# Patient Record
Sex: FEMALE | Race: BLACK OR AFRICAN AMERICAN | NOT HISPANIC OR LATINO | Employment: OTHER | RURAL
[De-identification: names, ages, dates, MRNs, and addresses within clinical notes are randomized per-mention and may not be internally consistent; named-entity substitution may affect disease eponyms.]

---

## 2017-02-23 ENCOUNTER — HISTORICAL (OUTPATIENT)
Dept: ADMINISTRATIVE | Facility: HOSPITAL | Age: 57
End: 2017-02-23

## 2017-02-24 LAB
LAB AP CLINICAL INFORMATION: NORMAL
LAB AP COMMENTS: NORMAL
LAB AP DIAGNOSIS - HISTORICAL: NORMAL
LAB AP GROSS PATHOLOGY - HISTORICAL: NORMAL
LAB AP SPECIMEN SUBMITTED - HISTORICAL: NORMAL

## 2017-10-04 ENCOUNTER — HISTORICAL (OUTPATIENT)
Dept: ADMINISTRATIVE | Facility: HOSPITAL | Age: 57
End: 2017-10-04

## 2017-10-05 ENCOUNTER — HISTORICAL (OUTPATIENT)
Dept: ADMINISTRATIVE | Facility: HOSPITAL | Age: 57
End: 2017-10-05

## 2017-10-05 LAB
LAB AP CLINICAL INFORMATION: NORMAL
LAB AP DIAGNOSIS - HISTORICAL: NORMAL
LAB AP GROSS PATHOLOGY - HISTORICAL: NORMAL
LAB AP SPECIMEN SUBMITTED - HISTORICAL: NORMAL

## 2019-04-05 LAB — HIV 1 AB: NORMAL

## 2020-01-28 ENCOUNTER — HISTORICAL (OUTPATIENT)
Dept: ADMINISTRATIVE | Facility: HOSPITAL | Age: 60
End: 2020-01-28

## 2020-06-30 ENCOUNTER — HISTORICAL (OUTPATIENT)
Dept: ADMINISTRATIVE | Facility: HOSPITAL | Age: 60
End: 2020-06-30

## 2020-09-17 ENCOUNTER — HISTORICAL (OUTPATIENT)
Dept: ADMINISTRATIVE | Facility: HOSPITAL | Age: 60
End: 2020-09-17

## 2020-10-21 ENCOUNTER — HISTORICAL (OUTPATIENT)
Dept: ADMINISTRATIVE | Facility: HOSPITAL | Age: 60
End: 2020-10-21

## 2020-10-21 LAB
ALBUMIN SERPL BCP-MCNC: 3.8 G/DL (ref 3.5–5)
ALBUMIN/GLOB SERPL: 1.1 {RATIO}
ALP SERPL-CCNC: 186 U/L (ref 46–118)
ALT SERPL W P-5'-P-CCNC: 26 U/L (ref 13–56)
ANION GAP SERPL CALCULATED.3IONS-SCNC: 8.6 MMOL/L (ref 7–16)
AST SERPL W P-5'-P-CCNC: 25 U/L (ref 15–37)
BILIRUB SERPL-MCNC: 0.8 MG/DL (ref 0–1.2)
BUN SERPL-MCNC: 10 MG/DL (ref 7–18)
BUN/CREAT SERPL: 11
CALCIUM SERPL-MCNC: 9.6 MG/DL (ref 8.5–10.1)
CHLORIDE SERPL-SCNC: 103 MMOL/L (ref 98–107)
CO2 SERPL-SCNC: 31 MMOL/L (ref 21–32)
CREAT SERPL-MCNC: 0.91 MG/DL (ref 0.5–1.02)
GLOBULIN SER-MCNC: 3.6 G/DL (ref 2–4)
GLUCOSE SERPL-MCNC: 111 MG/DL (ref 74–106)
POTASSIUM SERPL-SCNC: 3.6 MMOL/L (ref 3.5–5.1)
PROT SERPL-MCNC: 7.4 G/DL (ref 6.4–8.2)
SODIUM SERPL-SCNC: 139 MMOL/L (ref 136–145)

## 2020-10-30 ENCOUNTER — HISTORICAL (OUTPATIENT)
Dept: ADMINISTRATIVE | Facility: HOSPITAL | Age: 60
End: 2020-10-30

## 2020-11-20 ENCOUNTER — HISTORICAL (OUTPATIENT)
Dept: ADMINISTRATIVE | Facility: HOSPITAL | Age: 60
End: 2020-11-20

## 2021-03-12 VITALS
DIASTOLIC BLOOD PRESSURE: 90 MMHG | RESPIRATION RATE: 18 BRPM | OXYGEN SATURATION: 97 % | WEIGHT: 194 LBS | SYSTOLIC BLOOD PRESSURE: 124 MMHG | HEIGHT: 66 IN | TEMPERATURE: 97 F | BODY MASS INDEX: 31.18 KG/M2 | HEART RATE: 94 BPM

## 2021-03-12 RX ORDER — ALBUTEROL SULFATE 90 UG/1
2 AEROSOL, METERED RESPIRATORY (INHALATION) EVERY 4 HOURS PRN
COMMUNITY
End: 2021-06-15 | Stop reason: SDUPTHER

## 2021-03-12 RX ORDER — FLUTICASONE PROPIONATE 220 UG/1
AEROSOL, METERED RESPIRATORY (INHALATION) 2 TIMES DAILY
COMMUNITY
End: 2022-03-11 | Stop reason: SDUPTHER

## 2021-03-12 RX ORDER — HYDROCHLOROTHIAZIDE 25 MG/1
25 TABLET ORAL DAILY
COMMUNITY
End: 2021-04-16 | Stop reason: SDUPTHER

## 2021-03-12 RX ORDER — ESOMEPRAZOLE MAGNESIUM 40 MG/1
40 GRANULE, DELAYED RELEASE ORAL
COMMUNITY
End: 2021-04-07

## 2021-03-12 RX ORDER — GABAPENTIN 100 MG/1
100 CAPSULE ORAL 3 TIMES DAILY
COMMUNITY
End: 2022-06-10 | Stop reason: SDUPTHER

## 2021-03-12 RX ORDER — SERTRALINE HYDROCHLORIDE 50 MG/1
50 TABLET, FILM COATED ORAL DAILY
COMMUNITY
End: 2021-03-18 | Stop reason: SDUPTHER

## 2021-03-12 RX ORDER — AZELASTINE 1 MG/ML
2 SPRAY, METERED NASAL 2 TIMES DAILY
COMMUNITY
End: 2022-10-07 | Stop reason: SDUPTHER

## 2021-03-12 RX ORDER — BUPROPION HYDROCHLORIDE 150 MG/1
150 TABLET ORAL DAILY
COMMUNITY
End: 2021-07-15 | Stop reason: SDUPTHER

## 2021-03-12 RX ORDER — FLUTICASONE PROPIONATE 50 MCG
2 SPRAY, SUSPENSION (ML) NASAL DAILY
COMMUNITY
End: 2022-03-11 | Stop reason: SDUPTHER

## 2021-03-12 RX ORDER — HYDROCODONE BITARTRATE AND ACETAMINOPHEN 10; 325 MG/1; MG/1
1 TABLET ORAL
COMMUNITY
End: 2021-03-18 | Stop reason: SDUPTHER

## 2021-03-12 RX ORDER — DICLOFENAC SODIUM 10 MG/G
1 GEL TOPICAL 4 TIMES DAILY
COMMUNITY
End: 2022-10-07 | Stop reason: SDUPTHER

## 2021-03-12 RX ORDER — LUBIPROSTONE 24 UG/1
24 CAPSULE ORAL 2 TIMES DAILY
COMMUNITY
End: 2021-06-15 | Stop reason: SDUPTHER

## 2021-03-12 RX ORDER — ATORVASTATIN CALCIUM 40 MG/1
40 TABLET, FILM COATED ORAL NIGHTLY
COMMUNITY
End: 2021-07-15

## 2021-03-12 RX ORDER — FLUTICASONE PROPIONATE 50 UG/1
POWDER, METERED RESPIRATORY (INHALATION)
COMMUNITY
End: 2021-06-15 | Stop reason: SDUPTHER

## 2021-03-12 RX ORDER — LEVOCETIRIZINE DIHYDROCHLORIDE 5 MG/1
5 TABLET, FILM COATED ORAL NIGHTLY
COMMUNITY
End: 2021-10-18

## 2021-03-12 RX ORDER — ALBUTEROL SULFATE 0.83 MG/ML
2.5 SOLUTION RESPIRATORY (INHALATION) 4 TIMES DAILY PRN
COMMUNITY
End: 2021-06-15 | Stop reason: SDUPTHER

## 2021-03-18 ENCOUNTER — OFFICE VISIT (OUTPATIENT)
Dept: FAMILY MEDICINE | Facility: CLINIC | Age: 61
End: 2021-03-18
Payer: COMMERCIAL

## 2021-03-18 VITALS
SYSTOLIC BLOOD PRESSURE: 136 MMHG | HEIGHT: 66 IN | WEIGHT: 198 LBS | OXYGEN SATURATION: 98 % | RESPIRATION RATE: 18 BRPM | BODY MASS INDEX: 31.82 KG/M2 | TEMPERATURE: 99 F | HEART RATE: 74 BPM | DIASTOLIC BLOOD PRESSURE: 82 MMHG

## 2021-03-18 DIAGNOSIS — M54.9 BACK PAIN, UNSPECIFIED BACK LOCATION, UNSPECIFIED BACK PAIN LATERALITY, UNSPECIFIED CHRONICITY: Primary | ICD-10-CM

## 2021-03-18 DIAGNOSIS — M17.0 BILATERAL PRIMARY OSTEOARTHRITIS OF KNEE: ICD-10-CM

## 2021-03-18 DIAGNOSIS — F32.A DEPRESSION, UNSPECIFIED DEPRESSION TYPE: ICD-10-CM

## 2021-03-18 DIAGNOSIS — G89.4 CHRONIC PAIN SYNDROME: ICD-10-CM

## 2021-03-18 DIAGNOSIS — Z79.891 LONG-TERM CURRENT USE OF OPIATE ANALGESIC: ICD-10-CM

## 2021-03-18 DIAGNOSIS — F17.200 TOBACCO DEPENDENCE SYNDROME: ICD-10-CM

## 2021-03-18 PROCEDURE — 3008F BODY MASS INDEX DOCD: CPT | Mod: ,,, | Performed by: NURSE PRACTITIONER

## 2021-03-18 PROCEDURE — 80305 POCT URINE DRUG SCREEN PRESUMP: ICD-10-PCS | Mod: QW,,, | Performed by: NURSE PRACTITIONER

## 2021-03-18 PROCEDURE — 3008F PR BODY MASS INDEX (BMI) DOCUMENTED: ICD-10-PCS | Mod: ,,, | Performed by: NURSE PRACTITIONER

## 2021-03-18 PROCEDURE — 80305 DRUG TEST PRSMV DIR OPT OBS: CPT | Mod: QW,,, | Performed by: NURSE PRACTITIONER

## 2021-03-18 PROCEDURE — 99213 OFFICE O/P EST LOW 20 MIN: CPT | Mod: ,,, | Performed by: NURSE PRACTITIONER

## 2021-03-18 PROCEDURE — 1125F AMNT PAIN NOTED PAIN PRSNT: CPT | Mod: ,,, | Performed by: NURSE PRACTITIONER

## 2021-03-18 PROCEDURE — 1125F PR PAIN SEVERITY QUANTIFIED, PAIN PRESENT: ICD-10-PCS | Mod: ,,, | Performed by: NURSE PRACTITIONER

## 2021-03-18 PROCEDURE — 99213 PR OFFICE/OUTPT VISIT, EST, LEVL III, 20-29 MIN: ICD-10-PCS | Mod: ,,, | Performed by: NURSE PRACTITIONER

## 2021-03-18 RX ORDER — SERTRALINE HYDROCHLORIDE 50 MG/1
50 TABLET, FILM COATED ORAL DAILY
Qty: 90 TABLET | Refills: 1 | Status: SHIPPED | OUTPATIENT
Start: 2021-03-18 | End: 2021-11-15

## 2021-03-18 RX ORDER — HYDROCODONE BITARTRATE AND ACETAMINOPHEN 10; 325 MG/1; MG/1
1 TABLET ORAL EVERY 6 HOURS PRN
Qty: 120 TABLET | Refills: 0 | Status: SHIPPED | OUTPATIENT
Start: 2021-03-18 | End: 2021-04-16 | Stop reason: SDUPTHER

## 2021-03-19 LAB
CTP QC/QA: YES
POC (AMP) AMPHETAMINE: NEGATIVE
POC (BAR) BARBITURATES: NEGATIVE
POC (BUP) BUPRENORPHINE: NEGATIVE
POC (BZO) BENZODIAZEPINES: NEGATIVE
POC (COC) COCAINE: NEGATIVE
POC (MDMA) METHYLENEDIOXYMETHAMPHETAMINE 3,4: NEGATIVE
POC (MET) METHAMPHETAMINE: NEGATIVE
POC (MOP) OPIATES: ABNORMAL
POC (MTD) METHADONE: NEGATIVE
POC (OXY) OXYCODONE: NEGATIVE
POC (PCP) PHENCYCLIDINE: NEGATIVE
POC (TCA) TRICYCLIC ANTIDEPRESSANTS: NEGATIVE
POC THC: NEGATIVE

## 2021-04-16 ENCOUNTER — OFFICE VISIT (OUTPATIENT)
Dept: FAMILY MEDICINE | Facility: CLINIC | Age: 61
End: 2021-04-16
Payer: COMMERCIAL

## 2021-04-16 VITALS
TEMPERATURE: 99 F | DIASTOLIC BLOOD PRESSURE: 78 MMHG | WEIGHT: 202 LBS | OXYGEN SATURATION: 97 % | HEIGHT: 66 IN | RESPIRATION RATE: 18 BRPM | HEART RATE: 104 BPM | BODY MASS INDEX: 32.47 KG/M2 | SYSTOLIC BLOOD PRESSURE: 124 MMHG

## 2021-04-16 DIAGNOSIS — Z79.891 LONG TERM (CURRENT) USE OF OPIATE ANALGESIC: Primary | ICD-10-CM

## 2021-04-16 DIAGNOSIS — K21.9 GASTROESOPHAGEAL REFLUX DISEASE, UNSPECIFIED WHETHER ESOPHAGITIS PRESENT: ICD-10-CM

## 2021-04-16 DIAGNOSIS — E78.5 HYPERLIPIDEMIA, UNSPECIFIED HYPERLIPIDEMIA TYPE: ICD-10-CM

## 2021-04-16 DIAGNOSIS — M54.9 BACK PAIN, UNSPECIFIED BACK LOCATION, UNSPECIFIED BACK PAIN LATERALITY, UNSPECIFIED CHRONICITY: ICD-10-CM

## 2021-04-16 DIAGNOSIS — I10 HYPERTENSION, UNSPECIFIED TYPE: ICD-10-CM

## 2021-04-16 DIAGNOSIS — J44.9 CHRONIC OBSTRUCTIVE PULMONARY DISEASE, UNSPECIFIED COPD TYPE: ICD-10-CM

## 2021-04-16 DIAGNOSIS — M51.9 LUMBAR DISC DISEASE: ICD-10-CM

## 2021-04-16 LAB
CTP QC/QA: YES
POC (AMP) AMPHETAMINE: NEGATIVE
POC (BAR) BARBITURATES: NEGATIVE
POC (BUP) BUPRENORPHINE: NEGATIVE
POC (BZO) BENZODIAZEPINES: NEGATIVE
POC (COC) COCAINE: NEGATIVE
POC (MDMA) METHYLENEDIOXYMETHAMPHETAMINE 3,4: NEGATIVE
POC (MET) METHAMPHETAMINE: NEGATIVE
POC (MOP) OPIATES: NORMAL
POC (MTD) METHADONE: NEGATIVE
POC (OXY) OXYCODONE: NEGATIVE
POC (PCP) PHENCYCLIDINE: NEGATIVE
POC (TCA) TRICYCLIC ANTIDEPRESSANTS: NEGATIVE
POC TEMPERATURE (URINE): 92
POC THC: NEGATIVE

## 2021-04-16 PROCEDURE — 80305 POCT URINE DRUG SCREEN PRESUMP: ICD-10-PCS | Mod: QW,,, | Performed by: FAMILY MEDICINE

## 2021-04-16 PROCEDURE — 99213 PR OFFICE/OUTPT VISIT, EST, LEVL III, 20-29 MIN: ICD-10-PCS | Mod: ,,, | Performed by: FAMILY MEDICINE

## 2021-04-16 PROCEDURE — 99213 OFFICE O/P EST LOW 20 MIN: CPT | Mod: ,,, | Performed by: FAMILY MEDICINE

## 2021-04-16 PROCEDURE — 80305 DRUG TEST PRSMV DIR OPT OBS: CPT | Mod: QW,,, | Performed by: FAMILY MEDICINE

## 2021-04-16 RX ORDER — HYDROCODONE BITARTRATE AND ACETAMINOPHEN 10; 325 MG/1; MG/1
1 TABLET ORAL EVERY 6 HOURS PRN
Qty: 120 TABLET | Refills: 0 | Status: SHIPPED | OUTPATIENT
Start: 2021-04-16 | End: 2021-05-14 | Stop reason: SDUPTHER

## 2021-04-16 RX ORDER — HYDROCHLOROTHIAZIDE 25 MG/1
25 TABLET ORAL DAILY
Qty: 90 TABLET | Refills: 1 | Status: SHIPPED | OUTPATIENT
Start: 2021-04-16 | End: 2021-06-15 | Stop reason: SDUPTHER

## 2021-05-14 ENCOUNTER — OFFICE VISIT (OUTPATIENT)
Dept: FAMILY MEDICINE | Facility: CLINIC | Age: 61
End: 2021-05-14
Payer: COMMERCIAL

## 2021-05-14 VITALS
WEIGHT: 204 LBS | HEART RATE: 88 BPM | SYSTOLIC BLOOD PRESSURE: 124 MMHG | RESPIRATION RATE: 18 BRPM | BODY MASS INDEX: 32.78 KG/M2 | HEIGHT: 66 IN | OXYGEN SATURATION: 95 % | DIASTOLIC BLOOD PRESSURE: 90 MMHG

## 2021-05-14 DIAGNOSIS — G89.29 CHRONIC PAIN OF BOTH KNEES: ICD-10-CM

## 2021-05-14 DIAGNOSIS — M25.511 CHRONIC PAIN OF BOTH SHOULDERS: ICD-10-CM

## 2021-05-14 DIAGNOSIS — Z79.891 LONG TERM (CURRENT) USE OF OPIATE ANALGESIC: Primary | ICD-10-CM

## 2021-05-14 DIAGNOSIS — M51.9 LUMBAR DISC DISEASE: ICD-10-CM

## 2021-05-14 DIAGNOSIS — M25.562 CHRONIC PAIN OF BOTH KNEES: ICD-10-CM

## 2021-05-14 DIAGNOSIS — M25.512 CHRONIC PAIN OF BOTH SHOULDERS: ICD-10-CM

## 2021-05-14 DIAGNOSIS — M25.561 CHRONIC PAIN OF BOTH KNEES: ICD-10-CM

## 2021-05-14 DIAGNOSIS — M54.9 BACK PAIN, UNSPECIFIED BACK LOCATION, UNSPECIFIED BACK PAIN LATERALITY, UNSPECIFIED CHRONICITY: ICD-10-CM

## 2021-05-14 DIAGNOSIS — G89.29 CHRONIC PAIN OF BOTH SHOULDERS: ICD-10-CM

## 2021-05-14 LAB
CTP QC/QA: YES
POC (AMP) AMPHETAMINE: NEGATIVE
POC (BAR) BARBITURATES: NEGATIVE
POC (BUP) BUPRENORPHINE: NEGATIVE
POC (BZO) BENZODIAZEPINES: NEGATIVE
POC (COC) COCAINE: NEGATIVE
POC (MDMA) METHYLENEDIOXYMETHAMPHETAMINE 3,4: NEGATIVE
POC (MET) METHAMPHETAMINE: NEGATIVE
POC (MOP) OPIATES: ABNORMAL
POC (MTD) METHADONE: NEGATIVE
POC (OXY) OXYCODONE: NEGATIVE
POC (PCP) PHENCYCLIDINE: NEGATIVE
POC (TCA) TRICYCLIC ANTIDEPRESSANTS: NEGATIVE
POC TEMPERATURE (URINE): 94
POC THC: NEGATIVE

## 2021-05-14 PROCEDURE — 1125F AMNT PAIN NOTED PAIN PRSNT: CPT | Mod: ,,, | Performed by: NURSE PRACTITIONER

## 2021-05-14 PROCEDURE — 99213 OFFICE O/P EST LOW 20 MIN: CPT | Mod: ,,, | Performed by: NURSE PRACTITIONER

## 2021-05-14 PROCEDURE — 80305 POCT URINE DRUG SCREEN PRESUMP: ICD-10-PCS | Mod: QW,,, | Performed by: NURSE PRACTITIONER

## 2021-05-14 PROCEDURE — 99213 PR OFFICE/OUTPT VISIT, EST, LEVL III, 20-29 MIN: ICD-10-PCS | Mod: ,,, | Performed by: NURSE PRACTITIONER

## 2021-05-14 PROCEDURE — 3008F PR BODY MASS INDEX (BMI) DOCUMENTED: ICD-10-PCS | Mod: ,,, | Performed by: NURSE PRACTITIONER

## 2021-05-14 PROCEDURE — 1125F PR PAIN SEVERITY QUANTIFIED, PAIN PRESENT: ICD-10-PCS | Mod: ,,, | Performed by: NURSE PRACTITIONER

## 2021-05-14 PROCEDURE — 80305 DRUG TEST PRSMV DIR OPT OBS: CPT | Mod: QW,,, | Performed by: NURSE PRACTITIONER

## 2021-05-14 PROCEDURE — 3008F BODY MASS INDEX DOCD: CPT | Mod: ,,, | Performed by: NURSE PRACTITIONER

## 2021-05-14 RX ORDER — HYDROCODONE BITARTRATE AND ACETAMINOPHEN 10; 325 MG/1; MG/1
1 TABLET ORAL EVERY 6 HOURS PRN
Qty: 120 TABLET | Refills: 0 | Status: SHIPPED | OUTPATIENT
Start: 2021-05-14 | End: 2021-06-15 | Stop reason: SDUPTHER

## 2021-06-15 ENCOUNTER — OFFICE VISIT (OUTPATIENT)
Dept: FAMILY MEDICINE | Facility: CLINIC | Age: 61
End: 2021-06-15
Payer: MEDICARE

## 2021-06-15 VITALS
SYSTOLIC BLOOD PRESSURE: 138 MMHG | OXYGEN SATURATION: 96 % | HEIGHT: 66 IN | TEMPERATURE: 99 F | RESPIRATION RATE: 18 BRPM | WEIGHT: 199 LBS | BODY MASS INDEX: 31.98 KG/M2 | HEART RATE: 99 BPM | DIASTOLIC BLOOD PRESSURE: 90 MMHG

## 2021-06-15 DIAGNOSIS — M25.512 CHRONIC LEFT SHOULDER PAIN: ICD-10-CM

## 2021-06-15 DIAGNOSIS — F17.200 SMOKER: ICD-10-CM

## 2021-06-15 DIAGNOSIS — Z79.891 LONG TERM (CURRENT) USE OF OPIATE ANALGESIC: ICD-10-CM

## 2021-06-15 DIAGNOSIS — K59.04 CHRONIC IDIOPATHIC CONSTIPATION: ICD-10-CM

## 2021-06-15 DIAGNOSIS — I10 HYPERTENSION, UNSPECIFIED TYPE: Primary | ICD-10-CM

## 2021-06-15 DIAGNOSIS — M51.9 LUMBAR DISC DISEASE: ICD-10-CM

## 2021-06-15 DIAGNOSIS — G89.29 CHRONIC LEFT SHOULDER PAIN: ICD-10-CM

## 2021-06-15 DIAGNOSIS — M54.9 BACK PAIN, UNSPECIFIED BACK LOCATION, UNSPECIFIED BACK PAIN LATERALITY, UNSPECIFIED CHRONICITY: ICD-10-CM

## 2021-06-15 DIAGNOSIS — E78.5 DYSLIPIDEMIA: ICD-10-CM

## 2021-06-15 LAB
ALBUMIN SERPL BCP-MCNC: 4.2 G/DL (ref 3.5–5)
ALBUMIN/GLOB SERPL: 1.2 {RATIO}
ALP SERPL-CCNC: 211 U/L (ref 46–118)
ALT SERPL W P-5'-P-CCNC: 27 U/L (ref 13–56)
ANION GAP SERPL CALCULATED.3IONS-SCNC: 14 MMOL/L (ref 7–16)
AST SERPL W P-5'-P-CCNC: 24 U/L (ref 15–37)
BASOPHILS # BLD AUTO: 0.08 K/UL (ref 0–0.2)
BASOPHILS NFR BLD AUTO: 0.8 % (ref 0–1)
BILIRUB SERPL-MCNC: 0.7 MG/DL (ref 0–1.2)
BUN SERPL-MCNC: 12 MG/DL (ref 7–18)
BUN/CREAT SERPL: 15 (ref 6–20)
CALCIUM SERPL-MCNC: 9.4 MG/DL (ref 8.5–10.1)
CHLORIDE SERPL-SCNC: 101 MMOL/L (ref 98–107)
CHOLEST SERPL-MCNC: 184 MG/DL (ref 0–200)
CHOLEST/HDLC SERPL: 3 {RATIO}
CO2 SERPL-SCNC: 28 MMOL/L (ref 21–32)
CREAT SERPL-MCNC: 0.81 MG/DL (ref 0.55–1.02)
CTP QC/QA: YES
DIFFERENTIAL METHOD BLD: ABNORMAL
EOSINOPHIL # BLD AUTO: 0.2 K/UL (ref 0–0.5)
EOSINOPHIL NFR BLD AUTO: 2.1 % (ref 1–4)
ERYTHROCYTE [DISTWIDTH] IN BLOOD BY AUTOMATED COUNT: 14.5 % (ref 11.5–14.5)
GLOBULIN SER-MCNC: 3.4 G/DL (ref 2–4)
GLUCOSE SERPL-MCNC: 106 MG/DL (ref 74–106)
HCT VFR BLD AUTO: 41.9 % (ref 38–47)
HDLC SERPL-MCNC: 62 MG/DL (ref 40–60)
HGB BLD-MCNC: 13.6 G/DL (ref 12–16)
IMM GRANULOCYTES # BLD AUTO: 0.03 K/UL (ref 0–0.04)
IMM GRANULOCYTES NFR BLD: 0.3 % (ref 0–0.4)
LDLC SERPL CALC-MCNC: 108 MG/DL
LDLC/HDLC SERPL: 1.7 {RATIO}
LYMPHOCYTES # BLD AUTO: 2.94 K/UL (ref 1–4.8)
LYMPHOCYTES NFR BLD AUTO: 30.7 % (ref 27–41)
MCH RBC QN AUTO: 28.9 PG (ref 27–31)
MCHC RBC AUTO-ENTMCNC: 32.5 G/DL (ref 32–36)
MCV RBC AUTO: 89 FL (ref 80–96)
MONOCYTES # BLD AUTO: 0.79 K/UL (ref 0–0.8)
MONOCYTES NFR BLD AUTO: 8.2 % (ref 2–6)
MPC BLD CALC-MCNC: 10.1 FL (ref 9.4–12.4)
NEUTROPHILS # BLD AUTO: 5.55 K/UL (ref 1.8–7.7)
NEUTROPHILS NFR BLD AUTO: 57.9 % (ref 53–65)
NONHDLC SERPL-MCNC: 122 MG/DL
NRBC # BLD AUTO: 0 X10E3/UL
NRBC, AUTO (.00): 0 %
PLATELET # BLD AUTO: 299 K/UL (ref 150–400)
POC (AMP) AMPHETAMINE: NEGATIVE
POC (BAR) BARBITURATES: NEGATIVE
POC (BUP) BUPRENORPHINE: NEGATIVE
POC (BZO) BENZODIAZEPINES: NEGATIVE
POC (COC) COCAINE: NEGATIVE
POC (MDMA) METHYLENEDIOXYMETHAMPHETAMINE 3,4: NEGATIVE
POC (MET) METHAMPHETAMINE: NEGATIVE
POC (MOP) OPIATES: ABNORMAL
POC (MTD) METHADONE: NEGATIVE
POC (OXY) OXYCODONE: NEGATIVE
POC (PCP) PHENCYCLIDINE: NEGATIVE
POC (TCA) TRICYCLIC ANTIDEPRESSANTS: NEGATIVE
POC TEMPERATURE (URINE): 90
POC THC: NEGATIVE
POTASSIUM SERPL-SCNC: 3.7 MMOL/L (ref 3.5–5.1)
PROT SERPL-MCNC: 7.6 G/DL (ref 6.4–8.2)
RBC # BLD AUTO: 4.71 M/UL (ref 4.2–5.4)
SODIUM SERPL-SCNC: 139 MMOL/L (ref 136–145)
TRIGL SERPL-MCNC: 68 MG/DL (ref 35–150)
VLDLC SERPL-MCNC: 14 MG/DL
WBC # BLD AUTO: 9.59 K/UL (ref 4.5–11)

## 2021-06-15 PROCEDURE — 80305 POCT URINE DRUG SCREEN PRESUMP: ICD-10-PCS | Mod: QW,,, | Performed by: NURSE PRACTITIONER

## 2021-06-15 PROCEDURE — 80053 COMPREHENSIVE METABOLIC PANEL: ICD-10-PCS | Mod: ,,, | Performed by: CLINICAL MEDICAL LABORATORY

## 2021-06-15 PROCEDURE — 99214 PR OFFICE/OUTPT VISIT, EST, LEVL IV, 30-39 MIN: ICD-10-PCS | Mod: ,,, | Performed by: NURSE PRACTITIONER

## 2021-06-15 PROCEDURE — 80061 LIPID PANEL: ICD-10-PCS | Mod: ,,, | Performed by: CLINICAL MEDICAL LABORATORY

## 2021-06-15 PROCEDURE — 99214 OFFICE O/P EST MOD 30 MIN: CPT | Mod: ,,, | Performed by: NURSE PRACTITIONER

## 2021-06-15 PROCEDURE — 80053 COMPREHEN METABOLIC PANEL: CPT | Mod: ,,, | Performed by: CLINICAL MEDICAL LABORATORY

## 2021-06-15 PROCEDURE — 80061 LIPID PANEL: CPT | Mod: ,,, | Performed by: CLINICAL MEDICAL LABORATORY

## 2021-06-15 PROCEDURE — 85025 COMPLETE CBC W/AUTO DIFF WBC: CPT | Mod: ,,, | Performed by: CLINICAL MEDICAL LABORATORY

## 2021-06-15 PROCEDURE — 85025 CBC WITH DIFFERENTIAL: ICD-10-PCS | Mod: ,,, | Performed by: CLINICAL MEDICAL LABORATORY

## 2021-06-15 PROCEDURE — 80305 DRUG TEST PRSMV DIR OPT OBS: CPT | Mod: QW,,, | Performed by: NURSE PRACTITIONER

## 2021-06-15 RX ORDER — HYDROCHLOROTHIAZIDE 25 MG/1
25 TABLET ORAL DAILY
Qty: 90 TABLET | Refills: 1 | Status: SHIPPED | OUTPATIENT
Start: 2021-06-15 | End: 2021-07-15 | Stop reason: SDUPTHER

## 2021-06-15 RX ORDER — ALBUTEROL SULFATE 90 UG/1
2 AEROSOL, METERED RESPIRATORY (INHALATION) EVERY 4 HOURS PRN
Qty: 18 G | Refills: 5 | Status: SHIPPED | OUTPATIENT
Start: 2021-06-15 | End: 2022-03-11 | Stop reason: SDUPTHER

## 2021-06-15 RX ORDER — ALBUTEROL SULFATE 0.83 MG/ML
2.5 SOLUTION RESPIRATORY (INHALATION) 4 TIMES DAILY PRN
Qty: 100 EACH | Refills: 5 | Status: SHIPPED | OUTPATIENT
Start: 2021-06-15 | End: 2021-11-15 | Stop reason: SDUPTHER

## 2021-06-15 RX ORDER — HYDROCODONE BITARTRATE AND ACETAMINOPHEN 10; 325 MG/1; MG/1
1 TABLET ORAL EVERY 6 HOURS PRN
Qty: 120 TABLET | Refills: 0 | Status: SHIPPED | OUTPATIENT
Start: 2021-06-15 | End: 2021-07-15 | Stop reason: SDUPTHER

## 2021-06-18 ENCOUNTER — TELEPHONE (OUTPATIENT)
Dept: FAMILY MEDICINE | Facility: CLINIC | Age: 61
End: 2021-06-18

## 2021-06-25 PROBLEM — M25.512 CHRONIC LEFT SHOULDER PAIN: Status: ACTIVE | Noted: 2021-06-25

## 2021-06-25 PROBLEM — G89.29 CHRONIC LEFT SHOULDER PAIN: Status: ACTIVE | Noted: 2021-06-25

## 2021-07-12 PROBLEM — M25.812 OS ACROMIALE OF LEFT SHOULDER: Status: ACTIVE | Noted: 2021-07-12

## 2021-07-15 ENCOUNTER — OFFICE VISIT (OUTPATIENT)
Dept: FAMILY MEDICINE | Facility: CLINIC | Age: 61
End: 2021-07-15
Payer: COMMERCIAL

## 2021-07-15 VITALS
BODY MASS INDEX: 31.34 KG/M2 | WEIGHT: 195 LBS | OXYGEN SATURATION: 98 % | TEMPERATURE: 99 F | SYSTOLIC BLOOD PRESSURE: 122 MMHG | HEART RATE: 79 BPM | RESPIRATION RATE: 20 BRPM | DIASTOLIC BLOOD PRESSURE: 82 MMHG | HEIGHT: 66 IN

## 2021-07-15 DIAGNOSIS — M51.9 LUMBAR DISC DISEASE: ICD-10-CM

## 2021-07-15 DIAGNOSIS — G89.29 CHRONIC LEFT SHOULDER PAIN: ICD-10-CM

## 2021-07-15 DIAGNOSIS — F32.A DEPRESSION, UNSPECIFIED DEPRESSION TYPE: ICD-10-CM

## 2021-07-15 DIAGNOSIS — M25.512 CHRONIC LEFT SHOULDER PAIN: ICD-10-CM

## 2021-07-15 DIAGNOSIS — I10 HYPERTENSION, UNSPECIFIED TYPE: ICD-10-CM

## 2021-07-15 DIAGNOSIS — R74.8 ELEVATED LIVER ENZYMES: ICD-10-CM

## 2021-07-15 DIAGNOSIS — E78.5 DYSLIPIDEMIA: Primary | ICD-10-CM

## 2021-07-15 PROCEDURE — 99213 OFFICE O/P EST LOW 20 MIN: CPT | Mod: ,,, | Performed by: FAMILY MEDICINE

## 2021-07-15 PROCEDURE — 99213 PR OFFICE/OUTPT VISIT, EST, LEVL III, 20-29 MIN: ICD-10-PCS | Mod: ,,, | Performed by: FAMILY MEDICINE

## 2021-07-15 RX ORDER — HYDROCHLOROTHIAZIDE 25 MG/1
25 TABLET ORAL DAILY
Qty: 90 TABLET | Refills: 1 | Status: SHIPPED | OUTPATIENT
Start: 2021-07-15 | End: 2021-10-15 | Stop reason: SDUPTHER

## 2021-07-15 RX ORDER — HYDROCODONE BITARTRATE AND ACETAMINOPHEN 10; 325 MG/1; MG/1
1 TABLET ORAL EVERY 6 HOURS PRN
Qty: 120 TABLET | Refills: 0 | Status: SHIPPED | OUTPATIENT
Start: 2021-07-15 | End: 2021-08-16 | Stop reason: SDUPTHER

## 2021-07-15 RX ORDER — BUPROPION HYDROCHLORIDE 150 MG/1
150 TABLET ORAL DAILY
Qty: 90 TABLET | Refills: 1 | Status: SHIPPED | OUTPATIENT
Start: 2021-07-15 | End: 2022-05-11

## 2021-07-15 RX ORDER — ROSUVASTATIN CALCIUM 20 MG/1
20 TABLET, COATED ORAL DAILY
Qty: 90 TABLET | Refills: 3 | Status: SHIPPED | OUTPATIENT
Start: 2021-07-15 | End: 2022-07-04 | Stop reason: SDUPTHER

## 2021-07-16 PROCEDURE — 84080 ALKALINE PHOSPHATASE, ISOENZYMES: ICD-10-PCS | Mod: 90,,, | Performed by: CLINICAL MEDICAL LABORATORY

## 2021-07-16 PROCEDURE — 84075 ASSAY ALKALINE PHOSPHATASE: CPT | Mod: 90,,, | Performed by: CLINICAL MEDICAL LABORATORY

## 2021-07-16 PROCEDURE — 84080 ASSAY ALKALINE PHOSPHATASES: CPT | Mod: 90,,, | Performed by: CLINICAL MEDICAL LABORATORY

## 2021-07-16 PROCEDURE — 84075 ALKALINE PHOSPHATASE, ISOENZYMES: ICD-10-PCS | Mod: 90,,, | Performed by: CLINICAL MEDICAL LABORATORY

## 2021-07-20 ENCOUNTER — TELEPHONE (OUTPATIENT)
Dept: FAMILY MEDICINE | Facility: CLINIC | Age: 61
End: 2021-07-20

## 2021-07-20 LAB
ALP BONE CFR SERPL: 19.6 % (ref 19.1–67.7)
ALP BONE SERPL-CCNC: 39 IU/L (ref 12.1–42.7)
ALP INTEST CFR SERPL: 0 % (ref 0–20.6)
ALP INTEST SERPL-CCNC: 0 IU/L (ref 0–11)
ALP LIVER 1 CFR SERPL: 67.8 % (ref 27.8–76.3)
ALP LIVER 1 SERPL-CCNC: 134.9 IU/L (ref 16.2–70.2)
ALP LIVER 2 CFR SERPL: 12.6 % (ref 0–8)
ALP LIVER 2 SERPL-CCNC: 25.1 IU/L (ref 0–5.8)
ALP PLAC SERPL QL: ABNORMAL
ALP SERPL-CCNC: 199 U/L (ref 35–104)

## 2021-07-26 ENCOUNTER — OFFICE VISIT (OUTPATIENT)
Dept: GASTROENTEROLOGY | Facility: CLINIC | Age: 61
End: 2021-07-26
Payer: COMMERCIAL

## 2021-07-26 VITALS
SYSTOLIC BLOOD PRESSURE: 126 MMHG | BODY MASS INDEX: 31.34 KG/M2 | HEIGHT: 66 IN | RESPIRATION RATE: 18 BRPM | OXYGEN SATURATION: 100 % | HEART RATE: 66 BPM | WEIGHT: 195 LBS | DIASTOLIC BLOOD PRESSURE: 79 MMHG

## 2021-07-26 DIAGNOSIS — R12 HEARTBURN: ICD-10-CM

## 2021-07-26 DIAGNOSIS — R10.11 RUQ PAIN: ICD-10-CM

## 2021-07-26 DIAGNOSIS — K59.04 CHRONIC IDIOPATHIC CONSTIPATION: ICD-10-CM

## 2021-07-26 DIAGNOSIS — R74.8 ALKALINE PHOSPHATASE ELEVATION: Primary | ICD-10-CM

## 2021-07-26 PROCEDURE — 3074F PR MOST RECENT SYSTOLIC BLOOD PRESSURE < 130 MM HG: ICD-10-PCS | Mod: ,,, | Performed by: NURSE PRACTITIONER

## 2021-07-26 PROCEDURE — 99204 OFFICE O/P NEW MOD 45 MIN: CPT | Mod: ,,, | Performed by: NURSE PRACTITIONER

## 2021-07-26 PROCEDURE — 3008F PR BODY MASS INDEX (BMI) DOCUMENTED: ICD-10-PCS | Mod: ,,, | Performed by: NURSE PRACTITIONER

## 2021-07-26 PROCEDURE — 3074F SYST BP LT 130 MM HG: CPT | Mod: ,,, | Performed by: NURSE PRACTITIONER

## 2021-07-26 PROCEDURE — 3078F DIAST BP <80 MM HG: CPT | Mod: ,,, | Performed by: NURSE PRACTITIONER

## 2021-07-26 PROCEDURE — 99204 PR OFFICE/OUTPT VISIT, NEW, LEVL IV, 45-59 MIN: ICD-10-PCS | Mod: ,,, | Performed by: NURSE PRACTITIONER

## 2021-07-26 PROCEDURE — 3078F PR MOST RECENT DIASTOLIC BLOOD PRESSURE < 80 MM HG: ICD-10-PCS | Mod: ,,, | Performed by: NURSE PRACTITIONER

## 2021-07-26 PROCEDURE — 3008F BODY MASS INDEX DOCD: CPT | Mod: ,,, | Performed by: NURSE PRACTITIONER

## 2021-07-26 RX ORDER — PANTOPRAZOLE SODIUM 40 MG/1
40 TABLET, DELAYED RELEASE ORAL DAILY
Qty: 90 TABLET | Refills: 3 | Status: SHIPPED | OUTPATIENT
Start: 2021-07-26 | End: 2022-12-08 | Stop reason: SDUPTHER

## 2021-07-29 ENCOUNTER — TELEPHONE (OUTPATIENT)
Dept: GASTROENTEROLOGY | Facility: CLINIC | Age: 61
End: 2021-07-29

## 2021-07-29 ENCOUNTER — HOSPITAL ENCOUNTER (OUTPATIENT)
Dept: RADIOLOGY | Facility: HOSPITAL | Age: 61
Discharge: HOME | End: 2021-07-29
Attending: NURSE PRACTITIONER
Payer: COMMERCIAL

## 2021-07-29 DIAGNOSIS — R74.8 ALKALINE PHOSPHATASE ELEVATION: ICD-10-CM

## 2021-07-29 PROCEDURE — 76705 ECHO EXAM OF ABDOMEN: CPT | Mod: TC

## 2021-07-29 PROCEDURE — 91200 LIVER ELASTOGRAPHY: CPT | Mod: 26,,, | Performed by: RADIOLOGY

## 2021-07-29 PROCEDURE — 91200 LIVER ELASTOGRAPHY: CPT | Mod: TC

## 2021-07-29 PROCEDURE — 76705 US ABDOMEN LIMITED: ICD-10-PCS | Mod: 26,59,, | Performed by: RADIOLOGY

## 2021-07-29 PROCEDURE — 91200 US ELASTOGRAPHY LIVER: ICD-10-PCS | Mod: 26,,, | Performed by: RADIOLOGY

## 2021-07-29 PROCEDURE — 76705 ECHO EXAM OF ABDOMEN: CPT | Mod: 26,59,, | Performed by: RADIOLOGY

## 2021-08-04 DIAGNOSIS — R74.8 ABNORMAL LIVER ENZYMES: Primary | ICD-10-CM

## 2021-08-11 ENCOUNTER — HOSPITAL ENCOUNTER (OUTPATIENT)
Dept: RADIOLOGY | Facility: HOSPITAL | Age: 61
Discharge: HOME | End: 2021-08-11
Attending: NURSE PRACTITIONER
Payer: MEDICARE

## 2021-08-11 VITALS
HEART RATE: 65 BPM | RESPIRATION RATE: 16 BRPM | SYSTOLIC BLOOD PRESSURE: 124 MMHG | BODY MASS INDEX: 33.11 KG/M2 | OXYGEN SATURATION: 98 % | HEIGHT: 66 IN | DIASTOLIC BLOOD PRESSURE: 64 MMHG | TEMPERATURE: 98 F | WEIGHT: 206 LBS

## 2021-08-11 DIAGNOSIS — R74.8 ABNORMAL LIVER ENZYMES: ICD-10-CM

## 2021-08-11 LAB
APTT PPP: 29 SECONDS (ref 25.2–37.3)
BASOPHILS # BLD AUTO: 0.05 K/UL (ref 0–0.2)
BASOPHILS NFR BLD AUTO: 0.6 % (ref 0–1)
DIFFERENTIAL METHOD BLD: ABNORMAL
EOSINOPHIL # BLD AUTO: 0.2 K/UL (ref 0–0.5)
EOSINOPHIL NFR BLD AUTO: 2.3 % (ref 1–4)
ERYTHROCYTE [DISTWIDTH] IN BLOOD BY AUTOMATED COUNT: 13.7 % (ref 11.5–14.5)
HCT VFR BLD AUTO: 37.4 % (ref 38–47)
HGB BLD-MCNC: 12.4 G/DL (ref 12–16)
IMM GRANULOCYTES # BLD AUTO: 0.02 K/UL (ref 0–0.04)
IMM GRANULOCYTES NFR BLD: 0.2 % (ref 0–0.4)
INR BLD: 0.9 (ref 0.9–1.1)
LYMPHOCYTES # BLD AUTO: 2.73 K/UL (ref 1–4.8)
LYMPHOCYTES NFR BLD AUTO: 31 % (ref 27–41)
MCH RBC QN AUTO: 28.9 PG (ref 27–31)
MCHC RBC AUTO-ENTMCNC: 33.2 G/DL (ref 32–36)
MCV RBC AUTO: 87.2 FL (ref 80–96)
MONOCYTES # BLD AUTO: 0.79 K/UL (ref 0–0.8)
MONOCYTES NFR BLD AUTO: 9 % (ref 2–6)
MPC BLD CALC-MCNC: 9.3 FL (ref 9.4–12.4)
NEUTROPHILS # BLD AUTO: 5.03 K/UL (ref 1.8–7.7)
NEUTROPHILS NFR BLD AUTO: 56.9 % (ref 53–65)
NRBC # BLD AUTO: 0 X10E3/UL
NRBC, AUTO (.00): 0 %
PLATELET # BLD AUTO: 298 K/UL (ref 150–400)
PROTHROMBIN TIME: 12.2 SECONDS (ref 11.7–14.7)
RBC # BLD AUTO: 4.29 M/UL (ref 4.2–5.4)
WBC # BLD AUTO: 8.82 K/UL (ref 4.5–11)

## 2021-08-11 PROCEDURE — 85610 PROTHROMBIN TIME: CPT | Performed by: RADIOLOGY

## 2021-08-11 PROCEDURE — 99153 MOD SED SAME PHYS/QHP EA: CPT

## 2021-08-11 PROCEDURE — 88307 SURGICAL PATHOLOGY: ICD-10-PCS | Mod: 26,,, | Performed by: PATHOLOGY

## 2021-08-11 PROCEDURE — 88313 SURGICAL PATHOLOGY: ICD-10-PCS | Mod: 26,,, | Performed by: PATHOLOGY

## 2021-08-11 PROCEDURE — 85025 COMPLETE CBC W/AUTO DIFF WBC: CPT | Performed by: RADIOLOGY

## 2021-08-11 PROCEDURE — 77012 CT SCAN FOR NEEDLE BIOPSY: CPT | Mod: 26,,, | Performed by: RADIOLOGY

## 2021-08-11 PROCEDURE — 88313 SPECIAL STAINS GROUP 2: CPT | Mod: 26,,, | Performed by: PATHOLOGY

## 2021-08-11 PROCEDURE — 63600175 PHARM REV CODE 636 W HCPCS: Performed by: RADIOLOGY

## 2021-08-11 PROCEDURE — 36415 COLL VENOUS BLD VENIPUNCTURE: CPT | Performed by: RADIOLOGY

## 2021-08-11 PROCEDURE — 85730 THROMBOPLASTIN TIME PARTIAL: CPT | Performed by: RADIOLOGY

## 2021-08-11 PROCEDURE — 88313 SPECIAL STAINS GROUP 2: CPT | Mod: SUR | Performed by: RADIOLOGY

## 2021-08-11 PROCEDURE — 47000 CT BIOPSY LIVER (XPD): ICD-10-PCS | Mod: ,,, | Performed by: RADIOLOGY

## 2021-08-11 PROCEDURE — 25000003 PHARM REV CODE 250: Performed by: RADIOLOGY

## 2021-08-11 PROCEDURE — 47000 NEEDLE BIOPSY OF LIVER PERQ: CPT

## 2021-08-11 PROCEDURE — 99152 MOD SED SAME PHYS/QHP 5/>YRS: CPT

## 2021-08-11 PROCEDURE — 88307 TISSUE EXAM BY PATHOLOGIST: CPT | Mod: 26,,, | Performed by: PATHOLOGY

## 2021-08-11 PROCEDURE — 47000 NEEDLE BIOPSY OF LIVER PERQ: CPT | Mod: ,,, | Performed by: RADIOLOGY

## 2021-08-11 PROCEDURE — 77012 CT BIOPSY LIVER (XPD): ICD-10-PCS | Mod: 26,,, | Performed by: RADIOLOGY

## 2021-08-11 RX ORDER — SODIUM CHLORIDE 450 MG/100ML
INJECTION, SOLUTION INTRAVENOUS ONCE
Status: COMPLETED | OUTPATIENT
Start: 2021-08-11 | End: 2021-08-11

## 2021-08-11 RX ORDER — MIDAZOLAM HYDROCHLORIDE 1 MG/ML
INJECTION INTRAMUSCULAR; INTRAVENOUS CODE/TRAUMA/SEDATION MEDICATION
Status: COMPLETED | OUTPATIENT
Start: 2021-08-11 | End: 2021-08-11

## 2021-08-11 RX ORDER — FENTANYL CITRATE 50 UG/ML
INJECTION, SOLUTION INTRAMUSCULAR; INTRAVENOUS CODE/TRAUMA/SEDATION MEDICATION
Status: COMPLETED | OUTPATIENT
Start: 2021-08-11 | End: 2021-08-11

## 2021-08-11 RX ADMIN — SODIUM CHLORIDE: 4.5 INJECTION, SOLUTION INTRAVENOUS at 09:08

## 2021-08-11 RX ADMIN — MIDAZOLAM HYDROCHLORIDE 1 MG: 1 INJECTION, SOLUTION INTRAMUSCULAR; INTRAVENOUS at 10:08

## 2021-08-11 RX ADMIN — FENTANYL CITRATE 50 MCG: 50 INJECTION INTRAMUSCULAR; INTRAVENOUS at 10:08

## 2021-08-12 LAB
DHEA SERPL-MCNC: NORMAL
ESTROGEN SERPL-MCNC: NORMAL PG/ML
LAB AP CLINICAL INFORMATION: NORMAL
LAB AP GROSS DESCRIPTION: NORMAL
LAB AP LABORATORY NOTES: NORMAL
T3RU NFR SERPL: NORMAL %

## 2021-08-16 ENCOUNTER — OFFICE VISIT (OUTPATIENT)
Dept: FAMILY MEDICINE | Facility: CLINIC | Age: 61
End: 2021-08-16
Payer: MEDICARE

## 2021-08-16 VITALS
BODY MASS INDEX: 33.11 KG/M2 | HEART RATE: 81 BPM | OXYGEN SATURATION: 98 % | DIASTOLIC BLOOD PRESSURE: 80 MMHG | RESPIRATION RATE: 20 BRPM | WEIGHT: 206 LBS | SYSTOLIC BLOOD PRESSURE: 112 MMHG | HEIGHT: 66 IN

## 2021-08-16 DIAGNOSIS — G89.29 CHRONIC LEFT SHOULDER PAIN: ICD-10-CM

## 2021-08-16 DIAGNOSIS — M51.9 LUMBAR DISC DISEASE: ICD-10-CM

## 2021-08-16 DIAGNOSIS — M25.512 CHRONIC LEFT SHOULDER PAIN: ICD-10-CM

## 2021-08-16 PROCEDURE — 99213 PR OFFICE/OUTPT VISIT, EST, LEVL III, 20-29 MIN: ICD-10-PCS | Mod: ,,, | Performed by: FAMILY MEDICINE

## 2021-08-16 PROCEDURE — 99213 OFFICE O/P EST LOW 20 MIN: CPT | Mod: ,,, | Performed by: FAMILY MEDICINE

## 2021-08-16 RX ORDER — HYDROCODONE BITARTRATE AND ACETAMINOPHEN 10; 325 MG/1; MG/1
1 TABLET ORAL EVERY 6 HOURS PRN
Qty: 120 TABLET | Refills: 0 | Status: SHIPPED | OUTPATIENT
Start: 2021-08-16 | End: 2021-09-16 | Stop reason: SDUPTHER

## 2021-08-19 ENCOUNTER — TELEPHONE (OUTPATIENT)
Dept: GASTROENTEROLOGY | Facility: CLINIC | Age: 61
End: 2021-08-19

## 2021-09-16 ENCOUNTER — OFFICE VISIT (OUTPATIENT)
Dept: FAMILY MEDICINE | Facility: CLINIC | Age: 61
End: 2021-09-16
Payer: COMMERCIAL

## 2021-09-16 VITALS
SYSTOLIC BLOOD PRESSURE: 122 MMHG | WEIGHT: 205 LBS | RESPIRATION RATE: 18 BRPM | BODY MASS INDEX: 32.95 KG/M2 | HEART RATE: 89 BPM | TEMPERATURE: 99 F | DIASTOLIC BLOOD PRESSURE: 86 MMHG | HEIGHT: 66 IN | OXYGEN SATURATION: 97 %

## 2021-09-16 DIAGNOSIS — G89.29 CHRONIC LEFT SHOULDER PAIN: ICD-10-CM

## 2021-09-16 DIAGNOSIS — E78.5 HYPERLIPIDEMIA, UNSPECIFIED HYPERLIPIDEMIA TYPE: ICD-10-CM

## 2021-09-16 DIAGNOSIS — M25.512 CHRONIC LEFT SHOULDER PAIN: ICD-10-CM

## 2021-09-16 DIAGNOSIS — Z79.899 OTHER LONG TERM (CURRENT) DRUG THERAPY: ICD-10-CM

## 2021-09-16 DIAGNOSIS — Z79.891 LONG TERM (CURRENT) USE OF OPIATE ANALGESIC: Primary | ICD-10-CM

## 2021-09-16 DIAGNOSIS — Z23 NEED FOR PROPHYLACTIC VACCINATION AND INOCULATION AGAINST INFLUENZA: ICD-10-CM

## 2021-09-16 DIAGNOSIS — M51.9 LUMBAR DISC DISEASE: ICD-10-CM

## 2021-09-16 LAB
BASOPHILS # BLD AUTO: 0.04 K/UL (ref 0–0.2)
BASOPHILS NFR BLD AUTO: 0.5 % (ref 0–1)
CTP QC/QA: YES
DIFFERENTIAL METHOD BLD: ABNORMAL
EOSINOPHIL # BLD AUTO: 0.1 K/UL (ref 0–0.5)
EOSINOPHIL NFR BLD AUTO: 1.1 % (ref 1–4)
ERYTHROCYTE [DISTWIDTH] IN BLOOD BY AUTOMATED COUNT: 13.7 % (ref 11.5–14.5)
HCT VFR BLD AUTO: 40.3 % (ref 38–47)
HGB BLD-MCNC: 13.2 G/DL (ref 12–16)
IMM GRANULOCYTES # BLD AUTO: 0.02 K/UL (ref 0–0.04)
IMM GRANULOCYTES NFR BLD: 0.2 % (ref 0–0.4)
LYMPHOCYTES # BLD AUTO: 3.26 K/UL (ref 1–4.8)
LYMPHOCYTES NFR BLD AUTO: 37.3 % (ref 27–41)
MCH RBC QN AUTO: 28.9 PG (ref 27–31)
MCHC RBC AUTO-ENTMCNC: 32.8 G/DL (ref 32–36)
MCV RBC AUTO: 88.4 FL (ref 80–96)
MONOCYTES # BLD AUTO: 0.62 K/UL (ref 0–0.8)
MONOCYTES NFR BLD AUTO: 7.1 % (ref 2–6)
MPC BLD CALC-MCNC: 9.9 FL (ref 9.4–12.4)
NEUTROPHILS # BLD AUTO: 4.7 K/UL (ref 1.8–7.7)
NEUTROPHILS NFR BLD AUTO: 53.8 % (ref 53–65)
NRBC # BLD AUTO: 0 X10E3/UL
NRBC, AUTO (.00): 0 %
PLATELET # BLD AUTO: 315 K/UL (ref 150–400)
POC (AMP) AMPHETAMINE: NEGATIVE
POC (BAR) BARBITURATES: NEGATIVE
POC (BUP) BUPRENORPHINE: NEGATIVE
POC (BZO) BENZODIAZEPINES: NEGATIVE
POC (COC) COCAINE: NEGATIVE
POC (MDMA) METHYLENEDIOXYMETHAMPHETAMINE 3,4: NEGATIVE
POC (MET) METHAMPHETAMINE: NEGATIVE
POC (MOP) OPIATES: ABNORMAL
POC (MTD) METHADONE: NEGATIVE
POC (OXY) OXYCODONE: NEGATIVE
POC (PCP) PHENCYCLIDINE: NEGATIVE
POC (TCA) TRICYCLIC ANTIDEPRESSANTS: NEGATIVE
POC TEMPERATURE (URINE): 92
POC THC: NEGATIVE
RBC # BLD AUTO: 4.56 M/UL (ref 4.2–5.4)
WBC # BLD AUTO: 8.74 K/UL (ref 4.5–11)

## 2021-09-16 PROCEDURE — 80305 DRUG TEST PRSMV DIR OPT OBS: CPT | Mod: QW,,, | Performed by: FAMILY MEDICINE

## 2021-09-16 PROCEDURE — 99214 OFFICE O/P EST MOD 30 MIN: CPT | Mod: ,,, | Performed by: FAMILY MEDICINE

## 2021-09-16 PROCEDURE — 90686 IIV4 VACC NO PRSV 0.5 ML IM: CPT | Mod: ,,, | Performed by: FAMILY MEDICINE

## 2021-09-16 PROCEDURE — 90686 FLU VACCINE (QUAD) GREATER THAN OR EQUAL TO 3YO PRESERVATIVE FREE IM: ICD-10-PCS | Mod: ,,, | Performed by: FAMILY MEDICINE

## 2021-09-16 PROCEDURE — 85025 CBC WITH DIFFERENTIAL: ICD-10-PCS | Mod: ,,, | Performed by: CLINICAL MEDICAL LABORATORY

## 2021-09-16 PROCEDURE — 80053 COMPREHEN METABOLIC PANEL: CPT | Mod: ,,, | Performed by: CLINICAL MEDICAL LABORATORY

## 2021-09-16 PROCEDURE — 80053 COMPREHENSIVE METABOLIC PANEL: ICD-10-PCS | Mod: ,,, | Performed by: CLINICAL MEDICAL LABORATORY

## 2021-09-16 PROCEDURE — 80061 LIPID PANEL: ICD-10-PCS | Mod: ,,, | Performed by: CLINICAL MEDICAL LABORATORY

## 2021-09-16 PROCEDURE — 99214 PR OFFICE/OUTPT VISIT, EST, LEVL IV, 30-39 MIN: ICD-10-PCS | Mod: ,,, | Performed by: FAMILY MEDICINE

## 2021-09-16 PROCEDURE — 85025 COMPLETE CBC W/AUTO DIFF WBC: CPT | Mod: ,,, | Performed by: CLINICAL MEDICAL LABORATORY

## 2021-09-16 PROCEDURE — 80061 LIPID PANEL: CPT | Mod: ,,, | Performed by: CLINICAL MEDICAL LABORATORY

## 2021-09-16 PROCEDURE — G0008 ADMIN INFLUENZA VIRUS VAC: HCPCS | Mod: ,,, | Performed by: FAMILY MEDICINE

## 2021-09-16 PROCEDURE — 80305 DRUG TEST PRSMV DIR OPT OBS: CPT | Mod: RHCUB | Performed by: FAMILY MEDICINE

## 2021-09-16 PROCEDURE — G0008 FLU VACCINE (QUAD) GREATER THAN OR EQUAL TO 3YO PRESERVATIVE FREE IM: ICD-10-PCS | Mod: ,,, | Performed by: FAMILY MEDICINE

## 2021-09-16 PROCEDURE — 80305 PR DRUG TEST, PRESUMP,ANY NUMBER OF CLASS, DIRECT OPTICAL OBS: ICD-10-PCS | Mod: QW,,, | Performed by: FAMILY MEDICINE

## 2021-09-16 RX ORDER — HYDROCODONE BITARTRATE AND ACETAMINOPHEN 10; 325 MG/1; MG/1
1 TABLET ORAL EVERY 6 HOURS PRN
Qty: 120 TABLET | Refills: 0 | Status: SHIPPED | OUTPATIENT
Start: 2021-09-16 | End: 2021-09-16

## 2021-09-16 RX ORDER — HYDROCODONE BITARTRATE AND ACETAMINOPHEN 10; 325 MG/1; MG/1
1 TABLET ORAL EVERY 6 HOURS PRN
Qty: 90 TABLET | Refills: 0 | Status: SHIPPED | OUTPATIENT
Start: 2021-09-16 | End: 2021-10-15 | Stop reason: SDUPTHER

## 2021-09-17 ENCOUNTER — TELEPHONE (OUTPATIENT)
Dept: FAMILY MEDICINE | Facility: CLINIC | Age: 61
End: 2021-09-17

## 2021-09-17 LAB
ALBUMIN SERPL BCP-MCNC: 4.1 G/DL (ref 3.5–5)
ALBUMIN/GLOB SERPL: 1.1 {RATIO}
ALP SERPL-CCNC: 190 U/L (ref 46–118)
ALT SERPL W P-5'-P-CCNC: 24 U/L (ref 13–56)
ANION GAP SERPL CALCULATED.3IONS-SCNC: 12 MMOL/L (ref 7–16)
AST SERPL W P-5'-P-CCNC: 22 U/L (ref 15–37)
BILIRUB SERPL-MCNC: 0.9 MG/DL (ref 0–1.2)
BUN SERPL-MCNC: 11 MG/DL (ref 7–18)
BUN/CREAT SERPL: 12 (ref 6–20)
CALCIUM SERPL-MCNC: 9.5 MG/DL (ref 8.5–10.1)
CHLORIDE SERPL-SCNC: 102 MMOL/L (ref 98–107)
CHOLEST SERPL-MCNC: 152 MG/DL (ref 0–200)
CHOLEST/HDLC SERPL: 2.2 {RATIO}
CO2 SERPL-SCNC: 30 MMOL/L (ref 21–32)
CREAT SERPL-MCNC: 0.94 MG/DL (ref 0.55–1.02)
GLOBULIN SER-MCNC: 3.6 G/DL (ref 2–4)
GLUCOSE SERPL-MCNC: 103 MG/DL (ref 74–106)
HDLC SERPL-MCNC: 69 MG/DL (ref 40–60)
LDLC SERPL CALC-MCNC: 64 MG/DL
LDLC/HDLC SERPL: 0.9 {RATIO}
NONHDLC SERPL-MCNC: 83 MG/DL
POTASSIUM SERPL-SCNC: 3.6 MMOL/L (ref 3.5–5.1)
PROT SERPL-MCNC: 7.7 G/DL (ref 6.4–8.2)
SODIUM SERPL-SCNC: 140 MMOL/L (ref 136–145)
TRIGL SERPL-MCNC: 95 MG/DL (ref 35–150)
VLDLC SERPL-MCNC: 19 MG/DL

## 2021-09-23 ENCOUNTER — HOSPITAL ENCOUNTER (OUTPATIENT)
Dept: RADIOLOGY | Facility: HOSPITAL | Age: 61
Discharge: HOME | End: 2021-09-23
Payer: COMMERCIAL

## 2021-09-23 VITALS — WEIGHT: 205 LBS | BODY MASS INDEX: 32.95 KG/M2 | HEIGHT: 66 IN

## 2021-09-23 DIAGNOSIS — Z12.31 SCREENING MAMMOGRAM, ENCOUNTER FOR: ICD-10-CM

## 2021-09-23 PROCEDURE — 77067 SCR MAMMO BI INCL CAD: CPT | Mod: TC

## 2021-10-15 ENCOUNTER — OFFICE VISIT (OUTPATIENT)
Dept: FAMILY MEDICINE | Facility: CLINIC | Age: 61
End: 2021-10-15
Payer: COMMERCIAL

## 2021-10-15 VITALS
HEIGHT: 66 IN | DIASTOLIC BLOOD PRESSURE: 90 MMHG | HEART RATE: 82 BPM | TEMPERATURE: 98 F | RESPIRATION RATE: 18 BRPM | SYSTOLIC BLOOD PRESSURE: 134 MMHG | OXYGEN SATURATION: 98 % | WEIGHT: 205 LBS | BODY MASS INDEX: 32.95 KG/M2

## 2021-10-15 DIAGNOSIS — I10 HYPERTENSION, UNSPECIFIED TYPE: ICD-10-CM

## 2021-10-15 DIAGNOSIS — M25.512 CHRONIC LEFT SHOULDER PAIN: ICD-10-CM

## 2021-10-15 DIAGNOSIS — M51.9 LUMBAR DISC DISEASE: ICD-10-CM

## 2021-10-15 DIAGNOSIS — G89.29 CHRONIC LEFT SHOULDER PAIN: ICD-10-CM

## 2021-10-15 PROCEDURE — 3008F PR BODY MASS INDEX (BMI) DOCUMENTED: ICD-10-PCS | Mod: ,,, | Performed by: FAMILY MEDICINE

## 2021-10-15 PROCEDURE — 3075F SYST BP GE 130 - 139MM HG: CPT | Mod: ,,, | Performed by: FAMILY MEDICINE

## 2021-10-15 PROCEDURE — 1160F RVW MEDS BY RX/DR IN RCRD: CPT | Mod: ,,, | Performed by: FAMILY MEDICINE

## 2021-10-15 PROCEDURE — 1160F PR REVIEW ALL MEDS BY PRESCRIBER/CLIN PHARMACIST DOCUMENTED: ICD-10-PCS | Mod: ,,, | Performed by: FAMILY MEDICINE

## 2021-10-15 PROCEDURE — 99213 OFFICE O/P EST LOW 20 MIN: CPT | Mod: ,,, | Performed by: FAMILY MEDICINE

## 2021-10-15 PROCEDURE — 1159F PR MEDICATION LIST DOCUMENTED IN MEDICAL RECORD: ICD-10-PCS | Mod: ,,, | Performed by: FAMILY MEDICINE

## 2021-10-15 PROCEDURE — 1159F MED LIST DOCD IN RCRD: CPT | Mod: ,,, | Performed by: FAMILY MEDICINE

## 2021-10-15 PROCEDURE — 3008F BODY MASS INDEX DOCD: CPT | Mod: ,,, | Performed by: FAMILY MEDICINE

## 2021-10-15 PROCEDURE — 3075F PR MOST RECENT SYSTOLIC BLOOD PRESS GE 130-139MM HG: ICD-10-PCS | Mod: ,,, | Performed by: FAMILY MEDICINE

## 2021-10-15 PROCEDURE — 3080F PR MOST RECENT DIASTOLIC BLOOD PRESSURE >= 90 MM HG: ICD-10-PCS | Mod: ,,, | Performed by: FAMILY MEDICINE

## 2021-10-15 PROCEDURE — 99213 PR OFFICE/OUTPT VISIT, EST, LEVL III, 20-29 MIN: ICD-10-PCS | Mod: ,,, | Performed by: FAMILY MEDICINE

## 2021-10-15 PROCEDURE — 3080F DIAST BP >= 90 MM HG: CPT | Mod: ,,, | Performed by: FAMILY MEDICINE

## 2021-10-15 RX ORDER — HYDROCHLOROTHIAZIDE 25 MG/1
25 TABLET ORAL DAILY
Qty: 90 TABLET | Refills: 1 | Status: SHIPPED | OUTPATIENT
Start: 2021-10-15 | End: 2021-11-15 | Stop reason: SDUPTHER

## 2021-10-15 RX ORDER — HYDROCODONE BITARTRATE AND ACETAMINOPHEN 10; 325 MG/1; MG/1
1 TABLET ORAL EVERY 6 HOURS PRN
Qty: 90 TABLET | Refills: 0 | Status: SHIPPED | OUTPATIENT
Start: 2021-10-15 | End: 2021-11-15 | Stop reason: SDUPTHER

## 2021-10-26 ENCOUNTER — OFFICE VISIT (OUTPATIENT)
Dept: GASTROENTEROLOGY | Facility: CLINIC | Age: 61
End: 2021-10-26
Payer: COMMERCIAL

## 2021-10-26 VITALS
WEIGHT: 210 LBS | HEART RATE: 80 BPM | OXYGEN SATURATION: 98 % | RESPIRATION RATE: 18 BRPM | BODY MASS INDEX: 34.99 KG/M2 | SYSTOLIC BLOOD PRESSURE: 127 MMHG | HEIGHT: 65 IN | DIASTOLIC BLOOD PRESSURE: 84 MMHG

## 2021-10-26 DIAGNOSIS — K59.04 CHRONIC IDIOPATHIC CONSTIPATION: ICD-10-CM

## 2021-10-26 DIAGNOSIS — K21.9 GASTROESOPHAGEAL REFLUX DISEASE, UNSPECIFIED WHETHER ESOPHAGITIS PRESENT: ICD-10-CM

## 2021-10-26 DIAGNOSIS — R74.8 ELEVATED ALKALINE PHOSPHATASE LEVEL: Primary | ICD-10-CM

## 2021-10-26 PROCEDURE — 3074F SYST BP LT 130 MM HG: CPT | Mod: ,,, | Performed by: NURSE PRACTITIONER

## 2021-10-26 PROCEDURE — 3008F PR BODY MASS INDEX (BMI) DOCUMENTED: ICD-10-PCS | Mod: ,,, | Performed by: NURSE PRACTITIONER

## 2021-10-26 PROCEDURE — 99213 PR OFFICE/OUTPT VISIT, EST, LEVL III, 20-29 MIN: ICD-10-PCS | Mod: ,,, | Performed by: NURSE PRACTITIONER

## 2021-10-26 PROCEDURE — 3079F PR MOST RECENT DIASTOLIC BLOOD PRESSURE 80-89 MM HG: ICD-10-PCS | Mod: ,,, | Performed by: NURSE PRACTITIONER

## 2021-10-26 PROCEDURE — 99213 OFFICE O/P EST LOW 20 MIN: CPT | Mod: ,,, | Performed by: NURSE PRACTITIONER

## 2021-10-26 PROCEDURE — 3008F BODY MASS INDEX DOCD: CPT | Mod: ,,, | Performed by: NURSE PRACTITIONER

## 2021-10-26 PROCEDURE — 3079F DIAST BP 80-89 MM HG: CPT | Mod: ,,, | Performed by: NURSE PRACTITIONER

## 2021-10-26 PROCEDURE — 3074F PR MOST RECENT SYSTOLIC BLOOD PRESSURE < 130 MM HG: ICD-10-PCS | Mod: ,,, | Performed by: NURSE PRACTITIONER

## 2021-10-26 PROCEDURE — 1159F PR MEDICATION LIST DOCUMENTED IN MEDICAL RECORD: ICD-10-PCS | Mod: ,,, | Performed by: NURSE PRACTITIONER

## 2021-10-26 PROCEDURE — 1159F MED LIST DOCD IN RCRD: CPT | Mod: ,,, | Performed by: NURSE PRACTITIONER

## 2021-11-15 ENCOUNTER — OFFICE VISIT (OUTPATIENT)
Dept: FAMILY MEDICINE | Facility: CLINIC | Age: 61
End: 2021-11-15
Payer: COMMERCIAL

## 2021-11-15 VITALS
HEIGHT: 65 IN | WEIGHT: 207 LBS | RESPIRATION RATE: 18 BRPM | SYSTOLIC BLOOD PRESSURE: 104 MMHG | DIASTOLIC BLOOD PRESSURE: 72 MMHG | TEMPERATURE: 98 F | BODY MASS INDEX: 34.49 KG/M2 | HEART RATE: 72 BPM | OXYGEN SATURATION: 96 %

## 2021-11-15 DIAGNOSIS — J44.9 CHRONIC OBSTRUCTIVE PULMONARY DISEASE, UNSPECIFIED COPD TYPE: Primary | ICD-10-CM

## 2021-11-15 DIAGNOSIS — G89.29 CHRONIC LEFT SHOULDER PAIN: ICD-10-CM

## 2021-11-15 DIAGNOSIS — M51.9 LUMBAR DISC DISEASE: ICD-10-CM

## 2021-11-15 DIAGNOSIS — I10 HYPERTENSION, UNSPECIFIED TYPE: ICD-10-CM

## 2021-11-15 DIAGNOSIS — M25.512 CHRONIC LEFT SHOULDER PAIN: ICD-10-CM

## 2021-11-15 PROCEDURE — 1160F RVW MEDS BY RX/DR IN RCRD: CPT | Mod: ,,, | Performed by: FAMILY MEDICINE

## 2021-11-15 PROCEDURE — 3074F PR MOST RECENT SYSTOLIC BLOOD PRESSURE < 130 MM HG: ICD-10-PCS | Mod: ,,, | Performed by: FAMILY MEDICINE

## 2021-11-15 PROCEDURE — 1159F PR MEDICATION LIST DOCUMENTED IN MEDICAL RECORD: ICD-10-PCS | Mod: ,,, | Performed by: FAMILY MEDICINE

## 2021-11-15 PROCEDURE — 3078F PR MOST RECENT DIASTOLIC BLOOD PRESSURE < 80 MM HG: ICD-10-PCS | Mod: ,,, | Performed by: FAMILY MEDICINE

## 2021-11-15 PROCEDURE — 3078F DIAST BP <80 MM HG: CPT | Mod: ,,, | Performed by: FAMILY MEDICINE

## 2021-11-15 PROCEDURE — 3008F PR BODY MASS INDEX (BMI) DOCUMENTED: ICD-10-PCS | Mod: ,,, | Performed by: FAMILY MEDICINE

## 2021-11-15 PROCEDURE — 99213 OFFICE O/P EST LOW 20 MIN: CPT | Mod: ,,, | Performed by: FAMILY MEDICINE

## 2021-11-15 PROCEDURE — 99213 PR OFFICE/OUTPT VISIT, EST, LEVL III, 20-29 MIN: ICD-10-PCS | Mod: ,,, | Performed by: FAMILY MEDICINE

## 2021-11-15 PROCEDURE — 1160F PR REVIEW ALL MEDS BY PRESCRIBER/CLIN PHARMACIST DOCUMENTED: ICD-10-PCS | Mod: ,,, | Performed by: FAMILY MEDICINE

## 2021-11-15 PROCEDURE — 1159F MED LIST DOCD IN RCRD: CPT | Mod: ,,, | Performed by: FAMILY MEDICINE

## 2021-11-15 PROCEDURE — 3074F SYST BP LT 130 MM HG: CPT | Mod: ,,, | Performed by: FAMILY MEDICINE

## 2021-11-15 PROCEDURE — 3008F BODY MASS INDEX DOCD: CPT | Mod: ,,, | Performed by: FAMILY MEDICINE

## 2021-11-15 RX ORDER — HYDROCODONE BITARTRATE AND ACETAMINOPHEN 10; 325 MG/1; MG/1
1 TABLET ORAL EVERY 6 HOURS PRN
Qty: 90 TABLET | Refills: 0 | Status: SHIPPED | OUTPATIENT
Start: 2021-11-15 | End: 2021-12-13 | Stop reason: SDUPTHER

## 2021-11-15 RX ORDER — HYDROCHLOROTHIAZIDE 25 MG/1
25 TABLET ORAL DAILY
Qty: 90 TABLET | Refills: 1 | Status: SHIPPED | OUTPATIENT
Start: 2021-11-15 | End: 2022-01-13 | Stop reason: SDUPTHER

## 2021-11-15 RX ORDER — ALBUTEROL SULFATE 0.83 MG/ML
2.5 SOLUTION RESPIRATORY (INHALATION) 4 TIMES DAILY PRN
Qty: 100 EACH | Refills: 5 | Status: SHIPPED | OUTPATIENT
Start: 2021-11-15 | End: 2022-10-07 | Stop reason: SDUPTHER

## 2021-11-18 ENCOUNTER — OFFICE VISIT (OUTPATIENT)
Dept: PULMONOLOGY | Facility: CLINIC | Age: 61
End: 2021-11-18
Payer: COMMERCIAL

## 2021-11-18 VITALS
HEART RATE: 72 BPM | SYSTOLIC BLOOD PRESSURE: 122 MMHG | WEIGHT: 207 LBS | RESPIRATION RATE: 16 BRPM | DIASTOLIC BLOOD PRESSURE: 82 MMHG | BODY MASS INDEX: 33.27 KG/M2 | OXYGEN SATURATION: 99 % | HEIGHT: 66 IN

## 2021-11-18 DIAGNOSIS — J44.9 CHRONIC OBSTRUCTIVE PULMONARY DISEASE, UNSPECIFIED COPD TYPE: Primary | ICD-10-CM

## 2021-11-18 PROCEDURE — 3074F SYST BP LT 130 MM HG: CPT | Mod: CPTII,,, | Performed by: INTERNAL MEDICINE

## 2021-11-18 PROCEDURE — 1160F PR REVIEW ALL MEDS BY PRESCRIBER/CLIN PHARMACIST DOCUMENTED: ICD-10-PCS | Mod: CPTII,,, | Performed by: INTERNAL MEDICINE

## 2021-11-18 PROCEDURE — 3079F DIAST BP 80-89 MM HG: CPT | Mod: CPTII,,, | Performed by: INTERNAL MEDICINE

## 2021-11-18 PROCEDURE — 99213 PR OFFICE/OUTPT VISIT, EST, LEVL III, 20-29 MIN: ICD-10-PCS | Mod: S$PBB,,, | Performed by: INTERNAL MEDICINE

## 2021-11-18 PROCEDURE — 3079F PR MOST RECENT DIASTOLIC BLOOD PRESSURE 80-89 MM HG: ICD-10-PCS | Mod: CPTII,,, | Performed by: INTERNAL MEDICINE

## 2021-11-18 PROCEDURE — 3074F PR MOST RECENT SYSTOLIC BLOOD PRESSURE < 130 MM HG: ICD-10-PCS | Mod: CPTII,,, | Performed by: INTERNAL MEDICINE

## 2021-11-18 PROCEDURE — 1159F MED LIST DOCD IN RCRD: CPT | Mod: CPTII,,, | Performed by: INTERNAL MEDICINE

## 2021-11-18 PROCEDURE — 3008F BODY MASS INDEX DOCD: CPT | Mod: CPTII,,, | Performed by: INTERNAL MEDICINE

## 2021-11-18 PROCEDURE — 3008F PR BODY MASS INDEX (BMI) DOCUMENTED: ICD-10-PCS | Mod: CPTII,,, | Performed by: INTERNAL MEDICINE

## 2021-11-18 PROCEDURE — 99213 OFFICE O/P EST LOW 20 MIN: CPT | Mod: S$PBB,,, | Performed by: INTERNAL MEDICINE

## 2021-11-18 PROCEDURE — 1159F PR MEDICATION LIST DOCUMENTED IN MEDICAL RECORD: ICD-10-PCS | Mod: CPTII,,, | Performed by: INTERNAL MEDICINE

## 2021-11-18 PROCEDURE — 99214 OFFICE O/P EST MOD 30 MIN: CPT | Mod: PBBFAC | Performed by: INTERNAL MEDICINE

## 2021-11-18 PROCEDURE — 1160F RVW MEDS BY RX/DR IN RCRD: CPT | Mod: CPTII,,, | Performed by: INTERNAL MEDICINE

## 2021-12-13 ENCOUNTER — OFFICE VISIT (OUTPATIENT)
Dept: FAMILY MEDICINE | Facility: CLINIC | Age: 61
End: 2021-12-13
Payer: COMMERCIAL

## 2021-12-13 VITALS
BODY MASS INDEX: 34.87 KG/M2 | DIASTOLIC BLOOD PRESSURE: 72 MMHG | RESPIRATION RATE: 18 BRPM | HEART RATE: 78 BPM | WEIGHT: 217 LBS | HEIGHT: 66 IN | SYSTOLIC BLOOD PRESSURE: 120 MMHG | TEMPERATURE: 99 F | OXYGEN SATURATION: 99 %

## 2021-12-13 DIAGNOSIS — Z79.891 LONG TERM (CURRENT) USE OF OPIATE ANALGESIC: Primary | ICD-10-CM

## 2021-12-13 DIAGNOSIS — R60.0 EDEMA OF BOTH LEGS: ICD-10-CM

## 2021-12-13 DIAGNOSIS — M51.9 LUMBAR DISC DISEASE: ICD-10-CM

## 2021-12-13 DIAGNOSIS — R20.0 NUMBNESS AND TINGLING OF BOTH LEGS: ICD-10-CM

## 2021-12-13 DIAGNOSIS — R07.9 CHEST PAIN, UNSPECIFIED TYPE: ICD-10-CM

## 2021-12-13 DIAGNOSIS — R20.2 NUMBNESS AND TINGLING OF BOTH LEGS: ICD-10-CM

## 2021-12-13 DIAGNOSIS — G89.29 CHRONIC LEFT SHOULDER PAIN: ICD-10-CM

## 2021-12-13 DIAGNOSIS — M25.512 CHRONIC LEFT SHOULDER PAIN: ICD-10-CM

## 2021-12-13 LAB
CTP QC/QA: YES
EKG 12-LEAD: NORMAL
POC (AMP) AMPHETAMINE: NEGATIVE
POC (BAR) BARBITURATES: NEGATIVE
POC (BUP) BUPRENORPHINE: NEGATIVE
POC (BZO) BENZODIAZEPINES: NEGATIVE
POC (COC) COCAINE: NEGATIVE
POC (MDMA) METHYLENEDIOXYMETHAMPHETAMINE 3,4: NEGATIVE
POC (MET) METHAMPHETAMINE: NEGATIVE
POC (MOP) OPIATES: ABNORMAL
POC (MTD) METHADONE: NEGATIVE
POC (OXY) OXYCODONE: NEGATIVE
POC (PCP) PHENCYCLIDINE: NEGATIVE
POC (TCA) TRICYCLIC ANTIDEPRESSANTS: NEGATIVE
POC TEMPERATURE (URINE): 90
POC THC: NEGATIVE
PR INTERVAL: NORMAL
PRT AXES: NORMAL
QRS DURATION: NORMAL
QT/QTC: NORMAL
VENTRICULAR RATE: NORMAL

## 2021-12-13 PROCEDURE — 3074F SYST BP LT 130 MM HG: CPT | Mod: ,,, | Performed by: FAMILY MEDICINE

## 2021-12-13 PROCEDURE — 82607 VITAMIN B12: ICD-10-PCS | Mod: ,,, | Performed by: CLINICAL MEDICAL LABORATORY

## 2021-12-13 PROCEDURE — 80053 COMPREHENSIVE METABOLIC PANEL: ICD-10-PCS | Mod: ,,, | Performed by: CLINICAL MEDICAL LABORATORY

## 2021-12-13 PROCEDURE — 99214 OFFICE O/P EST MOD 30 MIN: CPT | Mod: ,,, | Performed by: FAMILY MEDICINE

## 2021-12-13 PROCEDURE — 3078F DIAST BP <80 MM HG: CPT | Mod: ,,, | Performed by: FAMILY MEDICINE

## 2021-12-13 PROCEDURE — 82607 VITAMIN B-12: CPT | Mod: ,,, | Performed by: CLINICAL MEDICAL LABORATORY

## 2021-12-13 PROCEDURE — 3078F PR MOST RECENT DIASTOLIC BLOOD PRESSURE < 80 MM HG: ICD-10-PCS | Mod: ,,, | Performed by: FAMILY MEDICINE

## 2021-12-13 PROCEDURE — 3008F PR BODY MASS INDEX (BMI) DOCUMENTED: ICD-10-PCS | Mod: ,,, | Performed by: FAMILY MEDICINE

## 2021-12-13 PROCEDURE — 80053 COMPREHEN METABOLIC PANEL: CPT | Mod: ,,, | Performed by: CLINICAL MEDICAL LABORATORY

## 2021-12-13 PROCEDURE — 80305 DRUG TEST PRSMV DIR OPT OBS: CPT | Mod: QW,,, | Performed by: FAMILY MEDICINE

## 2021-12-13 PROCEDURE — 99214 PR OFFICE/OUTPT VISIT, EST, LEVL IV, 30-39 MIN: ICD-10-PCS | Mod: ,,, | Performed by: FAMILY MEDICINE

## 2021-12-13 PROCEDURE — 80305 POCT URINE DRUG SCREEN PRESUMP: ICD-10-PCS | Mod: QW,,, | Performed by: FAMILY MEDICINE

## 2021-12-13 PROCEDURE — 93005 ELECTROCARDIOGRAM TRACING: CPT | Mod: ,,, | Performed by: FAMILY MEDICINE

## 2021-12-13 PROCEDURE — 93010 POCT EKG 12-LEAD: ICD-10-PCS | Mod: ,,, | Performed by: FAMILY MEDICINE

## 2021-12-13 PROCEDURE — 93010 ELECTROCARDIOGRAM REPORT: CPT | Mod: ,,, | Performed by: FAMILY MEDICINE

## 2021-12-13 PROCEDURE — 93005 PR ELECTROCARDIOGRAM, TRACING: ICD-10-PCS | Mod: ,,, | Performed by: FAMILY MEDICINE

## 2021-12-13 PROCEDURE — 3008F BODY MASS INDEX DOCD: CPT | Mod: ,,, | Performed by: FAMILY MEDICINE

## 2021-12-13 PROCEDURE — 3074F PR MOST RECENT SYSTOLIC BLOOD PRESSURE < 130 MM HG: ICD-10-PCS | Mod: ,,, | Performed by: FAMILY MEDICINE

## 2021-12-13 RX ORDER — HYDROCODONE BITARTRATE AND ACETAMINOPHEN 10; 325 MG/1; MG/1
1 TABLET ORAL EVERY 6 HOURS PRN
Qty: 90 TABLET | Refills: 0 | Status: SHIPPED | OUTPATIENT
Start: 2021-12-13 | End: 2022-01-13 | Stop reason: SDUPTHER

## 2021-12-14 LAB
ALBUMIN SERPL BCP-MCNC: 3.7 G/DL (ref 3.5–5)
ALBUMIN/GLOB SERPL: 1 {RATIO}
ALP SERPL-CCNC: 158 U/L (ref 50–130)
ALT SERPL W P-5'-P-CCNC: 29 U/L (ref 13–56)
ANION GAP SERPL CALCULATED.3IONS-SCNC: 9 MMOL/L (ref 7–16)
AST SERPL W P-5'-P-CCNC: 26 U/L (ref 15–37)
BILIRUB SERPL-MCNC: 0.8 MG/DL (ref 0–1.2)
BUN SERPL-MCNC: 14 MG/DL (ref 7–18)
BUN/CREAT SERPL: 17 (ref 6–20)
CALCIUM SERPL-MCNC: 8.9 MG/DL (ref 8.5–10.1)
CHLORIDE SERPL-SCNC: 107 MMOL/L (ref 98–107)
CO2 SERPL-SCNC: 31 MMOL/L (ref 21–32)
CREAT SERPL-MCNC: 0.83 MG/DL (ref 0.55–1.02)
GLOBULIN SER-MCNC: 3.6 G/DL (ref 2–4)
GLUCOSE SERPL-MCNC: 85 MG/DL (ref 74–106)
POTASSIUM SERPL-SCNC: 3.5 MMOL/L (ref 3.5–5.1)
PROT SERPL-MCNC: 7.3 G/DL (ref 6.4–8.2)
SODIUM SERPL-SCNC: 143 MMOL/L (ref 136–145)
VIT B12 SERPL-MCNC: 775 PG/ML (ref 193–986)

## 2021-12-16 ENCOUNTER — OFFICE VISIT (OUTPATIENT)
Dept: CARDIOLOGY | Facility: CLINIC | Age: 61
End: 2021-12-16
Payer: COMMERCIAL

## 2021-12-16 VITALS
SYSTOLIC BLOOD PRESSURE: 124 MMHG | HEART RATE: 80 BPM | BODY MASS INDEX: 34.79 KG/M2 | RESPIRATION RATE: 16 BRPM | WEIGHT: 216.5 LBS | HEIGHT: 66 IN | DIASTOLIC BLOOD PRESSURE: 80 MMHG

## 2021-12-16 DIAGNOSIS — R07.9 CHEST PAIN, UNSPECIFIED TYPE: Primary | ICD-10-CM

## 2021-12-16 DIAGNOSIS — I10 HYPERTENSION, UNSPECIFIED TYPE: Chronic | ICD-10-CM

## 2021-12-16 DIAGNOSIS — E78.5 HYPERLIPIDEMIA, UNSPECIFIED HYPERLIPIDEMIA TYPE: Chronic | ICD-10-CM

## 2021-12-16 PROCEDURE — 99213 PR OFFICE/OUTPT VISIT, EST, LEVL III, 20-29 MIN: ICD-10-PCS | Mod: S$PBB,,, | Performed by: INTERNAL MEDICINE

## 2021-12-16 PROCEDURE — 99213 OFFICE O/P EST LOW 20 MIN: CPT | Mod: S$PBB,,, | Performed by: INTERNAL MEDICINE

## 2021-12-16 PROCEDURE — 99215 OFFICE O/P EST HI 40 MIN: CPT | Mod: PBBFAC | Performed by: INTERNAL MEDICINE

## 2021-12-16 PROCEDURE — 93005 ELECTROCARDIOGRAM TRACING: CPT | Mod: PBBFAC | Performed by: INTERNAL MEDICINE

## 2021-12-16 PROCEDURE — 93010 ELECTROCARDIOGRAM REPORT: CPT | Mod: S$PBB,,, | Performed by: INTERNAL MEDICINE

## 2021-12-16 PROCEDURE — 93010 EKG 12-LEAD: ICD-10-PCS | Mod: S$PBB,,, | Performed by: INTERNAL MEDICINE

## 2021-12-21 PROBLEM — R07.9 CHEST PAIN: Status: ACTIVE | Noted: 2021-12-21

## 2021-12-27 ENCOUNTER — TELEPHONE (OUTPATIENT)
Dept: FAMILY MEDICINE | Facility: CLINIC | Age: 61
End: 2021-12-27
Payer: COMMERCIAL

## 2022-01-06 NOTE — TELEPHONE ENCOUNTER
----- Message from Nancie Dimas sent at 1/6/2022  1:05 PM CST -----  Regarding: MEDICATION REFILL  PATIENT SAYS SHE CALLED [facility] TO CHECK ON HER REFILL OF THEOPHYLLINE BUT WAS TOLD IT HAD NOT BEEN CALLED IN. PATIENT NEEDS REFILL ON THEOPHYLLINE.  PHONE NUMBER    [phone]

## 2022-01-13 ENCOUNTER — OFFICE VISIT (OUTPATIENT)
Dept: FAMILY MEDICINE | Facility: CLINIC | Age: 62
End: 2022-01-13
Payer: COMMERCIAL

## 2022-01-13 VITALS
OXYGEN SATURATION: 99 % | BODY MASS INDEX: 34.72 KG/M2 | HEIGHT: 66 IN | RESPIRATION RATE: 18 BRPM | SYSTOLIC BLOOD PRESSURE: 110 MMHG | HEART RATE: 72 BPM | DIASTOLIC BLOOD PRESSURE: 80 MMHG | TEMPERATURE: 98 F | WEIGHT: 216 LBS

## 2022-01-13 DIAGNOSIS — M25.512 CHRONIC LEFT SHOULDER PAIN: ICD-10-CM

## 2022-01-13 DIAGNOSIS — I10 HYPERTENSION, UNSPECIFIED TYPE: ICD-10-CM

## 2022-01-13 DIAGNOSIS — M51.9 LUMBAR DISC DISEASE: ICD-10-CM

## 2022-01-13 DIAGNOSIS — G89.29 CHRONIC LEFT SHOULDER PAIN: ICD-10-CM

## 2022-01-13 PROCEDURE — 3074F PR MOST RECENT SYSTOLIC BLOOD PRESSURE < 130 MM HG: ICD-10-PCS | Mod: ,,, | Performed by: FAMILY MEDICINE

## 2022-01-13 PROCEDURE — 99213 OFFICE O/P EST LOW 20 MIN: CPT | Mod: ,,, | Performed by: FAMILY MEDICINE

## 2022-01-13 PROCEDURE — 1159F PR MEDICATION LIST DOCUMENTED IN MEDICAL RECORD: ICD-10-PCS | Mod: ,,, | Performed by: FAMILY MEDICINE

## 2022-01-13 PROCEDURE — 99213 PR OFFICE/OUTPT VISIT, EST, LEVL III, 20-29 MIN: ICD-10-PCS | Mod: ,,, | Performed by: FAMILY MEDICINE

## 2022-01-13 PROCEDURE — 3074F SYST BP LT 130 MM HG: CPT | Mod: ,,, | Performed by: FAMILY MEDICINE

## 2022-01-13 PROCEDURE — 3008F PR BODY MASS INDEX (BMI) DOCUMENTED: ICD-10-PCS | Mod: ,,, | Performed by: FAMILY MEDICINE

## 2022-01-13 PROCEDURE — 3079F PR MOST RECENT DIASTOLIC BLOOD PRESSURE 80-89 MM HG: ICD-10-PCS | Mod: ,,, | Performed by: FAMILY MEDICINE

## 2022-01-13 PROCEDURE — 3079F DIAST BP 80-89 MM HG: CPT | Mod: ,,, | Performed by: FAMILY MEDICINE

## 2022-01-13 PROCEDURE — 3008F BODY MASS INDEX DOCD: CPT | Mod: ,,, | Performed by: FAMILY MEDICINE

## 2022-01-13 PROCEDURE — 1160F RVW MEDS BY RX/DR IN RCRD: CPT | Mod: ,,, | Performed by: FAMILY MEDICINE

## 2022-01-13 PROCEDURE — 1159F MED LIST DOCD IN RCRD: CPT | Mod: ,,, | Performed by: FAMILY MEDICINE

## 2022-01-13 PROCEDURE — 1160F PR REVIEW ALL MEDS BY PRESCRIBER/CLIN PHARMACIST DOCUMENTED: ICD-10-PCS | Mod: ,,, | Performed by: FAMILY MEDICINE

## 2022-01-13 RX ORDER — HYDROCHLOROTHIAZIDE 25 MG/1
25 TABLET ORAL DAILY
Qty: 90 TABLET | Refills: 1 | Status: SHIPPED | OUTPATIENT
Start: 2022-01-13 | End: 2022-02-09 | Stop reason: SDUPTHER

## 2022-01-13 RX ORDER — HYDROCODONE BITARTRATE AND ACETAMINOPHEN 10; 325 MG/1; MG/1
1 TABLET ORAL EVERY 6 HOURS PRN
Qty: 90 TABLET | Refills: 0 | Status: SHIPPED | OUTPATIENT
Start: 2022-01-13 | End: 2022-02-11 | Stop reason: SDUPTHER

## 2022-01-13 NOTE — PROGRESS NOTES
Nneka Gomes is a 61 y.o. female seen today for follow-up on her chronic pain syndrome related to her DJD of her right shoulder and also her lumbar disc disease.  She continues to have tingling and numbness affecting her right lower extremity and a neurology appointment is currently pending.  She is meeting her ADLs and has had no signs or symptoms of tolerance or addiction such is requesting early medication refills.  Her  today was appropriate and urine drug screen in November only showed her prescribed narcotic.      Past Medical History:   Diagnosis Date    Abdominal bloating 01/09/2020    Abdominal pain, right upper quadrant 01/09/2020    Abnormal liver enzymes 03/04/2020    Abnormal results of liver function studies 11/18/2020    Acute conjunctivitis 05/30/2014    Acute exacerbation of chronic obstructive airways disease 02/13/2017    Acute pharyngitis 05/19/2020    Acute sinusitis 10/09/2017    Acute upper respiratory infection 05/19/2020    Allergic rhinitis 09/23/2020    Anemia 03/07/2014    Anesthesia of skin 09/06/2017    bilateral fingers    Bilateral primary osteoarthritis of knee 12/02/2019    Bradycardia 01/16/2020    Carpal tunnel syndrome 08/10/2017    Chest pain 03/06/2020    Chronic idiopathic constipation 11/18/2020    Chronic low back pain 11/05/2018    Chronic pain 04/23/2019    Chronic pain syndrome 01/30/2020    COPD (chronic obstructive pulmonary disease) 02/19/2021    Coronavirus infection 05/21/2020    Cough 05/19/2020    Degeneration of lumbar intervertebral disc 09/26/2014    L3-L4 L4-L5 Greater than L5-S1    Depressive disorder 07/03/2019    Disorder of gallbladder 03/30/2015    Dysphagia 01/09/2020    Dyspnea 05/19/2020    Dysuria 05/19/2020    Fatigue 05/19/2020    Frequency of urination 02/16/2015    GERD (gastroesophageal reflux disease) 11/18/2020    Hematuria 06/30/2020    Hemoptysis 02/27/2020    Herpes zoster 09/23/2020    Hyperlipidemia  09/23/2020    Hypertension 03/06/2020    Joint pain 11/04/2019    Knee pain 01/04/2019    Left inguinal hernia 06/10/2019    Long term (current) use of opiate analgesic 02/19/2021    Lumbar spondylosis 11/23/2020    Lumbar sprain 07/16/2012    Malaise 11/20/2014    Melena 09/25/2017    Microscopic hematuria 04/02/2020    Migraine without aura 02/06/2012    Nausea 02/27/2020    Nicotine dependence, cigarettes, uncomplicated 11/11/2020    Nonulcer dyspepsia 01/23/2017    On home O2     Other long term (current) drug therapy 09/23/2020    Other specified urinary incontinence 06/17/2020    Pain in left shoulder 05/02/2019    Pain in left wrist 09/06/2017    and hand    Pain in right shoulder 08/30/2019    Pain in right wrist 09/04/2018    Right hand    Palpitations 03/06/2020    Shoulder joint pain 04/23/2019    Smoker 07/03/2019    Snoring 08/02/2019    Synovial cyst of popliteal space 04/08/2013    Tobacco dependence syndrome 02/19/2021    Unstable angina 01/16/2020    UTI (urinary tract infection) 03/27/2020     Family History   Problem Relation Age of Onset    Rectal cancer Other     Diabetes Other     Heart disease Other     Hypertension Other     Hypertension Mother     Cancer Maternal Aunt      Current Outpatient Medications on File Prior to Visit   Medication Sig Dispense Refill    albuterol (PROAIR HFA) 90 mcg/actuation inhaler Inhale 2 puffs into the lungs every 4 (four) hours as needed. 18 g 5    albuterol (PROVENTIL) 2.5 mg /3 mL (0.083 %) nebulizer solution Take 3 mLs (2.5 mg total) by nebulization 4 (four) times daily as needed for Wheezing. 100 each 5    azelastine (ASTELIN) 137 mcg (0.1 %) nasal spray 2 sprays by Nasal route 2 (two) times daily.      buPROPion (WELLBUTRIN XL) 150 MG TB24 tablet Take 1 tablet (150 mg total) by mouth once daily. 90 tablet 1    diclofenac sodium (VOLTAREN) 1 % Gel Apply 1 g topically 4 (four) times daily.      fluticasone propionate  (FLONASE) 50 mcg/actuation nasal spray 2 sprays by Each Nostril route once daily.      fluticasone propionate (FLOVENT HFA) 220 mcg/actuation inhaler Inhale into the lungs 2 (two) times daily. Controller      gabapentin (NEURONTIN) 100 MG capsule Take 100 mg by mouth 3 (three) times daily.      levocetirizine (XYZAL) 5 MG tablet TAKE 1 TABLET EVERY NIGHT AT BEDTIME 90 tablet 3    linaCLOtide (LINZESS) 145 mcg Cap capsule Take 1 capsule (145 mcg total) by mouth before breakfast. 90 capsule 3    rosuvastatin (CRESTOR) 20 MG tablet Take 1 tablet (20 mg total) by mouth once daily. 90 tablet 3    theophylline (ZEFERINO-24) 100 MG 24 hr capsule Take 3 capsules (300 mg total) by mouth once daily. 90 capsule 1    [DISCONTINUED] hydroCHLOROthiazide (HYDRODIURIL) 25 MG tablet Take 1 tablet (25 mg total) by mouth once daily. 90 tablet 1    [DISCONTINUED] HYDROcodone-acetaminophen (NORCO)  mg per tablet Take 1 tablet by mouth every 6 (six) hours as needed for Pain. Every 4 to 6 hours prn for Chronic back pain 90 tablet 0    pantoprazole (PROTONIX) 40 MG tablet Take 1 tablet (40 mg total) by mouth once daily. 90 tablet 3     No current facility-administered medications on file prior to visit.     Immunization History   Administered Date(s) Administered    COVID-19, MRNA, LN-S, PF (Pfizer) 04/29/2021, 10/28/2021    Influenza - Quadrivalent - PF *Preferred* (6 months and older) 09/16/2021    Influenza Split 10/03/2019    Pneumococcal Conjugate - 13 Valent 11/05/2018    Pneumococcal Polysaccharide - 23 Valent 11/05/2019       Review of Systems   Constitutional: Negative for fever, malaise/fatigue and weight loss.   Respiratory: Negative for shortness of breath.    Cardiovascular: Negative for chest pain and palpitations.   Gastrointestinal: Negative for nausea and vomiting.   Musculoskeletal: Positive for back pain, joint pain and myalgias.   Neurological: Positive for tingling. Negative for weakness.    Psychiatric/Behavioral: Negative for depression.        Vitals:    01/13/22 0827   BP: 110/80   Pulse: 72   Resp: 18   Temp: 98.3 °F (36.8 °C)       Physical Exam  Vitals reviewed.   Constitutional:       Appearance: Normal appearance.   HENT:      Head: Normocephalic.   Eyes:      Extraocular Movements: Extraocular movements intact.      Conjunctiva/sclera: Conjunctivae normal.      Pupils: Pupils are equal, round, and reactive to light.   Neck:      Thyroid: No thyroid mass or thyromegaly.   Pulmonary:      Effort: Pulmonary effort is normal.   Musculoskeletal:      Lumbar back: Spasms and tenderness present. Decreased range of motion.        Back:    Skin:     General: Skin is warm and dry.      Coloration: Skin is not jaundiced or pale.   Neurological:      Mental Status: She is alert.   Psychiatric:         Mood and Affect: Mood normal.         Behavior: Behavior normal.         Thought Content: Thought content normal.         Judgment: Judgment normal.          Assessment and Plan  Lumbar disc disease  -     HYDROcodone-acetaminophen (NORCO)  mg per tablet; Take 1 tablet by mouth every 6 (six) hours as needed for Pain. Every 4 to 6 hours prn for Chronic back pain  Dispense: 90 tablet; Refill: 0    Chronic left shoulder pain  -     HYDROcodone-acetaminophen (NORCO)  mg per tablet; Take 1 tablet by mouth every 6 (six) hours as needed for Pain. Every 4 to 6 hours prn for Chronic back pain  Dispense: 90 tablet; Refill: 0    Hypertension, unspecified type  -     hydroCHLOROthiazide (HYDRODIURIL) 25 MG tablet; Take 1 tablet (25 mg total) by mouth once daily.  Dispense: 90 tablet; Refill: 1            Return to clinic in 1 m for [facility] wellness.    Health Maintenance Topics with due status: Not Due       Topic Last Completion Date    Colorectal Cancer Screening 10/04/2017    Pneumococcal Vaccines (Age 0-64) 11/05/2019    Lipid Panel 09/16/2021    Mammogram 09/23/2021    COVID-19 Vaccine 10/28/2021

## 2022-01-24 ENCOUNTER — OFFICE VISIT (OUTPATIENT)
Dept: FAMILY MEDICINE | Facility: CLINIC | Age: 62
End: 2022-01-24
Payer: COMMERCIAL

## 2022-01-24 VITALS
SYSTOLIC BLOOD PRESSURE: 119 MMHG | OXYGEN SATURATION: 99 % | DIASTOLIC BLOOD PRESSURE: 80 MMHG | TEMPERATURE: 98 F | BODY MASS INDEX: 33.43 KG/M2 | HEIGHT: 66 IN | HEART RATE: 79 BPM | RESPIRATION RATE: 19 BRPM | WEIGHT: 208 LBS

## 2022-01-24 DIAGNOSIS — Z00.00 ROUTINE GENERAL MEDICAL EXAMINATION AT A HEALTH CARE FACILITY: Primary | ICD-10-CM

## 2022-01-24 DIAGNOSIS — Z13.1 SCREENING FOR DIABETES MELLITUS: ICD-10-CM

## 2022-01-24 DIAGNOSIS — Z13.220 SCREENING FOR LIPOID DISORDERS: ICD-10-CM

## 2022-01-24 LAB
CHOLEST SERPL-MCNC: 147 MG/DL (ref 0–200)
CHOLEST/HDLC SERPL: 3 {RATIO}
GLUCOSE SERPL-MCNC: 88 MG/DL (ref 74–106)
HDLC SERPL-MCNC: 49 MG/DL (ref 40–60)
LDLC SERPL CALC-MCNC: 87 MG/DL
LDLC/HDLC SERPL: 1.8 {RATIO}
NONHDLC SERPL-MCNC: 98 MG/DL
TRIGL SERPL-MCNC: 57 MG/DL (ref 35–150)
VLDLC SERPL-MCNC: 11 MG/DL

## 2022-01-24 PROCEDURE — 99396 PR PREVENTIVE VISIT,EST,40-64: ICD-10-PCS | Mod: ,,, | Performed by: FAMILY MEDICINE

## 2022-01-24 PROCEDURE — 3008F PR BODY MASS INDEX (BMI) DOCUMENTED: ICD-10-PCS | Mod: ,,, | Performed by: FAMILY MEDICINE

## 2022-01-24 PROCEDURE — 1160F PR REVIEW ALL MEDS BY PRESCRIBER/CLIN PHARMACIST DOCUMENTED: ICD-10-PCS | Mod: ,,, | Performed by: FAMILY MEDICINE

## 2022-01-24 PROCEDURE — 80061 LIPID PANEL: CPT | Mod: GZ,,, | Performed by: CLINICAL MEDICAL LABORATORY

## 2022-01-24 PROCEDURE — 80061 LIPID PANEL: ICD-10-PCS | Mod: GZ,,, | Performed by: CLINICAL MEDICAL LABORATORY

## 2022-01-24 PROCEDURE — 1160F RVW MEDS BY RX/DR IN RCRD: CPT | Mod: ,,, | Performed by: FAMILY MEDICINE

## 2022-01-24 PROCEDURE — 3079F DIAST BP 80-89 MM HG: CPT | Mod: ,,, | Performed by: FAMILY MEDICINE

## 2022-01-24 PROCEDURE — 82947 GLUCOSE, FASTING: ICD-10-PCS | Mod: ,,, | Performed by: CLINICAL MEDICAL LABORATORY

## 2022-01-24 PROCEDURE — 3079F PR MOST RECENT DIASTOLIC BLOOD PRESSURE 80-89 MM HG: ICD-10-PCS | Mod: ,,, | Performed by: FAMILY MEDICINE

## 2022-01-24 PROCEDURE — 1159F MED LIST DOCD IN RCRD: CPT | Mod: ,,, | Performed by: FAMILY MEDICINE

## 2022-01-24 PROCEDURE — 1159F PR MEDICATION LIST DOCUMENTED IN MEDICAL RECORD: ICD-10-PCS | Mod: ,,, | Performed by: FAMILY MEDICINE

## 2022-01-24 PROCEDURE — 3008F BODY MASS INDEX DOCD: CPT | Mod: ,,, | Performed by: FAMILY MEDICINE

## 2022-01-24 PROCEDURE — 82947 ASSAY GLUCOSE BLOOD QUANT: CPT | Mod: ,,, | Performed by: CLINICAL MEDICAL LABORATORY

## 2022-01-24 PROCEDURE — 99396 PREV VISIT EST AGE 40-64: CPT | Mod: ,,, | Performed by: FAMILY MEDICINE

## 2022-01-24 PROCEDURE — 3074F SYST BP LT 130 MM HG: CPT | Mod: ,,, | Performed by: FAMILY MEDICINE

## 2022-01-24 PROCEDURE — 3074F PR MOST RECENT SYSTOLIC BLOOD PRESSURE < 130 MM HG: ICD-10-PCS | Mod: ,,, | Performed by: FAMILY MEDICINE

## 2022-01-24 NOTE — PROGRESS NOTES
Nneka Gomes is a 61 y.o. female seen today for her healthy you.  Patient denies any chest pain and has had no shortness of breath above her baseline.  She is up-to-date on her mammogram and colon cancer screening and has had her COVID vaccination x3 patient reports she is exercising 3 times weekly and is watching her diet closely.  He has no new complaints today.      Past Medical History:   Diagnosis Date    Abdominal bloating 01/09/2020    Abdominal pain, right upper quadrant 01/09/2020    Abnormal liver enzymes 03/04/2020    Abnormal results of liver function studies 11/18/2020    Acute conjunctivitis 05/30/2014    Acute exacerbation of chronic obstructive airways disease 02/13/2017    Acute pharyngitis 05/19/2020    Acute sinusitis 10/09/2017    Acute upper respiratory infection 05/19/2020    Allergic rhinitis 09/23/2020    Anemia 03/07/2014    Anesthesia of skin 09/06/2017    bilateral fingers    Bilateral primary osteoarthritis of knee 12/02/2019    Bradycardia 01/16/2020    Carpal tunnel syndrome 08/10/2017    Chest pain 03/06/2020    Chronic idiopathic constipation 11/18/2020    Chronic low back pain 11/05/2018    Chronic pain 04/23/2019    Chronic pain syndrome 01/30/2020    COPD (chronic obstructive pulmonary disease) 02/19/2021    Coronavirus infection 05/21/2020    Cough 05/19/2020    Degeneration of lumbar intervertebral disc 09/26/2014    L3-L4 L4-L5 Greater than L5-S1    Depressive disorder 07/03/2019    Disorder of gallbladder 03/30/2015    Dysphagia 01/09/2020    Dyspnea 05/19/2020    Dysuria 05/19/2020    Fatigue 05/19/2020    Frequency of urination 02/16/2015    GERD (gastroesophageal reflux disease) 11/18/2020    Hematuria 06/30/2020    Hemoptysis 02/27/2020    Herpes zoster 09/23/2020    Hyperlipidemia 09/23/2020    Hypertension 03/06/2020    Joint pain 11/04/2019    Knee pain 01/04/2019    Left inguinal hernia 06/10/2019    Long term (current) use of  opiate analgesic 02/19/2021    Lumbar spondylosis 11/23/2020    Lumbar sprain 07/16/2012    Malaise 11/20/2014    Melena 09/25/2017    Microscopic hematuria 04/02/2020    Migraine without aura 02/06/2012    Nausea 02/27/2020    Nicotine dependence, cigarettes, uncomplicated 11/11/2020    Nonulcer dyspepsia 01/23/2017    On home O2     Other long term (current) drug therapy 09/23/2020    Other specified urinary incontinence 06/17/2020    Pain in left shoulder 05/02/2019    Pain in left wrist 09/06/2017    and hand    Pain in right shoulder 08/30/2019    Pain in right wrist 09/04/2018    Right hand    Palpitations 03/06/2020    Shoulder joint pain 04/23/2019    Smoker 07/03/2019    Snoring 08/02/2019    Synovial cyst of popliteal space 04/08/2013    Tobacco dependence syndrome 02/19/2021    Unstable angina 01/16/2020    UTI (urinary tract infection) 03/27/2020     Family History   Problem Relation Age of Onset    Rectal cancer Other     Diabetes Other     Heart disease Other     Hypertension Other     Hypertension Mother     Cancer Maternal Aunt      Current Outpatient Medications on File Prior to Visit   Medication Sig Dispense Refill    albuterol (PROAIR HFA) 90 mcg/actuation inhaler Inhale 2 puffs into the lungs every 4 (four) hours as needed. 18 g 5    albuterol (PROVENTIL) 2.5 mg /3 mL (0.083 %) nebulizer solution Take 3 mLs (2.5 mg total) by nebulization 4 (four) times daily as needed for Wheezing. 100 each 5    azelastine (ASTELIN) 137 mcg (0.1 %) nasal spray 2 sprays by Nasal route 2 (two) times daily.      buPROPion (WELLBUTRIN XL) 150 MG TB24 tablet Take 1 tablet (150 mg total) by mouth once daily. 90 tablet 1    diclofenac sodium (VOLTAREN) 1 % Gel Apply 1 g topically 4 (four) times daily.      fluticasone propionate (FLONASE) 50 mcg/actuation nasal spray 2 sprays by Each Nostril route once daily.      fluticasone propionate (FLOVENT HFA) 220 mcg/actuation inhaler Inhale  into the lungs 2 (two) times daily. Controller      gabapentin (NEURONTIN) 100 MG capsule Take 100 mg by mouth 3 (three) times daily.      hydroCHLOROthiazide (HYDRODIURIL) 25 MG tablet Take 1 tablet (25 mg total) by mouth once daily. 90 tablet 1    HYDROcodone-acetaminophen (NORCO)  mg per tablet Take 1 tablet by mouth every 6 (six) hours as needed for Pain. Every 4 to 6 hours prn for Chronic back pain 90 tablet 0    levocetirizine (XYZAL) 5 MG tablet TAKE 1 TABLET EVERY NIGHT AT BEDTIME 90 tablet 3    linaCLOtide (LINZESS) 145 mcg Cap capsule Take 1 capsule (145 mcg total) by mouth before breakfast. 90 capsule 3    rosuvastatin (CRESTOR) 20 MG tablet Take 1 tablet (20 mg total) by mouth once daily. 90 tablet 3    theophylline (ZEFERINO-24) 100 MG 24 hr capsule Take 3 capsules (300 mg total) by mouth once daily. 90 capsule 1    pantoprazole (PROTONIX) 40 MG tablet Take 1 tablet (40 mg total) by mouth once daily. 90 tablet 3     No current facility-administered medications on file prior to visit.     Immunization History   Administered Date(s) Administered    COVID-19, MRNA, LN-S, PF (Pfizer) (Purple Cap) 04/29/2021, 10/28/2021    Influenza - Quadrivalent - PF *Preferred* (6 months and older) 09/16/2021    Influenza Split 10/03/2019    Pneumococcal Conjugate - 13 Valent 11/05/2018    Pneumococcal Polysaccharide - 23 Valent 11/05/2019       Review of Systems   Constitutional: Negative for fever, malaise/fatigue and weight loss.   Respiratory: Positive for shortness of breath.    Cardiovascular: Negative for chest pain and palpitations.   Gastrointestinal: Negative for nausea and vomiting.   Musculoskeletal: Positive for back pain and myalgias. Negative for falls.   Psychiatric/Behavioral: Negative for depression.        Vitals:    01/24/22 0804   BP: 119/80   Pulse: 79   Resp: 19   Temp: 98.1 °F (36.7 °C)       Physical Exam  Vitals reviewed.   Constitutional:       Appearance: Normal appearance.    HENT:      Head: Normocephalic.   Eyes:      Extraocular Movements: Extraocular movements intact.      Conjunctiva/sclera: Conjunctivae normal.      Pupils: Pupils are equal, round, and reactive to light.   Neck:      Thyroid: No thyroid mass or thyromegaly.   Cardiovascular:      Rate and Rhythm: Normal rate and regular rhythm.      Heart sounds: Normal heart sounds. No murmur heard.  No gallop.    Pulmonary:      Effort: Pulmonary effort is normal. No respiratory distress.      Breath sounds: Normal breath sounds. No wheezing or rales.   Skin:     General: Skin is warm and dry.      Coloration: Skin is not jaundiced or pale.   Neurological:      Mental Status: She is alert.   Psychiatric:         Mood and Affect: Mood normal.         Behavior: Behavior normal.         Thought Content: Thought content normal.         Judgment: Judgment normal.          Assessment and Plan  Screening for diabetes mellitus  -     Glucose, Fasting; Future; Expected date: 01/24/2022    Screening for lipoid disorders  -     Lipid Panel; Future; Expected date: 01/24/2022            Return to clinic in 2 weeks or as needed once her lab work is in.    Health Maintenance Topics with due status: Not Due       Topic Last Completion Date    Colorectal Cancer Screening 10/04/2017    Pneumococcal Vaccines (Age 0-64) 11/05/2019    Lipid Panel 09/16/2021    Mammogram 09/23/2021    COVID-19 Vaccine 10/28/2021

## 2022-02-09 DIAGNOSIS — I10 HYPERTENSION, UNSPECIFIED TYPE: ICD-10-CM

## 2022-02-09 RX ORDER — HYDROCHLOROTHIAZIDE 25 MG/1
25 TABLET ORAL DAILY
Qty: 90 TABLET | Refills: 1 | Status: SHIPPED | OUTPATIENT
Start: 2022-02-09 | End: 2022-05-11 | Stop reason: SDUPTHER

## 2022-02-09 NOTE — TELEPHONE ENCOUNTER
----- Message from Marie Maldonado sent at 2/8/2022  9:56 AM CST -----  Pt called to get a refill of HYDRODIURIL  Pharmacy is  [email or web address]   Phone number is [phone]

## 2022-02-11 ENCOUNTER — OFFICE VISIT (OUTPATIENT)
Dept: FAMILY MEDICINE | Facility: CLINIC | Age: 62
End: 2022-02-11
Payer: COMMERCIAL

## 2022-02-11 VITALS
SYSTOLIC BLOOD PRESSURE: 123 MMHG | BODY MASS INDEX: 33.43 KG/M2 | OXYGEN SATURATION: 99 % | HEART RATE: 100 BPM | WEIGHT: 208 LBS | DIASTOLIC BLOOD PRESSURE: 84 MMHG | TEMPERATURE: 98 F | HEIGHT: 66 IN | RESPIRATION RATE: 18 BRPM

## 2022-02-11 DIAGNOSIS — M51.9 LUMBAR DISC DISEASE: ICD-10-CM

## 2022-02-11 DIAGNOSIS — Z79.891 LONG TERM (CURRENT) USE OF OPIATE ANALGESIC: Primary | ICD-10-CM

## 2022-02-11 DIAGNOSIS — G89.29 CHRONIC LEFT SHOULDER PAIN: ICD-10-CM

## 2022-02-11 DIAGNOSIS — M25.512 CHRONIC LEFT SHOULDER PAIN: ICD-10-CM

## 2022-02-11 LAB
CTP QC/QA: YES
POC (AMP) AMPHETAMINE: NEGATIVE
POC (BAR) BARBITURATES: NEGATIVE
POC (BUP) BUPRENORPHINE: NEGATIVE
POC (BZO) BENZODIAZEPINES: NEGATIVE
POC (COC) COCAINE: NEGATIVE
POC (MDMA) METHYLENEDIOXYMETHAMPHETAMINE 3,4: NEGATIVE
POC (MET) METHAMPHETAMINE: NEGATIVE
POC (MOP) OPIATES: ABNORMAL
POC (MTD) METHADONE: NEGATIVE
POC (OXY) OXYCODONE: NEGATIVE
POC (PCP) PHENCYCLIDINE: NEGATIVE
POC (TCA) TRICYCLIC ANTIDEPRESSANTS: NEGATIVE
POC TEMPERATURE (URINE): 92
POC THC: NEGATIVE

## 2022-02-11 PROCEDURE — 3074F PR MOST RECENT SYSTOLIC BLOOD PRESSURE < 130 MM HG: ICD-10-PCS | Mod: ,,, | Performed by: FAMILY MEDICINE

## 2022-02-11 PROCEDURE — 3008F PR BODY MASS INDEX (BMI) DOCUMENTED: ICD-10-PCS | Mod: ,,, | Performed by: FAMILY MEDICINE

## 2022-02-11 PROCEDURE — 3008F BODY MASS INDEX DOCD: CPT | Mod: ,,, | Performed by: FAMILY MEDICINE

## 2022-02-11 PROCEDURE — 3079F PR MOST RECENT DIASTOLIC BLOOD PRESSURE 80-89 MM HG: ICD-10-PCS | Mod: ,,, | Performed by: FAMILY MEDICINE

## 2022-02-11 PROCEDURE — 3079F DIAST BP 80-89 MM HG: CPT | Mod: ,,, | Performed by: FAMILY MEDICINE

## 2022-02-11 PROCEDURE — 1159F PR MEDICATION LIST DOCUMENTED IN MEDICAL RECORD: ICD-10-PCS | Mod: ,,, | Performed by: FAMILY MEDICINE

## 2022-02-11 PROCEDURE — 1160F PR REVIEW ALL MEDS BY PRESCRIBER/CLIN PHARMACIST DOCUMENTED: ICD-10-PCS | Mod: ,,, | Performed by: FAMILY MEDICINE

## 2022-02-11 PROCEDURE — 1159F MED LIST DOCD IN RCRD: CPT | Mod: ,,, | Performed by: FAMILY MEDICINE

## 2022-02-11 PROCEDURE — 80305 POCT URINE DRUG SCREEN PRESUMP: ICD-10-PCS | Mod: QW,,, | Performed by: FAMILY MEDICINE

## 2022-02-11 PROCEDURE — 1160F RVW MEDS BY RX/DR IN RCRD: CPT | Mod: ,,, | Performed by: FAMILY MEDICINE

## 2022-02-11 PROCEDURE — 3074F SYST BP LT 130 MM HG: CPT | Mod: ,,, | Performed by: FAMILY MEDICINE

## 2022-02-11 PROCEDURE — 80305 DRUG TEST PRSMV DIR OPT OBS: CPT | Mod: QW,,, | Performed by: FAMILY MEDICINE

## 2022-02-11 PROCEDURE — 99213 OFFICE O/P EST LOW 20 MIN: CPT | Mod: ,,, | Performed by: FAMILY MEDICINE

## 2022-02-11 PROCEDURE — 99213 PR OFFICE/OUTPT VISIT, EST, LEVL III, 20-29 MIN: ICD-10-PCS | Mod: ,,, | Performed by: FAMILY MEDICINE

## 2022-02-11 RX ORDER — HYDROCODONE BITARTRATE AND ACETAMINOPHEN 10; 325 MG/1; MG/1
1 TABLET ORAL EVERY 6 HOURS PRN
Qty: 90 TABLET | Refills: 0 | Status: SHIPPED | OUTPATIENT
Start: 2022-02-11 | End: 2022-03-11 | Stop reason: SDUPTHER

## 2022-02-11 NOTE — PROGRESS NOTES
Nneka Gomes is a 61 y.o. female seen today for follow-up on her lumbar disc disease.  She takes Norco for her pain 3 times daily with good medication affect.  Patient has had some constipation with the medication but this is usually taking care by her Linzess.  She has had no signs or symptoms of tolerance or addiction such is requesting early medication refills are losing her prescriptions.  Her  today was appropriate and her urine drug screen in December only showed her prescribed narcotic.  She is active the meeting her ADLs.  Today she has no new complaints but we did review her recent lab work and her LDL cholesterol is not to goal at 88 and I have encouraged her to improve her diet.    Past Medical History:   Diagnosis Date    Abdominal bloating 01/09/2020    Abdominal pain, right upper quadrant 01/09/2020    Abnormal liver enzymes 03/04/2020    Abnormal results of liver function studies 11/18/2020    Acute conjunctivitis 05/30/2014    Acute exacerbation of chronic obstructive airways disease 02/13/2017    Acute pharyngitis 05/19/2020    Acute sinusitis 10/09/2017    Acute upper respiratory infection 05/19/2020    Allergic rhinitis 09/23/2020    Anemia 03/07/2014    Anesthesia of skin 09/06/2017    bilateral fingers    Bilateral primary osteoarthritis of knee 12/02/2019    Bradycardia 01/16/2020    Carpal tunnel syndrome 08/10/2017    Chest pain 03/06/2020    Chronic idiopathic constipation 11/18/2020    Chronic low back pain 11/05/2018    Chronic pain 04/23/2019    Chronic pain syndrome 01/30/2020    COPD (chronic obstructive pulmonary disease) 02/19/2021    Coronavirus infection 05/21/2020    Cough 05/19/2020    Degeneration of lumbar intervertebral disc 09/26/2014    L3-L4 L4-L5 Greater than L5-S1    Depressive disorder 07/03/2019    Disorder of gallbladder 03/30/2015    Dysphagia 01/09/2020    Dyspnea 05/19/2020    Dysuria 05/19/2020    Fatigue 05/19/2020    Frequency  of urination 02/16/2015    GERD (gastroesophageal reflux disease) 11/18/2020    Hematuria 06/30/2020    Hemoptysis 02/27/2020    Herpes zoster 09/23/2020    Hyperlipidemia 09/23/2020    Hypertension 03/06/2020    Joint pain 11/04/2019    Knee pain 01/04/2019    Left inguinal hernia 06/10/2019    Long term (current) use of opiate analgesic 02/19/2021    Lumbar spondylosis 11/23/2020    Lumbar sprain 07/16/2012    Malaise 11/20/2014    Melena 09/25/2017    Microscopic hematuria 04/02/2020    Migraine without aura 02/06/2012    Nausea 02/27/2020    Nicotine dependence, cigarettes, uncomplicated 11/11/2020    Nonulcer dyspepsia 01/23/2017    On home O2     Other long term (current) drug therapy 09/23/2020    Other specified urinary incontinence 06/17/2020    Pain in left shoulder 05/02/2019    Pain in left wrist 09/06/2017    and hand    Pain in right shoulder 08/30/2019    Pain in right wrist 09/04/2018    Right hand    Palpitations 03/06/2020    Shoulder joint pain 04/23/2019    Smoker 07/03/2019    Snoring 08/02/2019    Synovial cyst of popliteal space 04/08/2013    Tobacco dependence syndrome 02/19/2021    Unstable angina 01/16/2020    UTI (urinary tract infection) 03/27/2020     Family History   Problem Relation Age of Onset    Rectal cancer Other     Diabetes Other     Heart disease Other     Hypertension Other     Hypertension Mother     Cancer Maternal Aunt      Current Outpatient Medications on File Prior to Visit   Medication Sig Dispense Refill    albuterol (PROAIR HFA) 90 mcg/actuation inhaler Inhale 2 puffs into the lungs every 4 (four) hours as needed. 18 g 5    albuterol (PROVENTIL) 2.5 mg /3 mL (0.083 %) nebulizer solution Take 3 mLs (2.5 mg total) by nebulization 4 (four) times daily as needed for Wheezing. 100 each 5    azelastine (ASTELIN) 137 mcg (0.1 %) nasal spray 2 sprays by Nasal route 2 (two) times daily.      buPROPion (WELLBUTRIN XL) 150 MG TB24  tablet Take 1 tablet (150 mg total) by mouth once daily. 90 tablet 1    diclofenac sodium (VOLTAREN) 1 % Gel Apply 1 g topically 4 (four) times daily.      fluticasone propionate (FLONASE) 50 mcg/actuation nasal spray 2 sprays by Each Nostril route once daily.      fluticasone propionate (FLOVENT HFA) 220 mcg/actuation inhaler Inhale into the lungs 2 (two) times daily. Controller      gabapentin (NEURONTIN) 100 MG capsule Take 100 mg by mouth 3 (three) times daily.      hydroCHLOROthiazide (HYDRODIURIL) 25 MG tablet Take 1 tablet (25 mg total) by mouth once daily. 90 tablet 1    levocetirizine (XYZAL) 5 MG tablet TAKE 1 TABLET EVERY NIGHT AT BEDTIME 90 tablet 3    linaCLOtide (LINZESS) 145 mcg Cap capsule Take 1 capsule (145 mcg total) by mouth before breakfast. 90 capsule 3    rosuvastatin (CRESTOR) 20 MG tablet Take 1 tablet (20 mg total) by mouth once daily. 90 tablet 3    theophylline (ZEFERINO-24) 100 MG 24 hr capsule Take 3 capsules (300 mg total) by mouth once daily. 90 capsule 1    [DISCONTINUED] HYDROcodone-acetaminophen (NORCO)  mg per tablet Take 1 tablet by mouth every 6 (six) hours as needed for Pain. Every 4 to 6 hours prn for Chronic back pain 90 tablet 0    pantoprazole (PROTONIX) 40 MG tablet Take 1 tablet (40 mg total) by mouth once daily. 90 tablet 3     No current facility-administered medications on file prior to visit.     Immunization History   Administered Date(s) Administered    COVID-19, MRNA, LN-S, PF (Pfizer) (Purple Cap) 04/29/2021, 10/28/2021    Influenza - Quadrivalent - PF *Preferred* (6 months and older) 09/16/2021    Influenza Split 10/03/2019    Pneumococcal Conjugate - 13 Valent 11/05/2018    Pneumococcal Polysaccharide - 23 Valent 11/05/2019       Review of Systems   Constitutional: Negative for fever, malaise/fatigue and weight loss.   Respiratory: Negative for shortness of breath.    Cardiovascular: Negative for chest pain and palpitations.   Gastrointestinal:  Negative for nausea and vomiting.   Musculoskeletal: Positive for back pain, joint pain and myalgias.   Psychiatric/Behavioral: Negative for depression.        Vitals:    02/11/22 0837   BP: 123/84   Pulse: 100   Resp: 18   Temp: 97.8 °F (36.6 °C)       Physical Exam  Vitals reviewed.   Constitutional:       Appearance: Normal appearance.   HENT:      Head: Normocephalic.   Eyes:      Extraocular Movements: Extraocular movements intact.      Conjunctiva/sclera: Conjunctivae normal.      Pupils: Pupils are equal, round, and reactive to light.   Neck:      Thyroid: No thyroid mass or thyromegaly.   Cardiovascular:      Rate and Rhythm: Normal rate and regular rhythm.      Heart sounds: Normal heart sounds. No murmur heard.  No gallop.    Pulmonary:      Effort: Pulmonary effort is normal. No respiratory distress.      Breath sounds: Normal breath sounds. No wheezing or rales.   Musculoskeletal:      Lumbar back: Spasms and tenderness present. Decreased range of motion.        Back:       Comments: She is slow to rise from a seated position and has an antalgic gait.   Skin:     General: Skin is warm and dry.      Coloration: Skin is not jaundiced or pale.   Neurological:      Mental Status: She is alert.   Psychiatric:         Mood and Affect: Mood normal.         Behavior: Behavior normal.         Thought Content: Thought content normal.         Judgment: Judgment normal.          Assessment and Plan  Long term (current) use of opiate analgesic  -     POCT Urine Drug Screen Presump    Lumbar disc disease  -     HYDROcodone-acetaminophen (NORCO)  mg per tablet; Take 1 tablet by mouth every 6 (six) hours as needed for Pain. Every 4 to 6 hours prn for Chronic back pain  Dispense: 90 tablet; Refill: 0    Chronic left shoulder pain  -     HYDROcodone-acetaminophen (NORCO)  mg per tablet; Take 1 tablet by mouth every 6 (six) hours as needed for Pain. Every 4 to 6 hours prn for Chronic back pain  Dispense: 90  tablet; Refill: 0            Return to clinic in 1 month.  We will plan on rechecking her lipid panel in 3 months.    Health Maintenance Topics with due status: Not Due       Topic Last Completion Date    Colorectal Cancer Screening 10/04/2017    Pneumococcal Vaccines (Age 0-64) 11/05/2019    Mammogram 09/23/2021    COVID-19 Vaccine 10/28/2021    Lipid Panel 01/24/2022

## 2022-02-16 ENCOUNTER — OFFICE VISIT (OUTPATIENT)
Dept: NEUROLOGY | Facility: CLINIC | Age: 62
End: 2022-02-16
Payer: COMMERCIAL

## 2022-02-16 ENCOUNTER — TELEPHONE (OUTPATIENT)
Dept: NEUROLOGY | Facility: CLINIC | Age: 62
End: 2022-02-16
Payer: COMMERCIAL

## 2022-02-16 ENCOUNTER — HOSPITAL ENCOUNTER (OUTPATIENT)
Dept: RADIOLOGY | Facility: HOSPITAL | Age: 62
Discharge: HOME | End: 2022-02-16
Attending: NURSE PRACTITIONER
Payer: COMMERCIAL

## 2022-02-16 VITALS
HEART RATE: 78 BPM | DIASTOLIC BLOOD PRESSURE: 78 MMHG | WEIGHT: 208 LBS | SYSTOLIC BLOOD PRESSURE: 140 MMHG | OXYGEN SATURATION: 99 % | HEIGHT: 66 IN | BODY MASS INDEX: 33.43 KG/M2 | RESPIRATION RATE: 18 BRPM

## 2022-02-16 DIAGNOSIS — R20.0 NUMBNESS AND TINGLING OF BOTH LEGS: ICD-10-CM

## 2022-02-16 DIAGNOSIS — M54.16 LUMBAR RADICULOPATHY: Primary | ICD-10-CM

## 2022-02-16 DIAGNOSIS — M54.16 LUMBAR RADICULOPATHY: ICD-10-CM

## 2022-02-16 DIAGNOSIS — R20.2 NUMBNESS AND TINGLING OF BOTH LEGS: ICD-10-CM

## 2022-02-16 PROCEDURE — 3077F SYST BP >= 140 MM HG: CPT | Mod: CPTII,,, | Performed by: NURSE PRACTITIONER

## 2022-02-16 PROCEDURE — 72100 X-RAY EXAM L-S SPINE 2/3 VWS: CPT | Mod: 26,,, | Performed by: STUDENT IN AN ORGANIZED HEALTH CARE EDUCATION/TRAINING PROGRAM

## 2022-02-16 PROCEDURE — 99215 OFFICE O/P EST HI 40 MIN: CPT | Mod: PBBFAC | Performed by: NURSE PRACTITIONER

## 2022-02-16 PROCEDURE — 3077F PR MOST RECENT SYSTOLIC BLOOD PRESSURE >= 140 MM HG: ICD-10-PCS | Mod: CPTII,,, | Performed by: NURSE PRACTITIONER

## 2022-02-16 PROCEDURE — 72100 XR LUMBAR SPINE AP AND LATERAL: ICD-10-PCS | Mod: 26,,, | Performed by: STUDENT IN AN ORGANIZED HEALTH CARE EDUCATION/TRAINING PROGRAM

## 2022-02-16 PROCEDURE — 1160F RVW MEDS BY RX/DR IN RCRD: CPT | Mod: CPTII,,, | Performed by: NURSE PRACTITIONER

## 2022-02-16 PROCEDURE — 1159F MED LIST DOCD IN RCRD: CPT | Mod: CPTII,,, | Performed by: NURSE PRACTITIONER

## 2022-02-16 PROCEDURE — 1159F PR MEDICATION LIST DOCUMENTED IN MEDICAL RECORD: ICD-10-PCS | Mod: CPTII,,, | Performed by: NURSE PRACTITIONER

## 2022-02-16 PROCEDURE — 3078F PR MOST RECENT DIASTOLIC BLOOD PRESSURE < 80 MM HG: ICD-10-PCS | Mod: CPTII,,, | Performed by: NURSE PRACTITIONER

## 2022-02-16 PROCEDURE — 3008F PR BODY MASS INDEX (BMI) DOCUMENTED: ICD-10-PCS | Mod: CPTII,,, | Performed by: NURSE PRACTITIONER

## 2022-02-16 PROCEDURE — 72100 X-RAY EXAM L-S SPINE 2/3 VWS: CPT | Mod: TC

## 2022-02-16 PROCEDURE — 3078F DIAST BP <80 MM HG: CPT | Mod: CPTII,,, | Performed by: NURSE PRACTITIONER

## 2022-02-16 PROCEDURE — 99203 PR OFFICE/OUTPT VISIT, NEW, LEVL III, 30-44 MIN: ICD-10-PCS | Mod: S$PBB,,, | Performed by: NURSE PRACTITIONER

## 2022-02-16 PROCEDURE — 1160F PR REVIEW ALL MEDS BY PRESCRIBER/CLIN PHARMACIST DOCUMENTED: ICD-10-PCS | Mod: CPTII,,, | Performed by: NURSE PRACTITIONER

## 2022-02-16 PROCEDURE — 99203 OFFICE O/P NEW LOW 30 MIN: CPT | Mod: S$PBB,,, | Performed by: NURSE PRACTITIONER

## 2022-02-16 PROCEDURE — 3008F BODY MASS INDEX DOCD: CPT | Mod: CPTII,,, | Performed by: NURSE PRACTITIONER

## 2022-02-16 NOTE — TELEPHONE ENCOUNTER
----- Message from ZENA Lindquist sent at 2/16/2022  1:37 PM CST -----  Moderate degenerative changes, no acute findings

## 2022-02-16 NOTE — PATIENT INSTRUCTIONS
1. Need EMG/NCS of the lower extremities, will try to setup with Dr. Vicente locally    2. XR of lumbar spine    3. Physical therapy for lower back pain    4. Followup in 3 months, sooner if able to get the EMG sooner

## 2022-02-16 NOTE — PROGRESS NOTES
Subjective:       Patient ID: Nneka Gomes is a 61 y.o. female     Chief Complaint:    Chief Complaint   Patient presents with    Referral    Numbness        Allergies:  Patient has no known allergies.    Current Medications:    Outpatient Encounter Medications as of 2/16/2022   Medication Sig Dispense Refill    albuterol (PROAIR HFA) 90 mcg/actuation inhaler Inhale 2 puffs into the lungs every 4 (four) hours as needed. 18 g 5    albuterol (PROVENTIL) 2.5 mg /3 mL (0.083 %) nebulizer solution Take 3 mLs (2.5 mg total) by nebulization 4 (four) times daily as needed for Wheezing. 100 each 5    azelastine (ASTELIN) 137 mcg (0.1 %) nasal spray 2 sprays by Nasal route 2 (two) times daily.      buPROPion (WELLBUTRIN XL) 150 MG TB24 tablet Take 1 tablet (150 mg total) by mouth once daily. 90 tablet 1    diclofenac sodium (VOLTAREN) 1 % Gel Apply 1 g topically 4 (four) times daily.      fluticasone propionate (FLONASE) 50 mcg/actuation nasal spray 2 sprays by Each Nostril route once daily.      fluticasone propionate (FLOVENT HFA) 220 mcg/actuation inhaler Inhale into the lungs 2 (two) times daily. Controller      gabapentin (NEURONTIN) 100 MG capsule Take 100 mg by mouth 3 (three) times daily.      hydroCHLOROthiazide (HYDRODIURIL) 25 MG tablet Take 1 tablet (25 mg total) by mouth once daily. 90 tablet 1    HYDROcodone-acetaminophen (NORCO)  mg per tablet Take 1 tablet by mouth every 6 (six) hours as needed for Pain. Every 4 to 6 hours prn for Chronic back pain 90 tablet 0    levocetirizine (XYZAL) 5 MG tablet TAKE 1 TABLET EVERY NIGHT AT BEDTIME 90 tablet 3    linaCLOtide (LINZESS) 145 mcg Cap capsule Take 1 capsule (145 mcg total) by mouth before breakfast. 90 capsule 3    rosuvastatin (CRESTOR) 20 MG tablet Take 1 tablet (20 mg total) by mouth once daily. 90 tablet 3    theophylline (ZEFERINO-24) 100 MG 24 hr capsule Take 3 capsules (300 mg total) by mouth once daily. 90 capsule 1     pantoprazole (PROTONIX) 40 MG tablet Take 1 tablet (40 mg total) by mouth once daily. 90 tablet 3     No facility-administered encounter medications on file as of 2/16/2022.       History of Present Illness  62 y/o AAF presents to the neurology clinic as new referral for reported lower extremity pain and numbness.    History of lumbar pain, chronic.  Prior seen by Dr. Nicolas neurosurgery in 2013 with EMG/NCS done and MRI at that time.  Reported minimal degenerative changes on the MRI lumbar and EMG/NCS was reported as negative at the time.    She reports these current symptoms are a burning to the anterior right thigh that sometimes radiates around from her buttocks.  It can alternate with a reported stinging sensation as well.  Denies numbness.  Only rarely has she had similar symptoms in the LLE.  No reported falls, but has had reported near falls.  She has continued with the chronic lower back pain for many years, she feels worse since she was prior seen by Dr. Armstrong in 2013.  Rates lower back pain as 7/10.  Has neurontin 100mg TID currently per her primary care.  AS well as hydrocodone.  She denies any recent physical therapy.  I do not note neurological deficits on exam.  Will plan to setup EMG/NCS of the lower extremities and plain film XR of the lumbar.  If these are not revealing then will obtain MRI of the lumbar.         Review of Systems  Review of Systems   Constitutional: Negative for diaphoresis and fever.   HENT: Negative for congestion, hearing loss and tinnitus.    Eyes: Negative for blurred vision, double vision, photophobia, discharge and redness.   Respiratory: Negative for cough and shortness of breath.    Cardiovascular: Negative for chest pain.   Gastrointestinal: Negative for abdominal pain, nausea and vomiting.   Musculoskeletal: Positive for back pain. Negative for joint pain, myalgias and neck pain.   Skin: Negative for itching and rash.   Neurological: Positive for tingling and  weakness. Negative for dizziness, tremors, sensory change, speech change, focal weakness, seizures, loss of consciousness and headaches.   Psychiatric/Behavioral: Negative for depression, hallucinations and memory loss. The patient does not have insomnia.    All other systems reviewed and are negative.     Objective:     NEUROLOGICAL EXAMINATION:     MENTAL STATUS   Oriented to person, place, and time.   Registration: recalls 3 of 3 objects. Recall at 5 minutes: recalls 3 of 3 objects.   Attention: normal. Concentration: normal.   Speech: speech is normal   Level of consciousness: alert  Knowledge: good and consistent with education.   Normal comprehension.     CRANIAL NERVES     CN II   Visual fields full to confrontation.   Visual acuity: normal  Right visual field deficit: none  Left visual field deficit: none     CN III, IV, VI   Pupils are equal, round, and reactive to light.  Extraocular motions are normal.   Right pupil: Size: 3 mm. Shape: regular. Reactivity: brisk. Consensual response: intact. Accommodation: intact.   Left pupil: Size: 3 mm. Shape: regular. Reactivity: brisk. Consensual response: intact. Accommodation: intact.   CN III: no CN III palsy  CN VI: no CN VI palsy  Nystagmus: none   Diplopia: none  Upgaze: normal  Downgaze: normal  Conjugate gaze: present  Vestibulo-ocular reflex: present    CN V   Facial sensation intact.   Right facial sensation deficit: none  Left facial sensation deficit: none  Right corneal reflex: normal  Left corneal reflex: normal    CN VII   Facial expression full, symmetric.   Right facial weakness: none  Left facial weakness: none  Right taste: normal  Left taste: normal    CN VIII   CN VIII normal.   Hearing: intact    CN IX, X   CN IX normal.   CN X normal.   Palate: symmetric    CN XI   CN XI normal.   Right sternocleidomastoid strength: normal  Left sternocleidomastoid strength: normal  Right trapezius strength: normal  Left trapezius strength: normal    CN XII    CN XII normal.   Tongue: not atrophic  Fasciculations: absent  Tongue deviation: none    MOTOR EXAM   Muscle bulk: normal  Overall muscle tone: normal  Right arm tone: normal  Left arm tone: normal  Right arm pronator drift: absent  Left arm pronator drift: absent  Right leg tone: normal  Left leg tone: normal    Strength   Right neck flexion: 5/5  Left neck flexion: 5/5  Right neck extension: 5/5  Left neck extension: 5/5  Right deltoid: 5/5  Left deltoid: 5/5  Right biceps: 5/5  Left biceps: 5/5  Right triceps: 5/5  Left triceps: 5/5  Right wrist flexion: 5/5  Left wrist flexion: 5/5  Right wrist extension: 5/5  Left wrist extension: 5/5  Right interossei: 5/5  Left interossei: 5/5  Right iliopsoas: /  Left iliopsoas:   Right quadriceps:   Left quadriceps: /  Right hamstrin/5  Left hamstrin/5  Right anterior tibial: 5  Left anterior tibial: 5  Right posterior tibial:   Left posterior tibial: /  Right gastroc: 5/  Left gastroc: 5/5    REFLEXES     Reflexes   Right brachioradialis: 2+  Left brachioradialis: 2+  Right biceps: 2+  Left biceps: 2+  Right triceps: 2+  Left triceps: 2+  Right patellar: 2+  Left patellar: 2+  Right achilles: 2+  Left achilles: 2+  Right plantar: normal  Left plantar: normal  Right Arnold: absent  Left Arnold: absent  Right ankle clonus: absent  Left ankle clonus: absent  Right pendular knee jerk: absent  Left pendular knee jerk: absent    SENSORY EXAM   Light touch normal.   Right arm light touch: normal  Left arm light touch: normal  Right leg light touch: normal  Left leg light touch: normal  Vibration normal.   Right arm vibration: normal  Left arm vibration: normal  Right leg vibration: normal  Left leg vibration: normal  Proprioception normal.   Right arm proprioception: normal  Left arm proprioception: normal  Right leg proprioception: normal  Left leg proprioception: normal  Pinprick normal.   Right arm pinprick: normal  Left arm pinprick:  normal  Right leg pinprick: normal  Left leg pinprick: normal  Graphesthesia: normal  Romberg: negative  Stereognosis: normal    GAIT AND COORDINATION     Gait  Gait: normal     Coordination   Finger to nose coordination: normal  Heel to shin coordination: normal  Tandem walking coordination: normal    Tremor   Resting tremor: absent  Intention tremor: absent  Action tremor: absent       Physical Exam  Vitals and nursing note reviewed.   Constitutional:       Appearance: Normal appearance.   HENT:      Head: Normocephalic.   Eyes:      Extraocular Movements: Extraocular movements intact and EOM normal.      Pupils: Pupils are equal, round, and reactive to light.   Cardiovascular:      Rate and Rhythm: Normal rate and regular rhythm.   Pulmonary:      Effort: Pulmonary effort is normal.      Breath sounds: Normal breath sounds.   Musculoskeletal:         General: No swelling.      Cervical back: Normal, normal range of motion and neck supple.      Thoracic back: Normal.      Lumbar back: Tenderness and bony tenderness present. No swelling, lacerations or spasms. Decreased range of motion. Negative right straight leg raise test and negative left straight leg raise test.      Right lower leg: No edema.      Left lower leg: No edema.   Skin:     General: Skin is warm and dry.      Coloration: Skin is not jaundiced.      Findings: No rash.   Neurological:      General: No focal deficit present.      Mental Status: She is alert and oriented to person, place, and time.      GCS: GCS eye subscore is 4. GCS verbal subscore is 5. GCS motor subscore is 6.      Cranial Nerves: No cranial nerve deficit.      Sensory: No sensory deficit.      Motor: Motor function is intact. No weakness.      Coordination: Coordination is intact. Coordination normal. Finger-Nose-Finger Test, Heel to Shin Test and Romberg Test normal.      Gait: Gait is intact. Gait and tandem walk normal.      Deep Tendon Reflexes: Reflexes normal.      Reflex  Scores:       Tricep reflexes are 2+ on the right side and 2+ on the left side.       Bicep reflexes are 2+ on the right side and 2+ on the left side.       Brachioradialis reflexes are 2+ on the right side and 2+ on the left side.       Patellar reflexes are 2+ on the right side and 2+ on the left side.       Achilles reflexes are 2+ on the right side and 2+ on the left side.  Psychiatric:         Mood and Affect: Mood normal.         Speech: Speech normal.         Behavior: Behavior normal.          Assessment:     Problem List Items Addressed This Visit        Neuro    Lumbar radiculopathy - Primary    Relevant Orders    EMG W/ ULTRASOUND AND NERVE CONDUCTION TEST 2 Extremities    Ambulatory referral/consult to Physical/Occupational Therapy    X-Ray Lumbar Spine AP And Lateral       Other    Numbness and tingling of both legs           Primary Diagnosis and ICD10  Lumbar radiculopathy [M54.16]    Plan:     Patient Instructions   1. Need EMG/NCS of the lower extremities    2. XR of lumbar spine    3. Physical therapy for lower back pain      There are no discontinued medications.    Requested Prescriptions      No prescriptions requested or ordered in this encounter       Orders Placed This Encounter   Procedures    X-Ray Lumbar Spine AP And Lateral    Ambulatory referral/consult to Physical/Occupational Therapy    EMG W/ ULTRASOUND AND NERVE CONDUCTION TEST 2 Extremities

## 2022-03-11 ENCOUNTER — OFFICE VISIT (OUTPATIENT)
Dept: FAMILY MEDICINE | Facility: CLINIC | Age: 62
End: 2022-03-11
Payer: COMMERCIAL

## 2022-03-11 VITALS
HEIGHT: 66 IN | RESPIRATION RATE: 18 BRPM | TEMPERATURE: 98 F | OXYGEN SATURATION: 99 % | SYSTOLIC BLOOD PRESSURE: 109 MMHG | BODY MASS INDEX: 33.43 KG/M2 | WEIGHT: 208 LBS | HEART RATE: 77 BPM | DIASTOLIC BLOOD PRESSURE: 78 MMHG

## 2022-03-11 DIAGNOSIS — G89.29 CHRONIC LEFT SHOULDER PAIN: ICD-10-CM

## 2022-03-11 DIAGNOSIS — M25.512 CHRONIC LEFT SHOULDER PAIN: ICD-10-CM

## 2022-03-11 DIAGNOSIS — J44.9 CHRONIC OBSTRUCTIVE PULMONARY DISEASE, UNSPECIFIED COPD TYPE: ICD-10-CM

## 2022-03-11 DIAGNOSIS — J30.9 ALLERGIC RHINITIS, UNSPECIFIED SEASONALITY, UNSPECIFIED TRIGGER: Primary | ICD-10-CM

## 2022-03-11 DIAGNOSIS — M51.9 LUMBAR DISC DISEASE: ICD-10-CM

## 2022-03-11 PROCEDURE — 3074F PR MOST RECENT SYSTOLIC BLOOD PRESSURE < 130 MM HG: ICD-10-PCS | Mod: ,,, | Performed by: FAMILY MEDICINE

## 2022-03-11 PROCEDURE — 1160F PR REVIEW ALL MEDS BY PRESCRIBER/CLIN PHARMACIST DOCUMENTED: ICD-10-PCS | Mod: ,,, | Performed by: FAMILY MEDICINE

## 2022-03-11 PROCEDURE — 3008F PR BODY MASS INDEX (BMI) DOCUMENTED: ICD-10-PCS | Mod: ,,, | Performed by: FAMILY MEDICINE

## 2022-03-11 PROCEDURE — 3008F BODY MASS INDEX DOCD: CPT | Mod: ,,, | Performed by: FAMILY MEDICINE

## 2022-03-11 PROCEDURE — 3078F DIAST BP <80 MM HG: CPT | Mod: ,,, | Performed by: FAMILY MEDICINE

## 2022-03-11 PROCEDURE — 99213 PR OFFICE/OUTPT VISIT, EST, LEVL III, 20-29 MIN: ICD-10-PCS | Mod: ,,, | Performed by: FAMILY MEDICINE

## 2022-03-11 PROCEDURE — 3074F SYST BP LT 130 MM HG: CPT | Mod: ,,, | Performed by: FAMILY MEDICINE

## 2022-03-11 PROCEDURE — 99213 OFFICE O/P EST LOW 20 MIN: CPT | Mod: ,,, | Performed by: FAMILY MEDICINE

## 2022-03-11 PROCEDURE — 3078F PR MOST RECENT DIASTOLIC BLOOD PRESSURE < 80 MM HG: ICD-10-PCS | Mod: ,,, | Performed by: FAMILY MEDICINE

## 2022-03-11 PROCEDURE — 1159F MED LIST DOCD IN RCRD: CPT | Mod: ,,, | Performed by: FAMILY MEDICINE

## 2022-03-11 PROCEDURE — 1160F RVW MEDS BY RX/DR IN RCRD: CPT | Mod: ,,, | Performed by: FAMILY MEDICINE

## 2022-03-11 PROCEDURE — 1159F PR MEDICATION LIST DOCUMENTED IN MEDICAL RECORD: ICD-10-PCS | Mod: ,,, | Performed by: FAMILY MEDICINE

## 2022-03-11 RX ORDER — FLUTICASONE PROPIONATE 50 MCG
2 SPRAY, SUSPENSION (ML) NASAL DAILY
Qty: 16 G | Refills: 5 | Status: SHIPPED | OUTPATIENT
Start: 2022-03-11 | End: 2022-10-07 | Stop reason: SDUPTHER

## 2022-03-11 RX ORDER — HYDROCODONE BITARTRATE AND ACETAMINOPHEN 10; 325 MG/1; MG/1
1 TABLET ORAL EVERY 6 HOURS PRN
Qty: 90 TABLET | Refills: 0 | Status: SHIPPED | OUTPATIENT
Start: 2022-03-11 | End: 2022-04-11 | Stop reason: SDUPTHER

## 2022-03-11 RX ORDER — ALBUTEROL SULFATE 90 UG/1
2 AEROSOL, METERED RESPIRATORY (INHALATION) EVERY 4 HOURS PRN
Qty: 18 G | Refills: 5 | Status: SHIPPED | OUTPATIENT
Start: 2022-03-11 | End: 2022-11-21 | Stop reason: SDUPTHER

## 2022-03-11 RX ORDER — FLUTICASONE PROPIONATE 220 UG/1
2 AEROSOL, METERED RESPIRATORY (INHALATION) 2 TIMES DAILY
Qty: 12 G | Refills: 5 | Status: SHIPPED | OUTPATIENT
Start: 2022-03-11 | End: 2022-06-01 | Stop reason: SDUPTHER

## 2022-03-11 NOTE — PROGRESS NOTES
Nneka Gomes is a 61 y.o. female seen today for follow-up on her COPD, and chronic low back pain and chronic shoulder pain.  She has a history of lumbar disc disease and DJD of her shoulder.  She reports her pain symptoms are well controlled with her Norco with no medication side effects.  She is meeting her ADLs and attending physical therapy and has had no signs or symptoms of tolerance or addiction such as losing her prescriptions.  Patient's COPD is well controlled and she has had no recent exacerbations.  Her  today was appropriate and urine drug screens have only showed her prescribed Norco.    Past Medical History:   Diagnosis Date    Abdominal bloating 01/09/2020    Abdominal pain, right upper quadrant 01/09/2020    Abnormal liver enzymes 03/04/2020    Abnormal results of liver function studies 11/18/2020    Acute conjunctivitis 05/30/2014    Acute exacerbation of chronic obstructive airways disease 02/13/2017    Acute pharyngitis 05/19/2020    Acute sinusitis 10/09/2017    Acute upper respiratory infection 05/19/2020    Allergic rhinitis 09/23/2020    Anemia 03/07/2014    Anesthesia of skin 09/06/2017    bilateral fingers    Bilateral primary osteoarthritis of knee 12/02/2019    Bradycardia 01/16/2020    Carpal tunnel syndrome 08/10/2017    Chest pain 03/06/2020    Chronic idiopathic constipation 11/18/2020    Chronic low back pain 11/05/2018    Chronic pain 04/23/2019    Chronic pain syndrome 01/30/2020    COPD (chronic obstructive pulmonary disease) 02/19/2021    Coronavirus infection 05/21/2020    Cough 05/19/2020    Degeneration of lumbar intervertebral disc 09/26/2014    L3-L4 L4-L5 Greater than L5-S1    Depressive disorder 07/03/2019    Disorder of gallbladder 03/30/2015    Dysphagia 01/09/2020    Dyspnea 05/19/2020    Dysuria 05/19/2020    Fatigue 05/19/2020    Frequency of urination 02/16/2015    GERD (gastroesophageal reflux disease) 11/18/2020    Hematuria  06/30/2020    Hemoptysis 02/27/2020    Herpes zoster 09/23/2020    Hyperlipidemia 09/23/2020    Hypertension 03/06/2020    Joint pain 11/04/2019    Knee pain 01/04/2019    Left inguinal hernia 06/10/2019    Long term (current) use of opiate analgesic 02/19/2021    Lumbar spondylosis 11/23/2020    Lumbar sprain 07/16/2012    Malaise 11/20/2014    Melena 09/25/2017    Microscopic hematuria 04/02/2020    Migraine without aura 02/06/2012    Nausea 02/27/2020    Nicotine dependence, cigarettes, uncomplicated 11/11/2020    Nonulcer dyspepsia 01/23/2017    On home O2     Other long term (current) drug therapy 09/23/2020    Other specified urinary incontinence 06/17/2020    Pain in left shoulder 05/02/2019    Pain in left wrist 09/06/2017    and hand    Pain in right shoulder 08/30/2019    Pain in right wrist 09/04/2018    Right hand    Palpitations 03/06/2020    Shoulder joint pain 04/23/2019    Smoker 07/03/2019    Snoring 08/02/2019    Synovial cyst of popliteal space 04/08/2013    Tobacco dependence syndrome 02/19/2021    Unstable angina 01/16/2020    UTI (urinary tract infection) 03/27/2020     Family History   Problem Relation Age of Onset    Rectal cancer Other     Diabetes Other     Heart disease Other     Hypertension Other     Hypertension Mother     Cancer Maternal Aunt      Current Outpatient Medications on File Prior to Visit   Medication Sig Dispense Refill    albuterol (PROVENTIL) 2.5 mg /3 mL (0.083 %) nebulizer solution Take 3 mLs (2.5 mg total) by nebulization 4 (four) times daily as needed for Wheezing. 100 each 5    azelastine (ASTELIN) 137 mcg (0.1 %) nasal spray 2 sprays by Nasal route 2 (two) times daily.      buPROPion (WELLBUTRIN XL) 150 MG TB24 tablet Take 1 tablet (150 mg total) by mouth once daily. 90 tablet 1    diclofenac sodium (VOLTAREN) 1 % Gel Apply 1 g topically 4 (four) times daily.      gabapentin (NEURONTIN) 100 MG capsule Take 100 mg by mouth  3 (three) times daily.      hydroCHLOROthiazide (HYDRODIURIL) 25 MG tablet Take 1 tablet (25 mg total) by mouth once daily. 90 tablet 1    levocetirizine (XYZAL) 5 MG tablet TAKE 1 TABLET EVERY NIGHT AT BEDTIME 90 tablet 3    linaCLOtide (LINZESS) 145 mcg Cap capsule Take 1 capsule (145 mcg total) by mouth before breakfast. 90 capsule 3    pantoprazole (PROTONIX) 40 MG tablet Take 1 tablet (40 mg total) by mouth once daily. 90 tablet 3    rosuvastatin (CRESTOR) 20 MG tablet Take 1 tablet (20 mg total) by mouth once daily. 90 tablet 3    theophylline (ZEFERINO-24) 100 MG 24 hr capsule Take 3 capsules (300 mg total) by mouth once daily. 90 capsule 1    [DISCONTINUED] albuterol (PROAIR HFA) 90 mcg/actuation inhaler Inhale 2 puffs into the lungs every 4 (four) hours as needed. 18 g 5    [DISCONTINUED] fluticasone propionate (FLONASE) 50 mcg/actuation nasal spray 2 sprays by Each Nostril route once daily.      [DISCONTINUED] fluticasone propionate (FLOVENT HFA) 220 mcg/actuation inhaler Inhale into the lungs 2 (two) times daily. Controller      [DISCONTINUED] HYDROcodone-acetaminophen (NORCO)  mg per tablet Take 1 tablet by mouth every 6 (six) hours as needed for Pain. Every 4 to 6 hours prn for Chronic back pain 90 tablet 0     No current facility-administered medications on file prior to visit.     Immunization History   Administered Date(s) Administered    COVID-19, MRNA, LN-S, PF (Pfizer) (Purple Cap) 04/29/2021, 10/28/2021    Influenza - Quadrivalent - PF *Preferred* (6 months and older) 09/16/2021    Influenza Split 10/03/2019    Pneumococcal Conjugate - 13 Valent 11/05/2018    Pneumococcal Polysaccharide - 23 Valent 11/05/2019       Review of Systems   Constitutional: Negative for fever, malaise/fatigue and weight loss.   Respiratory: Negative for shortness of breath.    Cardiovascular: Negative for chest pain and palpitations.   Gastrointestinal: Negative for nausea and vomiting.    Musculoskeletal: Positive for back pain, joint pain and myalgias.   Psychiatric/Behavioral: Negative for depression.        Vitals:    03/11/22 0830   BP: 109/78   Pulse: 77   Resp: 18   Temp: 97.8 °F (36.6 °C)       Physical Exam  Vitals reviewed.   Constitutional:       Appearance: Normal appearance.   HENT:      Head: Normocephalic.   Eyes:      Extraocular Movements: Extraocular movements intact.      Conjunctiva/sclera: Conjunctivae normal.      Pupils: Pupils are equal, round, and reactive to light.   Neck:      Thyroid: No thyroid mass or thyromegaly.   Cardiovascular:      Rate and Rhythm: Normal rate and regular rhythm.      Heart sounds: Normal heart sounds. No murmur heard.    No gallop.   Pulmonary:      Effort: Pulmonary effort is normal. No respiratory distress.      Breath sounds: Decreased air movement present. Decreased breath sounds present. No wheezing or rales.   Musculoskeletal:      Lumbar back: Spasms and tenderness present. Decreased range of motion.        Back:       Comments: She is slow to rise from a seated position and has a stooped antalgic gait.   Skin:     General: Skin is warm and dry.      Coloration: Skin is not jaundiced or pale.   Neurological:      Mental Status: She is alert.   Psychiatric:         Mood and Affect: Mood normal.         Behavior: Behavior normal.         Thought Content: Thought content normal.         Judgment: Judgment normal.          Assessment and Plan  Allergic rhinitis, unspecified seasonality, unspecified trigger  -     fluticasone propionate (FLONASE) 50 mcg/actuation nasal spray; 2 sprays (100 mcg total) by Each Nostril route once daily.  Dispense: 16 g; Refill: 5    Lumbar disc disease  -     HYDROcodone-acetaminophen (NORCO)  mg per tablet; Take 1 tablet by mouth every 6 (six) hours as needed for Pain. Every 4 to 6 hours prn for Chronic back pain  Dispense: 90 tablet; Refill: 0    Chronic left shoulder pain  -     HYDROcodone-acetaminophen  (NORCO)  mg per tablet; Take 1 tablet by mouth every 6 (six) hours as needed for Pain. Every 4 to 6 hours prn for Chronic back pain  Dispense: 90 tablet; Refill: 0    Chronic obstructive pulmonary disease, unspecified COPD type  -     fluticasone propionate (FLOVENT HFA) 220 mcg/actuation inhaler; Inhale 2 puffs into the lungs 2 (two) times daily. Controller  Dispense: 12 g; Refill: 5    Other orders  -     albuterol (PROAIR HFA) 90 mcg/actuation inhaler; Inhale 2 puffs into the lungs every 4 (four) hours as needed for Wheezing.  Dispense: 18 g; Refill: 5            Return to clinic in 1 month or as needed.    Health Maintenance Topics with due status: Not Due       Topic Last Completion Date    Colorectal Cancer Screening 10/04/2017    Pneumococcal Vaccines (Age 0-64) 11/05/2019    Mammogram 09/23/2021    Lipid Panel 01/24/2022

## 2022-04-11 ENCOUNTER — OFFICE VISIT (OUTPATIENT)
Dept: FAMILY MEDICINE | Facility: CLINIC | Age: 62
End: 2022-04-11
Payer: COMMERCIAL

## 2022-04-11 VITALS
HEIGHT: 66 IN | HEART RATE: 83 BPM | RESPIRATION RATE: 18 BRPM | OXYGEN SATURATION: 98 % | DIASTOLIC BLOOD PRESSURE: 81 MMHG | WEIGHT: 208 LBS | SYSTOLIC BLOOD PRESSURE: 113 MMHG | TEMPERATURE: 99 F | BODY MASS INDEX: 33.43 KG/M2

## 2022-04-11 DIAGNOSIS — M51.9 LUMBAR DISC DISEASE: ICD-10-CM

## 2022-04-11 DIAGNOSIS — M25.512 CHRONIC LEFT SHOULDER PAIN: ICD-10-CM

## 2022-04-11 DIAGNOSIS — G89.29 CHRONIC LEFT SHOULDER PAIN: ICD-10-CM

## 2022-04-11 PROCEDURE — 1160F PR REVIEW ALL MEDS BY PRESCRIBER/CLIN PHARMACIST DOCUMENTED: ICD-10-PCS | Mod: ,,, | Performed by: FAMILY MEDICINE

## 2022-04-11 PROCEDURE — 3079F PR MOST RECENT DIASTOLIC BLOOD PRESSURE 80-89 MM HG: ICD-10-PCS | Mod: ,,, | Performed by: FAMILY MEDICINE

## 2022-04-11 PROCEDURE — 1159F PR MEDICATION LIST DOCUMENTED IN MEDICAL RECORD: ICD-10-PCS | Mod: ,,, | Performed by: FAMILY MEDICINE

## 2022-04-11 PROCEDURE — 3008F PR BODY MASS INDEX (BMI) DOCUMENTED: ICD-10-PCS | Mod: ,,, | Performed by: FAMILY MEDICINE

## 2022-04-11 PROCEDURE — 3074F SYST BP LT 130 MM HG: CPT | Mod: ,,, | Performed by: FAMILY MEDICINE

## 2022-04-11 PROCEDURE — 3074F PR MOST RECENT SYSTOLIC BLOOD PRESSURE < 130 MM HG: ICD-10-PCS | Mod: ,,, | Performed by: FAMILY MEDICINE

## 2022-04-11 PROCEDURE — 3079F DIAST BP 80-89 MM HG: CPT | Mod: ,,, | Performed by: FAMILY MEDICINE

## 2022-04-11 PROCEDURE — 3008F BODY MASS INDEX DOCD: CPT | Mod: ,,, | Performed by: FAMILY MEDICINE

## 2022-04-11 PROCEDURE — 1159F MED LIST DOCD IN RCRD: CPT | Mod: ,,, | Performed by: FAMILY MEDICINE

## 2022-04-11 PROCEDURE — 99213 PR OFFICE/OUTPT VISIT, EST, LEVL III, 20-29 MIN: ICD-10-PCS | Mod: ,,, | Performed by: FAMILY MEDICINE

## 2022-04-11 PROCEDURE — 99213 OFFICE O/P EST LOW 20 MIN: CPT | Mod: ,,, | Performed by: FAMILY MEDICINE

## 2022-04-11 PROCEDURE — 1160F RVW MEDS BY RX/DR IN RCRD: CPT | Mod: ,,, | Performed by: FAMILY MEDICINE

## 2022-04-11 RX ORDER — BETAMETHASONE DIPROPIONATE 0.5 MG/G
1 LOTION TOPICAL 2 TIMES DAILY
COMMUNITY
Start: 2022-04-06 | End: 2023-01-09

## 2022-04-11 RX ORDER — HYDROCODONE BITARTRATE AND ACETAMINOPHEN 10; 325 MG/1; MG/1
1 TABLET ORAL EVERY 6 HOURS PRN
Qty: 90 TABLET | Refills: 0 | Status: SHIPPED | OUTPATIENT
Start: 2022-04-11 | End: 2022-05-11 | Stop reason: SDUPTHER

## 2022-04-11 RX ORDER — CYCLOSPORINE 0.5 MG/ML
EMULSION OPHTHALMIC
COMMUNITY
Start: 2022-03-27

## 2022-04-11 NOTE — PROGRESS NOTES
Nneka Gomes is a 61 y.o. female seen today for follow-up on her chronic pain syndrome secondary to her lumbar disc disease.  Patient reports good symptom control with no medication side effects.  She is meeting her ADLs and has had no signs or symptoms of tolerance or addiction.  Her  today was appropriate and her urine drug screen has only shown her prescribe Norco.  Today she has no new complaints.      Past Medical History:   Diagnosis Date    Abdominal bloating 01/09/2020    Abdominal pain, right upper quadrant 01/09/2020    Abnormal liver enzymes 03/04/2020    Abnormal results of liver function studies 11/18/2020    Acute conjunctivitis 05/30/2014    Acute exacerbation of chronic obstructive airways disease 02/13/2017    Acute pharyngitis 05/19/2020    Acute sinusitis 10/09/2017    Acute upper respiratory infection 05/19/2020    Allergic rhinitis 09/23/2020    Anemia 03/07/2014    Anesthesia of skin 09/06/2017    bilateral fingers    Bilateral primary osteoarthritis of knee 12/02/2019    Bradycardia 01/16/2020    Carpal tunnel syndrome 08/10/2017    Chest pain 03/06/2020    Chronic idiopathic constipation 11/18/2020    Chronic low back pain 11/05/2018    Chronic pain 04/23/2019    Chronic pain syndrome 01/30/2020    COPD (chronic obstructive pulmonary disease) 02/19/2021    Coronavirus infection 05/21/2020    Cough 05/19/2020    Degeneration of lumbar intervertebral disc 09/26/2014    L3-L4 L4-L5 Greater than L5-S1    Depressive disorder 07/03/2019    Disorder of gallbladder 03/30/2015    Dysphagia 01/09/2020    Dyspnea 05/19/2020    Dysuria 05/19/2020    Fatigue 05/19/2020    Frequency of urination 02/16/2015    GERD (gastroesophageal reflux disease) 11/18/2020    Hematuria 06/30/2020    Hemoptysis 02/27/2020    Herpes zoster 09/23/2020    Hyperlipidemia 09/23/2020    Hypertension 03/06/2020    Joint pain 11/04/2019    Knee pain 01/04/2019    Left inguinal hernia  06/10/2019    Long term (current) use of opiate analgesic 02/19/2021    Lumbar spondylosis 11/23/2020    Lumbar sprain 07/16/2012    Malaise 11/20/2014    Melena 09/25/2017    Microscopic hematuria 04/02/2020    Migraine without aura 02/06/2012    Nausea 02/27/2020    Nicotine dependence, cigarettes, uncomplicated 11/11/2020    Nonulcer dyspepsia 01/23/2017    On home O2     Other long term (current) drug therapy 09/23/2020    Other specified urinary incontinence 06/17/2020    Pain in left shoulder 05/02/2019    Pain in left wrist 09/06/2017    and hand    Pain in right shoulder 08/30/2019    Pain in right wrist 09/04/2018    Right hand    Palpitations 03/06/2020    Shoulder joint pain 04/23/2019    Smoker 07/03/2019    Snoring 08/02/2019    Synovial cyst of popliteal space 04/08/2013    Tobacco dependence syndrome 02/19/2021    Unstable angina 01/16/2020    UTI (urinary tract infection) 03/27/2020     Family History   Problem Relation Age of Onset    Rectal cancer Other     Diabetes Other     Heart disease Other     Hypertension Other     Hypertension Mother     Cancer Maternal Aunt      Current Outpatient Medications on File Prior to Visit   Medication Sig Dispense Refill    albuterol (PROAIR HFA) 90 mcg/actuation inhaler Inhale 2 puffs into the lungs every 4 (four) hours as needed for Wheezing. 18 g 5    albuterol (PROVENTIL) 2.5 mg /3 mL (0.083 %) nebulizer solution Take 3 mLs (2.5 mg total) by nebulization 4 (four) times daily as needed for Wheezing. 100 each 5    azelastine (ASTELIN) 137 mcg (0.1 %) nasal spray 2 sprays by Nasal route 2 (two) times daily.      betamethasone dipropionate (DIPROLENE) 0.05 % lotion Apply 1 application topically 2 (two) times daily.      buPROPion (WELLBUTRIN XL) 150 MG TB24 tablet Take 1 tablet (150 mg total) by mouth once daily. 90 tablet 1    diclofenac sodium (VOLTAREN) 1 % Gel Apply 1 g topically 4 (four) times daily.      fluticasone  propionate (FLONASE) 50 mcg/actuation nasal spray 2 sprays (100 mcg total) by Each Nostril route once daily. 16 g 5    fluticasone propionate (FLOVENT HFA) 220 mcg/actuation inhaler Inhale 2 puffs into the lungs 2 (two) times daily. Controller 12 g 5    gabapentin (NEURONTIN) 100 MG capsule Take 100 mg by mouth 3 (three) times daily.      hydroCHLOROthiazide (HYDRODIURIL) 25 MG tablet Take 1 tablet (25 mg total) by mouth once daily. 90 tablet 1    levocetirizine (XYZAL) 5 MG tablet TAKE 1 TABLET EVERY NIGHT AT BEDTIME 90 tablet 3    linaCLOtide (LINZESS) 145 mcg Cap capsule Take 1 capsule (145 mcg total) by mouth before breakfast. 90 capsule 3    RESTASIS 0.05 % ophthalmic emulsion       rosuvastatin (CRESTOR) 20 MG tablet Take 1 tablet (20 mg total) by mouth once daily. 90 tablet 3    theophylline (ZEFERINO-24) 100 MG 24 hr capsule Take 3 capsules (300 mg total) by mouth once daily. 90 capsule 1    [DISCONTINUED] HYDROcodone-acetaminophen (NORCO)  mg per tablet Take 1 tablet by mouth every 6 (six) hours as needed for Pain. Every 4 to 6 hours prn for Chronic back pain 90 tablet 0    pantoprazole (PROTONIX) 40 MG tablet Take 1 tablet (40 mg total) by mouth once daily. 90 tablet 3     No current facility-administered medications on file prior to visit.     Immunization History   Administered Date(s) Administered    COVID-19, MRNA, LN-S, PF (Pfizer) (Purple Cap) 04/29/2021, 10/28/2021    Influenza - Quadrivalent - PF *Preferred* (6 months and older) 09/16/2021    Influenza Split 10/03/2019    Pneumococcal Conjugate - 13 Valent 11/05/2018    Pneumococcal Polysaccharide - 23 Valent 11/05/2019       Review of Systems   Constitutional: Negative for fever, malaise/fatigue and weight loss.   Respiratory: Positive for shortness of breath.    Cardiovascular: Negative for chest pain and palpitations.   Gastrointestinal: Negative for nausea and vomiting.   Musculoskeletal: Positive for back pain, joint pain and  myalgias.   Psychiatric/Behavioral: Negative for depression.        Vitals:    04/11/22 0819   BP: 113/81   Pulse: 83   Resp: 18   Temp: 98.8 °F (37.1 °C)       Physical Exam  Vitals reviewed.   Constitutional:       Appearance: Normal appearance.   HENT:      Head: Normocephalic.   Eyes:      Extraocular Movements: Extraocular movements intact.      Conjunctiva/sclera: Conjunctivae normal.      Pupils: Pupils are equal, round, and reactive to light.   Neck:      Thyroid: No thyroid mass or thyromegaly.   Cardiovascular:      Rate and Rhythm: Normal rate and regular rhythm.      Heart sounds: Normal heart sounds. No murmur heard.    No gallop.   Pulmonary:      Effort: Pulmonary effort is normal. No respiratory distress.      Breath sounds: Normal breath sounds. No wheezing or rales.   Musculoskeletal:      Left shoulder: Decreased range of motion.      Lumbar back: Spasms and tenderness present. Decreased range of motion.        Back:       Comments: She is slow to rise from a seated position and has a cane dependent, antalgic gait.   Skin:     General: Skin is warm and dry.      Coloration: Skin is not jaundiced or pale.   Neurological:      Mental Status: She is alert.   Psychiatric:         Mood and Affect: Mood normal.         Behavior: Behavior normal.         Thought Content: Thought content normal.         Judgment: Judgment normal.          Assessment and Plan  Lumbar disc disease  -     HYDROcodone-acetaminophen (NORCO)  mg per tablet; Take 1 tablet by mouth every 6 (six) hours as needed for Pain. Every 4 to 6 hours prn for Chronic back pain  Dispense: 90 tablet; Refill: 0    Chronic left shoulder pain  -     HYDROcodone-acetaminophen (NORCO)  mg per tablet; Take 1 tablet by mouth every 6 (six) hours as needed for Pain. Every 4 to 6 hours prn for Chronic back pain  Dispense: 90 tablet; Refill: 0            Return to clinic in 1 month    Health Maintenance Topics with due status: Not Due        Topic Last Completion Date    Colorectal Cancer Screening 10/04/2017    Mammogram 09/23/2021    Lipid Panel 01/24/2022

## 2022-05-10 ENCOUNTER — OFFICE VISIT (OUTPATIENT)
Dept: FAMILY MEDICINE | Facility: CLINIC | Age: 62
End: 2022-05-10
Payer: COMMERCIAL

## 2022-05-10 VITALS
TEMPERATURE: 98 F | HEIGHT: 66 IN | WEIGHT: 204.13 LBS | SYSTOLIC BLOOD PRESSURE: 124 MMHG | RESPIRATION RATE: 18 BRPM | BODY MASS INDEX: 32.81 KG/M2 | HEART RATE: 86 BPM | DIASTOLIC BLOOD PRESSURE: 84 MMHG | OXYGEN SATURATION: 99 %

## 2022-05-10 DIAGNOSIS — N95.1 HOT FLASHES DUE TO MENOPAUSE: ICD-10-CM

## 2022-05-10 DIAGNOSIS — Z01.419 PAP SMEAR, AS PART OF ROUTINE GYNECOLOGICAL EXAMINATION: Primary | ICD-10-CM

## 2022-05-10 DIAGNOSIS — Z71.89 COMPLEX CARE COORDINATION: ICD-10-CM

## 2022-05-10 LAB
CANDIDA SPECIES: NEGATIVE
GARDNERELLA: POSITIVE
TRICHOMONAS: NEGATIVE

## 2022-05-10 PROCEDURE — 87510 BACTERIAL VAGINOSIS: ICD-10-PCS | Mod: ,,, | Performed by: CLINICAL MEDICAL LABORATORY

## 2022-05-10 PROCEDURE — 87660 TRICHOMONAS VAGIN DIR PROBE: CPT | Mod: ,,, | Performed by: CLINICAL MEDICAL LABORATORY

## 2022-05-10 PROCEDURE — 88141 THINPREP PAP TEST: ICD-10-PCS | Mod: ,,, | Performed by: PATHOLOGY

## 2022-05-10 PROCEDURE — 87591 CHLAMYDIA/GONORRHOEAE(GC), PCR: ICD-10-PCS | Mod: ,,, | Performed by: CLINICAL MEDICAL LABORATORY

## 2022-05-10 PROCEDURE — 3008F PR BODY MASS INDEX (BMI) DOCUMENTED: ICD-10-PCS | Mod: ,,, | Performed by: NURSE PRACTITIONER

## 2022-05-10 PROCEDURE — 87591 N.GONORRHOEAE DNA AMP PROB: CPT | Mod: ,,, | Performed by: CLINICAL MEDICAL LABORATORY

## 2022-05-10 PROCEDURE — 3074F PR MOST RECENT SYSTOLIC BLOOD PRESSURE < 130 MM HG: ICD-10-PCS | Mod: ,,, | Performed by: NURSE PRACTITIONER

## 2022-05-10 PROCEDURE — 88142 CYTOPATH C/V THIN LAYER: CPT | Mod: GCY | Performed by: NURSE PRACTITIONER

## 2022-05-10 PROCEDURE — 3079F PR MOST RECENT DIASTOLIC BLOOD PRESSURE 80-89 MM HG: ICD-10-PCS | Mod: ,,, | Performed by: NURSE PRACTITIONER

## 2022-05-10 PROCEDURE — 3008F BODY MASS INDEX DOCD: CPT | Mod: ,,, | Performed by: NURSE PRACTITIONER

## 2022-05-10 PROCEDURE — 87491 CHLAMYDIA/GONORRHOEAE(GC), PCR: ICD-10-PCS | Mod: ,,, | Performed by: CLINICAL MEDICAL LABORATORY

## 2022-05-10 PROCEDURE — 87624 HPV HI-RISK TYP POOLED RSLT: CPT | Mod: ,,, | Performed by: CLINICAL MEDICAL LABORATORY

## 2022-05-10 PROCEDURE — 88141 CYTOPATH C/V INTERPRET: CPT | Mod: ,,, | Performed by: PATHOLOGY

## 2022-05-10 PROCEDURE — G0101 PR CA SCREEN;PELVIC/BREAST EXAM: ICD-10-PCS | Mod: ,,, | Performed by: NURSE PRACTITIONER

## 2022-05-10 PROCEDURE — 87510 GARDNER VAG DNA DIR PROBE: CPT | Mod: ,,, | Performed by: CLINICAL MEDICAL LABORATORY

## 2022-05-10 PROCEDURE — 3079F DIAST BP 80-89 MM HG: CPT | Mod: ,,, | Performed by: NURSE PRACTITIONER

## 2022-05-10 PROCEDURE — 1159F PR MEDICATION LIST DOCUMENTED IN MEDICAL RECORD: ICD-10-PCS | Mod: ,,, | Performed by: NURSE PRACTITIONER

## 2022-05-10 PROCEDURE — 87480 CANDIDA DNA DIR PROBE: CPT | Mod: ,,, | Performed by: CLINICAL MEDICAL LABORATORY

## 2022-05-10 PROCEDURE — 1159F MED LIST DOCD IN RCRD: CPT | Mod: ,,, | Performed by: NURSE PRACTITIONER

## 2022-05-10 PROCEDURE — 87660 BACTERIAL VAGINOSIS: ICD-10-PCS | Mod: ,,, | Performed by: CLINICAL MEDICAL LABORATORY

## 2022-05-10 PROCEDURE — 87491 CHLMYD TRACH DNA AMP PROBE: CPT | Mod: ,,, | Performed by: CLINICAL MEDICAL LABORATORY

## 2022-05-10 PROCEDURE — G0101 CA SCREEN;PELVIC/BREAST EXAM: HCPCS | Mod: ,,, | Performed by: NURSE PRACTITIONER

## 2022-05-10 PROCEDURE — 3074F SYST BP LT 130 MM HG: CPT | Mod: ,,, | Performed by: NURSE PRACTITIONER

## 2022-05-10 PROCEDURE — 87624 HUMAN PAPILLOMAVIRUS (HPV): ICD-10-PCS | Mod: ,,, | Performed by: CLINICAL MEDICAL LABORATORY

## 2022-05-10 PROCEDURE — 87480 BACTERIAL VAGINOSIS: ICD-10-PCS | Mod: ,,, | Performed by: CLINICAL MEDICAL LABORATORY

## 2022-05-10 RX ORDER — ESTRADIOL 0.5 MG/1
0.5 TABLET ORAL NIGHTLY
Qty: 30 TABLET | Refills: 11 | Status: SHIPPED | OUTPATIENT
Start: 2022-05-10 | End: 2023-03-10

## 2022-05-10 NOTE — PROGRESS NOTES
ZENA Fernandez   [facility]      PATIENT NAME: Nneka Gomes  : 1960  DATE: 5/10/22  MRN: [id number]      Patient PCP Information     Provider PCP Type    [facility]          Reason for Visit / Chief Complaint: Gynecologic Exam         History of Present Illness / Problem Focused Workflow     Nneka Gomes presents to the clinic with Gynecologic Exam     HPI   Ms Gomes is here for routine pap. Last pap approx 7 years ago. Patient does report hx of abnormal pap however states repeat was normal. Denies prior colposcopy. Patient is followed by Dr Yan for routine care. Denies hx of CAD. Patient is smoker approx 1 cigarette per day. Patient complains of hot flashes requested hormone replacement therapy for hot flashes. Discussed risk of clots etc while smoking on hormone replacement therapy. Patient states she has quit before and interested in quit again. Has taken nicorette to help. Patient aware of risk. Patient exp menopause around early 50s. No abd vaginal bleeding or pain reported. +family hx of colon and breast cancer. Reports last mammogram in 2021    Review of Systems     Review of Systems   Constitutional: Negative for activity change, appetite change, chills, diaphoresis, fatigue, fever and unexpected weight change.   HENT: Negative for congestion, ear pain, facial swelling, hearing loss, nosebleeds and sore throat.    Respiratory: Negative for apnea, cough, shortness of breath and wheezing.    Cardiovascular: Negative for chest pain, palpitations and leg swelling.   Gastrointestinal: Negative for abdominal distention, abdominal pain, blood in stool, constipation, diarrhea and nausea.   Endocrine: Negative for cold intolerance, heat intolerance, polydipsia, polyphagia and polyuria.   Genitourinary: Negative for decreased urine volume, difficulty urinating, dysuria, flank pain, frequency, hematuria, urgency, vaginal bleeding, vaginal discharge and  vaginal pain.   Musculoskeletal: Negative for arthralgias, joint swelling and myalgias.   Skin: Negative for color change and rash.   Neurological: Negative for dizziness, tremors, seizures, syncope, facial asymmetry, speech difficulty, weakness, light-headedness, numbness and headaches.   Hematological: Negative for adenopathy. Does not bruise/bleed easily.   Psychiatric/Behavioral: Negative for behavioral problems and confusion.       Medical / Social / Family History     Past Medical History:   Diagnosis Date    Abdominal bloating 01/09/2020    Abdominal pain, right upper quadrant 01/09/2020    Abnormal liver enzymes 03/04/2020    Abnormal results of liver function studies 11/18/2020    Acute conjunctivitis 05/30/2014    Acute exacerbation of chronic obstructive airways disease 02/13/2017    Acute pharyngitis 05/19/2020    Acute sinusitis 10/09/2017    Acute upper respiratory infection 05/19/2020    Allergic rhinitis 09/23/2020    Anemia 03/07/2014    Anesthesia of skin 09/06/2017    bilateral fingers    Bilateral primary osteoarthritis of knee 12/02/2019    Bradycardia 01/16/2020    Carpal tunnel syndrome 08/10/2017    Chest pain 03/06/2020    Chronic idiopathic constipation 11/18/2020    Chronic low back pain 11/05/2018    Chronic pain 04/23/2019    Chronic pain syndrome 01/30/2020    COPD (chronic obstructive pulmonary disease) 02/19/2021    Coronavirus infection 05/21/2020    Cough 05/19/2020    Degeneration of lumbar intervertebral disc 09/26/2014    L3-L4 L4-L5 Greater than L5-S1    Depressive disorder 07/03/2019    Disorder of gallbladder 03/30/2015    Dysphagia 01/09/2020    Dyspnea 05/19/2020    Dysuria 05/19/2020    Fatigue 05/19/2020    Frequency of urination 02/16/2015    GERD (gastroesophageal reflux disease) 11/18/2020    Hematuria 06/30/2020    Hemoptysis 02/27/2020    Herpes zoster 09/23/2020    Hyperlipidemia 09/23/2020    Hypertension 03/06/2020    Joint  pain 11/04/2019    Knee pain 01/04/2019    Left inguinal hernia 06/10/2019    Long term (current) use of opiate analgesic 02/19/2021    Lumbar spondylosis 11/23/2020    Lumbar sprain 07/16/2012    Malaise 11/20/2014    Melena 09/25/2017    Microscopic hematuria 04/02/2020    Migraine without aura 02/06/2012    Nausea 02/27/2020    Nicotine dependence, cigarettes, uncomplicated 11/11/2020    Nonulcer dyspepsia 01/23/2017    On home O2     Other long term (current) drug therapy 09/23/2020    Other specified urinary incontinence 06/17/2020    Pain in left shoulder 05/02/2019    Pain in left wrist 09/06/2017    and hand    Pain in right shoulder 08/30/2019    Pain in right wrist 09/04/2018    Right hand    Palpitations 03/06/2020    Shoulder joint pain 04/23/2019    Smoker 07/03/2019    Snoring 08/02/2019    Synovial cyst of popliteal space 04/08/2013    Tobacco dependence syndrome 02/19/2021    Unstable angina 01/16/2020    UTI (urinary tract infection) 03/27/2020       Past Surgical History:   Procedure Laterality Date    CARDIAC CATHETERIZATION      CHOLECYSTECTOMY      HERNIA REPAIR      ROTATOR CUFF REPAIR         Social History  Ms.  reports that she has been smoking cigarettes. She has a 40.00 pack-year smoking history. She has quit using smokeless tobacco. She reports previous alcohol use. She reports previous drug use. Drug: Marijuana.    Family History  Ms.'s family history includes Cancer in her maternal aunt; Diabetes in her other; Heart disease in her other; Hypertension in her mother and other; Rectal cancer in her other.    Medications and Allergies     Medications  Outpatient Medications Marked as Taking for the 5/10/22 encounter (Office Visit) with ZENA Fernandez   Medication Sig Dispense Refill    albuterol (PROAIR HFA) 90 mcg/actuation inhaler Inhale 2 puffs into the lungs every 4 (four) hours as needed for Wheezing. 18 g 5    albuterol (PROVENTIL) 2.5 mg /3 mL  "(0.083 %) nebulizer solution Take 3 mLs (2.5 mg total) by nebulization 4 (four) times daily as needed for Wheezing. 100 each 5    azelastine (ASTELIN) 137 mcg (0.1 %) nasal spray 2 sprays by Nasal route 2 (two) times daily.      betamethasone dipropionate (DIPROLENE) 0.05 % lotion Apply 1 application topically 2 (two) times daily.      diclofenac sodium (VOLTAREN) 1 % Gel Apply 1 g topically 4 (four) times daily.      fluticasone propionate (FLONASE) 50 mcg/actuation nasal spray 2 sprays (100 mcg total) by Each Nostril route once daily. 16 g 5    fluticasone propionate (FLOVENT HFA) 220 mcg/actuation inhaler Inhale 2 puffs into the lungs 2 (two) times daily. Controller 12 g 5    hydroCHLOROthiazide (HYDRODIURIL) 25 MG tablet Take 1 tablet (25 mg total) by mouth once daily. 90 tablet 1    HYDROcodone-acetaminophen (NORCO)  mg per tablet Take 1 tablet by mouth every 6 (six) hours as needed for Pain. Every 4 to 6 hours prn for Chronic back pain 90 tablet 0    levocetirizine (XYZAL) 5 MG tablet TAKE 1 TABLET EVERY NIGHT AT BEDTIME 90 tablet 3    linaCLOtide (LINZESS) 145 mcg Cap capsule Take 1 capsule (145 mcg total) by mouth before breakfast. 90 capsule 3    RESTASIS 0.05 % ophthalmic emulsion       rosuvastatin (CRESTOR) 20 MG tablet Take 1 tablet (20 mg total) by mouth once daily. 90 tablet 3    theophylline (ZEFERINO-24) 100 MG 24 hr capsule Take 3 capsules (300 mg total) by mouth once daily. 90 capsule 1       Allergies  Review of patient's allergies indicates:  No Known Allergies    Physical Examination     Vitals:    05/10/22 0815 05/10/22 0817   BP: 134/86 124/84   BP Location: Left arm Right arm   Patient Position: Sitting Sitting   Pulse: 86    Resp: 18    Temp: 98.3 °F (36.8 °C)    SpO2: 99%    Weight: 92.6 kg (204 lb 2 oz)    Height: 5' 6" (1.676 m)      Depression Patient Health Questionnaire 5/10/2022 2/11/2022 1/24/2022 1/13/2022 12/13/2021 11/18/2021 11/15/2021   Over the last two weeks how " often have you been bothered by little interest or pleasure in doing things 0 0 0 0 0 0 0   Over the last two weeks how often have you been bothered by feeling down, depressed or hopeless 1 0 0 0 0 0 0   PHQ-2 Total Score 1 0 0 0 0 0 0   Over the last two weeks how often have you been bothered by trouble falling or staying asleep, or sleeping too much - - - - - - -   Over the last two weeks how often have you been bothered by feeling tired or having little energy - - - - - - -   Over the last two weeks how often have you been bothered by a poor appetite or overeating - - - - - - -   Over the last two weeks how often have you been bothered by feeling bad about yourself - or that you are a failure or have let yourself or your family down - - - - - - -   Over the last two weeks how often have you been bothered by trouble concentrating on things, such as reading the newspaper or watching television - - - - - - -   Over the last two weeks how often have you been bothered by moving or speaking so slowly that other people could have noticed. Or the opposite - being so fidgety or restless that you have been moving around a lot more than usual. - - - - - - -   Over the last two weeks how often have you been bothered by thoughts that you would be better off dead, or of hurting yourself - - - - - - -   If you checked off any problems, how difficult have these problems made it for you to do your work, take care of things at home or get along with other people? - - - - - - -   Total Score - - - - - - -   Interpretation - - - - - - -         Physical Exam  Vitals reviewed. Exam conducted with a chaperone present.   Constitutional:       Appearance: Normal appearance. She is obese.   HENT:      Head: Normocephalic.      Right Ear: External ear normal.      Left Ear: External ear normal.      Nose: Nose normal.      Mouth/Throat:      Mouth: Mucous membranes are moist.   Eyes:      Extraocular Movements: Extraocular movements  intact.   Cardiovascular:      Rate and Rhythm: Normal rate and regular rhythm.      Pulses: Normal pulses.      Heart sounds: Normal heart sounds.   Pulmonary:      Effort: Pulmonary effort is normal.      Breath sounds: Normal breath sounds.   Chest:      Chest wall: No mass, lacerations, deformity, swelling, tenderness, crepitus or edema. There is no dullness to percussion.   Breasts:      Juan Score is 5. Breasts are symmetrical.      Right: Normal. No axillary adenopathy or supraclavicular adenopathy.      Left: Normal. No axillary adenopathy or supraclavicular adenopathy.       Abdominal:      General: Abdomen is flat. There is no distension.      Palpations: Abdomen is soft. There is no mass.      Tenderness: There is no abdominal tenderness. There is no right CVA tenderness, left CVA tenderness, guarding or rebound.      Hernia: No hernia is present. There is no hernia in the left inguinal area or right inguinal area.   Genitourinary:     Exam position: Lithotomy position.      Pubic Area: No rash or pubic lice.       Juan stage (genital): 5.      Labia:         Right: No rash, tenderness, lesion or injury.         Left: No rash, tenderness, lesion or injury.       Urethra: No prolapse, urethral pain, urethral swelling or urethral lesion.      Vagina: Normal.      Cervix: Discharge present.      Uterus: Normal.       Adnexa: Right adnexa normal and left adnexa normal.      Rectum: External hemorrhoid present.      Comments: Medium speculum  Cervix visualized white discharge no lesions  No CMT or masses on manual pelvic exam  Musculoskeletal:         General: Normal range of motion.      Cervical back: Normal range of motion.   Lymphadenopathy:      Upper Body:      Right upper body: No supraclavicular, axillary or pectoral adenopathy.      Left upper body: No supraclavicular, axillary or pectoral adenopathy.      Lower Body: No right inguinal adenopathy. No left inguinal adenopathy.   Skin:     General:  Skin is warm and dry.      Capillary Refill: Capillary refill takes less than 2 seconds.   Neurological:      General: No focal deficit present.      Mental Status: She is alert and oriented to person, place, and time.   Psychiatric:         Mood and Affect: Mood normal.         Behavior: Behavior normal.         Thought Content: Thought content normal.         Judgment: Judgment normal.           No visits with results within 14 Day(s) from this visit.   Latest known visit with results is:   Office Visit on 02/11/2022   Component Date Value Ref Range Status    POC Amphetamines 02/11/2022 Negative  Negative, Inconclusive Final    POC Barbiturates 02/11/2022 Negative  Negative, Inconclusive Final    POC Benzodiazepines 02/11/2022 Negative  Negative, Inconclusive Final    POC Cocaine 02/11/2022 Negative  Negative, Inconclusive Final    POC THC 02/11/2022 Negative  Negative, Inconclusive Final    POC Methadone 02/11/2022 Negative  Negative, Inconclusive Final    POC Methamphetamine 02/11/2022 Negative  Negative, Inconclusive Final    POC Opiates 02/11/2022 Presumptive Positive (A) Negative, Inconclusive Final    POC Oxycodone 02/11/2022 Negative  Negative, Inconclusive Final    POC Phencyclidine 02/11/2022 Negative  Negative, Inconclusive Final    POC Methylenedioxymethamphetamine * 02/11/2022 Negative  Negative, Inconclusive Final    POC Tricyclic Antidepressants 02/11/2022 Negative  Negative, Inconclusive Final    POC Buprenorphine 02/11/2022 Negative   Final     Acceptable 02/11/2022 Yes   Final    POC Temperature (Urine) 02/11/2022 92   Final             Assessment and Plan (including Health Maintenance)   :    Plan:   Pap smear, as part of routine gynecological examination  -     ThinPrep Pap Test; Future; Expected date: 05/10/2022  -     Bacterial Vaginosis; Future; Expected date: 05/10/2022  -     Chlamydia/GC, PCR; Future; Expected date: 05/10/2022    Night sweats  -     estradioL  (ESTRACE) 0.5 MG tablet; Take 1 tablet (0.5 mg total) by mouth every evening.  Dispense: 30 tablet; Refill: 11  -     Patient to refrain from smoking, patient aware of risk of smoking on HRT.        There are no Patient Instructions on file for this visit.       Health Maintenance Due   Topic Date Due    Cervical Cancer Screening  Never done    TETANUS VACCINE  Never done    Sign Pain Contract  Never done    Complete Opioid Risk Tool  Never done    Shingles Vaccine (1 of 2) Never done    LDCT Lung Screen  11/20/2021    COVID-19 Vaccine (3 - Booster for Pfizer series) 03/28/2022       Most Recent Immunizations   Administered Date(s) Administered    COVID-19, MRNA, LN-S, PF (Pfizer) (Purple Cap) 10/28/2021    Influenza - Quadrivalent - PF *Preferred* (6 months and older) 09/16/2021    Influenza Split 10/03/2019    Pneumococcal Conjugate - 13 Valent 11/05/2018    Pneumococcal Polysaccharide - 23 Valent 11/05/2019        Problem List Items Addressed This Visit    None     Visit Diagnoses     Pap smear, as part of routine gynecological examination    -  Primary    Relevant Orders    ThinPrep Pap Test    Bacterial Vaginosis    Chlamydia/GC, PCR    Night sweats              Health Maintenance Topics with due status: Not Due       Topic Last Completion Date    Colorectal Cancer Screening 10/04/2017    Pneumococcal Vaccines (Age 0-64) 11/05/2019    Mammogram 09/23/2021    Lipid Panel 01/24/2022       Future Appointments   Date Time Provider Department Center   5/11/2022  9:15 AM Cordelia Prado [facility] [place]   6/27/2022  9:15 AM Magno Yan MD [facility] FAMMED [place]   7/5/2022  8:45 AM Garett Rich MD [facility]   9/29/2022  7:30 AM [facility] [place]   11/21/2022  9:00 AM Mohan Voss [facility]            Signature:  Yvette Ambriz [facility]     Date of encounter: 5/10/22

## 2022-05-11 ENCOUNTER — OFFICE VISIT (OUTPATIENT)
Dept: FAMILY MEDICINE | Facility: CLINIC | Age: 62
End: 2022-05-11
Payer: COMMERCIAL

## 2022-05-11 VITALS
WEIGHT: 204 LBS | HEIGHT: 66 IN | DIASTOLIC BLOOD PRESSURE: 80 MMHG | OXYGEN SATURATION: 98 % | HEART RATE: 83 BPM | SYSTOLIC BLOOD PRESSURE: 109 MMHG | RESPIRATION RATE: 17 BRPM | BODY MASS INDEX: 32.78 KG/M2 | TEMPERATURE: 97 F

## 2022-05-11 DIAGNOSIS — G89.29 CHRONIC LEFT SHOULDER PAIN: ICD-10-CM

## 2022-05-11 DIAGNOSIS — M25.512 CHRONIC LEFT SHOULDER PAIN: ICD-10-CM

## 2022-05-11 DIAGNOSIS — M51.9 LUMBAR DISC DISEASE: Primary | ICD-10-CM

## 2022-05-11 DIAGNOSIS — I10 HYPERTENSION, UNSPECIFIED TYPE: ICD-10-CM

## 2022-05-11 DIAGNOSIS — F17.200 TOBACCO DEPENDENCE SYNDROME: ICD-10-CM

## 2022-05-11 DIAGNOSIS — Z79.891 LONG TERM (CURRENT) USE OF OPIATE ANALGESIC: ICD-10-CM

## 2022-05-11 DIAGNOSIS — E78.5 HYPERLIPIDEMIA, UNSPECIFIED HYPERLIPIDEMIA TYPE: ICD-10-CM

## 2022-05-11 LAB
ALBUMIN SERPL BCP-MCNC: 3.8 G/DL (ref 3.5–5)
ALBUMIN/GLOB SERPL: 1.1 {RATIO}
ALP SERPL-CCNC: 191 U/L (ref 50–130)
ALT SERPL W P-5'-P-CCNC: 28 U/L (ref 13–56)
ANION GAP SERPL CALCULATED.3IONS-SCNC: 13 MMOL/L (ref 7–16)
AST SERPL W P-5'-P-CCNC: 26 U/L (ref 15–37)
BASOPHILS # BLD AUTO: 0.04 K/UL (ref 0–0.2)
BASOPHILS NFR BLD AUTO: 0.6 % (ref 0–1)
BILIRUB SERPL-MCNC: 0.7 MG/DL (ref 0–1.2)
BUN SERPL-MCNC: 11 MG/DL (ref 7–18)
BUN/CREAT SERPL: 12 (ref 6–20)
CALCIUM SERPL-MCNC: 9.6 MG/DL (ref 8.5–10.1)
CHLAMYDIA BY PCR: NEGATIVE
CHLORIDE SERPL-SCNC: 103 MMOL/L (ref 98–107)
CHOLEST SERPL-MCNC: 134 MG/DL (ref 0–200)
CHOLEST/HDLC SERPL: 2.7 {RATIO}
CO2 SERPL-SCNC: 29 MMOL/L (ref 21–32)
CREAT SERPL-MCNC: 0.9 MG/DL (ref 0.55–1.02)
CTP QC/QA: YES
DIFFERENTIAL METHOD BLD: ABNORMAL
EOSINOPHIL # BLD AUTO: 0.11 K/UL (ref 0–0.5)
EOSINOPHIL NFR BLD AUTO: 1.5 % (ref 1–4)
ERYTHROCYTE [DISTWIDTH] IN BLOOD BY AUTOMATED COUNT: 14.1 % (ref 11.5–14.5)
GLOBULIN SER-MCNC: 3.5 G/DL (ref 2–4)
GLUCOSE SERPL-MCNC: 103 MG/DL (ref 74–106)
HCT VFR BLD AUTO: 41.3 % (ref 38–47)
HDLC SERPL-MCNC: 50 MG/DL (ref 40–60)
HGB BLD-MCNC: 13.7 G/DL (ref 12–16)
IMM GRANULOCYTES # BLD AUTO: 0.03 K/UL (ref 0–0.04)
IMM GRANULOCYTES NFR BLD: 0.4 % (ref 0–0.4)
LDLC SERPL CALC-MCNC: 68 MG/DL
LDLC/HDLC SERPL: 1.4 {RATIO}
LYMPHOCYTES # BLD AUTO: 2.63 K/UL (ref 1–4.8)
LYMPHOCYTES NFR BLD AUTO: 36.5 % (ref 27–41)
MCH RBC QN AUTO: 29.6 PG (ref 27–31)
MCHC RBC AUTO-ENTMCNC: 33.2 G/DL (ref 32–36)
MCV RBC AUTO: 89.2 FL (ref 80–96)
MONOCYTES # BLD AUTO: 0.57 K/UL (ref 0–0.8)
MONOCYTES NFR BLD AUTO: 7.9 % (ref 2–6)
MPC BLD CALC-MCNC: 9.8 FL (ref 9.4–12.4)
N. GONORRHOEAE (GC) BY PCR: NEGATIVE
NEUTROPHILS # BLD AUTO: 3.82 K/UL (ref 1.8–7.7)
NEUTROPHILS NFR BLD AUTO: 53.1 % (ref 53–65)
NONHDLC SERPL-MCNC: 84 MG/DL
NRBC # BLD AUTO: 0 X10E3/UL
NRBC, AUTO (.00): 0 %
PLATELET # BLD AUTO: 308 K/UL (ref 150–400)
POC (AMP) AMPHETAMINE: NEGATIVE
POC (BAR) BARBITURATES: NEGATIVE
POC (BUP) BUPRENORPHINE: NEGATIVE
POC (BZO) BENZODIAZEPINES: NEGATIVE
POC (COC) COCAINE: NEGATIVE
POC (MDMA) METHYLENEDIOXYMETHAMPHETAMINE 3,4: NEGATIVE
POC (MET) METHAMPHETAMINE: NEGATIVE
POC (MOP) OPIATES: ABNORMAL
POC (MTD) METHADONE: NEGATIVE
POC (OXY) OXYCODONE: NEGATIVE
POC (PCP) PHENCYCLIDINE: NEGATIVE
POC (TCA) TRICYCLIC ANTIDEPRESSANTS: NEGATIVE
POC TEMPERATURE (URINE): 92
POC THC: NEGATIVE
POTASSIUM SERPL-SCNC: 3.6 MMOL/L (ref 3.5–5.1)
PROT SERPL-MCNC: 7.3 G/DL (ref 6.4–8.2)
RBC # BLD AUTO: 4.63 M/UL (ref 4.2–5.4)
SODIUM SERPL-SCNC: 141 MMOL/L (ref 136–145)
TRIGL SERPL-MCNC: 82 MG/DL (ref 35–150)
VLDLC SERPL-MCNC: 16 MG/DL
WBC # BLD AUTO: 7.2 K/UL (ref 4.5–11)

## 2022-05-11 PROCEDURE — 99213 PR OFFICE/OUTPT VISIT, EST, LEVL III, 20-29 MIN: ICD-10-PCS | Mod: ,,, | Performed by: NURSE PRACTITIONER

## 2022-05-11 PROCEDURE — 3008F PR BODY MASS INDEX (BMI) DOCUMENTED: ICD-10-PCS | Mod: ,,, | Performed by: NURSE PRACTITIONER

## 2022-05-11 PROCEDURE — 80061 LIPID PANEL: CPT | Mod: ,,, | Performed by: CLINICAL MEDICAL LABORATORY

## 2022-05-11 PROCEDURE — 80305 DRUG TEST PRSMV DIR OPT OBS: CPT | Mod: QW,,, | Performed by: NURSE PRACTITIONER

## 2022-05-11 PROCEDURE — 80053 COMPREHEN METABOLIC PANEL: CPT | Mod: ,,, | Performed by: CLINICAL MEDICAL LABORATORY

## 2022-05-11 PROCEDURE — 85025 COMPLETE CBC W/AUTO DIFF WBC: CPT | Mod: ,,, | Performed by: CLINICAL MEDICAL LABORATORY

## 2022-05-11 PROCEDURE — 1159F PR MEDICATION LIST DOCUMENTED IN MEDICAL RECORD: ICD-10-PCS | Mod: ,,, | Performed by: NURSE PRACTITIONER

## 2022-05-11 PROCEDURE — 80061 LIPID PANEL: ICD-10-PCS | Mod: ,,, | Performed by: CLINICAL MEDICAL LABORATORY

## 2022-05-11 PROCEDURE — 3008F BODY MASS INDEX DOCD: CPT | Mod: ,,, | Performed by: NURSE PRACTITIONER

## 2022-05-11 PROCEDURE — 80053 COMPREHENSIVE METABOLIC PANEL: ICD-10-PCS | Mod: ,,, | Performed by: CLINICAL MEDICAL LABORATORY

## 2022-05-11 PROCEDURE — 80305 POCT URINE DRUG SCREEN PRESUMP: ICD-10-PCS | Mod: QW,,, | Performed by: NURSE PRACTITIONER

## 2022-05-11 PROCEDURE — 1160F PR REVIEW ALL MEDS BY PRESCRIBER/CLIN PHARMACIST DOCUMENTED: ICD-10-PCS | Mod: ,,, | Performed by: NURSE PRACTITIONER

## 2022-05-11 PROCEDURE — 1159F MED LIST DOCD IN RCRD: CPT | Mod: ,,, | Performed by: NURSE PRACTITIONER

## 2022-05-11 PROCEDURE — 3079F DIAST BP 80-89 MM HG: CPT | Mod: ,,, | Performed by: NURSE PRACTITIONER

## 2022-05-11 PROCEDURE — 3074F PR MOST RECENT SYSTOLIC BLOOD PRESSURE < 130 MM HG: ICD-10-PCS | Mod: ,,, | Performed by: NURSE PRACTITIONER

## 2022-05-11 PROCEDURE — 85025 CBC WITH DIFFERENTIAL: ICD-10-PCS | Mod: ,,, | Performed by: CLINICAL MEDICAL LABORATORY

## 2022-05-11 PROCEDURE — 1160F RVW MEDS BY RX/DR IN RCRD: CPT | Mod: ,,, | Performed by: NURSE PRACTITIONER

## 2022-05-11 PROCEDURE — 3074F SYST BP LT 130 MM HG: CPT | Mod: ,,, | Performed by: NURSE PRACTITIONER

## 2022-05-11 PROCEDURE — 3079F PR MOST RECENT DIASTOLIC BLOOD PRESSURE 80-89 MM HG: ICD-10-PCS | Mod: ,,, | Performed by: NURSE PRACTITIONER

## 2022-05-11 PROCEDURE — 99213 OFFICE O/P EST LOW 20 MIN: CPT | Mod: ,,, | Performed by: NURSE PRACTITIONER

## 2022-05-11 RX ORDER — HYDROCODONE BITARTRATE AND ACETAMINOPHEN 10; 325 MG/1; MG/1
1 TABLET ORAL EVERY 6 HOURS PRN
Qty: 90 TABLET | Refills: 0 | Status: SHIPPED | OUTPATIENT
Start: 2022-05-11 | End: 2022-06-10 | Stop reason: SDUPTHER

## 2022-05-11 RX ORDER — BUPROPION HYDROCHLORIDE 150 MG/1
150 TABLET ORAL DAILY
Qty: 60 TABLET | Refills: 2 | Status: SHIPPED | OUTPATIENT
Start: 2022-05-11 | End: 2022-11-09 | Stop reason: SDUPTHER

## 2022-05-11 RX ORDER — HYDROCHLOROTHIAZIDE 25 MG/1
25 TABLET ORAL DAILY
Qty: 90 TABLET | Refills: 1 | Status: SHIPPED | OUTPATIENT
Start: 2022-05-11 | End: 2022-07-11 | Stop reason: SDUPTHER

## 2022-05-11 NOTE — PROGRESS NOTES
Subjective:       Patient ID: Nneka Gomes is a 61 y.o. female.    Chief Complaint: Medication Refill ([facility] reports take last Pm )    Ms. Gomes presents to clinic in follow up for HTN, chronic low back pain, and COPD. She reports that she had quit smoking but recently started back, interested in smoking cessation. She complains of right sided low back pain, radiates to bilateral thighs - reports she has EMG scheduled for next month. She denies weakness to legs, denies falls, denies change in bladder or bowel function. She reports taking Norco 10mg three times daily for pain, notes that this improves her pain and improves her physical function and ability to perform daily activities. She denies escalation of medications, admits some constipation that is relieved with Linzess. The patient's  and UDS are appropriate.    Review of Systems   Constitutional: Negative.    Respiratory: Negative.    Cardiovascular: Negative.    Gastrointestinal: Positive for constipation.   Genitourinary: Negative.    Musculoskeletal: Positive for arthralgias, back pain and leg pain.   Neurological: Negative.    Psychiatric/Behavioral: Negative.          Objective:      Physical Exam  Vitals and nursing note reviewed.   Constitutional:       Appearance: Normal appearance.   HENT:      Head: Normocephalic.   Eyes:      Conjunctiva/sclera: Conjunctivae normal.      Pupils: Pupils are equal, round, and reactive to light.   Cardiovascular:      Rate and Rhythm: Normal rate and regular rhythm.      Pulses: Normal pulses.      Heart sounds: Normal heart sounds.   Pulmonary:      Effort: Pulmonary effort is normal.      Breath sounds: Normal breath sounds.   Abdominal:      General: Bowel sounds are normal.      Palpations: Abdomen is soft.   Musculoskeletal:         General: Tenderness present. Normal range of motion.      Cervical back: Normal range of motion.      Lumbar back: Tenderness present. Negative left straight leg raise test.    Skin:     General: Skin is warm and dry.   Neurological:      Mental Status: She is alert and oriented to person, place, and time.   Psychiatric:         Behavior: Behavior normal.         Assessment:       Problem List Items Addressed This Visit        Neuro    Lumbar disc disease - Primary       Psychiatric    Long term (current) use of opiate analgesic    Relevant Orders    POCT Urine Drug Screen Presump (Completed)       Cardiac/Vascular    Hypertension       Orthopedic    Chronic left shoulder pain          Plan:        1.  and UDS appropriate (pos for opiates) - RF Norco for PRN use. Discussed pain treatment referral with patient and she defers.   2. Schedule one month follow up. Fasting labs

## 2022-05-13 LAB
GH SERPL-MCNC: ABNORMAL NG/ML
INSULIN SERPL-ACNC: ABNORMAL U[IU]/ML
LAB AP CLINICAL INFORMATION: ABNORMAL
LAB AP GYN INTERPRETATION: ABNORMAL
LAB AP PAP DISCLAIMER COMMENTS: ABNORMAL
RENIN PLAS-CCNC: ABNORMAL NG/ML/H

## 2022-05-16 ENCOUNTER — TELEPHONE (OUTPATIENT)
Dept: FAMILY MEDICINE | Facility: CLINIC | Age: 62
End: 2022-05-16
Payer: COMMERCIAL

## 2022-05-16 DIAGNOSIS — Z87.42 HX OF ABNORMAL CERVICAL PAP SMEAR: Primary | ICD-10-CM

## 2022-05-16 DIAGNOSIS — B97.7 HPV IN FEMALE: ICD-10-CM

## 2022-05-16 LAB
HPV 16: NEGATIVE
HPV 18: NEGATIVE
HPV OTHER: POSITIVE

## 2022-05-17 ENCOUNTER — TELEPHONE (OUTPATIENT)
Dept: FAMILY MEDICINE | Facility: CLINIC | Age: 62
End: 2022-05-17
Payer: COMMERCIAL

## 2022-05-17 NOTE — TELEPHONE ENCOUNTER
----- Message from ZENA Dodson sent at 5/12/2022  5:42 PM CDT -----  Alk phos stable, chol well controlled.

## 2022-05-19 ENCOUNTER — TELEPHONE (OUTPATIENT)
Dept: FAMILY MEDICINE | Facility: CLINIC | Age: 62
End: 2022-05-19
Payer: COMMERCIAL

## 2022-05-20 ENCOUNTER — TELEPHONE (OUTPATIENT)
Dept: FAMILY MEDICINE | Facility: CLINIC | Age: 62
End: 2022-05-20
Payer: COMMERCIAL

## 2022-05-23 RX ORDER — METRONIDAZOLE 500 MG/1
500 TABLET ORAL EVERY 12 HOURS
Qty: 14 TABLET | Refills: 0 | Status: SHIPPED | OUTPATIENT
Start: 2022-05-23 | End: 2022-05-30

## 2022-05-31 ENCOUNTER — TELEPHONE (OUTPATIENT)
Dept: PULMONOLOGY | Facility: CLINIC | Age: 62
End: 2022-05-31
Payer: COMMERCIAL

## 2022-05-31 NOTE — TELEPHONE ENCOUNTER
Pt phoned and requested med refill to [facility]    She was told MD away from clinic today: will forward  rf with E-Rx request on his return.    Pt confirmed med, dose, and pharmacy;    requesting 90 day supply with rf's    Flovent 220  2 puffs bid    Addendum:  6/2/22    MD reported rf for Flovent sent to [facility] 6/1/22.   was informed today and was encouraged to phone here prn.//gp

## 2022-06-01 DIAGNOSIS — J44.9 CHRONIC OBSTRUCTIVE PULMONARY DISEASE, UNSPECIFIED COPD TYPE: ICD-10-CM

## 2022-06-01 RX ORDER — FLUTICASONE PROPIONATE 220 UG/1
2 AEROSOL, METERED RESPIRATORY (INHALATION) 2 TIMES DAILY
Qty: 12 G | Refills: 5 | Status: SHIPPED | OUTPATIENT
Start: 2022-06-01 | End: 2022-11-21 | Stop reason: SDUPTHER

## 2022-06-06 DIAGNOSIS — R87.610 PAPANICOLAOU SMEAR OF CERVIX WITH ATYPICAL SQUAMOUS CELLS OF UNDETERMINED SIGNIFICANCE (ASC-US): Primary | ICD-10-CM

## 2022-06-07 ENCOUNTER — TELEPHONE (OUTPATIENT)
Dept: FAMILY MEDICINE | Facility: CLINIC | Age: 62
End: 2022-06-07
Payer: COMMERCIAL

## 2022-06-07 NOTE — TELEPHONE ENCOUNTER
Called pt to follow up with phone call from Friday that was routed to [facility]. Pt stated spoke with someone yesterday, and no longer has any concerns or questions.

## 2022-06-10 ENCOUNTER — OFFICE VISIT (OUTPATIENT)
Dept: FAMILY MEDICINE | Facility: CLINIC | Age: 62
End: 2022-06-10
Payer: COMMERCIAL

## 2022-06-10 VITALS
OXYGEN SATURATION: 99 % | BODY MASS INDEX: 32.78 KG/M2 | SYSTOLIC BLOOD PRESSURE: 116 MMHG | HEIGHT: 66 IN | RESPIRATION RATE: 18 BRPM | TEMPERATURE: 99 F | WEIGHT: 204 LBS | HEART RATE: 63 BPM | DIASTOLIC BLOOD PRESSURE: 79 MMHG

## 2022-06-10 DIAGNOSIS — M51.9 LUMBAR DISC DISEASE: ICD-10-CM

## 2022-06-10 DIAGNOSIS — G89.29 CHRONIC LEFT SHOULDER PAIN: ICD-10-CM

## 2022-06-10 DIAGNOSIS — Z79.891 LONG TERM (CURRENT) USE OF OPIATE ANALGESIC: Primary | ICD-10-CM

## 2022-06-10 DIAGNOSIS — M25.512 CHRONIC LEFT SHOULDER PAIN: ICD-10-CM

## 2022-06-10 PROCEDURE — 3008F PR BODY MASS INDEX (BMI) DOCUMENTED: ICD-10-PCS | Mod: ,,, | Performed by: FAMILY MEDICINE

## 2022-06-10 PROCEDURE — 80305 POCT URINE DRUG SCREEN PRESUMP: ICD-10-PCS | Mod: QW,,, | Performed by: FAMILY MEDICINE

## 2022-06-10 PROCEDURE — 1160F RVW MEDS BY RX/DR IN RCRD: CPT | Mod: ,,, | Performed by: FAMILY MEDICINE

## 2022-06-10 PROCEDURE — 1159F MED LIST DOCD IN RCRD: CPT | Mod: ,,, | Performed by: FAMILY MEDICINE

## 2022-06-10 PROCEDURE — 3078F PR MOST RECENT DIASTOLIC BLOOD PRESSURE < 80 MM HG: ICD-10-PCS | Mod: ,,, | Performed by: FAMILY MEDICINE

## 2022-06-10 PROCEDURE — 1159F PR MEDICATION LIST DOCUMENTED IN MEDICAL RECORD: ICD-10-PCS | Mod: ,,, | Performed by: FAMILY MEDICINE

## 2022-06-10 PROCEDURE — 99213 PR OFFICE/OUTPT VISIT, EST, LEVL III, 20-29 MIN: ICD-10-PCS | Mod: ,,, | Performed by: FAMILY MEDICINE

## 2022-06-10 PROCEDURE — 3074F PR MOST RECENT SYSTOLIC BLOOD PRESSURE < 130 MM HG: ICD-10-PCS | Mod: ,,, | Performed by: FAMILY MEDICINE

## 2022-06-10 PROCEDURE — 99213 OFFICE O/P EST LOW 20 MIN: CPT | Mod: ,,, | Performed by: FAMILY MEDICINE

## 2022-06-10 PROCEDURE — 3078F DIAST BP <80 MM HG: CPT | Mod: ,,, | Performed by: FAMILY MEDICINE

## 2022-06-10 PROCEDURE — 3074F SYST BP LT 130 MM HG: CPT | Mod: ,,, | Performed by: FAMILY MEDICINE

## 2022-06-10 PROCEDURE — 3008F BODY MASS INDEX DOCD: CPT | Mod: ,,, | Performed by: FAMILY MEDICINE

## 2022-06-10 PROCEDURE — 80305 DRUG TEST PRSMV DIR OPT OBS: CPT | Mod: QW,,, | Performed by: FAMILY MEDICINE

## 2022-06-10 PROCEDURE — 1160F PR REVIEW ALL MEDS BY PRESCRIBER/CLIN PHARMACIST DOCUMENTED: ICD-10-PCS | Mod: ,,, | Performed by: FAMILY MEDICINE

## 2022-06-10 RX ORDER — GABAPENTIN 100 MG/1
100 CAPSULE ORAL 3 TIMES DAILY
Qty: 180 CAPSULE | Refills: 1 | Status: SHIPPED | OUTPATIENT
Start: 2022-06-10 | End: 2023-01-09 | Stop reason: SDUPTHER

## 2022-06-10 RX ORDER — HYDROCODONE BITARTRATE AND ACETAMINOPHEN 10; 325 MG/1; MG/1
1 TABLET ORAL EVERY 6 HOURS PRN
Qty: 90 TABLET | Refills: 0 | Status: SHIPPED | OUTPATIENT
Start: 2022-06-10 | End: 2022-07-11 | Stop reason: SDUPTHER

## 2022-06-10 NOTE — PROGRESS NOTES
Nneka Gomes is a 61 y.o. female seen today for follow-up on her chronic pain secondary to lumbar disc disease and DJD of her knees.  Patient reports good symptom control and has had no medication side effects.  She is meeting her ADLs and has had no signs or symptoms of tolerance or addiction or medication escalation.  She has had no falls and her  was appropriate today.  Her urine drug screen today only showed her prescribed Norco.  Currently, she has no other complaints.    Past Medical History:   Diagnosis Date    Abdominal bloating 01/09/2020    Abdominal pain, right upper quadrant 01/09/2020    Abnormal liver enzymes 03/04/2020    Abnormal results of liver function studies 11/18/2020    Acute conjunctivitis 05/30/2014    Acute exacerbation of chronic obstructive airways disease 02/13/2017    Acute pharyngitis 05/19/2020    Acute sinusitis 10/09/2017    Acute upper respiratory infection 05/19/2020    Allergic rhinitis 09/23/2020    Anemia 03/07/2014    Anesthesia of skin 09/06/2017    bilateral fingers    Bilateral primary osteoarthritis of knee 12/02/2019    Bradycardia 01/16/2020    Carpal tunnel syndrome 08/10/2017    Chest pain 03/06/2020    Chronic idiopathic constipation 11/18/2020    Chronic low back pain 11/05/2018    Chronic pain 04/23/2019    Chronic pain syndrome 01/30/2020    COPD (chronic obstructive pulmonary disease) 02/19/2021    Coronavirus infection 05/21/2020    Cough 05/19/2020    Degeneration of lumbar intervertebral disc 09/26/2014    L3-L4 L4-L5 Greater than L5-S1    Depressive disorder 07/03/2019    Disorder of gallbladder 03/30/2015    Dysphagia 01/09/2020    Dyspnea 05/19/2020    Dysuria 05/19/2020    Fatigue 05/19/2020    Frequency of urination 02/16/2015    GERD (gastroesophageal reflux disease) 11/18/2020    Hematuria 06/30/2020    Hemoptysis 02/27/2020    Herpes zoster 09/23/2020    Hyperlipidemia 09/23/2020    Hypertension 03/06/2020     Joint pain 11/04/2019    Knee pain 01/04/2019    Left inguinal hernia 06/10/2019    Long term (current) use of opiate analgesic 02/19/2021    Lumbar spondylosis 11/23/2020    Lumbar sprain 07/16/2012    Malaise 11/20/2014    Melena 09/25/2017    Microscopic hematuria 04/02/2020    Migraine without aura 02/06/2012    Nausea 02/27/2020    Nicotine dependence, cigarettes, uncomplicated 11/11/2020    Nonulcer dyspepsia 01/23/2017    On home O2     Other long term (current) drug therapy 09/23/2020    Other specified urinary incontinence 06/17/2020    Pain in left shoulder 05/02/2019    Pain in left wrist 09/06/2017    and hand    Pain in right shoulder 08/30/2019    Pain in right wrist 09/04/2018    Right hand    Palpitations 03/06/2020    Shoulder joint pain 04/23/2019    Smoker 07/03/2019    Snoring 08/02/2019    Synovial cyst of popliteal space 04/08/2013    Tobacco dependence syndrome 02/19/2021    Unstable angina 01/16/2020    UTI (urinary tract infection) 03/27/2020     Family History   Problem Relation Age of Onset    Rectal cancer Other     Diabetes Other     Heart disease Other     Hypertension Other     Hypertension Mother     Cancer Maternal Aunt      Current Outpatient Medications on File Prior to Visit   Medication Sig Dispense Refill    albuterol (PROAIR HFA) 90 mcg/actuation inhaler Inhale 2 puffs into the lungs every 4 (four) hours as needed for Wheezing. 18 g 5    albuterol (PROVENTIL) 2.5 mg /3 mL (0.083 %) nebulizer solution Take 3 mLs (2.5 mg total) by nebulization 4 (four) times daily as needed for Wheezing. 100 each 5    azelastine (ASTELIN) 137 mcg (0.1 %) nasal spray 2 sprays by Nasal route 2 (two) times daily.      betamethasone dipropionate (DIPROLENE) 0.05 % lotion Apply 1 application topically 2 (two) times daily.      buPROPion (WELLBUTRIN XL) 150 MG TB24 tablet Take 1 tablet (150 mg total) by mouth once daily. Take 1 tablet daily X 4 days, then increase  to 2 tablets daily as tolerated. 60 tablet 2    diclofenac sodium (VOLTAREN) 1 % Gel Apply 1 g topically 4 (four) times daily.      estradioL (ESTRACE) 0.5 MG tablet Take 1 tablet (0.5 mg total) by mouth every evening. 30 tablet 11    fluticasone propionate (FLONASE) 50 mcg/actuation nasal spray 2 sprays (100 mcg total) by Each Nostril route once daily. 16 g 5    fluticasone propionate (FLOVENT HFA) 220 mcg/actuation inhaler Inhale 2 puffs into the lungs 2 (two) times daily. Controller 12 g 5    hydroCHLOROthiazide (HYDRODIURIL) 25 MG tablet Take 1 tablet (25 mg total) by mouth once daily. 90 tablet 1    levocetirizine (XYZAL) 5 MG tablet TAKE 1 TABLET EVERY NIGHT AT BEDTIME 90 tablet 3    linaCLOtide (LINZESS) 145 mcg Cap capsule Take 1 capsule (145 mcg total) by mouth before breakfast. 90 capsule 3    RESTASIS 0.05 % ophthalmic emulsion       rosuvastatin (CRESTOR) 20 MG tablet Take 1 tablet (20 mg total) by mouth once daily. 90 tablet 3    theophylline (ZEFERINO-24) 100 MG 24 hr capsule Take 3 capsules (300 mg total) by mouth once daily. 90 capsule 1    [DISCONTINUED] gabapentin (NEURONTIN) 100 MG capsule Take 100 mg by mouth 3 (three) times daily.      [DISCONTINUED] HYDROcodone-acetaminophen (NORCO)  mg per tablet Take 1 tablet by mouth every 6 (six) hours as needed for Pain. Every 4 to 6 hours prn for Chronic back pain 90 tablet 0    pantoprazole (PROTONIX) 40 MG tablet Take 1 tablet (40 mg total) by mouth once daily. 90 tablet 3     No current facility-administered medications on file prior to visit.     Immunization History   Administered Date(s) Administered    COVID-19, MRNA, LN-S, PF (Pfizer) (Purple Cap) 04/29/2021, 10/28/2021    Influenza - Quadrivalent - PF *Preferred* (6 months and older) 09/16/2021    Influenza Split 10/03/2019    Pneumococcal Conjugate - 13 Valent 11/05/2018    Pneumococcal Polysaccharide - 23 Valent 11/05/2019       Review of Systems   Constitutional: Negative  for fever, malaise/fatigue and weight loss.   Respiratory: Negative for shortness of breath.    Cardiovascular: Negative for chest pain and palpitations.   Gastrointestinal: Negative for nausea and vomiting.   Musculoskeletal: Positive for back pain, joint pain and myalgias. Negative for falls.   Psychiatric/Behavioral: Negative for depression.        Vitals:    06/10/22 0941   BP: 116/79   Pulse: 63   Resp: 18   Temp: 98.6 °F (37 °C)       Physical Exam  Vitals reviewed.   Constitutional:       Appearance: Normal appearance.   HENT:      Head: Normocephalic.   Eyes:      Extraocular Movements: Extraocular movements intact.      Conjunctiva/sclera: Conjunctivae normal.      Pupils: Pupils are equal, round, and reactive to light.   Neck:      Thyroid: No thyroid mass or thyromegaly.   Cardiovascular:      Rate and Rhythm: Normal rate and regular rhythm.      Heart sounds: Normal heart sounds. No murmur heard.    No gallop.   Pulmonary:      Effort: Pulmonary effort is normal. No respiratory distress.      Breath sounds: Normal breath sounds. No wheezing or rales.   Musculoskeletal:      Lumbar back: Spasms and tenderness present. Decreased range of motion.        Back:       Right knee: Decreased range of motion.      Left knee: Decreased range of motion.      Comments: She is slow to rise from a seated position and has a wide-based antalgic gait.   Skin:     General: Skin is warm and dry.      Coloration: Skin is not jaundiced or pale.   Neurological:      Mental Status: She is alert.   Psychiatric:         Mood and Affect: Mood normal.         Behavior: Behavior normal.         Thought Content: Thought content normal.         Judgment: Judgment normal.          Assessment and Plan  Long term (current) use of opiate analgesic  -     POCT Urine Drug Screen Presump    Lumbar disc disease  -     gabapentin (NEURONTIN) 100 MG capsule; Take 1 capsule (100 mg total) by mouth 3 (three) times daily.  Dispense: 180 capsule;  Refill: 1  -     HYDROcodone-acetaminophen (NORCO)  mg per tablet; Take 1 tablet by mouth every 6 (six) hours as needed for Pain. Every 4 to 6 hours prn for Chronic back pain  Dispense: 90 tablet; Refill: 0    Chronic left shoulder pain  -     HYDROcodone-acetaminophen (NORCO)  mg per tablet; Take 1 tablet by mouth every 6 (six) hours as needed for Pain. Every 4 to 6 hours prn for Chronic back pain  Dispense: 90 tablet; Refill: 0            Return to clinic in 1 month or as needed.    Health Maintenance Topics with due status: Not Due       Topic Last Completion Date    Colorectal Cancer Screening 10/04/2017    Pneumococcal Vaccines (Age 0-64) 11/05/2019    Mammogram 09/23/2021    Cervical Cancer Screening 05/10/2022    Lipid Panel 05/11/2022

## 2022-06-13 ENCOUNTER — PROCEDURE VISIT (OUTPATIENT)
Dept: OBSTETRICS AND GYNECOLOGY | Facility: CLINIC | Age: 62
End: 2022-06-13
Payer: COMMERCIAL

## 2022-06-13 VITALS
WEIGHT: 203.38 LBS | BODY MASS INDEX: 32.68 KG/M2 | RESPIRATION RATE: 16 BRPM | DIASTOLIC BLOOD PRESSURE: 80 MMHG | SYSTOLIC BLOOD PRESSURE: 130 MMHG | HEIGHT: 66 IN

## 2022-06-13 DIAGNOSIS — Z87.42 HISTORY OF ABNORMAL CERVICAL PAP SMEAR: Primary | ICD-10-CM

## 2022-06-13 DIAGNOSIS — R87.610 PAPANICOLAOU SMEAR OF CERVIX WITH ATYPICAL SQUAMOUS CELLS OF UNDETERMINED SIGNIFICANCE (ASC-US): ICD-10-CM

## 2022-06-13 PROCEDURE — 57454 COLPOSCOPY W/BIOPSY AND ECC- TODAY: ICD-10-PCS | Mod: S$PBB,,, | Performed by: OBSTETRICS & GYNECOLOGY

## 2022-06-13 PROCEDURE — 88142 CYTOPATH C/V THIN LAYER: CPT | Mod: GCY | Performed by: OBSTETRICS & GYNECOLOGY

## 2022-06-13 PROCEDURE — 57454 BX/CURETT OF CERVIX W/SCOPE: CPT | Mod: PBBFAC | Performed by: OBSTETRICS & GYNECOLOGY

## 2022-06-13 PROCEDURE — 88342 IMHCHEM/IMCYTCHM 1ST ANTB: CPT | Mod: 26,,, | Performed by: PATHOLOGY

## 2022-06-13 PROCEDURE — 88305 TISSUE EXAM BY PATHOLOGIST: CPT | Mod: SUR | Performed by: OBSTETRICS & GYNECOLOGY

## 2022-06-13 PROCEDURE — 88141 THINPREP PAP TEST: ICD-10-PCS | Mod: ,,, | Performed by: PATHOLOGY

## 2022-06-13 PROCEDURE — 88305 TISSUE EXAM BY PATHOLOGIST: CPT | Mod: 26,,, | Performed by: PATHOLOGY

## 2022-06-13 PROCEDURE — 88342 SURGICAL PATHOLOGY: ICD-10-PCS | Mod: 26,,, | Performed by: PATHOLOGY

## 2022-06-13 PROCEDURE — 88141 CYTOPATH C/V INTERPRET: CPT | Mod: ,,, | Performed by: PATHOLOGY

## 2022-06-13 PROCEDURE — 88305 SURGICAL PATHOLOGY: ICD-10-PCS | Mod: 26,,, | Performed by: PATHOLOGY

## 2022-06-15 LAB
DHEA SERPL-MCNC: NORMAL
ESTROGEN SERPL-MCNC: NORMAL PG/ML
INSULIN SERPL-ACNC: NORMAL U[IU]/ML
LAB AP CLINICAL INFORMATION: NORMAL
LAB AP GROSS DESCRIPTION: NORMAL
LAB AP LABORATORY NOTES: NORMAL
T3RU NFR SERPL: NORMAL %

## 2022-06-15 NOTE — PROCEDURES
Colposcopy W/BIOPSY AND ECC- Today    Date/Time: 6/13/2022 1:30 PM  Performed by: Jordan Sam MD  Authorized by: Jordan Sam MD     Consent Done?:  Yes (Written)  Assistants?: Yes    List of assistants:  Stefano RAMIREZ was present for the entire procedure.    Colposcopy Site:  Cervix  Position:  Supine  Anesthesia:  Topical anesthetic  Acrowhite Lesion: No    Atypical Vessels: No    Transformation Zone Adequate?: Yes    Biopsy?: Yes         Location:  Cervix ((2 00 and 7 00))  ECC Performed?: Yes    LEEP Performed?: No     Patient tolerated the procedure well with no immediate complications.   Post-operative instructions were provided for the patient.   Patient was discharged and will follow up if any complications occur     She is return in 1 week for follow-up and to discuss pathology specimen.

## 2022-06-29 ENCOUNTER — OFFICE VISIT (OUTPATIENT)
Dept: OBSTETRICS AND GYNECOLOGY | Facility: CLINIC | Age: 62
End: 2022-06-29
Payer: COMMERCIAL

## 2022-06-29 VITALS
BODY MASS INDEX: 32.62 KG/M2 | WEIGHT: 203 LBS | HEIGHT: 66 IN | SYSTOLIC BLOOD PRESSURE: 137 MMHG | DIASTOLIC BLOOD PRESSURE: 85 MMHG

## 2022-06-29 DIAGNOSIS — R87.610 PAPANICOLAOU SMEAR OF CERVIX WITH ATYPICAL SQUAMOUS CELLS OF UNDETERMINED SIGNIFICANCE (ASC-US): Primary | ICD-10-CM

## 2022-06-29 PROCEDURE — 99499 NO LOS: ICD-10-PCS | Mod: S$PBB,,, | Performed by: OBSTETRICS & GYNECOLOGY

## 2022-06-29 PROCEDURE — 99499 UNLISTED E&M SERVICE: CPT | Mod: S$PBB,,, | Performed by: OBSTETRICS & GYNECOLOGY

## 2022-06-29 PROCEDURE — 3079F PR MOST RECENT DIASTOLIC BLOOD PRESSURE 80-89 MM HG: ICD-10-PCS | Mod: CPTII,,, | Performed by: OBSTETRICS & GYNECOLOGY

## 2022-06-29 PROCEDURE — 3008F PR BODY MASS INDEX (BMI) DOCUMENTED: ICD-10-PCS | Mod: CPTII,,, | Performed by: OBSTETRICS & GYNECOLOGY

## 2022-06-29 PROCEDURE — 3008F BODY MASS INDEX DOCD: CPT | Mod: CPTII,,, | Performed by: OBSTETRICS & GYNECOLOGY

## 2022-06-29 PROCEDURE — 1159F PR MEDICATION LIST DOCUMENTED IN MEDICAL RECORD: ICD-10-PCS | Mod: CPTII,,, | Performed by: OBSTETRICS & GYNECOLOGY

## 2022-06-29 PROCEDURE — 1159F MED LIST DOCD IN RCRD: CPT | Mod: CPTII,,, | Performed by: OBSTETRICS & GYNECOLOGY

## 2022-06-29 PROCEDURE — 99214 OFFICE O/P EST MOD 30 MIN: CPT | Mod: PBBFAC | Performed by: OBSTETRICS & GYNECOLOGY

## 2022-06-29 PROCEDURE — 3079F DIAST BP 80-89 MM HG: CPT | Mod: CPTII,,, | Performed by: OBSTETRICS & GYNECOLOGY

## 2022-06-29 PROCEDURE — 3075F PR MOST RECENT SYSTOLIC BLOOD PRESS GE 130-139MM HG: ICD-10-PCS | Mod: CPTII,,, | Performed by: OBSTETRICS & GYNECOLOGY

## 2022-06-29 PROCEDURE — 1160F PR REVIEW ALL MEDS BY PRESCRIBER/CLIN PHARMACIST DOCUMENTED: ICD-10-PCS | Mod: CPTII,,, | Performed by: OBSTETRICS & GYNECOLOGY

## 2022-06-29 PROCEDURE — 3075F SYST BP GE 130 - 139MM HG: CPT | Mod: CPTII,,, | Performed by: OBSTETRICS & GYNECOLOGY

## 2022-06-29 PROCEDURE — 1160F RVW MEDS BY RX/DR IN RCRD: CPT | Mod: CPTII,,, | Performed by: OBSTETRICS & GYNECOLOGY

## 2022-07-04 NOTE — PROGRESS NOTES
Nneka Gomes female  for   Chief Complaint   Patient presents with    Follow-up     Colposcopy bx results      OB History             Para        Term                AB        Living   1       SAB        IAB        Ectopic        Multiple        Live Births                      Past Medical History:   Diagnosis Date    Abdominal bloating 2020    Abdominal pain, right upper quadrant 2020    Abnormal liver enzymes 2020    Abnormal results of liver function studies 2020    Acute conjunctivitis 2014    Acute exacerbation of chronic obstructive airways disease 2017    Acute pharyngitis 2020    Acute sinusitis 10/09/2017    Acute upper respiratory infection 2020    Allergic rhinitis 2020    Anemia 2014    Anesthesia of skin 2017    bilateral fingers    Bilateral primary osteoarthritis of knee 2019    Bradycardia 2020    Carpal tunnel syndrome 08/10/2017    Chest pain 2020    Chronic idiopathic constipation 2020    Chronic low back pain 2018    Chronic pain 2019    Chronic pain syndrome 2020    COPD (chronic obstructive pulmonary disease) 2021    Coronavirus infection 2020    Cough 2020    Degeneration of lumbar intervertebral disc 2014    L3-L4 L4-L5 Greater than L5-S1    Depressive disorder 2019    Disorder of gallbladder 2015    Dysphagia 2020    Dyspnea 2020    Dysuria 2020    Fatigue 2020    Frequency of urination 2015    GERD (gastroesophageal reflux disease) 2020    Hematuria 2020    Hemoptysis 2020    Herpes zoster 2020    Hyperlipidemia 2020    Hypertension 2020    Joint pain 2019    Knee pain 2019    Left inguinal hernia 06/10/2019    Long term (current) use of opiate analgesic 2021    Lumbar spondylosis 2020    Lumbar  sprain 07/16/2012    Malaise 11/20/2014    Melena 09/25/2017    Microscopic hematuria 04/02/2020    Migraine without aura 02/06/2012    Nausea 02/27/2020    Nicotine dependence, cigarettes, uncomplicated 11/11/2020    Nonulcer dyspepsia 01/23/2017    On home O2     Other long term (current) drug therapy 09/23/2020    Other specified urinary incontinence 06/17/2020    Pain in left shoulder 05/02/2019    Pain in left wrist 09/06/2017    and hand    Pain in right shoulder 08/30/2019    Pain in right wrist 09/04/2018    Right hand    Palpitations 03/06/2020    Shoulder joint pain 04/23/2019    Smoker 07/03/2019    Snoring 08/02/2019    Synovial cyst of popliteal space 04/08/2013    Tobacco dependence syndrome 02/19/2021    Unstable angina 01/16/2020    UTI (urinary tract infection) 03/27/2020      Past Surgical History:   Procedure Laterality Date    CARDIAC CATHETERIZATION      CHOLECYSTECTOMY      HERNIA REPAIR      ROTATOR CUFF REPAIR        Review of patient's allergies indicates:  No Known Allergies              Assessment:   Problem List Items Addressed This Visit    None     Visit Diagnoses     Papanicolaou smear of cervix with atypical squamous cells of undetermined significance (ASC-US)    -  Primary           Plan:  The patient's biopsy results were benign.  She is to repeat a Pap smear in 4 months.

## 2022-07-11 ENCOUNTER — OFFICE VISIT (OUTPATIENT)
Dept: FAMILY MEDICINE | Facility: CLINIC | Age: 62
End: 2022-07-11
Payer: COMMERCIAL

## 2022-07-11 VITALS
RESPIRATION RATE: 17 BRPM | BODY MASS INDEX: 32.62 KG/M2 | HEART RATE: 77 BPM | HEIGHT: 66 IN | WEIGHT: 203 LBS | OXYGEN SATURATION: 99 % | SYSTOLIC BLOOD PRESSURE: 112 MMHG | TEMPERATURE: 98 F | DIASTOLIC BLOOD PRESSURE: 76 MMHG

## 2022-07-11 DIAGNOSIS — G89.29 CHRONIC LEFT SHOULDER PAIN: ICD-10-CM

## 2022-07-11 DIAGNOSIS — M25.512 CHRONIC LEFT SHOULDER PAIN: ICD-10-CM

## 2022-07-11 DIAGNOSIS — Z12.11 COLON CANCER SCREENING: Primary | ICD-10-CM

## 2022-07-11 DIAGNOSIS — I10 HYPERTENSION, UNSPECIFIED TYPE: ICD-10-CM

## 2022-07-11 DIAGNOSIS — M51.9 LUMBAR DISC DISEASE: ICD-10-CM

## 2022-07-11 DIAGNOSIS — F17.200 SMOKING: ICD-10-CM

## 2022-07-11 PROCEDURE — 3078F PR MOST RECENT DIASTOLIC BLOOD PRESSURE < 80 MM HG: ICD-10-PCS | Mod: ,,, | Performed by: FAMILY MEDICINE

## 2022-07-11 PROCEDURE — 99213 OFFICE O/P EST LOW 20 MIN: CPT | Mod: ,,, | Performed by: FAMILY MEDICINE

## 2022-07-11 PROCEDURE — 3074F SYST BP LT 130 MM HG: CPT | Mod: ,,, | Performed by: FAMILY MEDICINE

## 2022-07-11 PROCEDURE — 3008F PR BODY MASS INDEX (BMI) DOCUMENTED: ICD-10-PCS | Mod: ,,, | Performed by: FAMILY MEDICINE

## 2022-07-11 PROCEDURE — 3074F PR MOST RECENT SYSTOLIC BLOOD PRESSURE < 130 MM HG: ICD-10-PCS | Mod: ,,, | Performed by: FAMILY MEDICINE

## 2022-07-11 PROCEDURE — 1160F PR REVIEW ALL MEDS BY PRESCRIBER/CLIN PHARMACIST DOCUMENTED: ICD-10-PCS | Mod: ,,, | Performed by: FAMILY MEDICINE

## 2022-07-11 PROCEDURE — 3078F DIAST BP <80 MM HG: CPT | Mod: ,,, | Performed by: FAMILY MEDICINE

## 2022-07-11 PROCEDURE — 99213 PR OFFICE/OUTPT VISIT, EST, LEVL III, 20-29 MIN: ICD-10-PCS | Mod: ,,, | Performed by: FAMILY MEDICINE

## 2022-07-11 PROCEDURE — 1160F RVW MEDS BY RX/DR IN RCRD: CPT | Mod: ,,, | Performed by: FAMILY MEDICINE

## 2022-07-11 PROCEDURE — 3008F BODY MASS INDEX DOCD: CPT | Mod: ,,, | Performed by: FAMILY MEDICINE

## 2022-07-11 PROCEDURE — 1159F PR MEDICATION LIST DOCUMENTED IN MEDICAL RECORD: ICD-10-PCS | Mod: ,,, | Performed by: FAMILY MEDICINE

## 2022-07-11 PROCEDURE — 1159F MED LIST DOCD IN RCRD: CPT | Mod: ,,, | Performed by: FAMILY MEDICINE

## 2022-07-11 RX ORDER — HYDROCODONE BITARTRATE AND ACETAMINOPHEN 10; 325 MG/1; MG/1
1 TABLET ORAL EVERY 6 HOURS PRN
Qty: 90 TABLET | Refills: 0 | Status: SHIPPED | OUTPATIENT
Start: 2022-07-11 | End: 2022-08-09 | Stop reason: SDUPTHER

## 2022-07-11 RX ORDER — HYDROCHLOROTHIAZIDE 25 MG/1
25 TABLET ORAL DAILY
Qty: 90 TABLET | Refills: 1 | Status: SHIPPED | OUTPATIENT
Start: 2022-07-11 | End: 2022-08-09 | Stop reason: SDUPTHER

## 2022-07-11 RX ORDER — IBUPROFEN 200 MG
1 TABLET ORAL DAILY
Qty: 30 PATCH | Refills: 2 | Status: SHIPPED | OUTPATIENT
Start: 2022-07-11 | End: 2023-01-09

## 2022-07-11 NOTE — PROGRESS NOTES
Nneka Gomes is a 61 y.o. female seen today for follow-up on her chronic low back pain secondary to lumbar disc disease.  She reports excellent symptom control on her current pain level is 3/10.  She has had no medication side effects and is meeting her ADLs.  She has had no signs or symptoms of tolerance or addiction and her  today was appropriate.  Her urine drug screen in May showed only her prescribed Norco.  Patient is up-to-date on her mammogram but is due for colon cancer screening.  Patient is also ready to try the nicotine patches to help her quit smoking..      Past Medical History:   Diagnosis Date    Abdominal bloating 01/09/2020    Abdominal pain, right upper quadrant 01/09/2020    Abnormal liver enzymes 03/04/2020    Abnormal results of liver function studies 11/18/2020    Acute conjunctivitis 05/30/2014    Acute exacerbation of chronic obstructive airways disease 02/13/2017    Acute pharyngitis 05/19/2020    Acute sinusitis 10/09/2017    Acute upper respiratory infection 05/19/2020    Allergic rhinitis 09/23/2020    Anemia 03/07/2014    Anesthesia of skin 09/06/2017    bilateral fingers    Bilateral primary osteoarthritis of knee 12/02/2019    Bradycardia 01/16/2020    Carpal tunnel syndrome 08/10/2017    Chest pain 03/06/2020    Chronic idiopathic constipation 11/18/2020    Chronic low back pain 11/05/2018    Chronic pain 04/23/2019    Chronic pain syndrome 01/30/2020    COPD (chronic obstructive pulmonary disease) 02/19/2021    Coronavirus infection 05/21/2020    Cough 05/19/2020    Degeneration of lumbar intervertebral disc 09/26/2014    L3-L4 L4-L5 Greater than L5-S1    Depressive disorder 07/03/2019    Disorder of gallbladder 03/30/2015    Dysphagia 01/09/2020    Dyspnea 05/19/2020    Dysuria 05/19/2020    Fatigue 05/19/2020    Frequency of urination 02/16/2015    GERD (gastroesophageal reflux disease) 11/18/2020    Hematuria 06/30/2020    Hemoptysis  02/27/2020    Herpes zoster 09/23/2020    Hyperlipidemia 09/23/2020    Hypertension 03/06/2020    Joint pain 11/04/2019    Knee pain 01/04/2019    Left inguinal hernia 06/10/2019    Long term (current) use of opiate analgesic 02/19/2021    Lumbar spondylosis 11/23/2020    Lumbar sprain 07/16/2012    Malaise 11/20/2014    Melena 09/25/2017    Microscopic hematuria 04/02/2020    Migraine without aura 02/06/2012    Nausea 02/27/2020    Nicotine dependence, cigarettes, uncomplicated 11/11/2020    Nonulcer dyspepsia 01/23/2017    On home O2     Other long term (current) drug therapy 09/23/2020    Other specified urinary incontinence 06/17/2020    Pain in left shoulder 05/02/2019    Pain in left wrist 09/06/2017    and hand    Pain in right shoulder 08/30/2019    Pain in right wrist 09/04/2018    Right hand    Palpitations 03/06/2020    Shoulder joint pain 04/23/2019    Smoker 07/03/2019    Snoring 08/02/2019    Synovial cyst of popliteal space 04/08/2013    Tobacco dependence syndrome 02/19/2021    Unstable angina 01/16/2020    UTI (urinary tract infection) 03/27/2020     Family History   Problem Relation Age of Onset    Rectal cancer Other     Diabetes Other     Heart disease Other     Hypertension Other     Hypertension Mother     Cancer Maternal Aunt      Current Outpatient Medications on File Prior to Visit   Medication Sig Dispense Refill    albuterol (PROAIR HFA) 90 mcg/actuation inhaler Inhale 2 puffs into the lungs every 4 (four) hours as needed for Wheezing. 18 g 5    albuterol (PROVENTIL) 2.5 mg /3 mL (0.083 %) nebulizer solution Take 3 mLs (2.5 mg total) by nebulization 4 (four) times daily as needed for Wheezing. 100 each 5    azelastine (ASTELIN) 137 mcg (0.1 %) nasal spray 2 sprays by Nasal route 2 (two) times daily.      betamethasone dipropionate (DIPROLENE) 0.05 % lotion Apply 1 application topically 2 (two) times daily.      buPROPion (WELLBUTRIN XL) 150 MG TB24  tablet Take 1 tablet (150 mg total) by mouth once daily. Take 1 tablet daily X 4 days, then increase to 2 tablets daily as tolerated. 60 tablet 2    diclofenac sodium (VOLTAREN) 1 % Gel Apply 1 g topically 4 (four) times daily.      estradioL (ESTRACE) 0.5 MG tablet Take 1 tablet (0.5 mg total) by mouth every evening. 30 tablet 11    fluticasone propionate (FLONASE) 50 mcg/actuation nasal spray 2 sprays (100 mcg total) by Each Nostril route once daily. 16 g 5    fluticasone propionate (FLOVENT HFA) 220 mcg/actuation inhaler Inhale 2 puffs into the lungs 2 (two) times daily. Controller 12 g 5    gabapentin (NEURONTIN) 100 MG capsule Take 1 capsule (100 mg total) by mouth 3 (three) times daily. 180 capsule 1    levocetirizine (XYZAL) 5 MG tablet TAKE 1 TABLET EVERY NIGHT AT BEDTIME 90 tablet 3    linaCLOtide (LINZESS) 145 mcg Cap capsule Take 1 capsule (145 mcg total) by mouth before breakfast. 90 capsule 3    RESTASIS 0.05 % ophthalmic emulsion       rosuvastatin (CRESTOR) 20 MG tablet TAKE 1 TABLET DAILY 90 tablet 3    theophylline (ZEFERINO-24) 100 MG 24 hr capsule Take 3 capsules (300 mg total) by mouth once daily. 90 capsule 1    [DISCONTINUED] hydroCHLOROthiazide (HYDRODIURIL) 25 MG tablet Take 1 tablet (25 mg total) by mouth once daily. 90 tablet 1    [DISCONTINUED] HYDROcodone-acetaminophen (NORCO)  mg per tablet Take 1 tablet by mouth every 6 (six) hours as needed for Pain. Every 4 to 6 hours prn for Chronic back pain 90 tablet 0    pantoprazole (PROTONIX) 40 MG tablet Take 1 tablet (40 mg total) by mouth once daily. 90 tablet 3     No current facility-administered medications on file prior to visit.     Immunization History   Administered Date(s) Administered    COVID-19, MRNA, LN-S, PF (Pfizer) (Purple Cap) 04/29/2021, 10/28/2021, 04/29/2022    Influenza - Quadrivalent - PF *Preferred* (6 months and older) 09/16/2021    Influenza Split 10/03/2019    Pneumococcal Conjugate - 13 Valent  11/05/2018    Pneumococcal Polysaccharide - 23 Valent 11/05/2019       ROS     Vitals:    07/11/22 1042   BP: 112/76   Pulse: 77   Resp: 17   Temp: 98.3 °F (36.8 °C)       Physical Exam     Assessment and Plan  Colon cancer screening  -     Ambulatory referral/consult to Gastroenterology; Future; Expected date: 07/18/2022    Lumbar disc disease  -     HYDROcodone-acetaminophen (NORCO)  mg per tablet; Take 1 tablet by mouth every 6 (six) hours as needed for Pain. Every 4 to 6 hours prn for Chronic back pain  Dispense: 90 tablet; Refill: 0    Chronic left shoulder pain  -     HYDROcodone-acetaminophen (NORCO)  mg per tablet; Take 1 tablet by mouth every 6 (six) hours as needed for Pain. Every 4 to 6 hours prn for Chronic back pain  Dispense: 90 tablet; Refill: 0    Hypertension, unspecified type  -     hydroCHLOROthiazide (HYDRODIURIL) 25 MG tablet; Take 1 tablet (25 mg total) by mouth once daily.  Dispense: 90 tablet; Refill: 1    Smoking  -     nicotine (NICODERM CQ) 21 mg/24 hr; Place 1 patch onto the skin once daily.  Dispense: 30 patch; Refill: 2            Return to clinic in 1 month or as needed.  Again she is strongly urged to discontinue smoking.    Health Maintenance Topics with due status: Not Due       Topic Last Completion Date    Colorectal Cancer Screening 10/04/2017    Pneumococcal Vaccines (Age 0-64) 11/05/2019    Influenza Vaccine 09/16/2021    Mammogram 09/23/2021    Lipid Panel 05/11/2022    Cervical Cancer Screening 06/13/2022

## 2022-07-22 DIAGNOSIS — Z12.11 COLON CANCER SCREENING: Primary | ICD-10-CM

## 2022-08-09 ENCOUNTER — OFFICE VISIT (OUTPATIENT)
Dept: FAMILY MEDICINE | Facility: CLINIC | Age: 62
End: 2022-08-09
Payer: COMMERCIAL

## 2022-08-09 VITALS
RESPIRATION RATE: 18 BRPM | HEIGHT: 66 IN | WEIGHT: 203 LBS | SYSTOLIC BLOOD PRESSURE: 102 MMHG | BODY MASS INDEX: 32.62 KG/M2 | OXYGEN SATURATION: 98 % | HEART RATE: 76 BPM | DIASTOLIC BLOOD PRESSURE: 82 MMHG

## 2022-08-09 DIAGNOSIS — I10 HYPERTENSION, UNSPECIFIED TYPE: ICD-10-CM

## 2022-08-09 DIAGNOSIS — M51.9 LUMBAR DISC DISEASE: Primary | ICD-10-CM

## 2022-08-09 PROCEDURE — 99213 OFFICE O/P EST LOW 20 MIN: CPT | Mod: ,,, | Performed by: FAMILY MEDICINE

## 2022-08-09 PROCEDURE — 3079F PR MOST RECENT DIASTOLIC BLOOD PRESSURE 80-89 MM HG: ICD-10-PCS | Mod: ,,, | Performed by: FAMILY MEDICINE

## 2022-08-09 PROCEDURE — 3008F BODY MASS INDEX DOCD: CPT | Mod: ,,, | Performed by: FAMILY MEDICINE

## 2022-08-09 PROCEDURE — 3079F DIAST BP 80-89 MM HG: CPT | Mod: ,,, | Performed by: FAMILY MEDICINE

## 2022-08-09 PROCEDURE — 3008F PR BODY MASS INDEX (BMI) DOCUMENTED: ICD-10-PCS | Mod: ,,, | Performed by: FAMILY MEDICINE

## 2022-08-09 PROCEDURE — 1160F PR REVIEW ALL MEDS BY PRESCRIBER/CLIN PHARMACIST DOCUMENTED: ICD-10-PCS | Mod: ,,, | Performed by: FAMILY MEDICINE

## 2022-08-09 PROCEDURE — 1159F PR MEDICATION LIST DOCUMENTED IN MEDICAL RECORD: ICD-10-PCS | Mod: ,,, | Performed by: FAMILY MEDICINE

## 2022-08-09 PROCEDURE — 1159F MED LIST DOCD IN RCRD: CPT | Mod: ,,, | Performed by: FAMILY MEDICINE

## 2022-08-09 PROCEDURE — 3074F PR MOST RECENT SYSTOLIC BLOOD PRESSURE < 130 MM HG: ICD-10-PCS | Mod: ,,, | Performed by: FAMILY MEDICINE

## 2022-08-09 PROCEDURE — 1160F RVW MEDS BY RX/DR IN RCRD: CPT | Mod: ,,, | Performed by: FAMILY MEDICINE

## 2022-08-09 PROCEDURE — 3074F SYST BP LT 130 MM HG: CPT | Mod: ,,, | Performed by: FAMILY MEDICINE

## 2022-08-09 PROCEDURE — 99213 PR OFFICE/OUTPT VISIT, EST, LEVL III, 20-29 MIN: ICD-10-PCS | Mod: ,,, | Performed by: FAMILY MEDICINE

## 2022-08-09 RX ORDER — HYDROCODONE BITARTRATE AND ACETAMINOPHEN 10; 325 MG/1; MG/1
1 TABLET ORAL EVERY 6 HOURS PRN
Qty: 90 TABLET | Refills: 0 | Status: SHIPPED | OUTPATIENT
Start: 2022-08-09 | End: 2022-09-09 | Stop reason: SDUPTHER

## 2022-08-09 RX ORDER — HYDROCHLOROTHIAZIDE 25 MG/1
25 TABLET ORAL DAILY
Qty: 90 TABLET | Refills: 1 | Status: SHIPPED | OUTPATIENT
Start: 2022-08-09 | End: 2022-09-09 | Stop reason: SDUPTHER

## 2022-08-09 NOTE — PROGRESS NOTES
Nneka Gomes is a 61 y.o. female seen today for follow-up for chronic pain related to her DJD of her knees and lumbar disc disease.  She reports the medications are working well with no side effects.  She is meeting her ADLs and has had no signs or symptoms of tolerance or addiction.  Patient's  was appropriate today and urine drug screen has only shown her prescribed Norco.  Today she has no other complaints.  She is up-to-date on her mammogram and her colonoscopy.      Past Medical History:   Diagnosis Date    Abdominal bloating 01/09/2020    Abdominal pain, right upper quadrant 01/09/2020    Abnormal liver enzymes 03/04/2020    Abnormal results of liver function studies 11/18/2020    Acute conjunctivitis 05/30/2014    Acute exacerbation of chronic obstructive airways disease 02/13/2017    Acute pharyngitis 05/19/2020    Acute sinusitis 10/09/2017    Acute upper respiratory infection 05/19/2020    Allergic rhinitis 09/23/2020    Anemia 03/07/2014    Anesthesia of skin 09/06/2017    bilateral fingers    Bilateral primary osteoarthritis of knee 12/02/2019    Bradycardia 01/16/2020    Carpal tunnel syndrome 08/10/2017    Chest pain 03/06/2020    Chronic idiopathic constipation 11/18/2020    Chronic low back pain 11/05/2018    Chronic pain 04/23/2019    Chronic pain syndrome 01/30/2020    COPD (chronic obstructive pulmonary disease) 02/19/2021    Coronavirus infection 05/21/2020    Cough 05/19/2020    Degeneration of lumbar intervertebral disc 09/26/2014    L3-L4 L4-L5 Greater than L5-S1    Depressive disorder 07/03/2019    Disorder of gallbladder 03/30/2015    Dysphagia 01/09/2020    Dyspnea 05/19/2020    Dysuria 05/19/2020    Fatigue 05/19/2020    Frequency of urination 02/16/2015    GERD (gastroesophageal reflux disease) 11/18/2020    Hematuria 06/30/2020    Hemoptysis 02/27/2020    Herpes zoster 09/23/2020    Hyperlipidemia 09/23/2020    Hypertension 03/06/2020    Joint  pain 11/04/2019    Knee pain 01/04/2019    Left inguinal hernia 06/10/2019    Long term (current) use of opiate analgesic 02/19/2021    Lumbar spondylosis 11/23/2020    Lumbar sprain 07/16/2012    Malaise 11/20/2014    Melena 09/25/2017    Microscopic hematuria 04/02/2020    Migraine without aura 02/06/2012    Nausea 02/27/2020    Nicotine dependence, cigarettes, uncomplicated 11/11/2020    Nonulcer dyspepsia 01/23/2017    On home O2     Other long term (current) drug therapy 09/23/2020    Other specified urinary incontinence 06/17/2020    Pain in left shoulder 05/02/2019    Pain in left wrist 09/06/2017    and hand    Pain in right shoulder 08/30/2019    Pain in right wrist 09/04/2018    Right hand    Palpitations 03/06/2020    Shoulder joint pain 04/23/2019    Smoker 07/03/2019    Snoring 08/02/2019    Synovial cyst of popliteal space 04/08/2013    Tobacco dependence syndrome 02/19/2021    Unstable angina 01/16/2020    UTI (urinary tract infection) 03/27/2020     Family History   Problem Relation Age of Onset    Rectal cancer Other     Diabetes Other     Heart disease Other     Hypertension Other     Hypertension Mother     Cancer Maternal Aunt      Current Outpatient Medications on File Prior to Visit   Medication Sig Dispense Refill    albuterol (PROAIR HFA) 90 mcg/actuation inhaler Inhale 2 puffs into the lungs every 4 (four) hours as needed for Wheezing. 18 g 5    albuterol (PROVENTIL) 2.5 mg /3 mL (0.083 %) nebulizer solution Take 3 mLs (2.5 mg total) by nebulization 4 (four) times daily as needed for Wheezing. 100 each 5    azelastine (ASTELIN) 137 mcg (0.1 %) nasal spray 2 sprays by Nasal route 2 (two) times daily.      betamethasone dipropionate (DIPROLENE) 0.05 % lotion Apply 1 application topically 2 (two) times daily.      buPROPion (WELLBUTRIN XL) 150 MG TB24 tablet Take 1 tablet (150 mg total) by mouth once daily. Take 1 tablet daily X 4 days, then increase to 2  tablets daily as tolerated. 60 tablet 2    diclofenac sodium (VOLTAREN) 1 % Gel Apply 1 g topically 4 (four) times daily.      estradioL (ESTRACE) 0.5 MG tablet Take 1 tablet (0.5 mg total) by mouth every evening. 30 tablet 11    fluticasone propionate (FLONASE) 50 mcg/actuation nasal spray 2 sprays (100 mcg total) by Each Nostril route once daily. 16 g 5    fluticasone propionate (FLOVENT HFA) 220 mcg/actuation inhaler Inhale 2 puffs into the lungs 2 (two) times daily. Controller 12 g 5    gabapentin (NEURONTIN) 100 MG capsule Take 1 capsule (100 mg total) by mouth 3 (three) times daily. 180 capsule 1    levocetirizine (XYZAL) 5 MG tablet TAKE 1 TABLET EVERY NIGHT AT BEDTIME 90 tablet 3    linaCLOtide (LINZESS) 145 mcg Cap capsule Take 1 capsule (145 mcg total) by mouth before breakfast. 90 capsule 3    nicotine (NICODERM CQ) 21 mg/24 hr Place 1 patch onto the skin once daily. 30 patch 2    RESTASIS 0.05 % ophthalmic emulsion       rosuvastatin (CRESTOR) 20 MG tablet TAKE 1 TABLET DAILY 90 tablet 3    theophylline (ZEFERINO-24) 100 MG 24 hr capsule Take 3 capsules (300 mg total) by mouth once daily. 90 capsule 1    [DISCONTINUED] hydroCHLOROthiazide (HYDRODIURIL) 25 MG tablet Take 1 tablet (25 mg total) by mouth once daily. 90 tablet 1    [DISCONTINUED] HYDROcodone-acetaminophen (NORCO)  mg per tablet Take 1 tablet by mouth every 6 (six) hours as needed for Pain. Every 4 to 6 hours prn for Chronic back pain 90 tablet 0    pantoprazole (PROTONIX) 40 MG tablet Take 1 tablet (40 mg total) by mouth once daily. 90 tablet 3     No current facility-administered medications on file prior to visit.     Immunization History   Administered Date(s) Administered    COVID-19, MRNA, LN-S, PF (Pfizer) (Purple Cap) 04/29/2021, 10/28/2021, 04/29/2022    Influenza - Quadrivalent - PF *Preferred* (6 months and older) 09/16/2021    Influenza Split 10/03/2019    Pneumococcal Conjugate - 13 Valent 11/05/2018     Pneumococcal Polysaccharide - 23 Valent 11/05/2019       Review of Systems   Constitutional: Negative for fever, malaise/fatigue and weight loss.   Respiratory: Positive for shortness of breath.    Cardiovascular: Negative for chest pain and palpitations.   Gastrointestinal: Negative for nausea and vomiting.   Musculoskeletal: Positive for back pain, joint pain and myalgias.   Psychiatric/Behavioral: Negative for depression.        Vitals:    08/09/22 0813   BP: 102/82   Pulse: 76   Resp: 18       Physical Exam  Vitals reviewed.   Constitutional:       Appearance: Normal appearance.   HENT:      Head: Normocephalic.   Eyes:      Extraocular Movements: Extraocular movements intact.      Conjunctiva/sclera: Conjunctivae normal.      Pupils: Pupils are equal, round, and reactive to light.   Neck:      Thyroid: No thyroid mass or thyromegaly.   Cardiovascular:      Rate and Rhythm: Normal rate and regular rhythm.      Heart sounds: Normal heart sounds. No murmur heard.    No gallop.   Pulmonary:      Effort: Pulmonary effort is normal. No respiratory distress.      Breath sounds: Decreased air movement present. Decreased breath sounds present. No wheezing or rales.   Musculoskeletal:      Lumbar back: Spasms and tenderness present. Decreased range of motion.      Right knee: Decreased range of motion.      Left knee: Decreased range of motion.      Comments: She is slow to rise from a seated position with stooped antalgic gait.   Skin:     General: Skin is warm and dry.      Coloration: Skin is not jaundiced or pale.   Neurological:      Mental Status: She is alert.   Psychiatric:         Mood and Affect: Mood normal.         Behavior: Behavior normal.         Thought Content: Thought content normal.         Judgment: Judgment normal.          Assessment and Plan  Lumbar disc disease  -     HYDROcodone-acetaminophen (NORCO)  mg per tablet; Take 1 tablet by mouth every 6 (six) hours as needed for Pain. Every 4 to 6  hours prn for Chronic back pain  Dispense: 90 tablet; Refill: 0    Hypertension, unspecified type  -     hydroCHLOROthiazide (HYDRODIURIL) 25 MG tablet; Take 1 tablet (25 mg total) by mouth once daily.  Dispense: 90 tablet; Refill: 1            Return to clinic in 1 month for follow-up.  She will also be set up for her annual wellness visit    Health Maintenance Topics with due status: Not Due       Topic Last Completion Date    Colorectal Cancer Screening 10/04/2017    Pneumococcal Vaccines (Age 0-64) 11/05/2019    Influenza Vaccine 09/16/2021    Lipid Panel 05/11/2022    Cervical Cancer Screening 06/13/2022

## 2022-09-09 ENCOUNTER — OFFICE VISIT (OUTPATIENT)
Dept: FAMILY MEDICINE | Facility: CLINIC | Age: 62
End: 2022-09-09
Payer: COMMERCIAL

## 2022-09-09 VITALS
BODY MASS INDEX: 32.62 KG/M2 | DIASTOLIC BLOOD PRESSURE: 72 MMHG | SYSTOLIC BLOOD PRESSURE: 106 MMHG | HEIGHT: 66 IN | OXYGEN SATURATION: 98 % | RESPIRATION RATE: 18 BRPM | HEART RATE: 75 BPM | WEIGHT: 203 LBS

## 2022-09-09 DIAGNOSIS — M51.9 LUMBAR DISC DISEASE: ICD-10-CM

## 2022-09-09 DIAGNOSIS — Z79.891 LONG TERM (CURRENT) USE OF OPIATE ANALGESIC: Primary | ICD-10-CM

## 2022-09-09 DIAGNOSIS — I10 HYPERTENSION, UNSPECIFIED TYPE: ICD-10-CM

## 2022-09-09 PROCEDURE — 3008F PR BODY MASS INDEX (BMI) DOCUMENTED: ICD-10-PCS | Mod: ,,, | Performed by: FAMILY MEDICINE

## 2022-09-09 PROCEDURE — 1159F MED LIST DOCD IN RCRD: CPT | Mod: ,,, | Performed by: FAMILY MEDICINE

## 2022-09-09 PROCEDURE — 3074F SYST BP LT 130 MM HG: CPT | Mod: ,,, | Performed by: FAMILY MEDICINE

## 2022-09-09 PROCEDURE — 99213 OFFICE O/P EST LOW 20 MIN: CPT | Mod: ,,, | Performed by: FAMILY MEDICINE

## 2022-09-09 PROCEDURE — 3074F PR MOST RECENT SYSTOLIC BLOOD PRESSURE < 130 MM HG: ICD-10-PCS | Mod: ,,, | Performed by: FAMILY MEDICINE

## 2022-09-09 PROCEDURE — 3008F BODY MASS INDEX DOCD: CPT | Mod: ,,, | Performed by: FAMILY MEDICINE

## 2022-09-09 PROCEDURE — 1160F PR REVIEW ALL MEDS BY PRESCRIBER/CLIN PHARMACIST DOCUMENTED: ICD-10-PCS | Mod: ,,, | Performed by: FAMILY MEDICINE

## 2022-09-09 PROCEDURE — 99213 PR OFFICE/OUTPT VISIT, EST, LEVL III, 20-29 MIN: ICD-10-PCS | Mod: ,,, | Performed by: FAMILY MEDICINE

## 2022-09-09 PROCEDURE — 3078F DIAST BP <80 MM HG: CPT | Mod: ,,, | Performed by: FAMILY MEDICINE

## 2022-09-09 PROCEDURE — 80305 DRUG TEST PRSMV DIR OPT OBS: CPT | Mod: QW,,, | Performed by: FAMILY MEDICINE

## 2022-09-09 PROCEDURE — 1160F RVW MEDS BY RX/DR IN RCRD: CPT | Mod: ,,, | Performed by: FAMILY MEDICINE

## 2022-09-09 PROCEDURE — 1159F PR MEDICATION LIST DOCUMENTED IN MEDICAL RECORD: ICD-10-PCS | Mod: ,,, | Performed by: FAMILY MEDICINE

## 2022-09-09 PROCEDURE — 3078F PR MOST RECENT DIASTOLIC BLOOD PRESSURE < 80 MM HG: ICD-10-PCS | Mod: ,,, | Performed by: FAMILY MEDICINE

## 2022-09-09 PROCEDURE — 80305 POCT URINE DRUG SCREEN PRESUMP: ICD-10-PCS | Mod: QW,,, | Performed by: FAMILY MEDICINE

## 2022-09-09 RX ORDER — HYDROCHLOROTHIAZIDE 25 MG/1
25 TABLET ORAL DAILY
Qty: 90 TABLET | Refills: 1 | Status: SHIPPED | OUTPATIENT
Start: 2022-09-09 | End: 2022-10-07 | Stop reason: SDUPTHER

## 2022-09-09 RX ORDER — HYDROCODONE BITARTRATE AND ACETAMINOPHEN 10; 325 MG/1; MG/1
1 TABLET ORAL EVERY 6 HOURS PRN
Qty: 90 TABLET | Refills: 0 | Status: SHIPPED | OUTPATIENT
Start: 2022-09-09 | End: 2022-10-07 | Stop reason: SDUPTHER

## 2022-09-09 NOTE — PROGRESS NOTES
Nneka Gomes is a 61 y.o. female seen today for follow-up for her chronic pain due to lumbar disc disease.  Patient reports good symptom control and states her average pain with her medication is 3/10.  She has had no medication side effects and is meeting her ADLs.  She has had no signs or symptoms of tolerance or addiction.  Her  today was appropriate and urine drug screen today only showed her prescribed Norco.  Today she has no other complaints.  We discussed the flu vaccine and her pneumonia vaccine which will likely be due in October    Past Medical History:   Diagnosis Date    Abdominal bloating 01/09/2020    Abdominal pain, right upper quadrant 01/09/2020    Abnormal liver enzymes 03/04/2020    Abnormal results of liver function studies 11/18/2020    Acute conjunctivitis 05/30/2014    Acute exacerbation of chronic obstructive airways disease 02/13/2017    Acute pharyngitis 05/19/2020    Acute sinusitis 10/09/2017    Acute upper respiratory infection 05/19/2020    Allergic rhinitis 09/23/2020    Anemia 03/07/2014    Anesthesia of skin 09/06/2017    bilateral fingers    Bilateral primary osteoarthritis of knee 12/02/2019    Bradycardia 01/16/2020    Carpal tunnel syndrome 08/10/2017    Chest pain 03/06/2020    Chronic idiopathic constipation 11/18/2020    Chronic low back pain 11/05/2018    Chronic pain 04/23/2019    Chronic pain syndrome 01/30/2020    COPD (chronic obstructive pulmonary disease) 02/19/2021    Coronavirus infection 05/21/2020    Cough 05/19/2020    Degeneration of lumbar intervertebral disc 09/26/2014    L3-L4 L4-L5 Greater than L5-S1    Depressive disorder 07/03/2019    Disorder of gallbladder 03/30/2015    Dysphagia 01/09/2020    Dyspnea 05/19/2020    Dysuria 05/19/2020    Fatigue 05/19/2020    Frequency of urination 02/16/2015    GERD (gastroesophageal reflux disease) 11/18/2020    Hematuria 06/30/2020    Hemoptysis 02/27/2020    Herpes zoster 09/23/2020    Hyperlipidemia 09/23/2020     Hypertension 03/06/2020    Joint pain 11/04/2019    Knee pain 01/04/2019    Left inguinal hernia 06/10/2019    Long term (current) use of opiate analgesic 02/19/2021    Lumbar spondylosis 11/23/2020    Lumbar sprain 07/16/2012    Malaise 11/20/2014    Melena 09/25/2017    Microscopic hematuria 04/02/2020    Migraine without aura 02/06/2012    Nausea 02/27/2020    Nicotine dependence, cigarettes, uncomplicated 11/11/2020    Nonulcer dyspepsia 01/23/2017    On home O2     Other long term (current) drug therapy 09/23/2020    Other specified urinary incontinence 06/17/2020    Pain in left shoulder 05/02/2019    Pain in left wrist 09/06/2017    and hand    Pain in right shoulder 08/30/2019    Pain in right wrist 09/04/2018    Right hand    Palpitations 03/06/2020    Shoulder joint pain 04/23/2019    Smoker 07/03/2019    Snoring 08/02/2019    Synovial cyst of popliteal space 04/08/2013    Tobacco dependence syndrome 02/19/2021    Unstable angina 01/16/2020    UTI (urinary tract infection) 03/27/2020     Family History   Problem Relation Age of Onset    Rectal cancer Other     Diabetes Other     Heart disease Other     Hypertension Other     Hypertension Mother     Cancer Maternal Aunt      Current Outpatient Medications on File Prior to Visit   Medication Sig Dispense Refill    albuterol (PROAIR HFA) 90 mcg/actuation inhaler Inhale 2 puffs into the lungs every 4 (four) hours as needed for Wheezing. 18 g 5    albuterol (PROVENTIL) 2.5 mg /3 mL (0.083 %) nebulizer solution Take 3 mLs (2.5 mg total) by nebulization 4 (four) times daily as needed for Wheezing. 100 each 5    azelastine (ASTELIN) 137 mcg (0.1 %) nasal spray 2 sprays by Nasal route 2 (two) times daily.      betamethasone dipropionate (DIPROLENE) 0.05 % lotion Apply 1 application topically 2 (two) times daily.      buPROPion (WELLBUTRIN XL) 150 MG TB24 tablet Take 1 tablet (150 mg total) by mouth once daily. Take 1 tablet daily X 4 days, then increase to 2  tablets daily as tolerated. 60 tablet 2    diclofenac sodium (VOLTAREN) 1 % Gel Apply 1 g topically 4 (four) times daily.      estradioL (ESTRACE) 0.5 MG tablet Take 1 tablet (0.5 mg total) by mouth every evening. 30 tablet 11    fluticasone propionate (FLONASE) 50 mcg/actuation nasal spray 2 sprays (100 mcg total) by Each Nostril route once daily. 16 g 5    fluticasone propionate (FLOVENT HFA) 220 mcg/actuation inhaler Inhale 2 puffs into the lungs 2 (two) times daily. Controller 12 g 5    gabapentin (NEURONTIN) 100 MG capsule Take 1 capsule (100 mg total) by mouth 3 (three) times daily. 180 capsule 1    hydroCHLOROthiazide (HYDRODIURIL) 25 MG tablet Take 1 tablet (25 mg total) by mouth once daily. 90 tablet 1    HYDROcodone-acetaminophen (NORCO)  mg per tablet Take 1 tablet by mouth every 6 (six) hours as needed for Pain. Every 4 to 6 hours prn for Chronic back pain 90 tablet 0    levocetirizine (XYZAL) 5 MG tablet TAKE 1 TABLET EVERY NIGHT AT BEDTIME 90 tablet 3    LINZESS 145 mcg Cap capsule TAKE 1 CAPSULE BEFORE BREAKFAST 90 capsule 3    nicotine (NICODERM CQ) 21 mg/24 hr Place 1 patch onto the skin once daily. 30 patch 2    RESTASIS 0.05 % ophthalmic emulsion       rosuvastatin (CRESTOR) 20 MG tablet TAKE 1 TABLET DAILY 90 tablet 3    theophylline (ZEFERINO-24) 100 MG 24 hr capsule Take 3 capsules (300 mg total) by mouth once daily. 90 capsule 1    pantoprazole (PROTONIX) 40 MG tablet Take 1 tablet (40 mg total) by mouth once daily. 90 tablet 3     No current facility-administered medications on file prior to visit.     Immunization History   Administered Date(s) Administered    COVID-19, MRNA, LN-S, PF (Pfizer) (Purple Cap) 04/29/2021, 10/28/2021, 04/29/2022    Influenza - Quadrivalent - PF *Preferred* (6 months and older) 09/16/2021    Influenza Split 10/03/2019    Pneumococcal Conjugate - 13 Valent 11/05/2018    Pneumococcal Polysaccharide - 23 Valent 11/05/2019       Review of Systems   Constitutional:   Negative for fever, malaise/fatigue and weight loss.   Respiratory:  Negative for shortness of breath.    Cardiovascular:  Negative for chest pain and palpitations.   Gastrointestinal:  Negative for nausea and vomiting.   Musculoskeletal:  Positive for back pain and myalgias.   Psychiatric/Behavioral:  Negative for depression.       Vitals:    09/09/22 0816   BP: 106/72   Pulse: 75   Resp: 18       Physical Exam  Vitals reviewed.   Constitutional:       Appearance: Normal appearance.   HENT:      Head: Normocephalic.   Eyes:      Extraocular Movements: Extraocular movements intact.      Conjunctiva/sclera: Conjunctivae normal.      Pupils: Pupils are equal, round, and reactive to light.   Neck:      Thyroid: No thyroid mass or thyromegaly.   Cardiovascular:      Rate and Rhythm: Normal rate and regular rhythm.      Heart sounds: Normal heart sounds. No murmur heard.    No gallop.   Pulmonary:      Effort: Pulmonary effort is normal. No respiratory distress.      Breath sounds: Normal breath sounds. No wheezing or rales.   Musculoskeletal:      Lumbar back: Spasms and tenderness present. Decreased range of motion.        Back:       Comments: She is slow to rise from a seated position with an antalgic gait.   Skin:     General: Skin is warm and dry.      Coloration: Skin is not jaundiced or pale.   Neurological:      Mental Status: She is alert.   Psychiatric:         Mood and Affect: Mood normal.         Behavior: Behavior normal.         Thought Content: Thought content normal.         Judgment: Judgment normal.        Assessment and Plan  Long term (current) use of opiate analgesic  -     POCT Urine Drug Screen Presump    Lumbar disc disease    Hypertension, unspecified type          Return to clinic in 1 month or as needed.    Health Maintenance Topics with due status: Not Due       Topic Last Completion Date    Colorectal Cancer Screening 10/04/2017    Pneumococcal Vaccines (Age 0-64) 11/05/2019    Lipid Panel  05/11/2022    Cervical Cancer Screening 06/13/2022

## 2022-09-29 ENCOUNTER — HOSPITAL ENCOUNTER (OUTPATIENT)
Dept: RADIOLOGY | Facility: HOSPITAL | Age: 62
Discharge: HOME | End: 2022-09-29
Payer: COMMERCIAL

## 2022-09-29 VITALS — BODY MASS INDEX: 32.62 KG/M2 | WEIGHT: 203 LBS | HEIGHT: 66 IN

## 2022-09-29 DIAGNOSIS — Z12.31 OTHER SCREENING MAMMOGRAM: ICD-10-CM

## 2022-09-29 PROCEDURE — 77067 SCR MAMMO BI INCL CAD: CPT | Mod: TC

## 2022-10-06 DIAGNOSIS — Z12.11 COLON CANCER SCREENING: Primary | ICD-10-CM

## 2022-10-07 ENCOUNTER — OFFICE VISIT (OUTPATIENT)
Dept: FAMILY MEDICINE | Facility: CLINIC | Age: 62
End: 2022-10-07
Payer: COMMERCIAL

## 2022-10-07 VITALS
BODY MASS INDEX: 32.62 KG/M2 | WEIGHT: 203 LBS | RESPIRATION RATE: 18 BRPM | HEART RATE: 87 BPM | DIASTOLIC BLOOD PRESSURE: 84 MMHG | SYSTOLIC BLOOD PRESSURE: 122 MMHG | TEMPERATURE: 98 F | OXYGEN SATURATION: 98 % | HEIGHT: 66 IN

## 2022-10-07 DIAGNOSIS — M51.9 LUMBAR DISC DISEASE: ICD-10-CM

## 2022-10-07 DIAGNOSIS — J30.9 ALLERGIC RHINITIS, UNSPECIFIED SEASONALITY, UNSPECIFIED TRIGGER: ICD-10-CM

## 2022-10-07 DIAGNOSIS — I10 HYPERTENSION, UNSPECIFIED TYPE: ICD-10-CM

## 2022-10-07 DIAGNOSIS — J44.9 CHRONIC OBSTRUCTIVE PULMONARY DISEASE, UNSPECIFIED COPD TYPE: ICD-10-CM

## 2022-10-07 PROCEDURE — 99213 PR OFFICE/OUTPT VISIT, EST, LEVL III, 20-29 MIN: ICD-10-PCS | Mod: ,,, | Performed by: FAMILY MEDICINE

## 2022-10-07 PROCEDURE — 1159F PR MEDICATION LIST DOCUMENTED IN MEDICAL RECORD: ICD-10-PCS | Mod: ,,, | Performed by: FAMILY MEDICINE

## 2022-10-07 PROCEDURE — 3074F PR MOST RECENT SYSTOLIC BLOOD PRESSURE < 130 MM HG: ICD-10-PCS | Mod: ,,, | Performed by: FAMILY MEDICINE

## 2022-10-07 PROCEDURE — 1160F PR REVIEW ALL MEDS BY PRESCRIBER/CLIN PHARMACIST DOCUMENTED: ICD-10-PCS | Mod: ,,, | Performed by: FAMILY MEDICINE

## 2022-10-07 PROCEDURE — 3074F SYST BP LT 130 MM HG: CPT | Mod: ,,, | Performed by: FAMILY MEDICINE

## 2022-10-07 PROCEDURE — 1159F MED LIST DOCD IN RCRD: CPT | Mod: ,,, | Performed by: FAMILY MEDICINE

## 2022-10-07 PROCEDURE — 3008F BODY MASS INDEX DOCD: CPT | Mod: ,,, | Performed by: FAMILY MEDICINE

## 2022-10-07 PROCEDURE — 1160F RVW MEDS BY RX/DR IN RCRD: CPT | Mod: ,,, | Performed by: FAMILY MEDICINE

## 2022-10-07 PROCEDURE — 3079F DIAST BP 80-89 MM HG: CPT | Mod: ,,, | Performed by: FAMILY MEDICINE

## 2022-10-07 PROCEDURE — 3008F PR BODY MASS INDEX (BMI) DOCUMENTED: ICD-10-PCS | Mod: ,,, | Performed by: FAMILY MEDICINE

## 2022-10-07 PROCEDURE — 99213 OFFICE O/P EST LOW 20 MIN: CPT | Mod: ,,, | Performed by: FAMILY MEDICINE

## 2022-10-07 PROCEDURE — 3079F PR MOST RECENT DIASTOLIC BLOOD PRESSURE 80-89 MM HG: ICD-10-PCS | Mod: ,,, | Performed by: FAMILY MEDICINE

## 2022-10-07 RX ORDER — AZELASTINE 1 MG/ML
2 SPRAY, METERED NASAL 2 TIMES DAILY
Qty: 90 ML | Refills: 1 | Status: SHIPPED | OUTPATIENT
Start: 2022-10-07 | End: 2023-03-06

## 2022-10-07 RX ORDER — ALBUTEROL SULFATE 0.83 MG/ML
2.5 SOLUTION RESPIRATORY (INHALATION) 4 TIMES DAILY PRN
Qty: 100 EACH | Refills: 5 | Status: SHIPPED | OUTPATIENT
Start: 2022-10-07 | End: 2022-11-21 | Stop reason: SDUPTHER

## 2022-10-07 RX ORDER — HYDROCHLOROTHIAZIDE 25 MG/1
25 TABLET ORAL DAILY
Qty: 90 TABLET | Refills: 1 | Status: SHIPPED | OUTPATIENT
Start: 2022-10-07 | End: 2022-10-07 | Stop reason: SDUPTHER

## 2022-10-07 RX ORDER — HYDROCODONE BITARTRATE AND ACETAMINOPHEN 10; 325 MG/1; MG/1
1 TABLET ORAL EVERY 6 HOURS PRN
Qty: 90 TABLET | Refills: 0 | Status: SHIPPED | OUTPATIENT
Start: 2022-10-07 | End: 2022-10-07 | Stop reason: SDUPTHER

## 2022-10-07 RX ORDER — HYDROCODONE BITARTRATE AND ACETAMINOPHEN 10; 325 MG/1; MG/1
1 TABLET ORAL EVERY 6 HOURS PRN
Qty: 90 TABLET | Refills: 0 | Status: SHIPPED | OUTPATIENT
Start: 2022-10-07 | End: 2022-11-09 | Stop reason: SDUPTHER

## 2022-10-07 RX ORDER — HYDROCHLOROTHIAZIDE 25 MG/1
25 TABLET ORAL DAILY
Qty: 90 TABLET | Refills: 1 | Status: SHIPPED | OUTPATIENT
Start: 2022-10-07 | End: 2022-12-08 | Stop reason: SDUPTHER

## 2022-10-07 RX ORDER — FLUTICASONE PROPIONATE 50 MCG
2 SPRAY, SUSPENSION (ML) NASAL DAILY
Qty: 16 G | Refills: 5 | Status: SHIPPED | OUTPATIENT
Start: 2022-10-07

## 2022-10-07 RX ORDER — DICLOFENAC SODIUM 10 MG/G
1 GEL TOPICAL 4 TIMES DAILY
Qty: 300 G | Refills: 1 | Status: SHIPPED | OUTPATIENT
Start: 2022-10-07 | End: 2023-03-10

## 2022-10-07 NOTE — PROGRESS NOTES
Nneka Gomes is a 61 y.o. female seen today for follow-up on her chronic pain due to lumbar disc disease.  She reports good symptom control with no medication side effects.  Her  today was appropriate and urine drug screen his only shown her prescribed Norco. She is also due  for her flu vaccination today and will wait for rest received the new Prevnar 20 to update her pneumonia vaccination status.  She is meeting her ADLs and has had no signs or symptoms of tolerance or addiction.  She has had no recent COPD exacerbations and is doing well.    Past Medical History:   Diagnosis Date    Abdominal bloating 01/09/2020    Abdominal pain, right upper quadrant 01/09/2020    Abnormal liver enzymes 03/04/2020    Abnormal results of liver function studies 11/18/2020    Acute conjunctivitis 05/30/2014    Acute exacerbation of chronic obstructive airways disease 02/13/2017    Acute pharyngitis 05/19/2020    Acute sinusitis 10/09/2017    Acute upper respiratory infection 05/19/2020    Allergic rhinitis 09/23/2020    Anemia 03/07/2014    Anesthesia of skin 09/06/2017    bilateral fingers    Bilateral primary osteoarthritis of knee 12/02/2019    Bradycardia 01/16/2020    Carpal tunnel syndrome 08/10/2017    Chest pain 03/06/2020    Chronic idiopathic constipation 11/18/2020    Chronic low back pain 11/05/2018    Chronic pain 04/23/2019    Chronic pain syndrome 01/30/2020    COPD (chronic obstructive pulmonary disease) 02/19/2021    Coronavirus infection 05/21/2020    Cough 05/19/2020    Degeneration of lumbar intervertebral disc 09/26/2014    L3-L4 L4-L5 Greater than L5-S1    Depressive disorder 07/03/2019    Disorder of gallbladder 03/30/2015    Dysphagia 01/09/2020    Dyspnea 05/19/2020    Dysuria 05/19/2020    Fatigue 05/19/2020    Frequency of urination 02/16/2015    GERD (gastroesophageal reflux disease) 11/18/2020    Hematuria 06/30/2020    Hemoptysis 02/27/2020    Herpes zoster 09/23/2020    Hyperlipidemia 09/23/2020     Hypertension 03/06/2020    Joint pain 11/04/2019    Knee pain 01/04/2019    Left inguinal hernia 06/10/2019    Long term (current) use of opiate analgesic 02/19/2021    Lumbar spondylosis 11/23/2020    Lumbar sprain 07/16/2012    Malaise 11/20/2014    Melena 09/25/2017    Microscopic hematuria 04/02/2020    Migraine without aura 02/06/2012    Nausea 02/27/2020    Nicotine dependence, cigarettes, uncomplicated 11/11/2020    Nonulcer dyspepsia 01/23/2017    On home O2     Other long term (current) drug therapy 09/23/2020    Other specified urinary incontinence 06/17/2020    Pain in left shoulder 05/02/2019    Pain in left wrist 09/06/2017    and hand    Pain in right shoulder 08/30/2019    Pain in right wrist 09/04/2018    Right hand    Palpitations 03/06/2020    Shoulder joint pain 04/23/2019    Smoker 07/03/2019    Snoring 08/02/2019    Synovial cyst of popliteal space 04/08/2013    Tobacco dependence syndrome 02/19/2021    Unstable angina 01/16/2020    UTI (urinary tract infection) 03/27/2020     Family History   Problem Relation Age of Onset    Rectal cancer Other     Diabetes Other     Heart disease Other     Hypertension Other     Hypertension Mother     Cancer Maternal Aunt      Current Outpatient Medications on File Prior to Visit   Medication Sig Dispense Refill    albuterol (PROAIR HFA) 90 mcg/actuation inhaler Inhale 2 puffs into the lungs every 4 (four) hours as needed for Wheezing. 18 g 5    buPROPion (WELLBUTRIN XL) 150 MG TB24 tablet Take 1 tablet (150 mg total) by mouth once daily. Take 1 tablet daily X 4 days, then increase to 2 tablets daily as tolerated. 60 tablet 2    estradioL (ESTRACE) 0.5 MG tablet Take 1 tablet (0.5 mg total) by mouth every evening. 30 tablet 11    fluticasone propionate (FLOVENT HFA) 220 mcg/actuation inhaler Inhale 2 puffs into the lungs 2 (two) times daily. Controller 12 g 5    gabapentin (NEURONTIN) 100 MG capsule Take 1 capsule (100 mg total) by mouth 3 (three) times  daily. 180 capsule 1    levocetirizine (XYZAL) 5 MG tablet TAKE 1 TABLET EVERY NIGHT AT BEDTIME 90 tablet 3    LINZESS 145 mcg Cap capsule TAKE 1 CAPSULE BEFORE BREAKFAST 90 capsule 3    nicotine (NICODERM CQ) 21 mg/24 hr Place 1 patch onto the skin once daily. 30 patch 2    RESTASIS 0.05 % ophthalmic emulsion       rosuvastatin (CRESTOR) 20 MG tablet TAKE 1 TABLET DAILY 90 tablet 3    theophylline (ZEFERINO-24) 100 MG 24 hr capsule Take 3 capsules (300 mg total) by mouth once daily. 90 capsule 1    [DISCONTINUED] albuterol (PROVENTIL) 2.5 mg /3 mL (0.083 %) nebulizer solution Take 3 mLs (2.5 mg total) by nebulization 4 (four) times daily as needed for Wheezing. 100 each 5    [DISCONTINUED] azelastine (ASTELIN) 137 mcg (0.1 %) nasal spray 2 sprays by Nasal route 2 (two) times daily.      [DISCONTINUED] diclofenac sodium (VOLTAREN) 1 % Gel Apply 1 g topically 4 (four) times daily.      [DISCONTINUED] fluticasone propionate (FLONASE) 50 mcg/actuation nasal spray 2 sprays (100 mcg total) by Each Nostril route once daily. 16 g 5    [DISCONTINUED] hydroCHLOROthiazide (HYDRODIURIL) 25 MG tablet Take 1 tablet (25 mg total) by mouth once daily. 90 tablet 1    [DISCONTINUED] HYDROcodone-acetaminophen (NORCO)  mg per tablet Take 1 tablet by mouth every 6 (six) hours as needed for Pain. Every 4 to 6 hours prn for Chronic back pain 90 tablet 0    betamethasone dipropionate (DIPROLENE) 0.05 % lotion Apply 1 application topically 2 (two) times daily.      pantoprazole (PROTONIX) 40 MG tablet Take 1 tablet (40 mg total) by mouth once daily. 90 tablet 3     No current facility-administered medications on file prior to visit.     Immunization History   Administered Date(s) Administered    COVID-19, MRNA, LN-S, PF (Pfizer) (Purple Cap) 04/29/2021, 10/28/2021, 04/29/2022    Influenza - Quadrivalent - PF *Preferred* (6 months and older) 09/16/2021    Influenza Split 10/03/2019    Pneumococcal Conjugate - 13 Valent 11/05/2018     Pneumococcal Polysaccharide - 23 Valent 11/05/2019       Review of Systems   Constitutional:  Negative for fever, malaise/fatigue and weight loss.   Respiratory:  Positive for shortness of breath.    Cardiovascular:  Negative for chest pain and palpitations.   Gastrointestinal:  Negative for nausea and vomiting.   Musculoskeletal:  Positive for back pain and myalgias.   Psychiatric/Behavioral:  Negative for depression.       Vitals:    10/07/22 0818   BP: 122/84   Pulse: 87   Resp: 18   Temp: 98.2 °F (36.8 °C)       Physical Exam  Vitals reviewed.   Constitutional:       Appearance: Normal appearance.   HENT:      Head: Normocephalic.   Eyes:      Extraocular Movements: Extraocular movements intact.      Conjunctiva/sclera: Conjunctivae normal.      Pupils: Pupils are equal, round, and reactive to light.   Neck:      Thyroid: No thyroid mass or thyromegaly.   Cardiovascular:      Rate and Rhythm: Normal rate and regular rhythm.      Heart sounds: Normal heart sounds. No murmur heard.    No gallop.   Pulmonary:      Effort: Pulmonary effort is normal. No respiratory distress.      Breath sounds: Decreased air movement present. Decreased breath sounds present. No wheezing or rales.   Musculoskeletal:      Lumbar back: Spasms and tenderness present. Decreased range of motion.      Comments: She is slow to rise from a seated position and has an antalgic gait.   Skin:     General: Skin is warm and dry.      Coloration: Skin is not jaundiced or pale.   Neurological:      Mental Status: She is alert.   Psychiatric:         Mood and Affect: Mood normal.         Behavior: Behavior normal.         Thought Content: Thought content normal.         Judgment: Judgment normal.        Assessment and Plan  Chronic obstructive pulmonary disease, unspecified COPD type  -     albuterol (PROVENTIL) 2.5 mg /3 mL (0.083 %) nebulizer solution; Take 3 mLs (2.5 mg total) by nebulization 4 (four) times daily as needed for Wheezing.  Dispense:  100 each; Refill: 5    Allergic rhinitis, unspecified seasonality, unspecified trigger  -     azelastine (ASTELIN) 137 mcg (0.1 %) nasal spray; 2 sprays (274 mcg total) by Nasal route 2 (two) times daily.  Dispense: 90 mL; Refill: 1  -     fluticasone propionate (FLONASE) 50 mcg/actuation nasal spray; 2 sprays (100 mcg total) by Each Nostril route once daily.  Dispense: 16 g; Refill: 5    Hypertension, unspecified type  -     Discontinue: hydroCHLOROthiazide (HYDRODIURIL) 25 MG tablet; Take 1 tablet (25 mg total) by mouth once daily.  Dispense: 90 tablet; Refill: 1  -     hydroCHLOROthiazide (HYDRODIURIL) 25 MG tablet; Take 1 tablet (25 mg total) by mouth once daily.  Dispense: 90 tablet; Refill: 1    Lumbar disc disease  -     diclofenac sodium (VOLTAREN) 1 % Gel; Apply 1 g topically 4 (four) times daily.  Dispense: 300 g; Refill: 1  -     Discontinue: HYDROcodone-acetaminophen (NORCO)  mg per tablet; Take 1 tablet by mouth every 6 (six) hours as needed for Pain. Every 4 to 6 hours prn for Chronic back pain  Dispense: 90 tablet; Refill: 0  -     HYDROcodone-acetaminophen (NORCO)  mg per tablet; Take 1 tablet by mouth every 6 (six) hours as needed for Pain. Every 4 to 6 hours prn for Chronic back pain  Dispense: 90 tablet; Refill: 0          Return to clinic in 1 month for follow-up or as needed.    Health Maintenance Topics with due status: Not Due       Topic Last Completion Date    Colorectal Cancer Screening 10/04/2017    Pneumococcal Vaccines (Age 0-64) 11/05/2019    Lipid Panel 05/11/2022    Cervical Cancer Screening 06/13/2022    Mammogram 09/29/2022

## 2022-10-10 RX ORDER — POLYETHYLENE GLYCOL 3350, SODIUM SULFATE ANHYDROUS, SODIUM BICARBONATE, SODIUM CHLORIDE, POTASSIUM CHLORIDE 236; 22.74; 6.74; 5.86; 2.97 G/4L; G/4L; G/4L; G/4L; G/4L
4 POWDER, FOR SOLUTION ORAL ONCE
Qty: 4000 ML | Refills: 0 | Status: SHIPPED | OUTPATIENT
Start: 2022-10-10 | End: 2022-10-10

## 2022-10-11 ENCOUNTER — HOSPITAL ENCOUNTER (OUTPATIENT)
Dept: GASTROENTEROLOGY | Facility: HOSPITAL | Age: 62
Discharge: HOME | End: 2022-10-11
Attending: STUDENT IN AN ORGANIZED HEALTH CARE EDUCATION/TRAINING PROGRAM
Payer: COMMERCIAL

## 2022-10-11 ENCOUNTER — ANESTHESIA EVENT (OUTPATIENT)
Dept: GASTROENTEROLOGY | Facility: HOSPITAL | Age: 62
End: 2022-10-11
Payer: COMMERCIAL

## 2022-10-11 ENCOUNTER — ANESTHESIA (OUTPATIENT)
Dept: GASTROENTEROLOGY | Facility: HOSPITAL | Age: 62
End: 2022-10-11
Payer: COMMERCIAL

## 2022-10-11 DIAGNOSIS — Z12.11 COLON CANCER SCREENING: Primary | ICD-10-CM

## 2022-10-11 DIAGNOSIS — Z12.11 COLON CANCER SCREENING: ICD-10-CM

## 2022-10-11 RX ORDER — ONDANSETRON 2 MG/ML
4 INJECTION INTRAMUSCULAR; INTRAVENOUS ONCE AS NEEDED
Status: CANCELLED | OUTPATIENT
Start: 2022-10-11 | End: 2034-03-08

## 2022-10-11 RX ORDER — SODIUM CHLORIDE 9 MG/ML
INJECTION, SOLUTION INTRAVENOUS CONTINUOUS
Status: CANCELLED | OUTPATIENT
Start: 2022-10-11

## 2022-10-11 RX ORDER — PROCHLORPERAZINE EDISYLATE 5 MG/ML
5 INJECTION INTRAMUSCULAR; INTRAVENOUS ONCE AS NEEDED
Status: CANCELLED | OUTPATIENT
Start: 2022-10-11 | End: 2034-03-08

## 2022-10-11 RX ORDER — POLYETHYLENE GLYCOL 3350, SODIUM SULFATE ANHYDROUS, SODIUM BICARBONATE, SODIUM CHLORIDE, POTASSIUM CHLORIDE 236; 22.74; 6.74; 5.86; 2.97 G/4L; G/4L; G/4L; G/4L; G/4L
4 POWDER, FOR SOLUTION ORAL ONCE
Qty: 4000 ML | Refills: 0 | Status: SHIPPED | OUTPATIENT
Start: 2022-10-11 | End: 2022-10-11

## 2022-10-11 RX ORDER — SODIUM CHLORIDE 0.9 % (FLUSH) 0.9 %
10 SYRINGE (ML) INJECTION
Status: CANCELLED | OUTPATIENT
Start: 2022-10-11

## 2022-10-11 NOTE — H&P
History of Present Illness    Nneka Gomes is a 61 y.o. female that  has a past medical history of Abdominal bloating (01/09/2020), Abdominal pain, right upper quadrant (01/09/2020), Abnormal liver enzymes (03/04/2020), Abnormal results of liver function studies (11/18/2020), Acute conjunctivitis (05/30/2014), Acute exacerbation of chronic obstructive airways disease (02/13/2017), Acute pharyngitis (05/19/2020), Acute sinusitis (10/09/2017), Acute upper respiratory infection (05/19/2020), Allergic rhinitis (09/23/2020), Anemia (03/07/2014), Anesthesia of skin (09/06/2017), Bilateral primary osteoarthritis of knee (12/02/2019), Bradycardia (01/16/2020), Carpal tunnel syndrome (08/10/2017), Chest pain (03/06/2020), Chronic idiopathic constipation (11/18/2020), Chronic low back pain (11/05/2018), Chronic pain (04/23/2019), Chronic pain syndrome (01/30/2020), COPD (chronic obstructive pulmonary disease) (02/19/2021), Coronavirus infection (05/21/2020), Cough (05/19/2020), Degeneration of lumbar intervertebral disc (09/26/2014), Depressive disorder (07/03/2019), Disorder of gallbladder (03/30/2015), Dysphagia (01/09/2020), Dyspnea (05/19/2020), Dysuria (05/19/2020), Fatigue (05/19/2020), Frequency of urination (02/16/2015), GERD (gastroesophageal reflux disease) (11/18/2020), Hematuria (06/30/2020), Hemoptysis (02/27/2020), Herpes zoster (09/23/2020), Hyperlipidemia (09/23/2020), Hypertension (03/06/2020), Joint pain (11/04/2019), Knee pain (01/04/2019), Left inguinal hernia (06/10/2019), Long term (current) use of opiate analgesic (02/19/2021), Lumbar spondylosis (11/23/2020), Lumbar sprain (07/16/2012), Malaise (11/20/2014), Melena (09/25/2017), Microscopic hematuria (04/02/2020), Migraine without aura (02/06/2012), Nausea (02/27/2020), Nicotine dependence, cigarettes, uncomplicated (11/11/2020), Nonulcer dyspepsia (01/23/2017), On home O2, Other long term (current) drug therapy (09/23/2020), Other specified urinary  incontinence (06/17/2020), Pain in left shoulder (05/02/2019), Pain in left wrist (09/06/2017), Pain in right shoulder (08/30/2019), Pain in right wrist (09/04/2018), Palpitations (03/06/2020), Shoulder joint pain (04/23/2019), Smoker (07/03/2019), Snoring (08/02/2019), Synovial cyst of popliteal space (04/08/2013), Tobacco dependence syndrome (02/19/2021), Unstable angina (01/16/2020), and UTI (urinary tract infection) (03/27/2020).     Follows in GI clinic for elevated ALP level.  On norco, linzess, Protonix.   Labs show Hgb 13.7. Ferritin, iron, B12 wnl.     Patient otherwise denies any:  - black stools  - bloody stools  - nausea  - vomiting  - diarrhea  - constipation  - abdominal pain  - family history of GI related malignancies    ROS  - 12 point review of systems is negative except as otherwise stated in HPI.    Past Medical History:   Diagnosis Date    Abdominal bloating 01/09/2020    Abdominal pain, right upper quadrant 01/09/2020    Abnormal liver enzymes 03/04/2020    Abnormal results of liver function studies 11/18/2020    Acute conjunctivitis 05/30/2014    Acute exacerbation of chronic obstructive airways disease 02/13/2017    Acute pharyngitis 05/19/2020    Acute sinusitis 10/09/2017    Acute upper respiratory infection 05/19/2020    Allergic rhinitis 09/23/2020    Anemia 03/07/2014    Anesthesia of skin 09/06/2017    bilateral fingers    Bilateral primary osteoarthritis of knee 12/02/2019    Bradycardia 01/16/2020    Carpal tunnel syndrome 08/10/2017    Chest pain 03/06/2020    Chronic idiopathic constipation 11/18/2020    Chronic low back pain 11/05/2018    Chronic pain 04/23/2019    Chronic pain syndrome 01/30/2020    COPD (chronic obstructive pulmonary disease) 02/19/2021    Coronavirus infection 05/21/2020    Cough 05/19/2020    Degeneration of lumbar intervertebral disc 09/26/2014    L3-L4 L4-L5 Greater than L5-S1    Depressive disorder 07/03/2019    Disorder of gallbladder 03/30/2015     Dysphagia 2020    Dyspnea 2020    Dysuria 2020    Fatigue 2020    Frequency of urination 2015    GERD (gastroesophageal reflux disease) 2020    Hematuria 2020    Hemoptysis 2020    Herpes zoster 2020    Hyperlipidemia 2020    Hypertension 2020    Joint pain 2019    Knee pain 2019    Left inguinal hernia 06/10/2019    Long term (current) use of opiate analgesic 2021    Lumbar spondylosis 2020    Lumbar sprain 2012    Malaise 2014    Melena 2017    Microscopic hematuria 2020    Migraine without aura 2012    Nausea 2020    Nicotine dependence, cigarettes, uncomplicated 2020    Nonulcer dyspepsia 2017    On home O2     Other long term (current) drug therapy 2020    Other specified urinary incontinence 2020    Pain in left shoulder 2019    Pain in left wrist 2017    and hand    Pain in right shoulder 2019    Pain in right wrist 2018    Right hand    Palpitations 2020    Shoulder joint pain 2019    Smoker 2019    Snoring 2019    Synovial cyst of popliteal space 2013    Tobacco dependence syndrome 2021    Unstable angina 2020    UTI (urinary tract infection) 2020       Past Surgical History:   Procedure Laterality Date    CARDIAC CATHETERIZATION      CHOLECYSTECTOMY      HERNIA REPAIR      ROTATOR CUFF REPAIR         Family History   Problem Relation Age of Onset    Rectal cancer Other     Diabetes Other     Heart disease Other     Hypertension Other     Hypertension Mother     Cancer Maternal Aunt        Social History     Socioeconomic History    Marital status: Single    Number of children: 1   Tobacco Use    Smoking status: Some Days     Packs/day: 0.25     Years: 40.00     Pack years: 10.00     Types: Cigarettes     Last attempt to quit: 2021     Years since quittin.2    Smokeless tobacco:  Former   Substance and Sexual Activity    Alcohol use: Not Currently     Comment: occ    Drug use: Not Currently     Types: Marijuana    Sexual activity: Not Currently       Current Outpatient Medications   Medication Sig Dispense Refill    albuterol (PROAIR HFA) 90 mcg/actuation inhaler Inhale 2 puffs into the lungs every 4 (four) hours as needed for Wheezing. 18 g 5    albuterol (PROVENTIL) 2.5 mg /3 mL (0.083 %) nebulizer solution Take 3 mLs (2.5 mg total) by nebulization 4 (four) times daily as needed for Wheezing. 100 each 5    azelastine (ASTELIN) 137 mcg (0.1 %) nasal spray 2 sprays (274 mcg total) by Nasal route 2 (two) times daily. 90 mL 1    betamethasone dipropionate (DIPROLENE) 0.05 % lotion Apply 1 application topically 2 (two) times daily.      buPROPion (WELLBUTRIN XL) 150 MG TB24 tablet Take 1 tablet (150 mg total) by mouth once daily. Take 1 tablet daily X 4 days, then increase to 2 tablets daily as tolerated. 60 tablet 2    diclofenac sodium (VOLTAREN) 1 % Gel Apply 1 g topically 4 (four) times daily. 300 g 1    estradioL (ESTRACE) 0.5 MG tablet Take 1 tablet (0.5 mg total) by mouth every evening. 30 tablet 11    fluticasone propionate (FLONASE) 50 mcg/actuation nasal spray 2 sprays (100 mcg total) by Each Nostril route once daily. 16 g 5    fluticasone propionate (FLOVENT HFA) 220 mcg/actuation inhaler Inhale 2 puffs into the lungs 2 (two) times daily. Controller 12 g 5    gabapentin (NEURONTIN) 100 MG capsule Take 1 capsule (100 mg total) by mouth 3 (three) times daily. 180 capsule 1    hydroCHLOROthiazide (HYDRODIURIL) 25 MG tablet Take 1 tablet (25 mg total) by mouth once daily. 90 tablet 1    HYDROcodone-acetaminophen (NORCO)  mg per tablet Take 1 tablet by mouth every 6 (six) hours as needed for Pain. Every 4 to 6 hours prn for Chronic back pain 90 tablet 0    levocetirizine (XYZAL) 5 MG tablet TAKE 1 TABLET EVERY NIGHT AT BEDTIME 90 tablet 3    LINZESS 145 mcg Cap capsule TAKE 1  CAPSULE BEFORE BREAKFAST 90 capsule 3    nicotine (NICODERM CQ) 21 mg/24 hr Place 1 patch onto the skin once daily. 30 patch 2    pantoprazole (PROTONIX) 40 MG tablet Take 1 tablet (40 mg total) by mouth once daily. 90 tablet 3    RESTASIS 0.05 % ophthalmic emulsion       rosuvastatin (CRESTOR) 20 MG tablet TAKE 1 TABLET DAILY 90 tablet 3    theophylline (ZEFERINO-24) 100 MG 24 hr capsule Take 3 capsules (300 mg total) by mouth once daily. 90 capsule 1     No current facility-administered medications for this encounter.       Review of patient's allergies indicates:  No Known Allergies    Objective:  There were no vitals filed for this visit.     Constitutional:       General: no acute distress.     Appearance: Normal appearance. Non toxic-appearing.   HENT:      Head: Normocephalic and atraumatic.      Mouth/Throat:      Pharynx: Oropharynx is clear. No posterior oropharyngeal erythema.   Eyes:      General: No scleral icterus.     Conjunctiva/sclera: Conjunctivae normal.   Cardiovascular:      Rate and Rhythm: Normal rate and regular rhythm.      Heart sounds: Normal heart sounds.   Pulmonary:      Effort: Pulmonary effort is normal.      Breath sounds: Normal breath sounds.   Abdominal:      General: Abdomen is flat. There is no distension.      Palpations: Abdomen is soft. There is no mass.      Tenderness: There is no abdominal tenderness. There is no guarding.   Musculoskeletal:         General: No swelling or deformity.      Cervical back: Normal range of motion and neck supple.   Skin:     General: Skin is warm and dry.   Neurological:      General: No focal deficit present.      Mental Status: alert and oriented to person, place, and time.     Assessment and Plan:  Patient did not have prep for colonoscopy ordered and ate last night, so will reschedule    Ish Myers MD  Gastroenterology

## 2022-10-18 DIAGNOSIS — Z12.11 COLON CANCER SCREENING: Primary | ICD-10-CM

## 2022-10-18 RX ORDER — POLYETHYLENE GLYCOL 3350, SODIUM SULFATE ANHYDROUS, SODIUM BICARBONATE, SODIUM CHLORIDE, POTASSIUM CHLORIDE 236; 22.74; 6.74; 5.86; 2.97 G/4L; G/4L; G/4L; G/4L; G/4L
4 POWDER, FOR SOLUTION ORAL ONCE
Qty: 4000 ML | Refills: 0 | Status: SHIPPED | OUTPATIENT
Start: 2022-10-18 | End: 2022-10-18

## 2022-10-31 ENCOUNTER — OFFICE VISIT (OUTPATIENT)
Dept: OBSTETRICS AND GYNECOLOGY | Facility: CLINIC | Age: 62
End: 2022-10-31
Payer: COMMERCIAL

## 2022-10-31 VITALS
SYSTOLIC BLOOD PRESSURE: 135 MMHG | HEIGHT: 66 IN | DIASTOLIC BLOOD PRESSURE: 89 MMHG | WEIGHT: 206.63 LBS | BODY MASS INDEX: 33.21 KG/M2

## 2022-10-31 DIAGNOSIS — R87.610 PAPANICOLAOU SMEAR OF CERVIX WITH ATYPICAL SQUAMOUS CELLS OF UNDETERMINED SIGNIFICANCE (ASC-US): Primary | ICD-10-CM

## 2022-10-31 PROCEDURE — 3079F PR MOST RECENT DIASTOLIC BLOOD PRESSURE 80-89 MM HG: ICD-10-PCS | Mod: CPTII,,, | Performed by: OBSTETRICS & GYNECOLOGY

## 2022-10-31 PROCEDURE — 1159F MED LIST DOCD IN RCRD: CPT | Mod: CPTII,,, | Performed by: OBSTETRICS & GYNECOLOGY

## 2022-10-31 PROCEDURE — 3075F SYST BP GE 130 - 139MM HG: CPT | Mod: CPTII,,, | Performed by: OBSTETRICS & GYNECOLOGY

## 2022-10-31 PROCEDURE — 99213 OFFICE O/P EST LOW 20 MIN: CPT | Mod: S$PBB,,, | Performed by: OBSTETRICS & GYNECOLOGY

## 2022-10-31 PROCEDURE — 1160F RVW MEDS BY RX/DR IN RCRD: CPT | Mod: CPTII,,, | Performed by: OBSTETRICS & GYNECOLOGY

## 2022-10-31 PROCEDURE — 1160F PR REVIEW ALL MEDS BY PRESCRIBER/CLIN PHARMACIST DOCUMENTED: ICD-10-PCS | Mod: CPTII,,, | Performed by: OBSTETRICS & GYNECOLOGY

## 2022-10-31 PROCEDURE — 3075F PR MOST RECENT SYSTOLIC BLOOD PRESS GE 130-139MM HG: ICD-10-PCS | Mod: CPTII,,, | Performed by: OBSTETRICS & GYNECOLOGY

## 2022-10-31 PROCEDURE — 3079F DIAST BP 80-89 MM HG: CPT | Mod: CPTII,,, | Performed by: OBSTETRICS & GYNECOLOGY

## 2022-10-31 PROCEDURE — 1159F PR MEDICATION LIST DOCUMENTED IN MEDICAL RECORD: ICD-10-PCS | Mod: CPTII,,, | Performed by: OBSTETRICS & GYNECOLOGY

## 2022-10-31 PROCEDURE — 99213 PR OFFICE/OUTPT VISIT, EST, LEVL III, 20-29 MIN: ICD-10-PCS | Mod: S$PBB,,, | Performed by: OBSTETRICS & GYNECOLOGY

## 2022-10-31 PROCEDURE — 88142 CYTOPATH C/V THIN LAYER: CPT | Mod: GCY | Performed by: OBSTETRICS & GYNECOLOGY

## 2022-10-31 PROCEDURE — 99214 OFFICE O/P EST MOD 30 MIN: CPT | Mod: PBBFAC | Performed by: OBSTETRICS & GYNECOLOGY

## 2022-11-01 NOTE — PROGRESS NOTES
Nneka Gomes female  for   Chief Complaint   Patient presents with    Abnormal Pap Smear     4 mth repeat pap      OB History               Para        Term                AB        Living   1         SAB        IAB        Ectopic        Multiple        Live Births                      Past Medical History:   Diagnosis Date    Abdominal bloating 2020    Abdominal pain, right upper quadrant 2020    Abnormal liver enzymes 2020    Abnormal results of liver function studies 2020    Acute conjunctivitis 2014    Acute exacerbation of chronic obstructive airways disease 2017    Acute pharyngitis 2020    Acute sinusitis 10/09/2017    Acute upper respiratory infection 2020    Allergic rhinitis 2020    Anemia 2014    Anesthesia of skin 2017    bilateral fingers    Bilateral primary osteoarthritis of knee 2019    Bradycardia 2020    Carpal tunnel syndrome 08/10/2017    Chest pain 2020    Chronic idiopathic constipation 2020    Chronic low back pain 2018    Chronic pain 2019    Chronic pain syndrome 2020    COPD (chronic obstructive pulmonary disease) 2021    Coronavirus infection 2020    Cough 2020    Degeneration of lumbar intervertebral disc 2014    L3-L4 L4-L5 Greater than L5-S1    Depressive disorder 2019    Disorder of gallbladder 2015    Dysphagia 2020    Dyspnea 2020    Dysuria 2020    Fatigue 2020    Frequency of urination 2015    GERD (gastroesophageal reflux disease) 2020    Hematuria 2020    Hemoptysis 2020    Herpes zoster 2020    Hyperlipidemia 2020    Hypertension 2020    Joint pain 2019    Knee pain 2019    Left inguinal hernia 06/10/2019    Long term (current) use of opiate analgesic 2021    Lumbar spondylosis 2020    Lumbar sprain 2012    Malaise 2014     Melena 09/25/2017    Microscopic hematuria 04/02/2020    Migraine without aura 02/06/2012    Nausea 02/27/2020    Nicotine dependence, cigarettes, uncomplicated 11/11/2020    Nonulcer dyspepsia 01/23/2017    On home O2     Other long term (current) drug therapy 09/23/2020    Other specified urinary incontinence 06/17/2020    Pain in left shoulder 05/02/2019    Pain in left wrist 09/06/2017    and hand    Pain in right shoulder 08/30/2019    Pain in right wrist 09/04/2018    Right hand    Palpitations 03/06/2020    Shoulder joint pain 04/23/2019    Smoker 07/03/2019    Snoring 08/02/2019    Synovial cyst of popliteal space 04/08/2013    Tobacco dependence syndrome 02/19/2021    Unstable angina 01/16/2020    UTI (urinary tract infection) 03/27/2020      Past Surgical History:   Procedure Laterality Date    CARDIAC CATHETERIZATION      CHOLECYSTECTOMY      HERNIA REPAIR      ROTATOR CUFF REPAIR        Review of patient's allergies indicates:  No Known Allergies          Physical exam:     General Appearance: healthy    Abdomen:Normal, benign.    Pelvic: Pelvic exam: normal external genitalia, vulva, vagina, cervix, uterus and adnexa, VULVA: normal appearing vulva with no masses, tenderness or lesions, CERVIX: normal appearing cervix without discharge or lesions, UTERUS: uterus is normal size, shape, consistency and nontender, ADNEXA: normal adnexa in size, nontender and no masses,    Extremity:normal    Skin: normal exam        Assessment:   Problem List Items Addressed This Visit    None  Visit Diagnoses       Papanicolaou smear of cervix with atypical squamous cells of undetermined significance (ASC-US)    -  Primary    Relevant Orders    ThinPrep Pap Test             Plan:  Her Pap smear was repeated today.

## 2022-11-03 LAB
GH SERPL-MCNC: NORMAL NG/ML
INSULIN SERPL-ACNC: NORMAL U[IU]/ML
LAB AP CLINICAL INFORMATION: NORMAL
LAB AP GYN INTERPRETATION: NEGATIVE
LAB AP PAP DISCLAIMER COMMENTS: NORMAL
RENIN PLAS-CCNC: NORMAL NG/ML/H

## 2022-11-08 ENCOUNTER — HOSPITAL ENCOUNTER (OUTPATIENT)
Dept: GASTROENTEROLOGY | Facility: HOSPITAL | Age: 62
Discharge: HOME | End: 2022-11-08
Attending: STUDENT IN AN ORGANIZED HEALTH CARE EDUCATION/TRAINING PROGRAM
Payer: COMMERCIAL

## 2022-11-08 VITALS
HEIGHT: 66 IN | DIASTOLIC BLOOD PRESSURE: 69 MMHG | OXYGEN SATURATION: 100 % | BODY MASS INDEX: 33.27 KG/M2 | HEART RATE: 64 BPM | RESPIRATION RATE: 15 BRPM | WEIGHT: 207 LBS | TEMPERATURE: 98 F | SYSTOLIC BLOOD PRESSURE: 114 MMHG

## 2022-11-08 PROCEDURE — D9220A PRA ANESTHESIA: ICD-10-PCS | Mod: ,,, | Performed by: NURSE ANESTHETIST, CERTIFIED REGISTERED

## 2022-11-08 PROCEDURE — 25000003 PHARM REV CODE 250: Performed by: NURSE ANESTHETIST, CERTIFIED REGISTERED

## 2022-11-08 PROCEDURE — G0121 COLON CA SCRN NOT HI RSK IND: HCPCS | Mod: ,,, | Performed by: STUDENT IN AN ORGANIZED HEALTH CARE EDUCATION/TRAINING PROGRAM

## 2022-11-08 PROCEDURE — G0121 COLON CA SCRN NOT HI RSK IND: ICD-10-PCS | Mod: ,,, | Performed by: STUDENT IN AN ORGANIZED HEALTH CARE EDUCATION/TRAINING PROGRAM

## 2022-11-08 PROCEDURE — G0121 COLON CA SCRN NOT HI RSK IND: HCPCS

## 2022-11-08 PROCEDURE — 63600175 PHARM REV CODE 636 W HCPCS: Performed by: NURSE ANESTHETIST, CERTIFIED REGISTERED

## 2022-11-08 PROCEDURE — 37000008 HC ANESTHESIA 1ST 15 MINUTES

## 2022-11-08 PROCEDURE — D9220A PRA ANESTHESIA: Mod: ,,, | Performed by: NURSE ANESTHETIST, CERTIFIED REGISTERED

## 2022-11-08 PROCEDURE — 37000009 HC ANESTHESIA EA ADD 15 MINS

## 2022-11-08 RX ORDER — SODIUM CHLORIDE 0.9 % (FLUSH) 0.9 %
10 SYRINGE (ML) INJECTION
Status: CANCELLED | OUTPATIENT
Start: 2022-11-08

## 2022-11-08 RX ORDER — SODIUM CHLORIDE 9 MG/ML
INJECTION, SOLUTION INTRAVENOUS CONTINUOUS
Status: CANCELLED | OUTPATIENT
Start: 2022-11-08

## 2022-11-08 RX ORDER — ONDANSETRON 2 MG/ML
4 INJECTION INTRAMUSCULAR; INTRAVENOUS ONCE AS NEEDED
Status: CANCELLED | OUTPATIENT
Start: 2022-11-08 | End: 2034-04-05

## 2022-11-08 RX ORDER — PROPOFOL 10 MG/ML
VIAL (ML) INTRAVENOUS
Status: DISCONTINUED | OUTPATIENT
Start: 2022-11-08 | End: 2022-11-08

## 2022-11-08 RX ORDER — LIDOCAINE HYDROCHLORIDE 20 MG/ML
INJECTION, SOLUTION EPIDURAL; INFILTRATION; INTRACAUDAL; PERINEURAL
Status: DISCONTINUED | OUTPATIENT
Start: 2022-11-08 | End: 2022-11-08

## 2022-11-08 RX ORDER — PROCHLORPERAZINE EDISYLATE 5 MG/ML
5 INJECTION INTRAMUSCULAR; INTRAVENOUS ONCE AS NEEDED
Status: CANCELLED | OUTPATIENT
Start: 2022-11-08 | End: 2034-04-05

## 2022-11-08 RX ADMIN — PROPOFOL 50 MG: 10 INJECTION, EMULSION INTRAVENOUS at 08:11

## 2022-11-08 RX ADMIN — PROPOFOL 60 MG: 10 INJECTION, EMULSION INTRAVENOUS at 08:11

## 2022-11-08 RX ADMIN — PROPOFOL 30 MG: 10 INJECTION, EMULSION INTRAVENOUS at 08:11

## 2022-11-08 RX ADMIN — LIDOCAINE HYDROCHLORIDE 100 MG: 20 INJECTION, SOLUTION EPIDURAL; INFILTRATION; INTRACAUDAL; PERINEURAL at 08:11

## 2022-11-08 RX ADMIN — PROPOFOL 40 MG: 10 INJECTION, EMULSION INTRAVENOUS at 08:11

## 2022-11-08 RX ADMIN — PROPOFOL 20 MG: 10 INJECTION, EMULSION INTRAVENOUS at 08:11

## 2022-11-08 RX ADMIN — SODIUM CHLORIDE: 9 INJECTION, SOLUTION INTRAVENOUS at 08:11

## 2022-11-08 NOTE — DISCHARGE INSTRUCTIONS
Procedure Date  11/8/22     Impression  Overall Impression: Normal colonoscopy.     Recommendation    Repeat screening colonoscopy in 10 years         Outcome of procedure:  successful Colonoscopy  Disposition: discharge home after assigned time in recovery.  Provisions for Followup: followup in clinic as scheduled.  Final diagnosis: average risk for CRC      No driving today, no operating heavy machinery, no signing any legal documents until tomorrow.    Drink lots of fluids, resume regular diet.  Take your normal medications.

## 2022-11-08 NOTE — TRANSFER OF CARE
"Anesthesia Transfer of Care Note    Patient: Nneka Gomes    Procedure(s) Performed: Colonoscopy    Patient location: GI    Anesthesia Type: general    Transport from OR: Transported from OR on room air with adequate spontaneous ventilation    Post pain: adequate analgesia    Post assessment: no apparent anesthetic complications    Post vital signs: stable    Level of consciousness: awake and alert    Nausea/Vomiting: no nausea/vomiting    Complications: none    Transfer of care protocol was followed      Last vitals:   Visit Vitals  /63   Pulse 72   Temp 36.5 °C (97.7 °F)   Resp (!) 22   Ht 5' 6" (1.676 m)   Wt 93.9 kg (207 lb)   SpO2 98%   Breastfeeding No   BMI 33.41 kg/m²     "

## 2022-11-08 NOTE — INTERVAL H&P NOTE
The patient has been examined and the H&P has been reviewed:    I concur with the findings and no changes have occurred since H&P was written.  Colonoscopy for CRC screening.      There are no hospital problems to display for this patient.

## 2022-11-08 NOTE — ANESTHESIA PREPROCEDURE EVALUATION
11/08/2022  Nneka Gomes is a 62 y.o., female.      Pre-op Assessment    I have reviewed the Patient Summary Reports.     I have reviewed the Nursing Notes. I have reviewed the NPO Status.   I have reviewed the Medications.     Review of Systems  Anesthesia Hx:  No problems with previous Anesthesia Denies Hx of Anesthetic complications  Denies Family Hx of Anesthesia complications.   Denies Personal Hx of Anesthesia complications.   Social:  Non-Smoker    Hematology/Oncology:  Hematology Normal   Oncology Normal     EENT/Dental:EENT/Dental Normal   Cardiovascular:   Hypertension Angina ECG has been reviewed.    Pulmonary:   COPD Asthma Shortness of breath    Renal/:  Renal/ Normal     Hepatic/GI:   Bowel Prep. GERD    Musculoskeletal:   Arthritis     Neurological:   Neuromuscular Disease, Headaches    Endocrine:  Endocrine Normal    Dermatological:  Skin Normal    Psych:   Psychiatric History depression          Physical Exam  General: Well nourished    Airway:  Mallampati: II   Mouth Opening: Normal  TM Distance: Normal  Tongue: Normal  Neck ROM: Normal ROM    Dental:  Intact        Anesthesia Plan  Type of Anesthesia, risks & benefits discussed:    Anesthesia Type: Gen Natural Airway  Intra-op Monitoring Plan: Standard ASA Monitors  Post Op Pain Control Plan: multimodal analgesia  Induction:  IV  Informed Consent: Informed consent signed with the Patient and all parties understand the risks and agree with anesthesia plan.  All questions answered. Patient consented to blood products? Yes  ASA Score: 3  Day of Surgery Review of History & Physical: H&P Update referred to the surgeon/provider.I have interviewed and examined the patient. I have reviewed the patient's H&P dated: There are no significant changes.     Ready For Surgery From Anesthesia Perspective.     .

## 2022-11-08 NOTE — ANESTHESIA POSTPROCEDURE EVALUATION
Anesthesia Post Evaluation    Patient: Nneka Gomes    Procedure(s) Performed: Colonoscopy    Final Anesthesia Type: general      Patient location during evaluation: GI PACU  Patient participation: Yes- Able to Participate  Level of consciousness: awake and alert  Post-procedure vital signs: reviewed and stable  Pain management: adequate  Airway patency: patent  BRANDY mitigation strategies: Multimodal analgesia  PONV status at discharge: No PONV  Anesthetic complications: no      Cardiovascular status: blood pressure returned to baseline  Respiratory status: spontaneous ventilation  Hydration status: euvolemic  Follow-up not needed.          Vitals Value Taken Time   /69 11/08/22 0851   Temp 36.5 °C (97.7 °F) 11/08/22 0831   Pulse 74 11/08/22 0853   Resp 18 11/08/22 0853   SpO2 97 % 11/08/22 0853   Vitals shown include unvalidated device data.      Event Time   Out of Recovery 09:07:51         Pain/Gerri Score: Gerri Score: 9 (11/8/2022  8:39 AM)

## 2022-11-09 ENCOUNTER — OFFICE VISIT (OUTPATIENT)
Dept: FAMILY MEDICINE | Facility: CLINIC | Age: 62
End: 2022-11-09
Payer: COMMERCIAL

## 2022-11-09 VITALS
BODY MASS INDEX: 32.95 KG/M2 | OXYGEN SATURATION: 98 % | DIASTOLIC BLOOD PRESSURE: 88 MMHG | SYSTOLIC BLOOD PRESSURE: 134 MMHG | WEIGHT: 205 LBS | HEIGHT: 66 IN | TEMPERATURE: 98 F | HEART RATE: 97 BPM | RESPIRATION RATE: 18 BRPM

## 2022-11-09 DIAGNOSIS — F17.200 TOBACCO DEPENDENCE SYNDROME: ICD-10-CM

## 2022-11-09 DIAGNOSIS — M51.9 LUMBAR DISC DISEASE: Primary | ICD-10-CM

## 2022-11-09 DIAGNOSIS — Z79.891 LONG TERM (CURRENT) USE OF OPIATE ANALGESIC: ICD-10-CM

## 2022-11-09 DIAGNOSIS — M54.16 LUMBAR RADICULOPATHY: ICD-10-CM

## 2022-11-09 PROCEDURE — 3079F DIAST BP 80-89 MM HG: CPT | Mod: ,,, | Performed by: NURSE PRACTITIONER

## 2022-11-09 PROCEDURE — 1159F MED LIST DOCD IN RCRD: CPT | Mod: ,,, | Performed by: NURSE PRACTITIONER

## 2022-11-09 PROCEDURE — 99213 OFFICE O/P EST LOW 20 MIN: CPT | Mod: ,,, | Performed by: NURSE PRACTITIONER

## 2022-11-09 PROCEDURE — 3079F PR MOST RECENT DIASTOLIC BLOOD PRESSURE 80-89 MM HG: ICD-10-PCS | Mod: ,,, | Performed by: NURSE PRACTITIONER

## 2022-11-09 PROCEDURE — 1160F RVW MEDS BY RX/DR IN RCRD: CPT | Mod: ,,, | Performed by: NURSE PRACTITIONER

## 2022-11-09 PROCEDURE — 3008F BODY MASS INDEX DOCD: CPT | Mod: ,,, | Performed by: NURSE PRACTITIONER

## 2022-11-09 PROCEDURE — 3008F PR BODY MASS INDEX (BMI) DOCUMENTED: ICD-10-PCS | Mod: ,,, | Performed by: NURSE PRACTITIONER

## 2022-11-09 PROCEDURE — 80305 DRUG TEST PRSMV DIR OPT OBS: CPT | Mod: QW,,, | Performed by: NURSE PRACTITIONER

## 2022-11-09 PROCEDURE — 1160F PR REVIEW ALL MEDS BY PRESCRIBER/CLIN PHARMACIST DOCUMENTED: ICD-10-PCS | Mod: ,,, | Performed by: NURSE PRACTITIONER

## 2022-11-09 PROCEDURE — 3075F SYST BP GE 130 - 139MM HG: CPT | Mod: ,,, | Performed by: NURSE PRACTITIONER

## 2022-11-09 PROCEDURE — 1159F PR MEDICATION LIST DOCUMENTED IN MEDICAL RECORD: ICD-10-PCS | Mod: ,,, | Performed by: NURSE PRACTITIONER

## 2022-11-09 PROCEDURE — 99213 PR OFFICE/OUTPT VISIT, EST, LEVL III, 20-29 MIN: ICD-10-PCS | Mod: ,,, | Performed by: NURSE PRACTITIONER

## 2022-11-09 PROCEDURE — 3075F PR MOST RECENT SYSTOLIC BLOOD PRESS GE 130-139MM HG: ICD-10-PCS | Mod: ,,, | Performed by: NURSE PRACTITIONER

## 2022-11-09 PROCEDURE — 80305 POCT URINE DRUG SCREEN PRESUMP: ICD-10-PCS | Mod: QW,,, | Performed by: NURSE PRACTITIONER

## 2022-11-09 RX ORDER — HYDROCODONE BITARTRATE AND ACETAMINOPHEN 10; 325 MG/1; MG/1
1 TABLET ORAL EVERY 6 HOURS PRN
Qty: 90 TABLET | Refills: 0 | Status: SHIPPED | OUTPATIENT
Start: 2022-11-09 | End: 2022-12-08 | Stop reason: SDUPTHER

## 2022-11-09 RX ORDER — BUPROPION HYDROCHLORIDE 150 MG/1
150 TABLET ORAL 2 TIMES DAILY
Qty: 180 TABLET | Refills: 1 | Status: SHIPPED | OUTPATIENT
Start: 2022-11-09 | End: 2023-03-10 | Stop reason: SDUPTHER

## 2022-11-09 NOTE — PROGRESS NOTES
Subjective:       Patient ID: Nneka Gomes is a 62 y.o. female.    Chief Complaint: Medication Refill (Norco- last taken last evening)    Ms. Gomes presents to clinic in follow up for chronic low back pain and bilateral knee pain - she requests a refill on opioids for chronic pain. She states that medications improve her physical function daily, also denies side effects. She complains of left ear feels stopped up, she has cerumen impaction bilaterally.     Review of Systems   Constitutional: Negative.    HENT:  Positive for ear pain (stopped up). Negative for ear discharge, postnasal drip and sinus pressure/congestion.    Respiratory: Negative.     Cardiovascular: Negative.    Gastrointestinal: Negative.    Genitourinary: Negative.    Musculoskeletal:  Positive for back pain. Negative for gait problem and leg pain.   Neurological: Negative.    Psychiatric/Behavioral: Negative.         Objective:      Physical Exam  Vitals reviewed.   Constitutional:       Appearance: Normal appearance.   HENT:      Head: Normocephalic.   Cardiovascular:      Rate and Rhythm: Normal rate and regular rhythm.      Pulses: Normal pulses.      Heart sounds: Normal heart sounds.   Pulmonary:      Effort: Pulmonary effort is normal.      Breath sounds: Normal breath sounds.   Abdominal:      General: Bowel sounds are normal.      Palpations: Abdomen is soft.   Musculoskeletal:         General: Tenderness present. Normal range of motion.      Lumbar back: Tenderness present. Normal range of motion. Negative right straight leg raise test and negative left straight leg raise test.   Skin:     General: Skin is warm and dry.   Neurological:      Mental Status: She is alert and oriented to person, place, and time.   Psychiatric:         Behavior: Behavior normal.       Assessment:       Problem List Items Addressed This Visit          Neuro    Lumbar disc disease - Primary    Lumbar radiculopathy       Psychiatric    Long term (current) use  of opiate analgesic     Other Visit Diagnoses       Tobacco dependence syndrome                  Plan:         Bilateral ears flushed today.     and UDS reviewed and appropriate, RF Norco prn use.    Schedule one month follow up.

## 2022-11-21 ENCOUNTER — OFFICE VISIT (OUTPATIENT)
Dept: PULMONOLOGY | Facility: CLINIC | Age: 62
End: 2022-11-21
Payer: COMMERCIAL

## 2022-11-21 VITALS
DIASTOLIC BLOOD PRESSURE: 81 MMHG | HEART RATE: 98 BPM | HEIGHT: 66 IN | RESPIRATION RATE: 20 BRPM | WEIGHT: 205 LBS | OXYGEN SATURATION: 98 % | SYSTOLIC BLOOD PRESSURE: 122 MMHG | BODY MASS INDEX: 32.95 KG/M2

## 2022-11-21 DIAGNOSIS — Z72.0 TOBACCO USE: ICD-10-CM

## 2022-11-21 DIAGNOSIS — J44.9 CHRONIC OBSTRUCTIVE PULMONARY DISEASE, UNSPECIFIED COPD TYPE: ICD-10-CM

## 2022-11-21 PROCEDURE — 3074F PR MOST RECENT SYSTOLIC BLOOD PRESSURE < 130 MM HG: ICD-10-PCS | Mod: CPTII,,, | Performed by: INTERNAL MEDICINE

## 2022-11-21 PROCEDURE — 1159F PR MEDICATION LIST DOCUMENTED IN MEDICAL RECORD: ICD-10-PCS | Mod: CPTII,,, | Performed by: INTERNAL MEDICINE

## 2022-11-21 PROCEDURE — 3074F SYST BP LT 130 MM HG: CPT | Mod: CPTII,,, | Performed by: INTERNAL MEDICINE

## 2022-11-21 PROCEDURE — 99213 OFFICE O/P EST LOW 20 MIN: CPT | Mod: S$PBB,,, | Performed by: INTERNAL MEDICINE

## 2022-11-21 PROCEDURE — 3079F DIAST BP 80-89 MM HG: CPT | Mod: CPTII,,, | Performed by: INTERNAL MEDICINE

## 2022-11-21 PROCEDURE — 1159F MED LIST DOCD IN RCRD: CPT | Mod: CPTII,,, | Performed by: INTERNAL MEDICINE

## 2022-11-21 PROCEDURE — 3079F PR MOST RECENT DIASTOLIC BLOOD PRESSURE 80-89 MM HG: ICD-10-PCS | Mod: CPTII,,, | Performed by: INTERNAL MEDICINE

## 2022-11-21 PROCEDURE — 99214 OFFICE O/P EST MOD 30 MIN: CPT | Mod: PBBFAC | Performed by: INTERNAL MEDICINE

## 2022-11-21 PROCEDURE — 3008F PR BODY MASS INDEX (BMI) DOCUMENTED: ICD-10-PCS | Mod: CPTII,,, | Performed by: INTERNAL MEDICINE

## 2022-11-21 PROCEDURE — 99213 PR OFFICE/OUTPT VISIT, EST, LEVL III, 20-29 MIN: ICD-10-PCS | Mod: S$PBB,,, | Performed by: INTERNAL MEDICINE

## 2022-11-21 PROCEDURE — 3008F BODY MASS INDEX DOCD: CPT | Mod: CPTII,,, | Performed by: INTERNAL MEDICINE

## 2022-11-21 RX ORDER — ALBUTEROL SULFATE 0.83 MG/ML
2.5 SOLUTION RESPIRATORY (INHALATION) 4 TIMES DAILY PRN
Qty: 100 EACH | Refills: 5 | Status: SHIPPED | OUTPATIENT
Start: 2022-11-21 | End: 2024-03-07 | Stop reason: SDUPTHER

## 2022-11-21 RX ORDER — FLUTICASONE PROPIONATE 220 UG/1
2 AEROSOL, METERED RESPIRATORY (INHALATION) 2 TIMES DAILY
Qty: 12 G | Refills: 5 | Status: SHIPPED | OUTPATIENT
Start: 2022-11-21 | End: 2022-11-29

## 2022-11-21 RX ORDER — ALBUTEROL SULFATE 90 UG/1
2 AEROSOL, METERED RESPIRATORY (INHALATION) EVERY 4 HOURS PRN
Qty: 18 G | Refills: 5 | Status: SHIPPED | OUTPATIENT
Start: 2022-11-21 | End: 2023-07-05 | Stop reason: SDUPTHER

## 2022-11-21 NOTE — ASSESSMENT & PLAN NOTE
Patient's symptoms are currently well controlled with p.r.n. Ventolin both nebulized and inhaler Flovent 2 puffs twice a day and theophylline.  I think the next thing I would add if she has more trouble be an anticholinergic inhaler.  That can be achieved by multiple inhalers or by triple inhaler.  Currently on home O2 that  O2 sat today 98% when she walks in the room do not know what criteria were used but I will defer that to her primary physician that ordered

## 2022-11-21 NOTE — PROGRESS NOTES
Subjective:       Patient ID: Nneka Gomes is a 62 y.o. female.    Chief Complaint: COPD and Chest Pain (Tightness with breathing )    COPD  This is a chronic problem. The current episode started more than 1 year ago. The problem has been unchanged. Associated symptoms include chest pain. Pertinent negatives include no abdominal pain, arthralgias, chills, congestion, headaches or rash.   Chest Pain   Pertinent negatives include no abdominal pain, back pain, dizziness, headaches or palpitations.   Past Medical History:   Diagnosis Date    Abdominal bloating 01/09/2020    Abdominal pain, right upper quadrant 01/09/2020    Abnormal liver enzymes 03/04/2020    Abnormal results of liver function studies 11/18/2020    Acute conjunctivitis 05/30/2014    Acute exacerbation of chronic obstructive airways disease 02/13/2017    Acute pharyngitis 05/19/2020    Acute sinusitis 10/09/2017    Acute upper respiratory infection 05/19/2020    Allergic rhinitis 09/23/2020    Anemia 03/07/2014    Anesthesia of skin 09/06/2017    bilateral fingers    Bilateral primary osteoarthritis of knee 12/02/2019    Bradycardia 01/16/2020    Carpal tunnel syndrome 08/10/2017    Chest pain 03/06/2020    Chronic idiopathic constipation 11/18/2020    Chronic low back pain 11/05/2018    Chronic pain 04/23/2019    Chronic pain syndrome 01/30/2020    COPD (chronic obstructive pulmonary disease) 02/19/2021    Coronavirus infection 05/21/2020    Cough 05/19/2020    Degeneration of lumbar intervertebral disc 09/26/2014    L3-L4 L4-L5 Greater than L5-S1    Depressive disorder 07/03/2019    Disorder of gallbladder 03/30/2015    Dysphagia 01/09/2020    Dyspnea 05/19/2020    Dysuria 05/19/2020    Fatigue 05/19/2020    Frequency of urination 02/16/2015    GERD (gastroesophageal reflux disease) 11/18/2020    Hematuria 06/30/2020    Hemoptysis 02/27/2020    Herpes zoster 09/23/2020    Hyperlipidemia 09/23/2020    Hypertension 03/06/2020    Joint pain 11/04/2019     Knee pain 2019    Left inguinal hernia 06/10/2019    Long term (current) use of opiate analgesic 2021    Lumbar spondylosis 2020    Lumbar sprain 2012    Malaise 2014    Melena 2017    Microscopic hematuria 2020    Migraine without aura 2012    Nausea 2020    Nicotine dependence, cigarettes, uncomplicated 2020    Nonulcer dyspepsia 2017    On home O2     Other long term (current) drug therapy 2020    Other specified urinary incontinence 2020    Pain in left shoulder 2019    Pain in left wrist 2017    and hand    Pain in right shoulder 2019    Pain in right wrist 2018    Right hand    Palpitations 2020    Shoulder joint pain 2019    Smoker 2019    Snoring 2019    Synovial cyst of popliteal space 2013    Tobacco dependence syndrome 2021    Unstable angina 2020    UTI (urinary tract infection) 2020     Past Surgical History:   Procedure Laterality Date    CARDIAC CATHETERIZATION      CHOLECYSTECTOMY      HERNIA REPAIR      ROTATOR CUFF REPAIR       Family History   Problem Relation Age of Onset    Rectal cancer Other     Diabetes Other     Heart disease Other     Hypertension Other     Hypertension Mother     Cancer Maternal Aunt      Review of patient's allergies indicates:  Not on File   Social History     Tobacco Use    Smoking status: Some Days     Packs/day: 1.00     Years: 40.00     Pack years: 40.00     Types: Cigarettes     Last attempt to quit: 2021     Years since quittin.3    Smokeless tobacco: Former   Substance Use Topics    Alcohol use: Not Currently     Comment: occ    Drug use: Not Currently     Types: Marijuana      Review of Systems   Constitutional:  Negative for chills, activity change and night sweats.   HENT:  Negative for congestion and ear pain.    Eyes:  Negative for redness and itching.   Cardiovascular:  Positive for chest pain.  Negative for palpitations.   Musculoskeletal:  Negative for arthralgias and back pain.   Skin:  Negative for rash.   Gastrointestinal:  Negative for abdominal pain and abdominal distention.   Neurological:  Negative for dizziness and headaches.   Hematological:  Negative for adenopathy. Does not bruise/bleed easily.   Psychiatric/Behavioral:  Negative for confusion. The patient is not nervous/anxious.      Objective:      Physical Exam   Constitutional: She is oriented to person, place, and time. She appears well-developed and well-nourished.   HENT:   Head: Normocephalic.   Nose: Nose normal.   Mouth/Throat: Oropharynx is clear and moist.   Neck: No JVD present. No thyromegaly present.   Cardiovascular: Normal rate, regular rhythm, normal heart sounds and intact distal pulses.   Pulmonary/Chest: Normal expansion, hyperinflation, symmetric chest wall expansion, effort normal and breath sounds normal.   Abdominal: Soft. Bowel sounds are normal.   Musculoskeletal:         General: Normal range of motion.      Cervical back: Normal range of motion and neck supple.   Lymphadenopathy: No supraclavicular adenopathy is present.     She has no cervical adenopathy.   Neurological: She is alert and oriented to person, place, and time. She has normal reflexes.   Skin: Skin is warm and dry.   Psychiatric: She has a normal mood and affect. Her behavior is normal.   Personal Diagnostic Review  none pertinent    No flowsheet data found.      Assessment:       1. Chronic obstructive pulmonary disease, unspecified COPD type    2. Tobacco use          Outpatient Encounter Medications as of 11/21/2022   Medication Sig Dispense Refill    azelastine (ASTELIN) 137 mcg (0.1 %) nasal spray 2 sprays (274 mcg total) by Nasal route 2 (two) times daily. 90 mL 1    betamethasone dipropionate (DIPROLENE) 0.05 % lotion Apply 1 application topically 2 (two) times daily.      buPROPion (WELLBUTRIN XL) 150 MG TB24 tablet Take 1 tablet (150 mg total)  by mouth 2 (two) times a day. 180 tablet 1    diclofenac sodium (VOLTAREN) 1 % Gel Apply 1 g topically 4 (four) times daily. 300 g 1    estradioL (ESTRACE) 0.5 MG tablet Take 1 tablet (0.5 mg total) by mouth every evening. 30 tablet 11    fluticasone propionate (FLONASE) 50 mcg/actuation nasal spray 2 sprays (100 mcg total) by Each Nostril route once daily. 16 g 5    gabapentin (NEURONTIN) 100 MG capsule Take 1 capsule (100 mg total) by mouth 3 (three) times daily. 180 capsule 1    hydroCHLOROthiazide (HYDRODIURIL) 25 MG tablet Take 1 tablet (25 mg total) by mouth once daily. 90 tablet 1    HYDROcodone-acetaminophen (NORCO)  mg per tablet Take 1 tablet by mouth every 6 (six) hours as needed for Pain. Every 4 to 6 hours prn for Chronic back pain 90 tablet 0    levocetirizine (XYZAL) 5 MG tablet TAKE 1 TABLET EVERY NIGHT AT BEDTIME 90 tablet 3    linaCLOtide (LINZESS) 145 mcg Cap capsule Take 1 capsule (145 mcg total) by mouth before breakfast. 90 capsule 3    nicotine (NICODERM CQ) 21 mg/24 hr Place 1 patch onto the skin once daily. 30 patch 2    RESTASIS 0.05 % ophthalmic emulsion       rosuvastatin (CRESTOR) 20 MG tablet TAKE 1 TABLET DAILY 90 tablet 3    [DISCONTINUED] albuterol (PROAIR HFA) 90 mcg/actuation inhaler Inhale 2 puffs into the lungs every 4 (four) hours as needed for Wheezing. 18 g 5    [DISCONTINUED] albuterol (PROVENTIL) 2.5 mg /3 mL (0.083 %) nebulizer solution Take 3 mLs (2.5 mg total) by nebulization 4 (four) times daily as needed for Wheezing. 100 each 5    [DISCONTINUED] fluticasone propionate (FLOVENT HFA) 220 mcg/actuation inhaler Inhale 2 puffs into the lungs 2 (two) times daily. Controller 12 g 5    [DISCONTINUED] theophylline (ZEFERINO-24) 100 MG 24 hr capsule Take 3 capsules (300 mg total) by mouth once daily. 90 capsule 1    albuterol (PROAIR HFA) 90 mcg/actuation inhaler Inhale 2 puffs into the lungs every 4 (four) hours as needed for Wheezing. 18 g 5    albuterol (PROVENTIL) 2.5 mg  /3 mL (0.083 %) nebulizer solution Take 3 mLs (2.5 mg total) by nebulization 4 (four) times daily as needed for Wheezing. 100 each 5    fluticasone propionate (FLOVENT HFA) 220 mcg/actuation inhaler Inhale 2 puffs into the lungs 2 (two) times daily. Controller 12 g 5    pantoprazole (PROTONIX) 40 MG tablet Take 1 tablet (40 mg total) by mouth once daily. 90 tablet 3    theophylline (ZEFERINO-24) 100 MG 24 hr capsule Take 3 capsules (300 mg total) by mouth once daily. 90 capsule 1    [DISCONTINUED] buPROPion (WELLBUTRIN XL) 150 MG TB24 tablet Take 1 tablet (150 mg total) by mouth once daily. Take 1 tablet daily X 4 days, then increase to 2 tablets daily as tolerated. 60 tablet 2    [DISCONTINUED] HYDROcodone-acetaminophen (NORCO)  mg per tablet Take 1 tablet by mouth every 6 (six) hours as needed for Pain. Every 4 to 6 hours prn for Chronic back pain 90 tablet 0    [DISCONTINUED] LINZESS 145 mcg Cap capsule TAKE 1 CAPSULE BEFORE BREAKFAST 90 capsule 3     No facility-administered encounter medications on file as of 11/21/2022.     No orders of the defined types were placed in this encounter.      Plan:       Problem List Items Addressed This Visit          Pulmonary    Chronic obstructive pulmonary disease     Patient's symptoms are currently well controlled with p.r.n. Ventolin both nebulized and inhaler Flovent 2 puffs twice a day and theophylline.  I think the next thing I would add if she has more trouble be an anticholinergic inhaler.  That can be achieved by multiple inhalers or by triple inhaler.  Currently on home O2 that  O2 sat today 98% when she walks in the room do not know what criteria were used but I will defer that to her primary physician that ordered         Relevant Medications    albuterol (PROVENTIL) 2.5 mg /3 mL (0.083 %) nebulizer solution    fluticasone propionate (FLOVENT HFA) 220 mcg/actuation inhaler       Other    Tobacco use     Using nicotine discussed how important is to quit  smoking

## 2022-11-28 ENCOUNTER — TELEPHONE (OUTPATIENT)
Dept: INTERNAL MEDICINE | Facility: CLINIC | Age: 62
End: 2022-11-28
Payer: COMMERCIAL

## 2022-11-28 DIAGNOSIS — J44.9 CHRONIC OBSTRUCTIVE PULMONARY DISEASE, UNSPECIFIED COPD TYPE: ICD-10-CM

## 2022-11-29 ENCOUNTER — TELEPHONE (OUTPATIENT)
Dept: PULMONOLOGY | Facility: CLINIC | Age: 62
End: 2022-11-29
Payer: COMMERCIAL

## 2022-11-29 RX ORDER — FLUTICASONE PROPIONATE 220 UG/1
2 AEROSOL, METERED RESPIRATORY (INHALATION) 2 TIMES DAILY
Qty: 36 G | Refills: 3 | Status: SHIPPED | OUTPATIENT
Start: 2022-11-29 | End: 2023-07-05

## 2022-11-29 NOTE — TELEPHONE ENCOUNTER
Med rf's 90 days called to the mail order pharmacy  (Flovent and domo) 11/29/22:   Please see the attached refill request.

## 2022-11-29 NOTE — TELEPHONE ENCOUNTER
Messages received from [facility] x2.    Med refill orders were E-rx'd 11/21/22 by : however, [facility] requested 90 day supply.    Attempt made to receive blank form for rf on 11/28/22   (15 min 53 sec) but form was not received.  With repeat call today 90 day supply and rf 1 year called to Pharmacist (9:43min) for Flovent 220 and Theophylline  at current doses.    Pt was called and she confirmed current doses.    She was told delay was related to Disp#/RF   but this has been amended and she should soon receive med.  She was encouraged to call should she need additional assistance regarding med refills or status.//gp

## 2022-11-30 ENCOUNTER — TELEPHONE (OUTPATIENT)
Dept: INTERNAL MEDICINE | Facility: CLINIC | Age: 62
End: 2022-11-30
Payer: COMMERCIAL

## 2022-12-08 ENCOUNTER — OFFICE VISIT (OUTPATIENT)
Dept: FAMILY MEDICINE | Facility: CLINIC | Age: 62
End: 2022-12-08
Payer: COMMERCIAL

## 2022-12-08 VITALS
BODY MASS INDEX: 32.95 KG/M2 | RESPIRATION RATE: 18 BRPM | TEMPERATURE: 98 F | DIASTOLIC BLOOD PRESSURE: 81 MMHG | HEIGHT: 66 IN | SYSTOLIC BLOOD PRESSURE: 117 MMHG | OXYGEN SATURATION: 98 % | WEIGHT: 205 LBS | HEART RATE: 76 BPM

## 2022-12-08 DIAGNOSIS — M51.9 LUMBAR DISC DISEASE: Primary | ICD-10-CM

## 2022-12-08 DIAGNOSIS — I10 HYPERTENSION, UNSPECIFIED TYPE: ICD-10-CM

## 2022-12-08 DIAGNOSIS — K21.9 GASTROESOPHAGEAL REFLUX DISEASE, UNSPECIFIED WHETHER ESOPHAGITIS PRESENT: ICD-10-CM

## 2022-12-08 PROCEDURE — 3008F BODY MASS INDEX DOCD: CPT | Mod: ,,, | Performed by: FAMILY MEDICINE

## 2022-12-08 PROCEDURE — 1159F MED LIST DOCD IN RCRD: CPT | Mod: ,,, | Performed by: FAMILY MEDICINE

## 2022-12-08 PROCEDURE — 99213 PR OFFICE/OUTPT VISIT, EST, LEVL III, 20-29 MIN: ICD-10-PCS | Mod: ,,, | Performed by: FAMILY MEDICINE

## 2022-12-08 PROCEDURE — 1160F PR REVIEW ALL MEDS BY PRESCRIBER/CLIN PHARMACIST DOCUMENTED: ICD-10-PCS | Mod: ,,, | Performed by: FAMILY MEDICINE

## 2022-12-08 PROCEDURE — 3074F SYST BP LT 130 MM HG: CPT | Mod: ,,, | Performed by: FAMILY MEDICINE

## 2022-12-08 PROCEDURE — 3079F PR MOST RECENT DIASTOLIC BLOOD PRESSURE 80-89 MM HG: ICD-10-PCS | Mod: ,,, | Performed by: FAMILY MEDICINE

## 2022-12-08 PROCEDURE — 3074F PR MOST RECENT SYSTOLIC BLOOD PRESSURE < 130 MM HG: ICD-10-PCS | Mod: ,,, | Performed by: FAMILY MEDICINE

## 2022-12-08 PROCEDURE — 99213 OFFICE O/P EST LOW 20 MIN: CPT | Mod: ,,, | Performed by: FAMILY MEDICINE

## 2022-12-08 PROCEDURE — 3008F PR BODY MASS INDEX (BMI) DOCUMENTED: ICD-10-PCS | Mod: ,,, | Performed by: FAMILY MEDICINE

## 2022-12-08 PROCEDURE — 3079F DIAST BP 80-89 MM HG: CPT | Mod: ,,, | Performed by: FAMILY MEDICINE

## 2022-12-08 PROCEDURE — 1159F PR MEDICATION LIST DOCUMENTED IN MEDICAL RECORD: ICD-10-PCS | Mod: ,,, | Performed by: FAMILY MEDICINE

## 2022-12-08 PROCEDURE — 1160F RVW MEDS BY RX/DR IN RCRD: CPT | Mod: ,,, | Performed by: FAMILY MEDICINE

## 2022-12-08 RX ORDER — PANTOPRAZOLE SODIUM 40 MG/1
40 TABLET, DELAYED RELEASE ORAL DAILY
Qty: 90 TABLET | Refills: 3 | Status: SHIPPED | OUTPATIENT
Start: 2022-12-08 | End: 2023-03-10 | Stop reason: SDUPTHER

## 2022-12-08 RX ORDER — HYDROCHLOROTHIAZIDE 25 MG/1
25 TABLET ORAL DAILY
Qty: 90 TABLET | Refills: 1 | Status: SHIPPED | OUTPATIENT
Start: 2022-12-08 | End: 2023-01-09 | Stop reason: SDUPTHER

## 2022-12-08 RX ORDER — HYDROCODONE BITARTRATE AND ACETAMINOPHEN 10; 325 MG/1; MG/1
1 TABLET ORAL EVERY 6 HOURS PRN
Qty: 90 TABLET | Refills: 0 | Status: SHIPPED | OUTPATIENT
Start: 2022-12-08 | End: 2023-01-09 | Stop reason: SDUPTHER

## 2022-12-08 NOTE — PROGRESS NOTES
Nneka Gomes is a 62 y.o. female seen today for follow-up on her chronic pain due to lumbar disc disease.  She reports good symptom control and has had no side effects.  She is meeting her ADLs and working and has had no signs or symptoms of tolerance or addiction.  Her  today is appropriate and urine drug screen is only shown her prescribed opiate.  Today she has no other complaints.      Past Medical History:   Diagnosis Date    Abdominal bloating 01/09/2020    Abdominal pain, right upper quadrant 01/09/2020    Abnormal liver enzymes 03/04/2020    Abnormal results of liver function studies 11/18/2020    Acute conjunctivitis 05/30/2014    Acute exacerbation of chronic obstructive airways disease 02/13/2017    Acute pharyngitis 05/19/2020    Acute sinusitis 10/09/2017    Acute upper respiratory infection 05/19/2020    Allergic rhinitis 09/23/2020    Anemia 03/07/2014    Anesthesia of skin 09/06/2017    bilateral fingers    Bilateral primary osteoarthritis of knee 12/02/2019    Bradycardia 01/16/2020    Carpal tunnel syndrome 08/10/2017    Chest pain 03/06/2020    Chronic idiopathic constipation 11/18/2020    Chronic low back pain 11/05/2018    Chronic pain 04/23/2019    Chronic pain syndrome 01/30/2020    COPD (chronic obstructive pulmonary disease) 02/19/2021    Coronavirus infection 05/21/2020    Cough 05/19/2020    Degeneration of lumbar intervertebral disc 09/26/2014    L3-L4 L4-L5 Greater than L5-S1    Depressive disorder 07/03/2019    Disorder of gallbladder 03/30/2015    Dysphagia 01/09/2020    Dyspnea 05/19/2020    Dysuria 05/19/2020    Fatigue 05/19/2020    Frequency of urination 02/16/2015    GERD (gastroesophageal reflux disease) 11/18/2020    Hematuria 06/30/2020    Hemoptysis 02/27/2020    Herpes zoster 09/23/2020    Hyperlipidemia 09/23/2020    Hypertension 03/06/2020    Joint pain 11/04/2019    Knee pain 01/04/2019    Left inguinal hernia 06/10/2019    Long term (current) use of opiate analgesic  02/19/2021    Lumbar spondylosis 11/23/2020    Lumbar sprain 07/16/2012    Malaise 11/20/2014    Melena 09/25/2017    Microscopic hematuria 04/02/2020    Migraine without aura 02/06/2012    Nausea 02/27/2020    Nicotine dependence, cigarettes, uncomplicated 11/11/2020    Nonulcer dyspepsia 01/23/2017    On home O2     Other long term (current) drug therapy 09/23/2020    Other specified urinary incontinence 06/17/2020    Pain in left shoulder 05/02/2019    Pain in left wrist 09/06/2017    and hand    Pain in right shoulder 08/30/2019    Pain in right wrist 09/04/2018    Right hand    Palpitations 03/06/2020    Shoulder joint pain 04/23/2019    Smoker 07/03/2019    Snoring 08/02/2019    Synovial cyst of popliteal space 04/08/2013    Tobacco dependence syndrome 02/19/2021    Unstable angina 01/16/2020    UTI (urinary tract infection) 03/27/2020     Family History   Problem Relation Age of Onset    Rectal cancer Other     Diabetes Other     Heart disease Other     Hypertension Other     Hypertension Mother     Cancer Maternal Aunt      Current Outpatient Medications on File Prior to Visit   Medication Sig Dispense Refill    albuterol (PROAIR HFA) 90 mcg/actuation inhaler Inhale 2 puffs into the lungs every 4 (four) hours as needed for Wheezing. 18 g 5    albuterol (PROVENTIL) 2.5 mg /3 mL (0.083 %) nebulizer solution Take 3 mLs (2.5 mg total) by nebulization 4 (four) times daily as needed for Wheezing. 100 each 5    azelastine (ASTELIN) 137 mcg (0.1 %) nasal spray 2 sprays (274 mcg total) by Nasal route 2 (two) times daily. 90 mL 1    betamethasone dipropionate (DIPROLENE) 0.05 % lotion Apply 1 application topically 2 (two) times daily.      buPROPion (WELLBUTRIN XL) 150 MG TB24 tablet Take 1 tablet (150 mg total) by mouth 2 (two) times a day. 180 tablet 1    diclofenac sodium (VOLTAREN) 1 % Gel Apply 1 g topically 4 (four) times daily. 300 g 1    estradioL (ESTRACE) 0.5 MG tablet Take 1 tablet (0.5 mg total) by mouth  every evening. 30 tablet 11    fluticasone propionate (FLONASE) 50 mcg/actuation nasal spray 2 sprays (100 mcg total) by Each Nostril route once daily. 16 g 5    fluticasone propionate (FLOVENT HFA) 220 mcg/actuation inhaler Inhale 2 puffs into the lungs 2 (two) times daily. 36 g 3    gabapentin (NEURONTIN) 100 MG capsule Take 1 capsule (100 mg total) by mouth 3 (three) times daily. 180 capsule 1    levocetirizine (XYZAL) 5 MG tablet TAKE 1 TABLET EVERY NIGHT AT BEDTIME 90 tablet 3    linaCLOtide (LINZESS) 145 mcg Cap capsule Take 1 capsule (145 mcg total) by mouth before breakfast. 90 capsule 3    nicotine (NICODERM CQ) 21 mg/24 hr Place 1 patch onto the skin once daily. 30 patch 2    RESTASIS 0.05 % ophthalmic emulsion       rosuvastatin (CRESTOR) 20 MG tablet TAKE 1 TABLET DAILY 90 tablet 3    theophylline (ZEFERINO-24) 100 MG 24 hr capsule Take 1 capsule (100 mg total) by mouth once daily. 90 capsule 3    [DISCONTINUED] hydroCHLOROthiazide (HYDRODIURIL) 25 MG tablet Take 1 tablet (25 mg total) by mouth once daily. 90 tablet 1    [DISCONTINUED] HYDROcodone-acetaminophen (NORCO)  mg per tablet Take 1 tablet by mouth every 6 (six) hours as needed for Pain. Every 4 to 6 hours prn for Chronic back pain 90 tablet 0    [DISCONTINUED] pantoprazole (PROTONIX) 40 MG tablet Take 1 tablet (40 mg total) by mouth once daily. 90 tablet 3     No current facility-administered medications on file prior to visit.     Immunization History   Administered Date(s) Administered    COVID-19, MRNA, LN-S, PF (Pfizer) (Purple Cap) 04/29/2021, 10/28/2021, 04/29/2022    Influenza - Quadrivalent - PF *Preferred* (6 months and older) 09/16/2021    Influenza Split 10/03/2019    Pneumococcal Conjugate - 13 Valent 11/05/2018    Pneumococcal Polysaccharide - 23 Valent 11/05/2019       Review of Systems   Constitutional:  Negative for fever, malaise/fatigue and weight loss.   Respiratory:  Positive for shortness of breath.    Cardiovascular:   Negative for chest pain and palpitations.   Gastrointestinal:  Negative for nausea and vomiting.   Musculoskeletal:  Positive for back pain and myalgias.   Psychiatric/Behavioral:  Negative for depression.       Vitals:    12/08/22 1327   BP: 117/81   Pulse: 76   Resp: 18   Temp: 97.6 °F (36.4 °C)       Physical Exam  Vitals reviewed.   Constitutional:       Appearance: Normal appearance.   HENT:      Head: Normocephalic.   Eyes:      Extraocular Movements: Extraocular movements intact.      Conjunctiva/sclera: Conjunctivae normal.      Pupils: Pupils are equal, round, and reactive to light.   Neck:      Thyroid: No thyroid mass or thyromegaly.   Cardiovascular:      Rate and Rhythm: Normal rate and regular rhythm.      Heart sounds: Normal heart sounds. No murmur heard.    No gallop.   Pulmonary:      Effort: Pulmonary effort is normal. No respiratory distress.      Breath sounds: Decreased breath sounds present. No wheezing or rales.   Skin:     General: Skin is warm and dry.      Coloration: Skin is not jaundiced or pale.   Neurological:      Mental Status: She is alert.   Psychiatric:         Mood and Affect: Mood normal.         Behavior: Behavior normal.         Thought Content: Thought content normal.         Judgment: Judgment normal.        Assessment and Plan  Lumbar disc disease  -     HYDROcodone-acetaminophen (NORCO)  mg per tablet; Take 1 tablet by mouth every 6 (six) hours as needed for Pain. Every 4 to 6 hours prn for Chronic back pain  Dispense: 90 tablet; Refill: 0    Gastroesophageal reflux disease, unspecified whether esophagitis present  -     pantoprazole (PROTONIX) 40 MG tablet; Take 1 tablet (40 mg total) by mouth once daily.  Dispense: 90 tablet; Refill: 3    Hypertension, unspecified type  -     hydroCHLOROthiazide (HYDRODIURIL) 25 MG tablet; Take 1 tablet (25 mg total) by mouth once daily.  Dispense: 90 tablet; Refill: 1            Return to clinic in 1 month for follow-up or as  needed.    Health Maintenance Topics with due status: Not Due       Topic Last Completion Date    Pneumococcal Vaccines (Age 0-64) 11/05/2019    Lipid Panel 05/11/2022    Mammogram 09/29/2022    Cervical Cancer Screening 10/31/2022    Colorectal Cancer Screening 11/08/2022

## 2022-12-09 DIAGNOSIS — Z71.89 COMPLEX CARE COORDINATION: ICD-10-CM

## 2023-01-09 ENCOUNTER — OFFICE VISIT (OUTPATIENT)
Dept: FAMILY MEDICINE | Facility: CLINIC | Age: 63
End: 2023-01-09
Payer: COMMERCIAL

## 2023-01-09 VITALS
HEIGHT: 66 IN | HEART RATE: 93 BPM | BODY MASS INDEX: 33.09 KG/M2 | SYSTOLIC BLOOD PRESSURE: 122 MMHG | DIASTOLIC BLOOD PRESSURE: 88 MMHG | OXYGEN SATURATION: 98 % | RESPIRATION RATE: 17 BRPM

## 2023-01-09 DIAGNOSIS — Z13.1 ENCOUNTER FOR SCREENING FOR DIABETES MELLITUS: ICD-10-CM

## 2023-01-09 DIAGNOSIS — M51.9 LUMBAR DISC DISEASE: ICD-10-CM

## 2023-01-09 DIAGNOSIS — E78.5 DYSLIPIDEMIA: ICD-10-CM

## 2023-01-09 DIAGNOSIS — Z79.891 LONG TERM (CURRENT) USE OF OPIATE ANALGESIC: Primary | ICD-10-CM

## 2023-01-09 DIAGNOSIS — I10 HYPERTENSION, UNSPECIFIED TYPE: ICD-10-CM

## 2023-01-09 DIAGNOSIS — Z23 NEED FOR VACCINATION AGAINST STREPTOCOCCUS PNEUMONIAE: ICD-10-CM

## 2023-01-09 LAB
ALBUMIN SERPL BCP-MCNC: 3.9 G/DL (ref 3.5–5)
ALBUMIN/GLOB SERPL: 1.1 {RATIO}
ALP SERPL-CCNC: 178 U/L (ref 50–130)
ALT SERPL W P-5'-P-CCNC: 26 U/L (ref 13–56)
ANION GAP SERPL CALCULATED.3IONS-SCNC: 9 MMOL/L (ref 7–16)
AST SERPL W P-5'-P-CCNC: 26 U/L (ref 15–37)
BASOPHILS # BLD AUTO: 0.05 K/UL (ref 0–0.2)
BASOPHILS NFR BLD AUTO: 0.5 % (ref 0–1)
BILIRUB SERPL-MCNC: 0.4 MG/DL (ref ?–1.2)
BUN SERPL-MCNC: 8 MG/DL (ref 7–18)
BUN/CREAT SERPL: 9 (ref 6–20)
CALCIUM SERPL-MCNC: 9.5 MG/DL (ref 8.5–10.1)
CHLORIDE SERPL-SCNC: 103 MMOL/L (ref 98–107)
CO2 SERPL-SCNC: 34 MMOL/L (ref 21–32)
CREAT SERPL-MCNC: 0.91 MG/DL (ref 0.55–1.02)
CTP QC/QA: YES
DIFFERENTIAL METHOD BLD: ABNORMAL
EGFR (NO RACE VARIABLE) (RUSH/TITUS): 72 ML/MIN/1.73M²
EOSINOPHIL # BLD AUTO: 0.14 K/UL (ref 0–0.5)
EOSINOPHIL NFR BLD AUTO: 1.5 % (ref 1–4)
ERYTHROCYTE [DISTWIDTH] IN BLOOD BY AUTOMATED COUNT: 13.7 % (ref 11.5–14.5)
GLOBULIN SER-MCNC: 3.4 G/DL (ref 2–4)
GLUCOSE SERPL-MCNC: 79 MG/DL (ref 74–106)
HCT VFR BLD AUTO: 41.1 % (ref 38–47)
HGB BLD-MCNC: 13.3 G/DL (ref 12–16)
IMM GRANULOCYTES # BLD AUTO: 0.03 K/UL (ref 0–0.04)
IMM GRANULOCYTES NFR BLD: 0.3 % (ref 0–0.4)
LYMPHOCYTES # BLD AUTO: 4.01 K/UL (ref 1–4.8)
LYMPHOCYTES NFR BLD AUTO: 42.6 % (ref 27–41)
MCH RBC QN AUTO: 28.8 PG (ref 27–31)
MCHC RBC AUTO-ENTMCNC: 32.4 G/DL (ref 32–36)
MCV RBC AUTO: 89 FL (ref 80–96)
MONOCYTES # BLD AUTO: 0.73 K/UL (ref 0–0.8)
MONOCYTES NFR BLD AUTO: 7.7 % (ref 2–6)
MPC BLD CALC-MCNC: 9.5 FL (ref 9.4–12.4)
NEUTROPHILS # BLD AUTO: 4.46 K/UL (ref 1.8–7.7)
NEUTROPHILS NFR BLD AUTO: 47.4 % (ref 53–65)
NRBC # BLD AUTO: 0 X10E3/UL
NRBC, AUTO (.00): 0 %
PLATELET # BLD AUTO: 310 K/UL (ref 150–400)
POC (AMP) AMPHETAMINE: NEGATIVE
POC (BAR) BARBITURATES: NEGATIVE
POC (BUP) BUPRENORPHINE: NEGATIVE
POC (BZO) BENZODIAZEPINES: NEGATIVE
POC (COC) COCAINE: NEGATIVE
POC (MDMA) METHYLENEDIOXYMETHAMPHETAMINE 3,4: NEGATIVE
POC (MET) METHAMPHETAMINE: NEGATIVE
POC (MOP) OPIATES: ABNORMAL
POC (MTD) METHADONE: NEGATIVE
POC (OXY) OXYCODONE: NEGATIVE
POC (PCP) PHENCYCLIDINE: NEGATIVE
POC (TCA) TRICYCLIC ANTIDEPRESSANTS: NEGATIVE
POC TEMPERATURE (URINE): 90
POC THC: NEGATIVE
POTASSIUM SERPL-SCNC: 3.1 MMOL/L (ref 3.5–5.1)
PROT SERPL-MCNC: 7.3 G/DL (ref 6.4–8.2)
RBC # BLD AUTO: 4.62 M/UL (ref 4.2–5.4)
SODIUM SERPL-SCNC: 143 MMOL/L (ref 136–145)
WBC # BLD AUTO: 9.42 K/UL (ref 4.5–11)

## 2023-01-09 PROCEDURE — G0009 PNEUMOCOCCAL CONJUGATE VACCINE 20-VALENT: ICD-10-PCS | Mod: ,,, | Performed by: FAMILY MEDICINE

## 2023-01-09 PROCEDURE — 36415 PR COLLECTION VENOUS BLOOD,VENIPUNCTURE: ICD-10-PCS | Mod: ,,, | Performed by: CLINICAL MEDICAL LABORATORY

## 2023-01-09 PROCEDURE — 3008F BODY MASS INDEX DOCD: CPT | Mod: ,,, | Performed by: FAMILY MEDICINE

## 2023-01-09 PROCEDURE — 99214 OFFICE O/P EST MOD 30 MIN: CPT | Mod: ,,, | Performed by: FAMILY MEDICINE

## 2023-01-09 PROCEDURE — G0009 ADMIN PNEUMOCOCCAL VACCINE: HCPCS | Mod: ,,, | Performed by: FAMILY MEDICINE

## 2023-01-09 PROCEDURE — 3074F PR MOST RECENT SYSTOLIC BLOOD PRESSURE < 130 MM HG: ICD-10-PCS | Mod: ,,, | Performed by: FAMILY MEDICINE

## 2023-01-09 PROCEDURE — 90677 PNEUMOCOCCAL CONJUGATE VACCINE 20-VALENT: ICD-10-PCS | Mod: ,,, | Performed by: FAMILY MEDICINE

## 2023-01-09 PROCEDURE — 80305 POCT URINE DRUG SCREEN PRESUMP: ICD-10-PCS | Mod: QW,,, | Performed by: FAMILY MEDICINE

## 2023-01-09 PROCEDURE — 90677 PCV20 VACCINE IM: CPT | Mod: ,,, | Performed by: FAMILY MEDICINE

## 2023-01-09 PROCEDURE — 99214 PR OFFICE/OUTPT VISIT, EST, LEVL IV, 30-39 MIN: ICD-10-PCS | Mod: ,,, | Performed by: FAMILY MEDICINE

## 2023-01-09 PROCEDURE — 3008F PR BODY MASS INDEX (BMI) DOCUMENTED: ICD-10-PCS | Mod: ,,, | Performed by: FAMILY MEDICINE

## 2023-01-09 PROCEDURE — 1159F MED LIST DOCD IN RCRD: CPT | Mod: ,,, | Performed by: FAMILY MEDICINE

## 2023-01-09 PROCEDURE — 85025 CBC WITH DIFFERENTIAL: ICD-10-PCS | Mod: ,,, | Performed by: CLINICAL MEDICAL LABORATORY

## 2023-01-09 PROCEDURE — 1159F PR MEDICATION LIST DOCUMENTED IN MEDICAL RECORD: ICD-10-PCS | Mod: ,,, | Performed by: FAMILY MEDICINE

## 2023-01-09 PROCEDURE — 80305 DRUG TEST PRSMV DIR OPT OBS: CPT | Mod: QW,,, | Performed by: FAMILY MEDICINE

## 2023-01-09 PROCEDURE — 80053 COMPREHENSIVE METABOLIC PANEL: ICD-10-PCS | Mod: ,,, | Performed by: CLINICAL MEDICAL LABORATORY

## 2023-01-09 PROCEDURE — 83036 HEMOGLOBIN A1C: ICD-10-PCS | Mod: GZ,,, | Performed by: CLINICAL MEDICAL LABORATORY

## 2023-01-09 PROCEDURE — 1160F PR REVIEW ALL MEDS BY PRESCRIBER/CLIN PHARMACIST DOCUMENTED: ICD-10-PCS | Mod: ,,, | Performed by: FAMILY MEDICINE

## 2023-01-09 PROCEDURE — 1160F RVW MEDS BY RX/DR IN RCRD: CPT | Mod: ,,, | Performed by: FAMILY MEDICINE

## 2023-01-09 PROCEDURE — 80053 COMPREHEN METABOLIC PANEL: CPT | Mod: ,,, | Performed by: CLINICAL MEDICAL LABORATORY

## 2023-01-09 PROCEDURE — 3079F PR MOST RECENT DIASTOLIC BLOOD PRESSURE 80-89 MM HG: ICD-10-PCS | Mod: ,,, | Performed by: FAMILY MEDICINE

## 2023-01-09 PROCEDURE — 3079F DIAST BP 80-89 MM HG: CPT | Mod: ,,, | Performed by: FAMILY MEDICINE

## 2023-01-09 PROCEDURE — 36415 COLL VENOUS BLD VENIPUNCTURE: CPT | Mod: ,,, | Performed by: CLINICAL MEDICAL LABORATORY

## 2023-01-09 PROCEDURE — 3074F SYST BP LT 130 MM HG: CPT | Mod: ,,, | Performed by: FAMILY MEDICINE

## 2023-01-09 PROCEDURE — 85025 COMPLETE CBC W/AUTO DIFF WBC: CPT | Mod: ,,, | Performed by: CLINICAL MEDICAL LABORATORY

## 2023-01-09 PROCEDURE — 83036 HEMOGLOBIN GLYCOSYLATED A1C: CPT | Mod: GZ,,, | Performed by: CLINICAL MEDICAL LABORATORY

## 2023-01-09 RX ORDER — HYDROCODONE BITARTRATE AND ACETAMINOPHEN 10; 325 MG/1; MG/1
1 TABLET ORAL EVERY 6 HOURS PRN
Qty: 90 TABLET | Refills: 0 | Status: SHIPPED | OUTPATIENT
Start: 2023-01-09 | End: 2023-02-10 | Stop reason: SDUPTHER

## 2023-01-09 RX ORDER — HYDROCHLOROTHIAZIDE 25 MG/1
25 TABLET ORAL DAILY
Qty: 90 TABLET | Refills: 1 | Status: SHIPPED | OUTPATIENT
Start: 2023-01-09 | End: 2023-01-17 | Stop reason: SDUPTHER

## 2023-01-09 RX ORDER — ROSUVASTATIN CALCIUM 20 MG/1
20 TABLET, COATED ORAL DAILY
Qty: 90 TABLET | Refills: 1 | Status: SHIPPED | OUTPATIENT
Start: 2023-01-09 | End: 2023-06-12 | Stop reason: SDUPTHER

## 2023-01-09 RX ORDER — GABAPENTIN 100 MG/1
100 CAPSULE ORAL 3 TIMES DAILY
Qty: 180 CAPSULE | Refills: 1 | Status: SHIPPED | OUTPATIENT
Start: 2023-01-09 | End: 2023-03-10 | Stop reason: SDUPTHER

## 2023-01-09 RX ORDER — HYDROCHLOROTHIAZIDE 25 MG/1
25 TABLET ORAL DAILY
Qty: 90 TABLET | Refills: 1 | Status: SHIPPED | OUTPATIENT
Start: 2023-01-09 | End: 2023-01-09 | Stop reason: SDUPTHER

## 2023-01-09 NOTE — PROGRESS NOTES
Nneka Gomes is a 62 y.o. female seen today for follow-up on her chronic pain due to lumbar disc disease patient reports good symptom control with no side effects.  She is meeting her ADLs and has had no signs or symptoms of tolerance or addiction.  Her  today is appropriate and urine drug screen today only showed her prescribed opiate.  Patient is also due for follow-up lab work and diabetic screening.  Her lipids were excellent when checked in June.  She is due for her Prevnar 20.      Past Medical History:   Diagnosis Date    Abdominal bloating 01/09/2020    Abdominal pain, right upper quadrant 01/09/2020    Abnormal liver enzymes 03/04/2020    Abnormal results of liver function studies 11/18/2020    Acute conjunctivitis 05/30/2014    Acute exacerbation of chronic obstructive airways disease 02/13/2017    Acute pharyngitis 05/19/2020    Acute sinusitis 10/09/2017    Acute upper respiratory infection 05/19/2020    Allergic rhinitis 09/23/2020    Anemia 03/07/2014    Anesthesia of skin 09/06/2017    bilateral fingers    Bilateral primary osteoarthritis of knee 12/02/2019    Bradycardia 01/16/2020    Carpal tunnel syndrome 08/10/2017    Chest pain 03/06/2020    Chronic idiopathic constipation 11/18/2020    Chronic low back pain 11/05/2018    Chronic pain 04/23/2019    Chronic pain syndrome 01/30/2020    COPD (chronic obstructive pulmonary disease) 02/19/2021    Coronavirus infection 05/21/2020    Cough 05/19/2020    Degeneration of lumbar intervertebral disc 09/26/2014    L3-L4 L4-L5 Greater than L5-S1    Depressive disorder 07/03/2019    Disorder of gallbladder 03/30/2015    Dysphagia 01/09/2020    Dyspnea 05/19/2020    Dysuria 05/19/2020    Fatigue 05/19/2020    Frequency of urination 02/16/2015    GERD (gastroesophageal reflux disease) 11/18/2020    Hematuria 06/30/2020    Hemoptysis 02/27/2020    Herpes zoster 09/23/2020    Hyperlipidemia 09/23/2020    Hypertension 03/06/2020    Joint pain 11/04/2019     Knee pain 01/04/2019    Left inguinal hernia 06/10/2019    Long term (current) use of opiate analgesic 02/19/2021    Lumbar spondylosis 11/23/2020    Lumbar sprain 07/16/2012    Malaise 11/20/2014    Melena 09/25/2017    Microscopic hematuria 04/02/2020    Migraine without aura 02/06/2012    Nausea 02/27/2020    Nicotine dependence, cigarettes, uncomplicated 11/11/2020    Nonulcer dyspepsia 01/23/2017    On home O2     Other long term (current) drug therapy 09/23/2020    Other specified urinary incontinence 06/17/2020    Pain in left shoulder 05/02/2019    Pain in left wrist 09/06/2017    and hand    Pain in right shoulder 08/30/2019    Pain in right wrist 09/04/2018    Right hand    Palpitations 03/06/2020    Shoulder joint pain 04/23/2019    Smoker 07/03/2019    Snoring 08/02/2019    Synovial cyst of popliteal space 04/08/2013    Tobacco dependence syndrome 02/19/2021    Unstable angina 01/16/2020    UTI (urinary tract infection) 03/27/2020     Family History   Problem Relation Age of Onset    Rectal cancer Other     Diabetes Other     Heart disease Other     Hypertension Other     Hypertension Mother     Cancer Maternal Aunt      Current Outpatient Medications on File Prior to Visit   Medication Sig Dispense Refill    albuterol (PROAIR HFA) 90 mcg/actuation inhaler Inhale 2 puffs into the lungs every 4 (four) hours as needed for Wheezing. 18 g 5    albuterol (PROVENTIL) 2.5 mg /3 mL (0.083 %) nebulizer solution Take 3 mLs (2.5 mg total) by nebulization 4 (four) times daily as needed for Wheezing. 100 each 5    azelastine (ASTELIN) 137 mcg (0.1 %) nasal spray 2 sprays (274 mcg total) by Nasal route 2 (two) times daily. 90 mL 1    buPROPion (WELLBUTRIN XL) 150 MG TB24 tablet Take 1 tablet (150 mg total) by mouth 2 (two) times a day. 180 tablet 1    diclofenac sodium (VOLTAREN) 1 % Gel Apply 1 g topically 4 (four) times daily. 300 g 1    estradioL (ESTRACE) 0.5 MG tablet Take 1 tablet (0.5 mg total) by mouth every  evening. 30 tablet 11    fluticasone propionate (FLONASE) 50 mcg/actuation nasal spray 2 sprays (100 mcg total) by Each Nostril route once daily. 16 g 5    fluticasone propionate (FLOVENT HFA) 220 mcg/actuation inhaler Inhale 2 puffs into the lungs 2 (two) times daily. 36 g 3    levocetirizine (XYZAL) 5 MG tablet TAKE 1 TABLET EVERY NIGHT AT BEDTIME 90 tablet 3    linaCLOtide (LINZESS) 145 mcg Cap capsule Take 1 capsule (145 mcg total) by mouth before breakfast. 90 capsule 3    pantoprazole (PROTONIX) 40 MG tablet Take 1 tablet (40 mg total) by mouth once daily. 90 tablet 3    RESTASIS 0.05 % ophthalmic emulsion       theophylline (ZEFERINO-24) 100 MG 24 hr capsule Take 1 capsule (100 mg total) by mouth once daily. 90 capsule 3    [DISCONTINUED] gabapentin (NEURONTIN) 100 MG capsule Take 1 capsule (100 mg total) by mouth 3 (three) times daily. 180 capsule 1    [DISCONTINUED] hydroCHLOROthiazide (HYDRODIURIL) 25 MG tablet Take 1 tablet (25 mg total) by mouth once daily. 90 tablet 1    [DISCONTINUED] HYDROcodone-acetaminophen (NORCO)  mg per tablet Take 1 tablet by mouth every 6 (six) hours as needed for Pain. Every 4 to 6 hours prn for Chronic back pain 90 tablet 0    [DISCONTINUED] rosuvastatin (CRESTOR) 20 MG tablet TAKE 1 TABLET DAILY 90 tablet 3    betamethasone dipropionate (DIPROLENE) 0.05 % lotion Apply 1 application topically 2 (two) times daily.      nicotine (NICODERM CQ) 21 mg/24 hr Place 1 patch onto the skin once daily. (Patient not taking: Reported on 1/9/2023) 30 patch 2     No current facility-administered medications on file prior to visit.     Immunization History   Administered Date(s) Administered    COVID-19, MRNA, LN-S, PF (Pfizer) (Purple Cap) 04/29/2021, 10/28/2021, 04/29/2022    Influenza - Quadrivalent - PF *Preferred* (6 months and older) 09/16/2021    Influenza Split 10/03/2019    Pneumococcal Conjugate - 13 Valent 11/05/2018    Pneumococcal Polysaccharide - 23 Valent 11/05/2019        Review of Systems   Constitutional:  Negative for fever, malaise/fatigue and weight loss.   Respiratory:  Negative for shortness of breath.    Cardiovascular:  Negative for chest pain and palpitations.   Gastrointestinal:  Negative for nausea and vomiting.   Musculoskeletal:  Positive for back pain and myalgias.   Psychiatric/Behavioral:  Negative for depression.       Vitals:    01/09/23 1245   BP: 122/88   Pulse: 93   Resp: 17       Physical Exam  Vitals reviewed.   Constitutional:       Appearance: Normal appearance.   HENT:      Head: Normocephalic.   Eyes:      Extraocular Movements: Extraocular movements intact.      Conjunctiva/sclera: Conjunctivae normal.      Pupils: Pupils are equal, round, and reactive to light.   Neck:      Thyroid: No thyroid mass or thyromegaly.   Cardiovascular:      Rate and Rhythm: Normal rate and regular rhythm.      Heart sounds: Normal heart sounds. No murmur heard.    No gallop.   Pulmonary:      Effort: Pulmonary effort is normal. No respiratory distress.      Breath sounds: Normal breath sounds. No wheezing or rales.   Musculoskeletal:      Lumbar back: Spasms and tenderness present. Decreased range of motion.      Comments: She has a stooped antalgic gait.   Skin:     General: Skin is warm and dry.      Coloration: Skin is not jaundiced or pale.   Neurological:      Mental Status: She is alert.   Psychiatric:         Mood and Affect: Mood normal.         Behavior: Behavior normal.         Thought Content: Thought content normal.         Judgment: Judgment normal.        Assessment and Plan  Long term (current) use of opiate analgesic  -     POCT Urine Drug Screen Presump    Dyslipidemia  -     rosuvastatin (CRESTOR) 20 MG tablet; Take 1 tablet (20 mg total) by mouth once daily.  Dispense: 90 tablet; Refill: 1    Lumbar disc disease  -     HYDROcodone-acetaminophen (NORCO)  mg per tablet; Take 1 tablet by mouth every 6 (six) hours as needed for Pain. Every 4 to 6  hours prn for Chronic back pain  Dispense: 90 tablet; Refill: 0  -     gabapentin (NEURONTIN) 100 MG capsule; Take 1 capsule (100 mg total) by mouth 3 (three) times daily.  Dispense: 180 capsule; Refill: 1    Hypertension, unspecified type  -     hydroCHLOROthiazide (HYDRODIURIL) 25 MG tablet; Take 1 tablet (25 mg total) by mouth once daily.  Dispense: 90 tablet; Refill: 1  -     CBC Auto Differential; Future; Expected date: 01/09/2023  -     Comprehensive Metabolic Panel; Future; Expected date: 01/09/2023    Encounter for screening for diabetes mellitus  -     Hemoglobin A1C; Future; Expected date: 01/09/2023            Return to clinic in 1 month or as needed.    Health Maintenance Topics with due status: Not Due       Topic Last Completion Date    Pneumococcal Vaccines (Age 0-64) 11/05/2019    Lipid Panel 05/11/2022    Mammogram 09/29/2022    Cervical Cancer Screening 10/31/2022    Colorectal Cancer Screening 11/08/2022

## 2023-01-10 LAB
EST. AVERAGE GLUCOSE BLD GHB EST-MCNC: 120 MG/DL
HBA1C MFR BLD HPLC: 6.2 % (ref 4.5–6.6)

## 2023-01-11 ENCOUNTER — TELEPHONE (OUTPATIENT)
Dept: FAMILY MEDICINE | Facility: CLINIC | Age: 63
End: 2023-01-11
Payer: COMMERCIAL

## 2023-01-11 NOTE — TELEPHONE ENCOUNTER
----- Message from Magno Yan MD sent at 1/10/2023  7:55 AM CST -----  Patient is a prediabetic, recheck A1c in 3 months.

## 2023-01-17 ENCOUNTER — TELEPHONE (OUTPATIENT)
Dept: FAMILY MEDICINE | Facility: CLINIC | Age: 63
End: 2023-01-17
Payer: COMMERCIAL

## 2023-01-17 DIAGNOSIS — I10 HYPERTENSION, UNSPECIFIED TYPE: ICD-10-CM

## 2023-01-17 RX ORDER — HYDROCHLOROTHIAZIDE 25 MG/1
25 TABLET ORAL DAILY
Qty: 90 TABLET | Refills: 1 | Status: SHIPPED | OUTPATIENT
Start: 2023-01-17 | End: 2023-04-10 | Stop reason: SDUPTHER

## 2023-01-17 NOTE — TELEPHONE ENCOUNTER
----- Message from Diana Graff MA sent at 1/13/2023 10:26 AM CST -----  Pt called saying she is needing someone to call [facility] regarding her gabapentin.  She says they called her saying they had not been able to reach Dr Yan concerning this.

## 2023-01-20 ENCOUNTER — TELEPHONE (OUTPATIENT)
Dept: INTERNAL MEDICINE | Facility: CLINIC | Age: 63
End: 2023-01-20
Payer: COMMERCIAL

## 2023-01-21 ENCOUNTER — TELEPHONE (OUTPATIENT)
Dept: PULMONOLOGY | Facility: CLINIC | Age: 63
End: 2023-01-21
Payer: COMMERCIAL

## 2023-01-21 NOTE — TELEPHONE ENCOUNTER
Requesting new E-Rx to [facility].     confirmed recent Theophylline dose:     300 mg daily with food: but has been taking as     100 mg tabs: tab 3 daily with food.        Reported she received most recent script labeled   100 mg daily  And received 90 tabs;  This would be 30 day supply  (for 300 mg daily)  and [facility] wants  90 day supply with one year refills.     was told that new script will be requested when MD returns to office after the weekend.//gp

## 2023-01-31 ENCOUNTER — TELEPHONE (OUTPATIENT)
Dept: PULMONOLOGY | Facility: CLINIC | Age: 63
End: 2023-01-31
Payer: COMMERCIAL

## 2023-01-31 NOTE — TELEPHONE ENCOUNTER
was phoned and told that when MD recently rf'd med he sent order for Theophylline 300 mg tab 1 daily.    She was asked to check new supply when received:   Take tab 1 if reading 300 mg tablets    (Rather than tab 3 if 100 mg tabs).  She voiced understanding.//gp      
Improved

## 2023-02-02 ENCOUNTER — TELEPHONE (OUTPATIENT)
Dept: PULMONOLOGY | Facility: CLINIC | Age: 63
End: 2023-02-02
Payer: COMMERCIAL

## 2023-02-02 NOTE — TELEPHONE ENCOUNTER
With her plan she has yo have an 90 day supply at [facility], also the Rx was sent in wrong. It states take 3 tabs a day of 100mg but It suppose to be written according to sulma notes from phone call Theophylline 300mg take 1 tab a day.

## 2023-02-03 ENCOUNTER — TELEPHONE (OUTPATIENT)
Dept: PULMONOLOGY | Facility: CLINIC | Age: 63
End: 2023-02-03
Payer: COMMERCIAL

## 2023-02-03 NOTE — TELEPHONE ENCOUNTER
Calls have been exchanged  With  and [place].    New script phoned:   Theophylline 30 mg one cap daily with Food    Disp #90, rf 3.  Per VO /gp     confirmed that she prefers 300mg  tab 1 daily   to previous dose .     was told that this new supply delivery should be expected 7-10 days.  She was reminded of new dose 1 cap alfonso (300 mg cap):  She voiced understanding.//gp

## 2023-02-10 ENCOUNTER — OFFICE VISIT (OUTPATIENT)
Dept: FAMILY MEDICINE | Facility: CLINIC | Age: 63
End: 2023-02-10
Payer: COMMERCIAL

## 2023-02-10 VITALS
HEART RATE: 83 BPM | OXYGEN SATURATION: 97 % | RESPIRATION RATE: 18 BRPM | WEIGHT: 205 LBS | HEIGHT: 66 IN | SYSTOLIC BLOOD PRESSURE: 118 MMHG | TEMPERATURE: 98 F | BODY MASS INDEX: 32.95 KG/M2 | DIASTOLIC BLOOD PRESSURE: 82 MMHG

## 2023-02-10 DIAGNOSIS — Z79.891 LONG TERM (CURRENT) USE OF OPIATE ANALGESIC: ICD-10-CM

## 2023-02-10 DIAGNOSIS — M51.9 LUMBAR DISC DISEASE: Primary | ICD-10-CM

## 2023-02-10 LAB
CTP QC/QA: YES
POC (AMP) AMPHETAMINE: NEGATIVE
POC (BAR) BARBITURATES: NEGATIVE
POC (BUP) BUPRENORPHINE: NEGATIVE
POC (BZO) BENZODIAZEPINES: NEGATIVE
POC (COC) COCAINE: NEGATIVE
POC (MDMA) METHYLENEDIOXYMETHAMPHETAMINE 3,4: NEGATIVE
POC (MET) METHAMPHETAMINE: NEGATIVE
POC (MOP) OPIATES: ABNORMAL
POC (MTD) METHADONE: NEGATIVE
POC (OXY) OXYCODONE: NEGATIVE
POC (PCP) PHENCYCLIDINE: NEGATIVE
POC (TCA) TRICYCLIC ANTIDEPRESSANTS: NEGATIVE
POC TEMPERATURE (URINE): 90
POC THC: NEGATIVE

## 2023-02-10 PROCEDURE — 3008F PR BODY MASS INDEX (BMI) DOCUMENTED: ICD-10-PCS | Mod: ,,, | Performed by: FAMILY MEDICINE

## 2023-02-10 PROCEDURE — 1160F RVW MEDS BY RX/DR IN RCRD: CPT | Mod: ,,, | Performed by: FAMILY MEDICINE

## 2023-02-10 PROCEDURE — 3079F DIAST BP 80-89 MM HG: CPT | Mod: ,,, | Performed by: FAMILY MEDICINE

## 2023-02-10 PROCEDURE — 3074F PR MOST RECENT SYSTOLIC BLOOD PRESSURE < 130 MM HG: ICD-10-PCS | Mod: ,,, | Performed by: FAMILY MEDICINE

## 2023-02-10 PROCEDURE — 3074F SYST BP LT 130 MM HG: CPT | Mod: ,,, | Performed by: FAMILY MEDICINE

## 2023-02-10 PROCEDURE — 3008F BODY MASS INDEX DOCD: CPT | Mod: ,,, | Performed by: FAMILY MEDICINE

## 2023-02-10 PROCEDURE — 80305 POCT URINE DRUG SCREEN PRESUMP: ICD-10-PCS | Mod: QW,,, | Performed by: FAMILY MEDICINE

## 2023-02-10 PROCEDURE — 1159F MED LIST DOCD IN RCRD: CPT | Mod: ,,, | Performed by: FAMILY MEDICINE

## 2023-02-10 PROCEDURE — 99213 PR OFFICE/OUTPT VISIT, EST, LEVL III, 20-29 MIN: ICD-10-PCS | Mod: ,,, | Performed by: FAMILY MEDICINE

## 2023-02-10 PROCEDURE — 1160F PR REVIEW ALL MEDS BY PRESCRIBER/CLIN PHARMACIST DOCUMENTED: ICD-10-PCS | Mod: ,,, | Performed by: FAMILY MEDICINE

## 2023-02-10 PROCEDURE — 99213 OFFICE O/P EST LOW 20 MIN: CPT | Mod: ,,, | Performed by: FAMILY MEDICINE

## 2023-02-10 PROCEDURE — 3044F PR MOST RECENT HEMOGLOBIN A1C LEVEL <7.0%: ICD-10-PCS | Mod: ,,, | Performed by: FAMILY MEDICINE

## 2023-02-10 PROCEDURE — 3044F HG A1C LEVEL LT 7.0%: CPT | Mod: ,,, | Performed by: FAMILY MEDICINE

## 2023-02-10 PROCEDURE — 3079F PR MOST RECENT DIASTOLIC BLOOD PRESSURE 80-89 MM HG: ICD-10-PCS | Mod: ,,, | Performed by: FAMILY MEDICINE

## 2023-02-10 PROCEDURE — 80305 DRUG TEST PRSMV DIR OPT OBS: CPT | Mod: QW,,, | Performed by: FAMILY MEDICINE

## 2023-02-10 PROCEDURE — 1159F PR MEDICATION LIST DOCUMENTED IN MEDICAL RECORD: ICD-10-PCS | Mod: ,,, | Performed by: FAMILY MEDICINE

## 2023-02-10 RX ORDER — HYDROCODONE BITARTRATE AND ACETAMINOPHEN 10; 325 MG/1; MG/1
1 TABLET ORAL EVERY 6 HOURS PRN
Qty: 90 TABLET | Refills: 0 | Status: SHIPPED | OUTPATIENT
Start: 2023-02-10 | End: 2023-03-10 | Stop reason: SDUPTHER

## 2023-02-10 NOTE — PROGRESS NOTES
Nneka Gomes is a 62 y.o. female seen today for follow-up on her lumbar disc disease.  She reports her current pain level 3/10 and her pain symptoms are usually well controlled with her Norco.  She is had no medication side effects and is meeting her ADLs.  Her  today is appropriate and urine drug screen is only showing her prescribed opiate.  She has had no signs or symptoms of tolerance or addiction and has no new complaints today.  She is up-to-date on her flu and pneumonia vaccinations.  With a new COVID wave I did recommend she continue to wear her mask in public places.      Past Medical History:   Diagnosis Date    Abdominal bloating 01/09/2020    Abdominal pain, right upper quadrant 01/09/2020    Abnormal liver enzymes 03/04/2020    Abnormal results of liver function studies 11/18/2020    Acute conjunctivitis 05/30/2014    Acute exacerbation of chronic obstructive airways disease 02/13/2017    Acute pharyngitis 05/19/2020    Acute sinusitis 10/09/2017    Acute upper respiratory infection 05/19/2020    Allergic rhinitis 09/23/2020    Anemia 03/07/2014    Anesthesia of skin 09/06/2017    bilateral fingers    Bilateral primary osteoarthritis of knee 12/02/2019    Bradycardia 01/16/2020    Carpal tunnel syndrome 08/10/2017    Chest pain 03/06/2020    Chronic idiopathic constipation 11/18/2020    Chronic low back pain 11/05/2018    Chronic pain 04/23/2019    Chronic pain syndrome 01/30/2020    COPD (chronic obstructive pulmonary disease) 02/19/2021    Coronavirus infection 05/21/2020    Cough 05/19/2020    Degeneration of lumbar intervertebral disc 09/26/2014    L3-L4 L4-L5 Greater than L5-S1    Depressive disorder 07/03/2019    Disorder of gallbladder 03/30/2015    Dysphagia 01/09/2020    Dyspnea 05/19/2020    Dysuria 05/19/2020    Fatigue 05/19/2020    Frequency of urination 02/16/2015    GERD (gastroesophageal reflux disease) 11/18/2020    Hematuria 06/30/2020    Hemoptysis 02/27/2020    Herpes zoster  09/23/2020    Hyperlipidemia 09/23/2020    Hypertension 03/06/2020    Joint pain 11/04/2019    Knee pain 01/04/2019    Left inguinal hernia 06/10/2019    Long term (current) use of opiate analgesic 02/19/2021    Lumbar spondylosis 11/23/2020    Lumbar sprain 07/16/2012    Malaise 11/20/2014    Melena 09/25/2017    Microscopic hematuria 04/02/2020    Migraine without aura 02/06/2012    Nausea 02/27/2020    Nicotine dependence, cigarettes, uncomplicated 11/11/2020    Nonulcer dyspepsia 01/23/2017    On home O2     Other long term (current) drug therapy 09/23/2020    Other specified urinary incontinence 06/17/2020    Pain in left shoulder 05/02/2019    Pain in left wrist 09/06/2017    and hand    Pain in right shoulder 08/30/2019    Pain in right wrist 09/04/2018    Right hand    Palpitations 03/06/2020    Shoulder joint pain 04/23/2019    Smoker 07/03/2019    Snoring 08/02/2019    Synovial cyst of popliteal space 04/08/2013    Tobacco dependence syndrome 02/19/2021    Unstable angina 01/16/2020    UTI (urinary tract infection) 03/27/2020     Family History   Problem Relation Age of Onset    Rectal cancer Other     Diabetes Other     Heart disease Other     Hypertension Other     Hypertension Mother     Cancer Maternal Aunt      Current Outpatient Medications on File Prior to Visit   Medication Sig Dispense Refill    albuterol (PROAIR HFA) 90 mcg/actuation inhaler Inhale 2 puffs into the lungs every 4 (four) hours as needed for Wheezing. 18 g 5    albuterol (PROVENTIL) 2.5 mg /3 mL (0.083 %) nebulizer solution Take 3 mLs (2.5 mg total) by nebulization 4 (four) times daily as needed for Wheezing. 100 each 5    azelastine (ASTELIN) 137 mcg (0.1 %) nasal spray 2 sprays (274 mcg total) by Nasal route 2 (two) times daily. 90 mL 1    buPROPion (WELLBUTRIN XL) 150 MG TB24 tablet Take 1 tablet (150 mg total) by mouth 2 (two) times a day. 180 tablet 1    diclofenac sodium (VOLTAREN) 1 % Gel Apply 1 g topically 4 (four) times  daily. 300 g 1    estradioL (ESTRACE) 0.5 MG tablet Take 1 tablet (0.5 mg total) by mouth every evening. 30 tablet 11    fluticasone propionate (FLONASE) 50 mcg/actuation nasal spray 2 sprays (100 mcg total) by Each Nostril route once daily. 16 g 5    fluticasone propionate (FLOVENT HFA) 220 mcg/actuation inhaler Inhale 2 puffs into the lungs 2 (two) times daily. 36 g 3    gabapentin (NEURONTIN) 100 MG capsule Take 1 capsule (100 mg total) by mouth 3 (three) times daily. 180 capsule 1    hydroCHLOROthiazide (HYDRODIURIL) 25 MG tablet Take 1 tablet (25 mg total) by mouth once daily. 90 tablet 1    levocetirizine (XYZAL) 5 MG tablet TAKE 1 TABLET EVERY NIGHT AT BEDTIME 90 tablet 3    linaCLOtide (LINZESS) 145 mcg Cap capsule Take 1 capsule (145 mcg total) by mouth before breakfast. 90 capsule 3    pantoprazole (PROTONIX) 40 MG tablet Take 1 tablet (40 mg total) by mouth once daily. 90 tablet 3    RESTASIS 0.05 % ophthalmic emulsion       rosuvastatin (CRESTOR) 20 MG tablet Take 1 tablet (20 mg total) by mouth once daily. 90 tablet 1    theophylline (ZEFERINO-24) 100 MG 24 hr capsule Take 3 capsules (300 mg total) by mouth once daily. Taking 3 (three) 100 mg tabs daily with food : for  300 mg daily dose. (Patient taking differently: Take 300 mg by mouth once daily. Was Taking 3 (three) 100 mg tabs daily with food : for  300 mg daily dose.  1/21/23 300 mg tab 1 daily ordered. Per ) 90 capsule 3    theophylline (THEODUR) 300 mg 24 hr capsule Take 300 mg by mouth once daily. One tab Daily with food: (replaced previous script for 100mg tab 3 daily)      [DISCONTINUED] HYDROcodone-acetaminophen (NORCO)  mg per tablet Take 1 tablet by mouth every 6 (six) hours as needed for Pain. Every 4 to 6 hours prn for Chronic back pain 90 tablet 0     No current facility-administered medications on file prior to visit.     Immunization History   Administered Date(s) Administered    COVID-19, MRNA, LN-S, PF (Pfizer) (Purple  Cap) 04/29/2021, 10/28/2021, 04/29/2022    Influenza - Quadrivalent - PF *Preferred* (6 months and older) 09/16/2021    Influenza Split 10/03/2019    Pneumococcal Conjugate - 13 Valent 11/05/2018    Pneumococcal Conjugate - 20 Valent 01/09/2023    Pneumococcal Polysaccharide - 23 Valent 11/05/2019       Review of Systems   Constitutional:  Negative for fever, malaise/fatigue and weight loss.   Respiratory:  Positive for shortness of breath.    Cardiovascular:  Negative for chest pain and palpitations.   Gastrointestinal:  Negative for nausea and vomiting.   Musculoskeletal:  Positive for back pain and myalgias.   Psychiatric/Behavioral:  Negative for depression.       Vitals:    02/10/23 0810   BP: 118/82   Pulse: 83   Resp: 18   Temp: 98.3 °F (36.8 °C)       Physical Exam  Vitals reviewed.   Constitutional:       Appearance: Normal appearance.   HENT:      Head: Normocephalic.   Eyes:      Extraocular Movements: Extraocular movements intact.      Conjunctiva/sclera: Conjunctivae normal.      Pupils: Pupils are equal, round, and reactive to light.   Neck:      Thyroid: No thyroid mass or thyromegaly.   Cardiovascular:      Rate and Rhythm: Normal rate and regular rhythm.      Heart sounds: Normal heart sounds. No murmur heard.    No gallop.   Pulmonary:      Effort: Pulmonary effort is normal. No respiratory distress.      Breath sounds: Normal breath sounds. No wheezing or rales.   Musculoskeletal:      Lumbar back: Spasms and tenderness present. Decreased range of motion.        Back:       Comments: She is slow to rise from a seated position with a stooped antalgic gait.   Skin:     General: Skin is warm and dry.      Coloration: Skin is not jaundiced or pale.   Neurological:      Mental Status: She is alert.   Psychiatric:         Mood and Affect: Mood normal.         Behavior: Behavior normal.         Thought Content: Thought content normal.         Judgment: Judgment normal.        Assessment and Plan  Lumbar  disc disease  -     HYDROcodone-acetaminophen (NORCO)  mg per tablet; Take 1 tablet by mouth every 6 (six) hours as needed for Pain. Every 4 to 6 hours prn for Chronic back pain  Dispense: 90 tablet; Refill: 0    Long term (current) use of opiate analgesic  -     POCT Urine Drug Screen Presump            Return to clinic in 1 month or as needed.    Health Maintenance Topics with due status: Not Due       Topic Last Completion Date    Lipid Panel 05/11/2022    Mammogram 09/29/2022    Cervical Cancer Screening 10/31/2022    Colorectal Cancer Screening 11/08/2022    Hemoglobin A1c (Diabetic Prevention Screening) 01/09/2023

## 2023-03-10 ENCOUNTER — OFFICE VISIT (OUTPATIENT)
Dept: FAMILY MEDICINE | Facility: CLINIC | Age: 63
End: 2023-03-10
Payer: COMMERCIAL

## 2023-03-10 VITALS
RESPIRATION RATE: 18 BRPM | OXYGEN SATURATION: 96 % | WEIGHT: 205 LBS | TEMPERATURE: 98 F | BODY MASS INDEX: 32.95 KG/M2 | HEART RATE: 72 BPM | SYSTOLIC BLOOD PRESSURE: 118 MMHG | DIASTOLIC BLOOD PRESSURE: 68 MMHG | HEIGHT: 66 IN

## 2023-03-10 DIAGNOSIS — M51.9 LUMBAR DISC DISEASE: ICD-10-CM

## 2023-03-10 DIAGNOSIS — K21.9 GASTROESOPHAGEAL REFLUX DISEASE, UNSPECIFIED WHETHER ESOPHAGITIS PRESENT: ICD-10-CM

## 2023-03-10 DIAGNOSIS — F17.200 SMOKER: Primary | ICD-10-CM

## 2023-03-10 PROCEDURE — 1159F PR MEDICATION LIST DOCUMENTED IN MEDICAL RECORD: ICD-10-PCS | Mod: ,,, | Performed by: FAMILY MEDICINE

## 2023-03-10 PROCEDURE — 3044F HG A1C LEVEL LT 7.0%: CPT | Mod: ,,, | Performed by: FAMILY MEDICINE

## 2023-03-10 PROCEDURE — 1160F PR REVIEW ALL MEDS BY PRESCRIBER/CLIN PHARMACIST DOCUMENTED: ICD-10-PCS | Mod: ,,, | Performed by: FAMILY MEDICINE

## 2023-03-10 PROCEDURE — 3078F PR MOST RECENT DIASTOLIC BLOOD PRESSURE < 80 MM HG: ICD-10-PCS | Mod: ,,, | Performed by: FAMILY MEDICINE

## 2023-03-10 PROCEDURE — 1159F MED LIST DOCD IN RCRD: CPT | Mod: ,,, | Performed by: FAMILY MEDICINE

## 2023-03-10 PROCEDURE — 3044F PR MOST RECENT HEMOGLOBIN A1C LEVEL <7.0%: ICD-10-PCS | Mod: ,,, | Performed by: FAMILY MEDICINE

## 2023-03-10 PROCEDURE — 3008F BODY MASS INDEX DOCD: CPT | Mod: ,,, | Performed by: FAMILY MEDICINE

## 2023-03-10 PROCEDURE — 99213 OFFICE O/P EST LOW 20 MIN: CPT | Mod: ,,, | Performed by: FAMILY MEDICINE

## 2023-03-10 PROCEDURE — 99213 PR OFFICE/OUTPT VISIT, EST, LEVL III, 20-29 MIN: ICD-10-PCS | Mod: ,,, | Performed by: FAMILY MEDICINE

## 2023-03-10 PROCEDURE — 3074F PR MOST RECENT SYSTOLIC BLOOD PRESSURE < 130 MM HG: ICD-10-PCS | Mod: ,,, | Performed by: FAMILY MEDICINE

## 2023-03-10 PROCEDURE — 3008F PR BODY MASS INDEX (BMI) DOCUMENTED: ICD-10-PCS | Mod: ,,, | Performed by: FAMILY MEDICINE

## 2023-03-10 PROCEDURE — 3074F SYST BP LT 130 MM HG: CPT | Mod: ,,, | Performed by: FAMILY MEDICINE

## 2023-03-10 PROCEDURE — 3078F DIAST BP <80 MM HG: CPT | Mod: ,,, | Performed by: FAMILY MEDICINE

## 2023-03-10 PROCEDURE — 1160F RVW MEDS BY RX/DR IN RCRD: CPT | Mod: ,,, | Performed by: FAMILY MEDICINE

## 2023-03-10 RX ORDER — PANTOPRAZOLE SODIUM 40 MG/1
40 TABLET, DELAYED RELEASE ORAL DAILY
Qty: 90 TABLET | Refills: 3 | Status: SHIPPED | OUTPATIENT
Start: 2023-03-10 | End: 2023-06-12 | Stop reason: SDUPTHER

## 2023-03-10 RX ORDER — BUPROPION HYDROCHLORIDE 150 MG/1
150 TABLET ORAL 2 TIMES DAILY
Qty: 180 TABLET | Refills: 1 | Status: SHIPPED | OUTPATIENT
Start: 2023-03-10 | End: 2023-06-12 | Stop reason: SDUPTHER

## 2023-03-10 RX ORDER — IBUPROFEN 200 MG
1 TABLET ORAL DAILY
Qty: 30 PATCH | Refills: 2 | Status: SHIPPED | OUTPATIENT
Start: 2023-03-10 | End: 2023-05-10 | Stop reason: SDUPTHER

## 2023-03-10 RX ORDER — GABAPENTIN 100 MG/1
100 CAPSULE ORAL 3 TIMES DAILY
Qty: 180 CAPSULE | Refills: 1 | Status: SHIPPED | OUTPATIENT
Start: 2023-03-10 | End: 2023-06-12 | Stop reason: SDUPTHER

## 2023-03-10 RX ORDER — HYDROCODONE BITARTRATE AND ACETAMINOPHEN 10; 325 MG/1; MG/1
1 TABLET ORAL EVERY 6 HOURS PRN
Qty: 90 TABLET | Refills: 0 | Status: SHIPPED | OUTPATIENT
Start: 2023-03-10 | End: 2023-04-10 | Stop reason: SDUPTHER

## 2023-03-10 NOTE — PROGRESS NOTES
Nneka Gomes is a 62 y.o. female seen today for follow-up on her chronic pain due to lumbar disc disease.  She reports good symptom control with no side effects and she is meeting her ADLs.  Patient has had no signs or symptoms of tolerance or addiction and her  today was appropriate.  Her urine drug screens have only shown her prescribed opiate.  Today she reports she is continuing to caraballo smoking and is down to approximately 1/2 pack per day.  We discussed adding Nicoderm CQ to regimen and I warned her not to smoke with the patch on.  She voices understanding.      Past Medical History:   Diagnosis Date    Abdominal bloating 01/09/2020    Abdominal pain, right upper quadrant 01/09/2020    Abnormal liver enzymes 03/04/2020    Abnormal results of liver function studies 11/18/2020    Acute conjunctivitis 05/30/2014    Acute exacerbation of chronic obstructive airways disease 02/13/2017    Acute pharyngitis 05/19/2020    Acute sinusitis 10/09/2017    Acute upper respiratory infection 05/19/2020    Allergic rhinitis 09/23/2020    Anemia 03/07/2014    Anesthesia of skin 09/06/2017    bilateral fingers    Bilateral primary osteoarthritis of knee 12/02/2019    Bradycardia 01/16/2020    Carpal tunnel syndrome 08/10/2017    Chest pain 03/06/2020    Chronic idiopathic constipation 11/18/2020    Chronic low back pain 11/05/2018    Chronic pain 04/23/2019    Chronic pain syndrome 01/30/2020    COPD (chronic obstructive pulmonary disease) 02/19/2021    Coronavirus infection 05/21/2020    Cough 05/19/2020    Degeneration of lumbar intervertebral disc 09/26/2014    L3-L4 L4-L5 Greater than L5-S1    Depressive disorder 07/03/2019    Disorder of gallbladder 03/30/2015    Dysphagia 01/09/2020    Dyspnea 05/19/2020    Dysuria 05/19/2020    Fatigue 05/19/2020    Frequency of urination 02/16/2015    GERD (gastroesophageal reflux disease) 11/18/2020    Hematuria 06/30/2020    Hemoptysis 02/27/2020    Herpes zoster 09/23/2020     Hyperlipidemia 09/23/2020    Hypertension 03/06/2020    Joint pain 11/04/2019    Knee pain 01/04/2019    Left inguinal hernia 06/10/2019    Long term (current) use of opiate analgesic 02/19/2021    Lumbar spondylosis 11/23/2020    Lumbar sprain 07/16/2012    Malaise 11/20/2014    Melena 09/25/2017    Microscopic hematuria 04/02/2020    Migraine without aura 02/06/2012    Nausea 02/27/2020    Nicotine dependence, cigarettes, uncomplicated 11/11/2020    Nonulcer dyspepsia 01/23/2017    On home O2     Other long term (current) drug therapy 09/23/2020    Other specified urinary incontinence 06/17/2020    Pain in left shoulder 05/02/2019    Pain in left wrist 09/06/2017    and hand    Pain in right shoulder 08/30/2019    Pain in right wrist 09/04/2018    Right hand    Palpitations 03/06/2020    Shoulder joint pain 04/23/2019    Smoker 07/03/2019    Snoring 08/02/2019    Synovial cyst of popliteal space 04/08/2013    Tobacco dependence syndrome 02/19/2021    Unstable angina 01/16/2020    UTI (urinary tract infection) 03/27/2020     Family History   Problem Relation Age of Onset    Rectal cancer Other     Diabetes Other     Heart disease Other     Hypertension Other     Hypertension Mother     Cancer Maternal Aunt      Current Outpatient Medications on File Prior to Visit   Medication Sig Dispense Refill    albuterol (PROAIR HFA) 90 mcg/actuation inhaler Inhale 2 puffs into the lungs every 4 (four) hours as needed for Wheezing. 18 g 5    albuterol (PROVENTIL) 2.5 mg /3 mL (0.083 %) nebulizer solution Take 3 mLs (2.5 mg total) by nebulization 4 (four) times daily as needed for Wheezing. 100 each 5    azelastine (ASTELIN) 137 mcg (0.1 %) nasal spray USE 2 SPRAYS NASALLY TWICE A DAY 90 mL 3    fluticasone propionate (FLONASE) 50 mcg/actuation nasal spray 2 sprays (100 mcg total) by Each Nostril route once daily. 16 g 5    fluticasone propionate (FLOVENT HFA) 220 mcg/actuation inhaler Inhale 2 puffs into the lungs 2 (two)  times daily. 36 g 3    hydroCHLOROthiazide (HYDRODIURIL) 25 MG tablet Take 1 tablet (25 mg total) by mouth once daily. 90 tablet 1    levocetirizine (XYZAL) 5 MG tablet TAKE 1 TABLET EVERY NIGHT AT BEDTIME 90 tablet 3    linaCLOtide (LINZESS) 145 mcg Cap capsule Take 1 capsule (145 mcg total) by mouth before breakfast. 90 capsule 3    RESTASIS 0.05 % ophthalmic emulsion       rosuvastatin (CRESTOR) 20 MG tablet Take 1 tablet (20 mg total) by mouth once daily. 90 tablet 1    theophylline (THEODUR) 300 mg 24 hr capsule Take 300 mg by mouth once daily. One tab Daily with food: (replaced previous script for 100mg tab 3 daily)      [DISCONTINUED] buPROPion (WELLBUTRIN XL) 150 MG TB24 tablet Take 1 tablet (150 mg total) by mouth 2 (two) times a day. 180 tablet 1    [DISCONTINUED] gabapentin (NEURONTIN) 100 MG capsule Take 1 capsule (100 mg total) by mouth 3 (three) times daily. 180 capsule 1    [DISCONTINUED] HYDROcodone-acetaminophen (NORCO)  mg per tablet Take 1 tablet by mouth every 6 (six) hours as needed for Pain. Every 4 to 6 hours prn for Chronic back pain 90 tablet 0    diclofenac sodium (VOLTAREN) 1 % Gel Apply 1 g topically 4 (four) times daily. (Patient not taking: Reported on 3/10/2023) 300 g 1    theophylline (ZEFERINO-24) 100 MG 24 hr capsule Take 3 capsules (300 mg total) by mouth once daily. Taking 3 (three) 100 mg tabs daily with food : for  300 mg daily dose. (Patient not taking: Reported on 3/10/2023) 90 capsule 3    [DISCONTINUED] estradioL (ESTRACE) 0.5 MG tablet Take 1 tablet (0.5 mg total) by mouth every evening. (Patient not taking: Reported on 3/10/2023) 30 tablet 11    [DISCONTINUED] pantoprazole (PROTONIX) 40 MG tablet Take 1 tablet (40 mg total) by mouth once daily. 90 tablet 3     No current facility-administered medications on file prior to visit.     Immunization History   Administered Date(s) Administered    COVID-19, MRNA, LN-S, PF (Pfizer) (Purple Cap) 04/29/2021, 10/28/2021, 04/29/2022     Influenza - Quadrivalent - PF *Preferred* (6 months and older) 09/16/2021    Influenza Split 10/03/2019    Pneumococcal Conjugate - 13 Valent 11/05/2018    Pneumococcal Conjugate - 20 Valent 01/09/2023    Pneumococcal Polysaccharide - 23 Valent 11/05/2019       Review of Systems   Constitutional:  Negative for fever, malaise/fatigue and weight loss.   Respiratory:  Positive for shortness of breath.    Cardiovascular:  Negative for chest pain and palpitations.   Gastrointestinal:  Negative for nausea and vomiting.   Musculoskeletal:  Positive for back pain and myalgias.   Psychiatric/Behavioral:  Negative for depression.       Vitals:    03/10/23 0816   BP: 118/68   Pulse: 72   Resp: 18   Temp: 98.3 °F (36.8 °C)       Physical Exam  Vitals reviewed.   Constitutional:       Appearance: Normal appearance.   HENT:      Head: Normocephalic.   Eyes:      Extraocular Movements: Extraocular movements intact.      Conjunctiva/sclera: Conjunctivae normal.      Pupils: Pupils are equal, round, and reactive to light.   Neck:      Thyroid: No thyroid mass or thyromegaly.   Cardiovascular:      Rate and Rhythm: Normal rate and regular rhythm.      Heart sounds: Normal heart sounds. No murmur heard.    No gallop.   Pulmonary:      Effort: Pulmonary effort is normal. No respiratory distress.      Breath sounds: Normal breath sounds. No wheezing or rales.   Musculoskeletal:      Lumbar back: Spasms and tenderness present. Decreased range of motion.        Back:    Skin:     General: Skin is warm and dry.      Coloration: Skin is not jaundiced or pale.   Neurological:      Mental Status: She is alert.   Psychiatric:         Mood and Affect: Mood normal.         Behavior: Behavior normal.         Thought Content: Thought content normal.         Judgment: Judgment normal.        Assessment and Plan  Smoker  -     buPROPion (WELLBUTRIN XL) 150 MG TB24 tablet; Take 1 tablet (150 mg total) by mouth 2 (two) times a day.  Dispense: 180  tablet; Refill: 1  -     nicotine (NICODERM CQ) 14 mg/24 hr; Place 1 patch onto the skin once daily.  Dispense: 30 patch; Refill: 2    Lumbar disc disease  -     HYDROcodone-acetaminophen (NORCO)  mg per tablet; Take 1 tablet by mouth every 6 (six) hours as needed for Pain. Every 4 to 6 hours prn for Chronic back pain  Dispense: 90 tablet; Refill: 0  -     gabapentin (NEURONTIN) 100 MG capsule; Take 1 capsule (100 mg total) by mouth 3 (three) times daily.  Dispense: 180 capsule; Refill: 1    Gastroesophageal reflux disease, unspecified whether esophagitis present  -     pantoprazole (PROTONIX) 40 MG tablet; Take 1 tablet (40 mg total) by mouth once daily.  Dispense: 90 tablet; Refill: 3            Return to clinic in 1 month.    Health Maintenance Topics with due status: Not Due       Topic Last Completion Date    Lipid Panel 05/11/2022    Mammogram 09/29/2022    Cervical Cancer Screening 10/31/2022    Colorectal Cancer Screening 11/08/2022    Hemoglobin A1c (Diabetic Prevention Screening) 01/09/2023

## 2023-04-10 ENCOUNTER — OFFICE VISIT (OUTPATIENT)
Dept: FAMILY MEDICINE | Facility: CLINIC | Age: 63
End: 2023-04-10
Payer: COMMERCIAL

## 2023-04-10 VITALS
DIASTOLIC BLOOD PRESSURE: 80 MMHG | HEART RATE: 83 BPM | HEIGHT: 66 IN | TEMPERATURE: 99 F | RESPIRATION RATE: 18 BRPM | SYSTOLIC BLOOD PRESSURE: 110 MMHG | BODY MASS INDEX: 33.27 KG/M2 | OXYGEN SATURATION: 95 % | WEIGHT: 207 LBS

## 2023-04-10 DIAGNOSIS — Z13.220 SCREENING FOR LIPOID DISORDERS: ICD-10-CM

## 2023-04-10 DIAGNOSIS — Z00.00 ROUTINE GENERAL MEDICAL EXAMINATION AT A HEALTH CARE FACILITY: Primary | ICD-10-CM

## 2023-04-10 DIAGNOSIS — Z13.1 SCREENING FOR DIABETES MELLITUS: ICD-10-CM

## 2023-04-10 DIAGNOSIS — Z87.891 FORMER SMOKER: ICD-10-CM

## 2023-04-10 DIAGNOSIS — I10 HYPERTENSION, UNSPECIFIED TYPE: ICD-10-CM

## 2023-04-10 DIAGNOSIS — M51.9 LUMBAR DISC DISEASE: ICD-10-CM

## 2023-04-10 LAB
CHOLEST SERPL-MCNC: 154 MG/DL (ref 0–200)
CHOLEST/HDLC SERPL: 2.7 {RATIO}
GLUCOSE SERPL-MCNC: 116 MG/DL (ref 74–106)
HDLC SERPL-MCNC: 58 MG/DL (ref 40–60)
LDLC SERPL CALC-MCNC: 81 MG/DL
LDLC/HDLC SERPL: 1.4 {RATIO}
NONHDLC SERPL-MCNC: 96 MG/DL
TRIGL SERPL-MCNC: 74 MG/DL (ref 35–150)
VLDLC SERPL-MCNC: 15 MG/DL

## 2023-04-10 PROCEDURE — 3074F SYST BP LT 130 MM HG: CPT | Mod: ,,, | Performed by: FAMILY MEDICINE

## 2023-04-10 PROCEDURE — 3074F PR MOST RECENT SYSTOLIC BLOOD PRESSURE < 130 MM HG: ICD-10-PCS | Mod: ,,, | Performed by: FAMILY MEDICINE

## 2023-04-10 PROCEDURE — 3008F PR BODY MASS INDEX (BMI) DOCUMENTED: ICD-10-PCS | Mod: ,,, | Performed by: FAMILY MEDICINE

## 2023-04-10 PROCEDURE — 1160F PR REVIEW ALL MEDS BY PRESCRIBER/CLIN PHARMACIST DOCUMENTED: ICD-10-PCS | Mod: ,,, | Performed by: FAMILY MEDICINE

## 2023-04-10 PROCEDURE — 1159F PR MEDICATION LIST DOCUMENTED IN MEDICAL RECORD: ICD-10-PCS | Mod: ,,, | Performed by: FAMILY MEDICINE

## 2023-04-10 PROCEDURE — 80061 LIPID PANEL: CPT | Mod: GZ,,, | Performed by: CLINICAL MEDICAL LABORATORY

## 2023-04-10 PROCEDURE — 1160F RVW MEDS BY RX/DR IN RCRD: CPT | Mod: ,,, | Performed by: FAMILY MEDICINE

## 2023-04-10 PROCEDURE — 3079F DIAST BP 80-89 MM HG: CPT | Mod: ,,, | Performed by: FAMILY MEDICINE

## 2023-04-10 PROCEDURE — 82947 ASSAY GLUCOSE BLOOD QUANT: CPT | Mod: ,,, | Performed by: CLINICAL MEDICAL LABORATORY

## 2023-04-10 PROCEDURE — 3044F HG A1C LEVEL LT 7.0%: CPT | Mod: ,,, | Performed by: FAMILY MEDICINE

## 2023-04-10 PROCEDURE — 1159F MED LIST DOCD IN RCRD: CPT | Mod: ,,, | Performed by: FAMILY MEDICINE

## 2023-04-10 PROCEDURE — 99396 PREV VISIT EST AGE 40-64: CPT | Mod: ,,, | Performed by: FAMILY MEDICINE

## 2023-04-10 PROCEDURE — 99396 PR PREVENTIVE VISIT,EST,40-64: ICD-10-PCS | Mod: ,,, | Performed by: FAMILY MEDICINE

## 2023-04-10 PROCEDURE — 3079F PR MOST RECENT DIASTOLIC BLOOD PRESSURE 80-89 MM HG: ICD-10-PCS | Mod: ,,, | Performed by: FAMILY MEDICINE

## 2023-04-10 PROCEDURE — 80061 LIPID PANEL: ICD-10-PCS | Mod: GZ,,, | Performed by: CLINICAL MEDICAL LABORATORY

## 2023-04-10 PROCEDURE — 3008F BODY MASS INDEX DOCD: CPT | Mod: ,,, | Performed by: FAMILY MEDICINE

## 2023-04-10 PROCEDURE — 82947 GLUCOSE, FASTING: ICD-10-PCS | Mod: ,,, | Performed by: CLINICAL MEDICAL LABORATORY

## 2023-04-10 PROCEDURE — 3044F PR MOST RECENT HEMOGLOBIN A1C LEVEL <7.0%: ICD-10-PCS | Mod: ,,, | Performed by: FAMILY MEDICINE

## 2023-04-10 RX ORDER — HYDROCODONE BITARTRATE AND ACETAMINOPHEN 10; 325 MG/1; MG/1
1 TABLET ORAL EVERY 6 HOURS PRN
Qty: 90 TABLET | Refills: 0 | Status: SHIPPED | OUTPATIENT
Start: 2023-04-10 | End: 2023-05-10 | Stop reason: SDUPTHER

## 2023-04-10 RX ORDER — HYDROCHLOROTHIAZIDE 25 MG/1
25 TABLET ORAL DAILY
Qty: 90 TABLET | Refills: 1 | Status: SHIPPED | OUTPATIENT
Start: 2023-04-10 | End: 2023-06-12 | Stop reason: SDUPTHER

## 2023-04-10 NOTE — PROGRESS NOTES
Nneka Gomes is a 62 y.o. female seen today for her healthy you.  Patient is up-to-date on her colonoscopy mammogram and reports she has quit smoking thanks to her nicotine patch.  We did discuss the need for a low-dose CT scan.  We reviewed the risks and benefits of this procedure.  She denies any chest pain shortness of breath but does suffer from chronic low back pain due to lumbar disc disease.  Patient's  today was appropriate and urine drug screen is only shown her prescribed opiate.  She has had no signs or symptoms of tolerance or addiction and is meeting her ADLs.  Patient reports good symptom control.    Past Medical History:   Diagnosis Date    Abdominal bloating 01/09/2020    Abdominal pain, right upper quadrant 01/09/2020    Abnormal liver enzymes 03/04/2020    Abnormal results of liver function studies 11/18/2020    Acute conjunctivitis 05/30/2014    Acute exacerbation of chronic obstructive airways disease 02/13/2017    Acute pharyngitis 05/19/2020    Acute sinusitis 10/09/2017    Acute upper respiratory infection 05/19/2020    Allergic rhinitis 09/23/2020    Anemia 03/07/2014    Anesthesia of skin 09/06/2017    bilateral fingers    Bilateral primary osteoarthritis of knee 12/02/2019    Bradycardia 01/16/2020    Carpal tunnel syndrome 08/10/2017    Chest pain 03/06/2020    Chronic idiopathic constipation 11/18/2020    Chronic low back pain 11/05/2018    Chronic pain 04/23/2019    Chronic pain syndrome 01/30/2020    COPD (chronic obstructive pulmonary disease) 02/19/2021    Coronavirus infection 05/21/2020    Cough 05/19/2020    Degeneration of lumbar intervertebral disc 09/26/2014    L3-L4 L4-L5 Greater than L5-S1    Depressive disorder 07/03/2019    Disorder of gallbladder 03/30/2015    Dysphagia 01/09/2020    Dyspnea 05/19/2020    Dysuria 05/19/2020    Fatigue 05/19/2020    Frequency of urination 02/16/2015    GERD (gastroesophageal reflux disease) 11/18/2020    Hematuria 06/30/2020     Hemoptysis 02/27/2020    Herpes zoster 09/23/2020    Hyperlipidemia 09/23/2020    Hypertension 03/06/2020    Joint pain 11/04/2019    Knee pain 01/04/2019    Left inguinal hernia 06/10/2019    Long term (current) use of opiate analgesic 02/19/2021    Lumbar spondylosis 11/23/2020    Lumbar sprain 07/16/2012    Malaise 11/20/2014    Melena 09/25/2017    Microscopic hematuria 04/02/2020    Migraine without aura 02/06/2012    Nausea 02/27/2020    Nicotine dependence, cigarettes, uncomplicated 11/11/2020    Nonulcer dyspepsia 01/23/2017    On home O2     Other long term (current) drug therapy 09/23/2020    Other specified urinary incontinence 06/17/2020    Pain in left shoulder 05/02/2019    Pain in left wrist 09/06/2017    and hand    Pain in right shoulder 08/30/2019    Pain in right wrist 09/04/2018    Right hand    Palpitations 03/06/2020    Shoulder joint pain 04/23/2019    Smoker 07/03/2019    Snoring 08/02/2019    Synovial cyst of popliteal space 04/08/2013    Tobacco dependence syndrome 02/19/2021    Unstable angina 01/16/2020    UTI (urinary tract infection) 03/27/2020     Family History   Problem Relation Age of Onset    Rectal cancer Other     Diabetes Other     Heart disease Other     Hypertension Other     Hypertension Mother     Cancer Maternal Aunt      Current Outpatient Medications on File Prior to Visit   Medication Sig Dispense Refill    albuterol (PROAIR HFA) 90 mcg/actuation inhaler Inhale 2 puffs into the lungs every 4 (four) hours as needed for Wheezing. 18 g 5    albuterol (PROVENTIL) 2.5 mg /3 mL (0.083 %) nebulizer solution Take 3 mLs (2.5 mg total) by nebulization 4 (four) times daily as needed for Wheezing. 100 each 5    azelastine (ASTELIN) 137 mcg (0.1 %) nasal spray USE 2 SPRAYS NASALLY TWICE A DAY 90 mL 3    buPROPion (WELLBUTRIN XL) 150 MG TB24 tablet Take 1 tablet (150 mg total) by mouth 2 (two) times a day. 180 tablet 1    fluticasone propionate (FLONASE) 50 mcg/actuation nasal spray 2  sprays (100 mcg total) by Each Nostril route once daily. 16 g 5    fluticasone propionate (FLOVENT HFA) 220 mcg/actuation inhaler Inhale 2 puffs into the lungs 2 (two) times daily. 36 g 3    gabapentin (NEURONTIN) 100 MG capsule Take 1 capsule (100 mg total) by mouth 3 (three) times daily. 180 capsule 1    levocetirizine (XYZAL) 5 MG tablet TAKE 1 TABLET EVERY NIGHT AT BEDTIME 90 tablet 3    linaCLOtide (LINZESS) 145 mcg Cap capsule Take 1 capsule (145 mcg total) by mouth before breakfast. 90 capsule 3    nicotine (NICODERM CQ) 14 mg/24 hr Place 1 patch onto the skin once daily. 30 patch 2    pantoprazole (PROTONIX) 40 MG tablet Take 1 tablet (40 mg total) by mouth once daily. 90 tablet 3    RESTASIS 0.05 % ophthalmic emulsion       rosuvastatin (CRESTOR) 20 MG tablet Take 1 tablet (20 mg total) by mouth once daily. 90 tablet 1    theophylline (THEODUR) 300 mg 24 hr capsule Take 300 mg by mouth once daily. One tab Daily with food: (replaced previous script for 100mg tab 3 daily)      [DISCONTINUED] hydroCHLOROthiazide (HYDRODIURIL) 25 MG tablet Take 1 tablet (25 mg total) by mouth once daily. 90 tablet 1    [DISCONTINUED] HYDROcodone-acetaminophen (NORCO)  mg per tablet Take 1 tablet by mouth every 6 (six) hours as needed for Pain. Every 4 to 6 hours prn for Chronic back pain 90 tablet 0     No current facility-administered medications on file prior to visit.     Immunization History   Administered Date(s) Administered    COVID-19, MRNA, LN-S, PF (Pfizer) (Purple Cap) 04/29/2021, 10/28/2021, 04/29/2022    Influenza - Quadrivalent - PF *Preferred* (6 months and older) 09/16/2021    Influenza Split 10/03/2019    Pneumococcal Conjugate - 13 Valent 11/05/2018    Pneumococcal Conjugate - 20 Valent 01/09/2023    Pneumococcal Polysaccharide - 23 Valent 11/05/2019       Review of Systems   Constitutional:  Negative for fever, malaise/fatigue and weight loss.   Cardiovascular:  Negative for chest pain and palpitations.    Gastrointestinal:  Negative for nausea and vomiting.   Musculoskeletal:  Positive for back pain and myalgias.   Psychiatric/Behavioral:  Negative for depression.       Vitals:    04/10/23 0837   BP: 110/80   Pulse: 83   Resp: 18   Temp: 98.6 °F (37 °C)       Physical Exam  Vitals reviewed.   Constitutional:       Appearance: Normal appearance.   HENT:      Head: Normocephalic.   Eyes:      Extraocular Movements: Extraocular movements intact.      Conjunctiva/sclera: Conjunctivae normal.      Pupils: Pupils are equal, round, and reactive to light.   Neck:      Thyroid: No thyroid mass or thyromegaly.   Cardiovascular:      Rate and Rhythm: Normal rate and regular rhythm.      Heart sounds: Normal heart sounds. No murmur heard.    No gallop.   Pulmonary:      Effort: Pulmonary effort is normal. No respiratory distress.      Breath sounds: Normal breath sounds. No wheezing or rales.   Musculoskeletal:      Lumbar back: Spasms and tenderness present. Decreased range of motion.        Back:       Comments: She is slow to rise from a seated position with a stooped antalgic gait.   Skin:     General: Skin is warm and dry.      Coloration: Skin is not jaundiced or pale.   Neurological:      Mental Status: She is alert.   Psychiatric:         Mood and Affect: Mood normal.         Behavior: Behavior normal.         Thought Content: Thought content normal.         Judgment: Judgment normal.        Assessment and Plan  Routine general medical examination at a health care facility    Screening for diabetes mellitus  -     Glucose, Fasting; Future; Expected date: 04/10/2023    Screening for lipoid disorders  -     Lipid Panel; Future; Expected date: 04/10/2023    Hypertension, unspecified type  -     hydroCHLOROthiazide (HYDRODIURIL) 25 MG tablet; Take 1 tablet (25 mg total) by mouth once daily.  Dispense: 90 tablet; Refill: 1    Lumbar disc disease  -     HYDROcodone-acetaminophen (NORCO)  mg per tablet; Take 1 tablet by  mouth every 6 (six) hours as needed for Pain. Every 4 to 6 hours prn for Chronic back pain  Dispense: 90 tablet; Refill: 0    Former smoker  -     CT Chest Lung Screening Low Dose; Future; Expected date: 04/10/2023            Return to clinic in 1 month or as needed.    Health Maintenance Topics with due status: Not Due       Topic Last Completion Date    Lipid Panel 05/11/2022    Mammogram 09/29/2022    Cervical Cancer Screening 10/31/2022    Colorectal Cancer Screening 11/08/2022    Hemoglobin A1c (Diabetic Prevention Screening) 01/09/2023

## 2023-04-10 NOTE — PATIENT INSTRUCTIONS
"Patient Education       Diet and Health   The Basics   Written by the doctors and editors at [facility]   Why is it important to eat a healthy diet? -- It's important to eat a healthy diet because eating the right foods can keep you healthy now and later on in life.  Which foods are especially healthy? -- Foods that are especially healthy include:  Fruits and vegetables - Eating a diet with lots of fruits and vegetables can help prevent heart disease and strokes. It might also help prevent certain types of cancers. Try to eat fruits and vegetables at each meal and also for snacks. If you don't have fresh fruits and vegetables available, you can eat frozen or canned ones instead. Doctors recommend eating at least 2 1/2 servings of vegetables and 2 servings of fruits each day.  Foods with fiber - Eating foods with a lot of fiber can help prevent heart disease and strokes. If you have type 2 diabetes, it can also help control your blood sugar. Foods that have a lot of fiber include vegetables, fruits, beans, nuts, oatmeal, and whole grain breads and cereals. You can tell how much fiber is in a food by reading the nutrition label (figure 1). Doctors recommend eating 25 to 36 grams of fiber each day.  Foods with folate (also called folic acid) - Folate is a vitamin that is important for pregnant people, since it helps prevent certain birth defects. Anyone who could get pregnant should get at least 400 micrograms of folic acid daily, whether or not they are actively trying to get pregnant. Folate is found in many breakfast cereals, oranges, orange juice, and green leafy vegetables.  Foods with calcium and vitamin D - Babies, children, and adults need calcium and vitamin D to help keep their bones strong. Adults also need calcium and vitamin D to help prevent osteoporosis. Osteoporosis is a condition that causes bones to get "thin" and break more easily than usual. Different foods and drinks have calcium and vitamin D in " "them (figure 2). People who don't get enough calcium and vitamin D in their diet might need to take a supplement.  Foods with protein - Protein helps your muscles stay strong. Healthy foods with a lot of protein include chicken, fish, eggs, beans, nuts, and soy products. Red meat also has a lot of protein, but it also contains fats, which can be unhealthy.  Some experts recommend a "Mediterranean diet." This involves eating a lot of fruits, vegetables, nuts, whole grains, and olive oil. It also includes some fish, poultry, and dairy products, but not a lot of red meat. Eating this way can help your overall health, and might even lower your risk of having a stroke.  What foods should I avoid or limit? -- To eat a healthy diet, there are some things you should avoid or limit. They include:   Fats - There are different types of fats. Some types of fats are better for your body than others.  Trans fats are especially unhealthy. They are found in margarines, many fast foods, and some store-bought baked goods. Trans fats can raise your cholesterol level and your increase your chance of getting heart disease. You should avoid eating foods with these types of fats.  The type of "polyunsaturated" fats found in fish seems to be healthy and can reduce your chance of getting heart disease. Other polyunsaturated fats might also be good for your health. When you cook, it's best to use oils with healthier fats, such as olive oil and canola oil.  Sugar - To have a healthy diet, it's important to limit or avoid sugar, sweets, and refined grains. Refined grains are found in white bread, white rice, most forms of pasta, and most packaged "snack" foods. Whole grains, such as whole-wheat bread and brown rice, have more fiber and are better for your health.  Avoiding sugar-sweetened beverages, like soda and sports drinks, can also help improve your health.  Red meat - Studies have shown that eating a lot of red meat can increase your " "risk of certain health problems, including heart disease and cancer. You should limit the amount of red meat that you eat.  Can I drink alcohol as part of a healthy diet? -- People who drink a small amount of alcohol each day might have a lower chance of getting heart disease. But drinking alcohol can lead to problems. For example, it can raise a person's chances of getting liver disease and certain types of cancers. In women, even 1 drink a day can increase the risk of getting breast cancer.  Most doctors recommend that adult women not have more than 1 drink a day and that adult men not have more than 2 drinks a day. The limits are different because most women's bodies take longer to break down alcohol.  How many calories do I need each day? -- The number of calories you need each day depends on your weight, height, age, sex, and how active you are.  Your doctor or nurse can tell you how many calories you should eat each day. If you are trying to lose weight, you should eat fewer calories each day.  What if I have questions? -- If you have questions about which foods you should or should not eat, ask your doctor or nurse. The right diet for you will depend, in part, on your health and any medical conditions you have.  All topics are updated as new evidence becomes available and our peer review process is complete.  This topic retrieved from [employer] on: Sep 21, 2021.  Topic 32970 Version 20.0  Release: 29.4.2 - C29.263  © 2021 UpToDate, Inc. and/or its affiliates. All rights reserved.  figure 1: Nutrition label - fiber     This is an example of a nutrition label. To figure out how much fiber is in a food, look for the line that says "Dietary Fiber." It's also important to look at the serving size. This food has 7 grams of fiber in each serving, and each serving is 1 cup.  Graphic 02905 Version 7.0    figure 2: Foods and drinks with calcium and vitamin D     Foods rich in calcium include ice cream, soy milk, breads, " "kale, broccoli, milk, cheese, cottage cheese, almonds, yogurt, ready-to-eat cereals, beans, and tofu. Foods rich in vitamin D include milk, fortified plant-based "milks" (soy, almond), canned tuna fish, cod liver oil, yogurt, ready-to-eat-cereals, cooked salmon, canned sardines, mackerel, and eggs. Some of these foods are rich in both.  [employer] 91790 Version 4.0    Consumer Information Use and Disclaimer   This information is not specific medical advice and does not replace information you receive from your health care provider. This is only a brief summary of general information. It does NOT include all information about conditions, illnesses, injuries, tests, procedures, treatments, therapies, discharge instructions or life-style choices that may apply to you. You must talk with your health care provider for complete information about your health and treatment options. This information should not be used to decide whether or not to accept your health care provider's advice, instructions or recommendations. Only your health care provider has the knowledge and training to provide advice that is right for you. The use of this information is governed by the [employer] End User License Agreement, available at https://www.[employer]/en/solutions/Sciences-U/about/tyrese.The use of [employer] content is governed by the [employer] Terms of Use. ©2021 UpToDate, Inc. All rights reserved.  Copyright   © 2021 UpToDate, Inc. and/or its affiliates. All rights reserved.    Patient Education       Smoking: Not Just Harmful to Your Lungs and Heart   About this topic   Smoking is very common at any age. It is one of the leading causes of poor health and illness. Smoking can lead to death. It has many harmful chemicals that are released by the tobacco you smoke and inhale. Tobacco has many forms. These are:  Cigarettes  Pipes  Cigars  Chewing tobacco  Electronic cigarettes  Loose  Hookah  All kinds of tobaccos contain many toxic " substances. These are what make you sick from smoking.  Nicotine - The main thing that makes you addicted to smoking  Carbon monoxide - A poison that gets into your blood when smoking  Tar - Has chemicals that are cancerous  Lead and many others  These factors affect how much damage smoking will have on you:  How much you smoke  What you smoke  How what you smoke has been prepared  The content of what you smoke  Smoking hurts every part of the body. The lungs and the heart are most often affected by tobacco use.  General   Smoking is very harmful to your body. It can cause many illnesses and can affect how you look.  Smoking and Cancer   Smoking is the most common cause of lung cancer. Smoking may also increase the risk of:  Mouth cancer. This can also be caused by chewing tobacco.  Bladder cancer  Cancer of the tube from the mouth to stomach. This is also called the esophagus.  Cancer of the throat. This can also be caused by chewing tobacco.  Kidney cancer  Liver cancer  Stomach, colon, and rectal cancer  Cancer of the pancreas  Cancer of the cervix in women  Cancer of the blood or leukemia  Skin cancer  Smoking and Your Lungs   If you smoke you are more likely to have:  Breathing problems  Lung infections like pneumonia or bronchitis  Lung disease such as COPD or emphysema  Asthma  Smoking and the Heart and Circulation   Causes heart disease and stroke  Smokers are at a higher risk for high blood pressure  You are more likely to get blood clots  Smoking can lower blood flow to the legs and skin  Smoking and Diabetes   If you smoke, you:  Have a higher risk of developing diabetes  May have higher blood sugar levels  May have more problems with your diabetes  Smoking and Digestion   Smokers make more stomach acid. This can cause sores or ulcers in your belly or bowel.  Your stomach acids may flow back to your esophagus. This is also called heartburn.  You have more chance of bowel irritation and swelling  Smoking  and Your Bones   If you smoke:  Your bone-forming cells don't grow as fast  Your bones may become weaker (osteoporosis)  You may have more low back pain and arthritis  You may have more overuse injuries  You may have a greater risk of breaking bones  Your broken bones may take longer to heal  Smoking and Pregnancy   Makes your baby more prone to problems  You have a higher chance of having a premature baby or baby born early  Your healthy baby may die without reason. This is called sudden infant death syndrome.  Your baby may be born dead (stillbirth)  Your baby may have a low birth weight  You have a higher risk of an ectopic pregnancy or a pregnancy outside of the uterus  You have a higher chance of having a baby with mouth or facial defects  Smoking and Your Eyes, Hair, Hands, and Mouth   If you smoke, you may notice your:  Eyes become cloudy and form cataracts  Hair may get thin and turn gray sooner than it should  Fingernails turn yellow  Teeth become yellowish brown  Gums have more problems  Teeth have problems or even become loose  Sense of taste and smell start to go away  Breath smells bad  Smoking and Skin   Smoking causes:  Less blood flow to the skin  Wrinkles start early around your eyes and mouth  Dry skin  Your skin to lose elasticity and strength  Skin may get splotchy or pale  Your face to look thin and old. This is called smoker's face.  Greater risk of getting a skin problem called psoriasis  Slower healing of cuts or bruises  Smoking and Sex Life   If you smoke you are more likely to have problems like:  Impotence  Decreased blood flow to the penis. This is also called erectile dysfunction.  Trouble getting pregnant  Early change of life or menopause for women  A higher risk of heart attack or stroke for women who smoke and use birth control pills  Damaged sperm that may lead to problems getting a woman pregnant, birth defects, or miscarriage  Smoking and Your Brain and Behavior   Smoking  can:  Affect your mood  Lead to addiction  Cause long-term confusion or damage to your brain cells  Cause low mood  Smoking and Children   If you smoke, your children:  Will be more likely to smoke.  Can be sick from being around your smoke. This is called secondhand smoke.  Need to learn about the bad effects of smoking. Most smokers start before the age of 18. Encourage your children never to smoke.  Smoking and Other Effects   Smoking can cause:  Wounds to take longer to heal  You to eat poorly  Weight loss  Swelling in the body and affect the body's immune or defense system  Rheumatoid arthritis  Greater chance of injury  You to get warts easier (human papillomavirus)  A bad odor in hair and on clothes  You to have poor sports performance  You to spend a lot of money. Smoking is an expensive habit. A smoker who smokes a pack per day in the [place] will spend over $2000 per year on cigarettes.  Health care to cost more  You to miss work more often  Hearing loss  Even if you have smoked for many years, it is not too late to quit. Your body starts to heal as soon as you stop smoking. It is better to quit when you are young, but you can still get healthier if you quit at any age.       Helpful tips   Some helpful steps you can take to help you quit smoking:  Set a date to quit smoking.  Know the reasons that make you smoke more.  Know why you want to quit smoking.  Write down each time you smoke. Include the time and what you are doing. Plan ahead about what you will do instead of smoking when that time or event reappears.  Tell your family and friends about your plan to quit smoking. Let them know how to help you.  Slowly reduce your smoking.  Remove smoking and tobacco products from your home and other places.  Avoid places and situations where you will more likely smoke. If people close to you smoke, ask them to quit with you. If they do not quit, ask them to not smoke around you.  Reward or treat yourself every time  you do not smoke. Do not use food as a reward.  Ask a doctor for help.  Talk with your doctor before you try an electronic cigarette.  Where can I learn more?   Centers for Disease Control and Prevention  http://www.cdc.gov/tobacco/data_statistics/fact_sheets/health_effects/effects_cig_smoking/#overview   Centers for Disease Control and Prevention  https://www.cdc.gov/tobacco/campaign/tips/quit-smoking/guide/quit-plan.html?s_cid=OS_tips_D9400   [employer]  http://[employer].org/teen/drug_alcohol/tobacco/smoking.html#    Food and Drug Administration  https://www.fda.gov/TobaccoProducts/Labeling/ProductsIngredientsComponents/default.htm   Last Reviewed Date   2021-03-31  Consumer Information Use and Disclaimer   This information is not specific medical advice and does not replace information you receive from your health care provider. This is only a brief summary of general information. It does NOT include all information about conditions, illnesses, injuries, tests, procedures, treatments, therapies, discharge instructions or life-style choices that may apply to you. You must talk with your health care provider for complete information about your health and treatment options. This information should not be used to decide whether or not to accept your health care providers advice, instructions or recommendations. Only your health care provider has the knowledge and training to provide advice that is right for you.  Copyright   Copyright © 2021 UpToDate, Inc. and its affiliates and/or licensors. All rights reserved.

## 2023-04-12 ENCOUNTER — TELEPHONE (OUTPATIENT)
Dept: FAMILY MEDICINE | Facility: CLINIC | Age: 63
End: 2023-04-12
Payer: COMMERCIAL

## 2023-04-12 DIAGNOSIS — R73.9 HYPERGLYCEMIA: Primary | ICD-10-CM

## 2023-04-12 NOTE — TELEPHONE ENCOUNTER
----- Message from Magno Yan MD sent at 4/11/2023  7:58 AM CDT -----  Office visit for LDL and please add an A1c for elevated blood sugar

## 2023-04-19 ENCOUNTER — HOSPITAL ENCOUNTER (OUTPATIENT)
Dept: RADIOLOGY | Facility: HOSPITAL | Age: 63
Discharge: HOME | End: 2023-04-19
Attending: FAMILY MEDICINE
Payer: COMMERCIAL

## 2023-04-19 VITALS — BODY MASS INDEX: 33.27 KG/M2 | HEIGHT: 66 IN | WEIGHT: 207 LBS

## 2023-04-19 DIAGNOSIS — Z87.891 FORMER SMOKER: ICD-10-CM

## 2023-04-19 PROCEDURE — 71271 CT THORAX LUNG CANCER SCR C-: CPT | Mod: TC

## 2023-04-19 PROCEDURE — 71271 CT THORAX LUNG CANCER SCR C-: CPT | Mod: 26,,, | Performed by: RADIOLOGY

## 2023-04-19 PROCEDURE — 71271 CT CHEST LUNG SCREENING LOW DOSE: ICD-10-PCS | Mod: 26,,, | Performed by: RADIOLOGY

## 2023-04-21 ENCOUNTER — TELEPHONE (OUTPATIENT)
Dept: FAMILY MEDICINE | Facility: CLINIC | Age: 63
End: 2023-04-21
Payer: COMMERCIAL

## 2023-04-21 NOTE — TELEPHONE ENCOUNTER
----- Message from Magno Yan MD sent at 4/19/2023  5:01 PM CDT -----  Negative CT scan repeat in 1 year.

## 2023-05-10 ENCOUNTER — OFFICE VISIT (OUTPATIENT)
Dept: FAMILY MEDICINE | Facility: CLINIC | Age: 63
End: 2023-05-10
Payer: COMMERCIAL

## 2023-05-10 VITALS
OXYGEN SATURATION: 97 % | DIASTOLIC BLOOD PRESSURE: 78 MMHG | BODY MASS INDEX: 33.75 KG/M2 | RESPIRATION RATE: 19 BRPM | SYSTOLIC BLOOD PRESSURE: 118 MMHG | HEIGHT: 66 IN | HEART RATE: 90 BPM | WEIGHT: 210 LBS | TEMPERATURE: 99 F

## 2023-05-10 DIAGNOSIS — R73.03 PREDIABETES: ICD-10-CM

## 2023-05-10 DIAGNOSIS — F17.200 SMOKER: ICD-10-CM

## 2023-05-10 DIAGNOSIS — Z79.891 LONG TERM (CURRENT) USE OF OPIATE ANALGESIC: Primary | ICD-10-CM

## 2023-05-10 DIAGNOSIS — M51.9 LUMBAR DISC DISEASE: ICD-10-CM

## 2023-05-10 LAB
ALBUMIN SERPL BCP-MCNC: 3.7 G/DL (ref 3.5–5)
ALBUMIN/GLOB SERPL: 1.2 {RATIO}
ALP SERPL-CCNC: 186 U/L (ref 50–130)
ALT SERPL W P-5'-P-CCNC: 26 U/L (ref 13–56)
ANION GAP SERPL CALCULATED.3IONS-SCNC: 7 MMOL/L (ref 7–16)
AST SERPL W P-5'-P-CCNC: 31 U/L (ref 15–37)
BASOPHILS # BLD AUTO: 0.04 K/UL (ref 0–0.2)
BASOPHILS NFR BLD AUTO: 0.5 % (ref 0–1)
BILIRUB SERPL-MCNC: 0.8 MG/DL (ref ?–1.2)
BUN SERPL-MCNC: 14 MG/DL (ref 7–18)
BUN/CREAT SERPL: 16 (ref 6–20)
CALCIUM SERPL-MCNC: 9.7 MG/DL (ref 8.5–10.1)
CHLORIDE SERPL-SCNC: 107 MMOL/L (ref 98–107)
CO2 SERPL-SCNC: 30 MMOL/L (ref 21–32)
CREAT SERPL-MCNC: 0.87 MG/DL (ref 0.55–1.02)
CTP QC/QA: YES
DIFFERENTIAL METHOD BLD: ABNORMAL
EGFR (NO RACE VARIABLE) (RUSH/TITUS): 76 ML/MIN/1.73M2
EOSINOPHIL # BLD AUTO: 0.13 K/UL (ref 0–0.5)
EOSINOPHIL NFR BLD AUTO: 1.8 % (ref 1–4)
ERYTHROCYTE [DISTWIDTH] IN BLOOD BY AUTOMATED COUNT: 14.1 % (ref 11.5–14.5)
EST. AVERAGE GLUCOSE BLD GHB EST-MCNC: 127 MG/DL
GLOBULIN SER-MCNC: 3.1 G/DL (ref 2–4)
GLUCOSE SERPL-MCNC: 115 MG/DL (ref 74–106)
HBA1C MFR BLD HPLC: 6.4 % (ref 4.5–6.6)
HCT VFR BLD AUTO: 40.8 % (ref 38–47)
HGB BLD-MCNC: 12.9 G/DL (ref 12–16)
IMM GRANULOCYTES # BLD AUTO: 0.02 K/UL (ref 0–0.04)
IMM GRANULOCYTES NFR BLD: 0.3 % (ref 0–0.4)
LYMPHOCYTES # BLD AUTO: 2.44 K/UL (ref 1–4.8)
LYMPHOCYTES NFR BLD AUTO: 33.1 % (ref 27–41)
MCH RBC QN AUTO: 28.4 PG (ref 27–31)
MCHC RBC AUTO-ENTMCNC: 31.6 G/DL (ref 32–36)
MCV RBC AUTO: 89.9 FL (ref 80–96)
MONOCYTES # BLD AUTO: 0.44 K/UL (ref 0–0.8)
MONOCYTES NFR BLD AUTO: 6 % (ref 2–6)
MPC BLD CALC-MCNC: 9.3 FL (ref 9.4–12.4)
NEUTROPHILS # BLD AUTO: 4.31 K/UL (ref 1.8–7.7)
NEUTROPHILS NFR BLD AUTO: 58.3 % (ref 53–65)
NRBC # BLD AUTO: 0 X10E3/UL
NRBC, AUTO (.00): 0 %
PLATELET # BLD AUTO: 290 K/UL (ref 150–400)
POC (AMP) AMPHETAMINE: NEGATIVE
POC (BAR) BARBITURATES: NEGATIVE
POC (BUP) BUPRENORPHINE: NEGATIVE
POC (BZO) BENZODIAZEPINES: NEGATIVE
POC (COC) COCAINE: NEGATIVE
POC (MDMA) METHYLENEDIOXYMETHAMPHETAMINE 3,4: NEGATIVE
POC (MET) METHAMPHETAMINE: NEGATIVE
POC (MOP) OPIATES: ABNORMAL
POC (MTD) METHADONE: NEGATIVE
POC (OXY) OXYCODONE: NEGATIVE
POC (PCP) PHENCYCLIDINE: NEGATIVE
POC (TCA) TRICYCLIC ANTIDEPRESSANTS: NEGATIVE
POC TEMPERATURE (URINE): 90
POC THC: NEGATIVE
POTASSIUM SERPL-SCNC: 3.3 MMOL/L (ref 3.5–5.1)
PROT SERPL-MCNC: 6.8 G/DL (ref 6.4–8.2)
RBC # BLD AUTO: 4.54 M/UL (ref 4.2–5.4)
SODIUM SERPL-SCNC: 141 MMOL/L (ref 136–145)
WBC # BLD AUTO: 7.38 K/UL (ref 4.5–11)

## 2023-05-10 PROCEDURE — 80305 DRUG TEST PRSMV DIR OPT OBS: CPT | Mod: QW,,, | Performed by: FAMILY MEDICINE

## 2023-05-10 PROCEDURE — 3078F DIAST BP <80 MM HG: CPT | Mod: ,,, | Performed by: FAMILY MEDICINE

## 2023-05-10 PROCEDURE — 99214 PR OFFICE/OUTPT VISIT, EST, LEVL IV, 30-39 MIN: ICD-10-PCS | Mod: ,,, | Performed by: FAMILY MEDICINE

## 2023-05-10 PROCEDURE — 80053 COMPREHEN METABOLIC PANEL: CPT | Mod: ,,, | Performed by: CLINICAL MEDICAL LABORATORY

## 2023-05-10 PROCEDURE — 3008F BODY MASS INDEX DOCD: CPT | Mod: ,,, | Performed by: FAMILY MEDICINE

## 2023-05-10 PROCEDURE — 3078F PR MOST RECENT DIASTOLIC BLOOD PRESSURE < 80 MM HG: ICD-10-PCS | Mod: ,,, | Performed by: FAMILY MEDICINE

## 2023-05-10 PROCEDURE — 1159F PR MEDICATION LIST DOCUMENTED IN MEDICAL RECORD: ICD-10-PCS | Mod: ,,, | Performed by: FAMILY MEDICINE

## 2023-05-10 PROCEDURE — 3044F PR MOST RECENT HEMOGLOBIN A1C LEVEL <7.0%: ICD-10-PCS | Mod: ,,, | Performed by: FAMILY MEDICINE

## 2023-05-10 PROCEDURE — 83036 HEMOGLOBIN GLYCOSYLATED A1C: CPT | Mod: ,,, | Performed by: CLINICAL MEDICAL LABORATORY

## 2023-05-10 PROCEDURE — 80053 COMPREHENSIVE METABOLIC PANEL: ICD-10-PCS | Mod: ,,, | Performed by: CLINICAL MEDICAL LABORATORY

## 2023-05-10 PROCEDURE — 80305 POCT URINE DRUG SCREEN PRESUMP: ICD-10-PCS | Mod: QW,,, | Performed by: FAMILY MEDICINE

## 2023-05-10 PROCEDURE — 85025 CBC WITH DIFFERENTIAL: ICD-10-PCS | Mod: ,,, | Performed by: CLINICAL MEDICAL LABORATORY

## 2023-05-10 PROCEDURE — 3074F PR MOST RECENT SYSTOLIC BLOOD PRESSURE < 130 MM HG: ICD-10-PCS | Mod: ,,, | Performed by: FAMILY MEDICINE

## 2023-05-10 PROCEDURE — 99214 OFFICE O/P EST MOD 30 MIN: CPT | Mod: ,,, | Performed by: FAMILY MEDICINE

## 2023-05-10 PROCEDURE — 3044F HG A1C LEVEL LT 7.0%: CPT | Mod: ,,, | Performed by: FAMILY MEDICINE

## 2023-05-10 PROCEDURE — 1160F RVW MEDS BY RX/DR IN RCRD: CPT | Mod: ,,, | Performed by: FAMILY MEDICINE

## 2023-05-10 PROCEDURE — 1160F PR REVIEW ALL MEDS BY PRESCRIBER/CLIN PHARMACIST DOCUMENTED: ICD-10-PCS | Mod: ,,, | Performed by: FAMILY MEDICINE

## 2023-05-10 PROCEDURE — 1159F MED LIST DOCD IN RCRD: CPT | Mod: ,,, | Performed by: FAMILY MEDICINE

## 2023-05-10 PROCEDURE — 3008F PR BODY MASS INDEX (BMI) DOCUMENTED: ICD-10-PCS | Mod: ,,, | Performed by: FAMILY MEDICINE

## 2023-05-10 PROCEDURE — 85025 COMPLETE CBC W/AUTO DIFF WBC: CPT | Mod: ,,, | Performed by: CLINICAL MEDICAL LABORATORY

## 2023-05-10 PROCEDURE — 3074F SYST BP LT 130 MM HG: CPT | Mod: ,,, | Performed by: FAMILY MEDICINE

## 2023-05-10 PROCEDURE — 83036 HEMOGLOBIN A1C: ICD-10-PCS | Mod: ,,, | Performed by: CLINICAL MEDICAL LABORATORY

## 2023-05-10 RX ORDER — IBUPROFEN 200 MG
1 TABLET ORAL DAILY
Qty: 30 PATCH | Refills: 2 | Status: SHIPPED | OUTPATIENT
Start: 2023-05-10 | End: 2023-07-11

## 2023-05-10 RX ORDER — HYDROCODONE BITARTRATE AND ACETAMINOPHEN 10; 325 MG/1; MG/1
1 TABLET ORAL EVERY 6 HOURS PRN
Qty: 90 TABLET | Refills: 0 | Status: SHIPPED | OUTPATIENT
Start: 2023-05-10 | End: 2023-06-12 | Stop reason: SDUPTHER

## 2023-05-10 NOTE — PROGRESS NOTES
Nneka Gomes is a 62 y.o. female seen today for follow-up on her chronic pain due to lumbar disc disease.  She reports good symptom control with no side effects and she is meeting her ADLs.  She has had no signs or symptoms of tolerance or addiction and her  today was appropriate.  Her urine drug screen is only shown her prescribed opiate.  Patient also needs follow-up for her prediabetes today.    Past Medical History:   Diagnosis Date    Abdominal bloating 01/09/2020    Abdominal pain, right upper quadrant 01/09/2020    Abnormal liver enzymes 03/04/2020    Abnormal results of liver function studies 11/18/2020    Acute conjunctivitis 05/30/2014    Acute exacerbation of chronic obstructive airways disease 02/13/2017    Acute pharyngitis 05/19/2020    Acute sinusitis 10/09/2017    Acute upper respiratory infection 05/19/2020    Allergic rhinitis 09/23/2020    Anemia 03/07/2014    Anesthesia of skin 09/06/2017    bilateral fingers    Bilateral primary osteoarthritis of knee 12/02/2019    Bradycardia 01/16/2020    Carpal tunnel syndrome 08/10/2017    Chest pain 03/06/2020    Chronic idiopathic constipation 11/18/2020    Chronic low back pain 11/05/2018    Chronic pain 04/23/2019    Chronic pain syndrome 01/30/2020    COPD (chronic obstructive pulmonary disease) 02/19/2021    Coronavirus infection 05/21/2020    Cough 05/19/2020    Degeneration of lumbar intervertebral disc 09/26/2014    L3-L4 L4-L5 Greater than L5-S1    Depressive disorder 07/03/2019    Disorder of gallbladder 03/30/2015    Dysphagia 01/09/2020    Dyspnea 05/19/2020    Dysuria 05/19/2020    Fatigue 05/19/2020    Frequency of urination 02/16/2015    GERD (gastroesophageal reflux disease) 11/18/2020    Hematuria 06/30/2020    Hemoptysis 02/27/2020    Herpes zoster 09/23/2020    Hyperlipidemia 09/23/2020    Hypertension 03/06/2020    Joint pain 11/04/2019    Knee pain 01/04/2019    Left inguinal hernia 06/10/2019    Long term (current) use of opiate  analgesic 02/19/2021    Lumbar spondylosis 11/23/2020    Lumbar sprain 07/16/2012    Malaise 11/20/2014    Melena 09/25/2017    Microscopic hematuria 04/02/2020    Migraine without aura 02/06/2012    Nausea 02/27/2020    Nicotine dependence, cigarettes, uncomplicated 11/11/2020    Nonulcer dyspepsia 01/23/2017    On home O2     Other long term (current) drug therapy 09/23/2020    Other specified urinary incontinence 06/17/2020    Pain in left shoulder 05/02/2019    Pain in left wrist 09/06/2017    and hand    Pain in right shoulder 08/30/2019    Pain in right wrist 09/04/2018    Right hand    Palpitations 03/06/2020    Shoulder joint pain 04/23/2019    Smoker 07/03/2019    Snoring 08/02/2019    Synovial cyst of popliteal space 04/08/2013    Tobacco dependence syndrome 02/19/2021    Unstable angina 01/16/2020    UTI (urinary tract infection) 03/27/2020     Family History   Problem Relation Age of Onset    Rectal cancer Other     Diabetes Other     Heart disease Other     Hypertension Other     Hypertension Mother     Cancer Maternal Aunt      Current Outpatient Medications on File Prior to Visit   Medication Sig Dispense Refill    albuterol (PROAIR HFA) 90 mcg/actuation inhaler Inhale 2 puffs into the lungs every 4 (four) hours as needed for Wheezing. 18 g 5    albuterol (PROVENTIL) 2.5 mg /3 mL (0.083 %) nebulizer solution Take 3 mLs (2.5 mg total) by nebulization 4 (four) times daily as needed for Wheezing. 100 each 5    azelastine (ASTELIN) 137 mcg (0.1 %) nasal spray USE 2 SPRAYS NASALLY TWICE A DAY 90 mL 3    buPROPion (WELLBUTRIN XL) 150 MG TB24 tablet Take 1 tablet (150 mg total) by mouth 2 (two) times a day. 180 tablet 1    fluticasone propionate (FLONASE) 50 mcg/actuation nasal spray 2 sprays (100 mcg total) by Each Nostril route once daily. 16 g 5    fluticasone propionate (FLOVENT HFA) 220 mcg/actuation inhaler Inhale 2 puffs into the lungs 2 (two) times daily. 36 g 3    gabapentin (NEURONTIN) 100 MG  capsule Take 1 capsule (100 mg total) by mouth 3 (three) times daily. 180 capsule 1    hydroCHLOROthiazide (HYDRODIURIL) 25 MG tablet Take 1 tablet (25 mg total) by mouth once daily. 90 tablet 1    levocetirizine (XYZAL) 5 MG tablet TAKE 1 TABLET EVERY NIGHT AT BEDTIME 90 tablet 3    linaCLOtide (LINZESS) 145 mcg Cap capsule Take 1 capsule (145 mcg total) by mouth before breakfast. 90 capsule 3    pantoprazole (PROTONIX) 40 MG tablet Take 1 tablet (40 mg total) by mouth once daily. 90 tablet 3    RESTASIS 0.05 % ophthalmic emulsion       rosuvastatin (CRESTOR) 20 MG tablet Take 1 tablet (20 mg total) by mouth once daily. 90 tablet 1    theophylline (THEODUR) 300 mg 24 hr capsule Take 300 mg by mouth once daily. One tab Daily with food: (replaced previous script for 100mg tab 3 daily)      [DISCONTINUED] HYDROcodone-acetaminophen (NORCO)  mg per tablet Take 1 tablet by mouth every 6 (six) hours as needed for Pain. Every 4 to 6 hours prn for Chronic back pain 90 tablet 0    [DISCONTINUED] nicotine (NICODERM CQ) 14 mg/24 hr Place 1 patch onto the skin once daily. 30 patch 2     No current facility-administered medications on file prior to visit.     Immunization History   Administered Date(s) Administered    COVID-19, MRNA, LN-S, PF (Pfizer) (Purple Cap) 04/29/2021, 10/28/2021, 04/29/2022    Influenza - Quadrivalent - PF *Preferred* (6 months and older) 09/16/2021    Influenza Split 10/03/2019    Pneumococcal Conjugate - 13 Valent 11/05/2018    Pneumococcal Conjugate - 20 Valent 01/09/2023    Pneumococcal Polysaccharide - 23 Valent 11/05/2019       Review of Systems   Constitutional:  Negative for fever, malaise/fatigue and weight loss.   Respiratory:  Negative for shortness of breath.    Cardiovascular:  Negative for chest pain and palpitations.   Gastrointestinal:  Negative for nausea and vomiting.   Musculoskeletal:  Positive for back pain and myalgias.   Psychiatric/Behavioral:  Negative for depression.        Vitals:    05/10/23 1035   BP: 118/78   Pulse: 90   Resp: 19   Temp: 98.6 °F (37 °C)       Physical Exam  Vitals reviewed.   Constitutional:       Appearance: Normal appearance.   HENT:      Head: Normocephalic.   Eyes:      Extraocular Movements: Extraocular movements intact.      Conjunctiva/sclera: Conjunctivae normal.      Pupils: Pupils are equal, round, and reactive to light.   Neck:      Thyroid: No thyroid mass or thyromegaly.   Cardiovascular:      Rate and Rhythm: Normal rate and regular rhythm.      Heart sounds: Normal heart sounds. No murmur heard.    No gallop.   Pulmonary:      Effort: Pulmonary effort is normal. No respiratory distress.      Breath sounds: Normal breath sounds. No wheezing or rales.   Musculoskeletal:      Lumbar back: Spasms and tenderness present. Decreased range of motion.        Back:    Skin:     General: Skin is warm and dry.      Coloration: Skin is not jaundiced or pale.   Neurological:      Mental Status: She is alert.   Psychiatric:         Mood and Affect: Mood normal.         Behavior: Behavior normal.         Thought Content: Thought content normal.         Judgment: Judgment normal.        Assessment and Plan  Long term (current) use of opiate analgesic  -     POCT Urine Drug Screen Presump    Lumbar disc disease  -     HYDROcodone-acetaminophen (NORCO)  mg per tablet; Take 1 tablet by mouth every 6 (six) hours as needed for Pain. Every 4 to 6 hours prn for Chronic back pain  Dispense: 90 tablet; Refill: 0    Smoker  -     nicotine (NICODERM CQ) 14 mg/24 hr; Place 1 patch onto the skin once daily.  Dispense: 30 patch; Refill: 2    Prediabetes  -     CBC Auto Differential; Future; Expected date: 05/10/2023  -     Comprehensive Metabolic Panel; Future; Expected date: 05/10/2023  -     Hemoglobin A1C; Future; Expected date: 05/10/2023            Return to clinic in 1 month or as needed.    Health Maintenance Topics with due status: Not Due       Topic Last  Completion Date    Mammogram 09/29/2022    Cervical Cancer Screening 10/31/2022    Colorectal Cancer Screening 11/08/2022    Hemoglobin A1c (Diabetic Prevention Screening) 01/09/2023    Lipid Panel 04/10/2023    LDCT Lung Screen 04/19/2023

## 2023-05-12 ENCOUNTER — TELEPHONE (OUTPATIENT)
Dept: FAMILY MEDICINE | Facility: CLINIC | Age: 63
End: 2023-05-12
Payer: COMMERCIAL

## 2023-05-12 NOTE — TELEPHONE ENCOUNTER
----- Message from Magno Yan MD sent at 5/11/2023  7:56 AM CDT -----  She remains prediabetic.  Make sure he decrease his intake of carbohydrates and stays active and plan on rechecking A1c in 3 months.

## 2023-05-12 NOTE — TELEPHONE ENCOUNTER
Pt was notified and was informed she is still prediabetic.  She was instructed to decrease the intake of carbohydrates and stay active and plan on rechecking A1c in 3 months. Pt. Voices understanding.

## 2023-06-12 ENCOUNTER — OFFICE VISIT (OUTPATIENT)
Dept: FAMILY MEDICINE | Facility: CLINIC | Age: 63
End: 2023-06-12
Payer: COMMERCIAL

## 2023-06-12 VITALS
SYSTOLIC BLOOD PRESSURE: 110 MMHG | HEIGHT: 66 IN | HEART RATE: 64 BPM | TEMPERATURE: 98 F | OXYGEN SATURATION: 96 % | RESPIRATION RATE: 19 BRPM | DIASTOLIC BLOOD PRESSURE: 80 MMHG | BODY MASS INDEX: 33.75 KG/M2 | WEIGHT: 210 LBS

## 2023-06-12 DIAGNOSIS — F17.200 SMOKER: ICD-10-CM

## 2023-06-12 DIAGNOSIS — E78.5 DYSLIPIDEMIA: ICD-10-CM

## 2023-06-12 DIAGNOSIS — I10 HYPERTENSION, UNSPECIFIED TYPE: ICD-10-CM

## 2023-06-12 DIAGNOSIS — M51.9 LUMBAR DISC DISEASE: ICD-10-CM

## 2023-06-12 DIAGNOSIS — K21.9 GASTROESOPHAGEAL REFLUX DISEASE, UNSPECIFIED WHETHER ESOPHAGITIS PRESENT: ICD-10-CM

## 2023-06-12 DIAGNOSIS — J30.9 ALLERGIC RHINITIS, UNSPECIFIED SEASONALITY, UNSPECIFIED TRIGGER: Primary | ICD-10-CM

## 2023-06-12 PROCEDURE — 3079F DIAST BP 80-89 MM HG: CPT | Mod: ,,, | Performed by: FAMILY MEDICINE

## 2023-06-12 PROCEDURE — 1160F RVW MEDS BY RX/DR IN RCRD: CPT | Mod: ,,, | Performed by: FAMILY MEDICINE

## 2023-06-12 PROCEDURE — 3044F PR MOST RECENT HEMOGLOBIN A1C LEVEL <7.0%: ICD-10-PCS | Mod: ,,, | Performed by: FAMILY MEDICINE

## 2023-06-12 PROCEDURE — 99213 OFFICE O/P EST LOW 20 MIN: CPT | Mod: ,,, | Performed by: FAMILY MEDICINE

## 2023-06-12 PROCEDURE — 3044F HG A1C LEVEL LT 7.0%: CPT | Mod: ,,, | Performed by: FAMILY MEDICINE

## 2023-06-12 PROCEDURE — 1159F MED LIST DOCD IN RCRD: CPT | Mod: ,,, | Performed by: FAMILY MEDICINE

## 2023-06-12 PROCEDURE — 3008F BODY MASS INDEX DOCD: CPT | Mod: ,,, | Performed by: FAMILY MEDICINE

## 2023-06-12 PROCEDURE — 3074F SYST BP LT 130 MM HG: CPT | Mod: ,,, | Performed by: FAMILY MEDICINE

## 2023-06-12 PROCEDURE — 99213 PR OFFICE/OUTPT VISIT, EST, LEVL III, 20-29 MIN: ICD-10-PCS | Mod: ,,, | Performed by: FAMILY MEDICINE

## 2023-06-12 PROCEDURE — 3079F PR MOST RECENT DIASTOLIC BLOOD PRESSURE 80-89 MM HG: ICD-10-PCS | Mod: ,,, | Performed by: FAMILY MEDICINE

## 2023-06-12 PROCEDURE — 3008F PR BODY MASS INDEX (BMI) DOCUMENTED: ICD-10-PCS | Mod: ,,, | Performed by: FAMILY MEDICINE

## 2023-06-12 PROCEDURE — 1160F PR REVIEW ALL MEDS BY PRESCRIBER/CLIN PHARMACIST DOCUMENTED: ICD-10-PCS | Mod: ,,, | Performed by: FAMILY MEDICINE

## 2023-06-12 PROCEDURE — 3074F PR MOST RECENT SYSTOLIC BLOOD PRESSURE < 130 MM HG: ICD-10-PCS | Mod: ,,, | Performed by: FAMILY MEDICINE

## 2023-06-12 PROCEDURE — 1159F PR MEDICATION LIST DOCUMENTED IN MEDICAL RECORD: ICD-10-PCS | Mod: ,,, | Performed by: FAMILY MEDICINE

## 2023-06-12 RX ORDER — GABAPENTIN 100 MG/1
100 CAPSULE ORAL 3 TIMES DAILY
Qty: 180 CAPSULE | Refills: 1 | Status: SHIPPED | OUTPATIENT
Start: 2023-06-12 | End: 2024-01-10 | Stop reason: SDUPTHER

## 2023-06-12 RX ORDER — BUPROPION HYDROCHLORIDE 150 MG/1
150 TABLET ORAL 2 TIMES DAILY
Qty: 60 TABLET | Refills: 3 | Status: SHIPPED | OUTPATIENT
Start: 2023-06-12 | End: 2023-09-11 | Stop reason: SDUPTHER

## 2023-06-12 RX ORDER — LEVOCETIRIZINE DIHYDROCHLORIDE 5 MG/1
5 TABLET, FILM COATED ORAL NIGHTLY
Qty: 90 TABLET | Refills: 3 | Status: SHIPPED | OUTPATIENT
Start: 2023-06-12

## 2023-06-12 RX ORDER — HYDROCHLOROTHIAZIDE 25 MG/1
25 TABLET ORAL DAILY
Qty: 90 TABLET | Refills: 1 | Status: SHIPPED | OUTPATIENT
Start: 2023-06-12 | End: 2023-09-11 | Stop reason: SDUPTHER

## 2023-06-12 RX ORDER — HYDROCODONE BITARTRATE AND ACETAMINOPHEN 10; 325 MG/1; MG/1
1 TABLET ORAL EVERY 6 HOURS PRN
Qty: 90 TABLET | Refills: 0 | Status: SHIPPED | OUTPATIENT
Start: 2023-06-12 | End: 2023-06-27 | Stop reason: SDUPTHER

## 2023-06-12 RX ORDER — PANTOPRAZOLE SODIUM 40 MG/1
40 TABLET, DELAYED RELEASE ORAL DAILY
Qty: 90 TABLET | Refills: 0 | Status: SHIPPED | OUTPATIENT
Start: 2023-06-12 | End: 2023-09-11 | Stop reason: SDUPTHER

## 2023-06-12 RX ORDER — ROSUVASTATIN CALCIUM 20 MG/1
20 TABLET, COATED ORAL DAILY
Qty: 90 TABLET | Refills: 1 | Status: SHIPPED | OUTPATIENT
Start: 2023-06-12 | End: 2023-09-11 | Stop reason: SDUPTHER

## 2023-06-12 NOTE — PROGRESS NOTES
Nneka Gomes is a 62 y.o. female seen today for follow-up on her chronic pain due to lumbar disc disease.  She reports her symptoms are well controlled with her Norco and declines decreasing her dose at this time.  She is had no signs or symptoms of tolerance or addiction and her  today was appropriate.  Her urine drug screen has only showed her prescribed opiate. She is active in meeting her ADLs.  We did discuss her lab work unfortunately her A1c is now 6.4 and I encouraged her to decrease her intake of carbohydrates and increase her activity.  We will plan on rechecking her A1c in August and hopefully her prediabetes will improve.    Past Medical History:   Diagnosis Date    Abdominal bloating 01/09/2020    Abdominal pain, right upper quadrant 01/09/2020    Abnormal liver enzymes 03/04/2020    Abnormal results of liver function studies 11/18/2020    Acute conjunctivitis 05/30/2014    Acute exacerbation of chronic obstructive airways disease 02/13/2017    Acute pharyngitis 05/19/2020    Acute sinusitis 10/09/2017    Acute upper respiratory infection 05/19/2020    Allergic rhinitis 09/23/2020    Anemia 03/07/2014    Anesthesia of skin 09/06/2017    bilateral fingers    Bilateral primary osteoarthritis of knee 12/02/2019    Bradycardia 01/16/2020    Carpal tunnel syndrome 08/10/2017    Chest pain 03/06/2020    Chronic idiopathic constipation 11/18/2020    Chronic low back pain 11/05/2018    Chronic pain 04/23/2019    Chronic pain syndrome 01/30/2020    COPD (chronic obstructive pulmonary disease) 02/19/2021    Coronavirus infection 05/21/2020    Cough 05/19/2020    Degeneration of lumbar intervertebral disc 09/26/2014    L3-L4 L4-L5 Greater than L5-S1    Depressive disorder 07/03/2019    Disorder of gallbladder 03/30/2015    Dysphagia 01/09/2020    Dyspnea 05/19/2020    Dysuria 05/19/2020    Fatigue 05/19/2020    Frequency of urination 02/16/2015    GERD (gastroesophageal reflux disease) 11/18/2020     Hematuria 06/30/2020    Hemoptysis 02/27/2020    Herpes zoster 09/23/2020    Hyperlipidemia 09/23/2020    Hypertension 03/06/2020    Joint pain 11/04/2019    Knee pain 01/04/2019    Left inguinal hernia 06/10/2019    Long term (current) use of opiate analgesic 02/19/2021    Lumbar spondylosis 11/23/2020    Lumbar sprain 07/16/2012    Malaise 11/20/2014    Melena 09/25/2017    Microscopic hematuria 04/02/2020    Migraine without aura 02/06/2012    Nausea 02/27/2020    Nicotine dependence, cigarettes, uncomplicated 11/11/2020    Nonulcer dyspepsia 01/23/2017    On home O2     Other long term (current) drug therapy 09/23/2020    Other specified urinary incontinence 06/17/2020    Pain in left shoulder 05/02/2019    Pain in left wrist 09/06/2017    and hand    Pain in right shoulder 08/30/2019    Pain in right wrist 09/04/2018    Right hand    Palpitations 03/06/2020    Shoulder joint pain 04/23/2019    Smoker 07/03/2019    Snoring 08/02/2019    Synovial cyst of popliteal space 04/08/2013    Tobacco dependence syndrome 02/19/2021    Unstable angina 01/16/2020    UTI (urinary tract infection) 03/27/2020     Family History   Problem Relation Age of Onset    Rectal cancer Other     Diabetes Other     Heart disease Other     Hypertension Other     Hypertension Mother     Cancer Maternal Aunt      Current Outpatient Medications on File Prior to Visit   Medication Sig Dispense Refill    albuterol (PROAIR HFA) 90 mcg/actuation inhaler Inhale 2 puffs into the lungs every 4 (four) hours as needed for Wheezing. 18 g 5    albuterol (PROVENTIL) 2.5 mg /3 mL (0.083 %) nebulizer solution Take 3 mLs (2.5 mg total) by nebulization 4 (four) times daily as needed for Wheezing. 100 each 5    azelastine (ASTELIN) 137 mcg (0.1 %) nasal spray USE 2 SPRAYS NASALLY TWICE A DAY 90 mL 3    fluticasone propionate (FLONASE) 50 mcg/actuation nasal spray 2 sprays (100 mcg total) by Each Nostril route once daily. 16 g 5    fluticasone propionate  (FLOVENT HFA) 220 mcg/actuation inhaler Inhale 2 puffs into the lungs 2 (two) times daily. 36 g 3    linaCLOtide (LINZESS) 145 mcg Cap capsule Take 1 capsule (145 mcg total) by mouth before breakfast. 90 capsule 3    nicotine (NICODERM CQ) 14 mg/24 hr Place 1 patch onto the skin once daily. 30 patch 2    RESTASIS 0.05 % ophthalmic emulsion       theophylline (THEODUR) 300 mg 24 hr capsule Take 300 mg by mouth once daily. One tab Daily with food: (replaced previous script for 100mg tab 3 daily)      [DISCONTINUED] buPROPion (WELLBUTRIN XL) 150 MG TB24 tablet Take 1 tablet (150 mg total) by mouth 2 (two) times a day. 180 tablet 1    [DISCONTINUED] gabapentin (NEURONTIN) 100 MG capsule Take 1 capsule (100 mg total) by mouth 3 (three) times daily. 180 capsule 1    [DISCONTINUED] hydroCHLOROthiazide (HYDRODIURIL) 25 MG tablet Take 1 tablet (25 mg total) by mouth once daily. 90 tablet 1    [DISCONTINUED] HYDROcodone-acetaminophen (NORCO)  mg per tablet Take 1 tablet by mouth every 6 (six) hours as needed for Pain. Every 4 to 6 hours prn for Chronic back pain 90 tablet 0    [DISCONTINUED] levocetirizine (XYZAL) 5 MG tablet TAKE 1 TABLET EVERY NIGHT AT BEDTIME 90 tablet 3    [DISCONTINUED] pantoprazole (PROTONIX) 40 MG tablet Take 1 tablet (40 mg total) by mouth once daily. 90 tablet 3    [DISCONTINUED] rosuvastatin (CRESTOR) 20 MG tablet Take 1 tablet (20 mg total) by mouth once daily. 90 tablet 1     No current facility-administered medications on file prior to visit.     Immunization History   Administered Date(s) Administered    COVID-19, MRNA, LN-S, PF (Pfizer) (Purple Cap) 04/29/2021, 10/28/2021, 04/29/2022    Influenza - Quadrivalent - PF *Preferred* (6 months and older) 09/16/2021    Influenza Split 10/03/2019    Pneumococcal Conjugate - 13 Valent 11/05/2018    Pneumococcal Conjugate - 20 Valent 01/09/2023    Pneumococcal Polysaccharide - 23 Valent 11/05/2019       Review of Systems   Constitutional:  Negative  for fever, malaise/fatigue and weight loss.   Respiratory:  Negative for shortness of breath.    Cardiovascular:  Negative for chest pain and palpitations.   Gastrointestinal:  Negative for nausea and vomiting.   Musculoskeletal:  Positive for back pain and myalgias.   Psychiatric/Behavioral:  Negative for depression.       Vitals:    06/12/23 1038   BP: 110/80   Pulse: 64   Resp: 19   Temp: 97.7 °F (36.5 °C)       Physical Exam  Vitals reviewed.   Constitutional:       Appearance: Normal appearance.   HENT:      Head: Normocephalic.   Eyes:      Extraocular Movements: Extraocular movements intact.      Conjunctiva/sclera: Conjunctivae normal.      Pupils: Pupils are equal, round, and reactive to light.   Neck:      Thyroid: No thyroid mass or thyromegaly.   Cardiovascular:      Rate and Rhythm: Normal rate and regular rhythm.      Heart sounds: Normal heart sounds. No murmur heard.    No gallop.   Pulmonary:      Effort: Pulmonary effort is normal. No respiratory distress.      Breath sounds: Normal breath sounds. No wheezing or rales.   Musculoskeletal:      Lumbar back: Spasms and tenderness present. Decreased range of motion.        Back:    Skin:     General: Skin is warm and dry.      Coloration: Skin is not jaundiced or pale.   Neurological:      Mental Status: She is alert.   Psychiatric:         Mood and Affect: Mood normal.         Behavior: Behavior normal.         Thought Content: Thought content normal.         Judgment: Judgment normal.        Assessment and Plan  Allergic rhinitis, unspecified seasonality, unspecified trigger  -     levocetirizine (XYZAL) 5 MG tablet; Take 1 tablet (5 mg total) by mouth every evening.  Dispense: 90 tablet; Refill: 3    Dyslipidemia  -     rosuvastatin (CRESTOR) 20 MG tablet; Take 1 tablet (20 mg total) by mouth once daily.  Dispense: 90 tablet; Refill: 1    Gastroesophageal reflux disease, unspecified whether esophagitis present  -     pantoprazole (PROTONIX) 40 MG  tablet; Take 1 tablet (40 mg total) by mouth once daily.  Dispense: 90 tablet; Refill: 0    Lumbar disc disease  -     HYDROcodone-acetaminophen (NORCO)  mg per tablet; Take 1 tablet by mouth every 6 (six) hours as needed for Pain. Every 4 to 6 hours prn for Chronic back pain  Dispense: 90 tablet; Refill: 0  -     gabapentin (NEURONTIN) 100 MG capsule; Take 1 capsule (100 mg total) by mouth 3 (three) times daily.  Dispense: 180 capsule; Refill: 1    Hypertension, unspecified type  -     hydroCHLOROthiazide (HYDRODIURIL) 25 MG tablet; Take 1 tablet (25 mg total) by mouth once daily.  Dispense: 90 tablet; Refill: 1    Smoker  -     buPROPion (WELLBUTRIN XL) 150 MG TB24 tablet; Take 1 tablet (150 mg total) by mouth 2 (two) times a day.  Dispense: 60 tablet; Refill: 3            Return to clinic in 1 month or as needed.    Health Maintenance Topics with due status: Not Due       Topic Last Completion Date    Mammogram 09/29/2022    Cervical Cancer Screening 10/31/2022    Colorectal Cancer Screening 11/08/2022    Lipid Panel 04/10/2023    LDCT Lung Screen 04/19/2023    Hemoglobin A1c (Prediabetes) 05/10/2023

## 2023-06-27 DIAGNOSIS — M51.9 LUMBAR DISC DISEASE: ICD-10-CM

## 2023-06-27 RX ORDER — HYDROCODONE BITARTRATE AND ACETAMINOPHEN 10; 325 MG/1; MG/1
1 TABLET ORAL EVERY 6 HOURS PRN
Qty: 90 TABLET | Refills: 0 | Status: SHIPPED | OUTPATIENT
Start: 2023-06-27 | End: 2023-08-10 | Stop reason: SDUPTHER

## 2023-07-05 ENCOUNTER — OFFICE VISIT (OUTPATIENT)
Dept: PULMONOLOGY | Facility: CLINIC | Age: 63
End: 2023-07-05
Payer: COMMERCIAL

## 2023-07-05 VITALS
OXYGEN SATURATION: 97 % | HEART RATE: 91 BPM | WEIGHT: 210 LBS | SYSTOLIC BLOOD PRESSURE: 118 MMHG | HEIGHT: 66 IN | RESPIRATION RATE: 16 BRPM | BODY MASS INDEX: 33.75 KG/M2 | DIASTOLIC BLOOD PRESSURE: 84 MMHG

## 2023-07-05 DIAGNOSIS — J44.9 CHRONIC OBSTRUCTIVE PULMONARY DISEASE, UNSPECIFIED COPD TYPE: Primary | ICD-10-CM

## 2023-07-05 DIAGNOSIS — Z72.0 TOBACCO USE: ICD-10-CM

## 2023-07-05 PROCEDURE — 3044F HG A1C LEVEL LT 7.0%: CPT | Mod: CPTII,,, | Performed by: INTERNAL MEDICINE

## 2023-07-05 PROCEDURE — 99213 OFFICE O/P EST LOW 20 MIN: CPT | Mod: S$PBB,,, | Performed by: INTERNAL MEDICINE

## 2023-07-05 PROCEDURE — 3008F BODY MASS INDEX DOCD: CPT | Mod: CPTII,,, | Performed by: INTERNAL MEDICINE

## 2023-07-05 PROCEDURE — 3074F SYST BP LT 130 MM HG: CPT | Mod: CPTII,,, | Performed by: INTERNAL MEDICINE

## 2023-07-05 PROCEDURE — 3079F DIAST BP 80-89 MM HG: CPT | Mod: CPTII,,, | Performed by: INTERNAL MEDICINE

## 2023-07-05 PROCEDURE — 1159F MED LIST DOCD IN RCRD: CPT | Mod: CPTII,,, | Performed by: INTERNAL MEDICINE

## 2023-07-05 PROCEDURE — 3044F PR MOST RECENT HEMOGLOBIN A1C LEVEL <7.0%: ICD-10-PCS | Mod: CPTII,,, | Performed by: INTERNAL MEDICINE

## 2023-07-05 PROCEDURE — 1159F PR MEDICATION LIST DOCUMENTED IN MEDICAL RECORD: ICD-10-PCS | Mod: CPTII,,, | Performed by: INTERNAL MEDICINE

## 2023-07-05 PROCEDURE — 3008F PR BODY MASS INDEX (BMI) DOCUMENTED: ICD-10-PCS | Mod: CPTII,,, | Performed by: INTERNAL MEDICINE

## 2023-07-05 PROCEDURE — 3079F PR MOST RECENT DIASTOLIC BLOOD PRESSURE 80-89 MM HG: ICD-10-PCS | Mod: CPTII,,, | Performed by: INTERNAL MEDICINE

## 2023-07-05 PROCEDURE — 99215 OFFICE O/P EST HI 40 MIN: CPT | Mod: PBBFAC | Performed by: INTERNAL MEDICINE

## 2023-07-05 PROCEDURE — 99213 PR OFFICE/OUTPT VISIT, EST, LEVL III, 20-29 MIN: ICD-10-PCS | Mod: S$PBB,,, | Performed by: INTERNAL MEDICINE

## 2023-07-05 PROCEDURE — 3074F PR MOST RECENT SYSTOLIC BLOOD PRESSURE < 130 MM HG: ICD-10-PCS | Mod: CPTII,,, | Performed by: INTERNAL MEDICINE

## 2023-07-05 RX ORDER — ALBUTEROL SULFATE 90 UG/1
2 AEROSOL, METERED RESPIRATORY (INHALATION) EVERY 4 HOURS PRN
Qty: 18 G | Refills: 5 | Status: SHIPPED | OUTPATIENT
Start: 2023-07-05 | End: 2024-03-07 | Stop reason: SDUPTHER

## 2023-07-05 RX ORDER — FLUTICASONE FUROATE, UMECLIDINIUM BROMIDE AND VILANTEROL TRIFENATATE 200; 62.5; 25 UG/1; UG/1; UG/1
1 POWDER RESPIRATORY (INHALATION) DAILY
Qty: 60 EACH | Refills: 5 | Status: SHIPPED | OUTPATIENT
Start: 2023-07-05

## 2023-07-05 NOTE — PROGRESS NOTES
Subjective:       Patient ID: Nneka Gomes is a 62 y.o. female.    Chief Complaint: COPD and Chest Pain (Patient states she has been having problems with rosuvastatin.)    COPD  This is a chronic problem. The current episode started more than 1 month ago. The problem has been unchanged. Associated symptoms include chest pain. Pertinent negatives include no abdominal pain, arthralgias, chills, congestion, headaches or rash.   Chest Pain   Pertinent negatives include no abdominal pain, back pain, dizziness, headaches or palpitations.   Past Medical History:   Diagnosis Date    Abdominal bloating 01/09/2020    Abdominal pain, right upper quadrant 01/09/2020    Abnormal liver enzymes 03/04/2020    Abnormal results of liver function studies 11/18/2020    Acute conjunctivitis 05/30/2014    Acute exacerbation of chronic obstructive airways disease 02/13/2017    Acute pharyngitis 05/19/2020    Acute sinusitis 10/09/2017    Acute upper respiratory infection 05/19/2020    Allergic rhinitis 09/23/2020    Anemia 03/07/2014    Anesthesia of skin 09/06/2017    bilateral fingers    Bilateral primary osteoarthritis of knee 12/02/2019    Bradycardia 01/16/2020    Carpal tunnel syndrome 08/10/2017    Chest pain 03/06/2020    Chronic idiopathic constipation 11/18/2020    Chronic low back pain 11/05/2018    Chronic pain 04/23/2019    Chronic pain syndrome 01/30/2020    COPD (chronic obstructive pulmonary disease) 02/19/2021    Coronavirus infection 05/21/2020    Cough 05/19/2020    Degeneration of lumbar intervertebral disc 09/26/2014    L3-L4 L4-L5 Greater than L5-S1    Depressive disorder 07/03/2019    Disorder of gallbladder 03/30/2015    Dysphagia 01/09/2020    Dyspnea 05/19/2020    Dysuria 05/19/2020    Fatigue 05/19/2020    Frequency of urination 02/16/2015    GERD (gastroesophageal reflux disease) 11/18/2020    Hematuria 06/30/2020    Hemoptysis 02/27/2020    Herpes zoster 09/23/2020    Hyperlipidemia 09/23/2020     Hypertension 2020    Joint pain 2019    Knee pain 2019    Left inguinal hernia 06/10/2019    Long term (current) use of opiate analgesic 2021    Lumbar spondylosis 2020    Lumbar sprain 2012    Malaise 2014    Melena 2017    Microscopic hematuria 2020    Migraine without aura 2012    Nausea 2020    Nicotine dependence, cigarettes, uncomplicated 2020    Nonulcer dyspepsia 2017    On home O2     Other long term (current) drug therapy 2020    Other specified urinary incontinence 2020    Pain in left shoulder 2019    Pain in left wrist 2017    and hand    Pain in right shoulder 2019    Pain in right wrist 2018    Right hand    Palpitations 2020    Shoulder joint pain 2019    Smoker 2019    Snoring 2019    Synovial cyst of popliteal space 2013    Tobacco dependence syndrome 2021    Unstable angina 2020    UTI (urinary tract infection) 2020     Past Surgical History:   Procedure Laterality Date    CARDIAC CATHETERIZATION      CHOLECYSTECTOMY      HERNIA REPAIR      ROTATOR CUFF REPAIR       Family History   Problem Relation Age of Onset    Rectal cancer Other     Diabetes Other     Heart disease Other     Hypertension Other     Hypertension Mother     Cancer Maternal Aunt      Review of patient's allergies indicates:  No Known Allergies   Social History     Tobacco Use    Smoking status: Former     Packs/day: 1.00     Years: 40.00     Pack years: 40.00     Types: Cigarettes     Quit date: 2021     Years since quittin.0     Passive exposure: Current    Smokeless tobacco: Former    Tobacco comments:     Pt has been quit for almost 3 months   Substance Use Topics    Alcohol use: Not Currently     Comment: occ    Drug use: Not Currently     Types: Marijuana      Review of Systems   Constitutional:  Negative for chills, activity change and night sweats.   HENT:   Negative for congestion and ear pain.    Eyes:  Negative for redness and itching.   Cardiovascular:  Positive for chest pain. Negative for palpitations.   Musculoskeletal:  Negative for arthralgias and back pain.   Skin:  Negative for rash.   Gastrointestinal:  Negative for abdominal pain and abdominal distention.   Neurological:  Negative for dizziness and headaches.   Hematological:  Negative for adenopathy. Does not bruise/bleed easily.   Psychiatric/Behavioral:  Negative for confusion. The patient is not nervous/anxious.      Objective:      Physical Exam   Constitutional: She is oriented to person, place, and time. She appears well-developed and well-nourished.   HENT:   Head: Normocephalic.   Nose: Nose normal.   Mouth/Throat: Oropharynx is clear and moist.   Neck: No JVD present. No thyromegaly present.   Cardiovascular: Normal rate, regular rhythm, normal heart sounds and intact distal pulses.   Pulmonary/Chest: Normal expansion, hyperinflation, symmetric chest wall expansion, effort normal and breath sounds normal.   Abdominal: Soft. Bowel sounds are normal.   Musculoskeletal:         General: Normal range of motion.      Cervical back: Normal range of motion and neck supple.   Lymphadenopathy: No supraclavicular adenopathy is present.     She has no cervical adenopathy.   Neurological: She is alert and oriented to person, place, and time. She has normal reflexes.   Skin: Skin is warm and dry.   Psychiatric: She has a normal mood and affect. Her behavior is normal.   Personal Diagnostic Review  none pertinent    No flowsheet data found.      Assessment:       1. Chronic obstructive pulmonary disease, unspecified COPD type    2. Tobacco use        Outpatient Encounter Medications as of 7/5/2023   Medication Sig Dispense Refill    albuterol (PROVENTIL) 2.5 mg /3 mL (0.083 %) nebulizer solution Take 3 mLs (2.5 mg total) by nebulization 4 (four) times daily as needed for Wheezing. 100 each 5    azelastine  (ASTELIN) 137 mcg (0.1 %) nasal spray USE 2 SPRAYS NASALLY TWICE A DAY 90 mL 3    buPROPion (WELLBUTRIN XL) 150 MG TB24 tablet Take 1 tablet (150 mg total) by mouth 2 (two) times a day. 60 tablet 3    fluticasone propionate (FLONASE) 50 mcg/actuation nasal spray 2 sprays (100 mcg total) by Each Nostril route once daily. 16 g 5    gabapentin (NEURONTIN) 100 MG capsule Take 1 capsule (100 mg total) by mouth 3 (three) times daily. 180 capsule 1    hydroCHLOROthiazide (HYDRODIURIL) 25 MG tablet Take 1 tablet (25 mg total) by mouth once daily. 90 tablet 1    HYDROcodone-acetaminophen (NORCO)  mg per tablet Take 1 tablet by mouth every 6 (six) hours as needed for Pain. Every 4 to 6 hours prn for Chronic back pain 90 tablet 0    levocetirizine (XYZAL) 5 MG tablet Take 1 tablet (5 mg total) by mouth every evening. 90 tablet 3    linaCLOtide (LINZESS) 145 mcg Cap capsule Take 1 capsule (145 mcg total) by mouth before breakfast. 90 capsule 3    pantoprazole (PROTONIX) 40 MG tablet Take 1 tablet (40 mg total) by mouth once daily. 90 tablet 0    RESTASIS 0.05 % ophthalmic emulsion       rosuvastatin (CRESTOR) 20 MG tablet Take 1 tablet (20 mg total) by mouth once daily. 90 tablet 1    [DISCONTINUED] albuterol (PROAIR HFA) 90 mcg/actuation inhaler Inhale 2 puffs into the lungs every 4 (four) hours as needed for Wheezing. 18 g 5    [DISCONTINUED] fluticasone propionate (FLOVENT HFA) 220 mcg/actuation inhaler Inhale 2 puffs into the lungs 2 (two) times daily. 36 g 3    [DISCONTINUED] theophylline (THEODUR) 300 mg 24 hr capsule Take 300 mg by mouth once daily. One tab Daily with food: (replaced previous script for 100mg tab 3 daily) 90 capsule 3    albuterol (PROAIR HFA) 90 mcg/actuation inhaler Inhale 2 puffs into the lungs every 4 (four) hours as needed for Wheezing. 18 g 5    fluticasone-umeclidin-vilanter (TRELEGY ELLIPTA) 200-62.5-25 mcg inhaler Inhale 1 puff into the lungs once daily. 60 each 5    nicotine (NICODERM CQ)  14 mg/24 hr Place 1 patch onto the skin once daily. (Patient not taking: Reported on 7/5/2023) 30 patch 2    theophylline (THEODUR) 300 mg 24 hr capsule Take 300 mg by mouth once daily. One tab Daily with food: (replaced previous script for 100mg tab 3 daily) 90 capsule 3    [DISCONTINUED] HYDROcodone-acetaminophen (NORCO)  mg per tablet Take 1 tablet by mouth every 6 (six) hours as needed for Pain. Every 4 to 6 hours prn for Chronic back pain 90 tablet 0     No facility-administered encounter medications on file as of 7/5/2023.     Orders Placed This Encounter   Procedures    Complete PFT with bronchodilator     Standing Status:   Future     Standing Expiration Date:   7/5/2024     Order Specific Question:   Release to patient     Answer:   Immediate       Plan:       Problem List Items Addressed This Visit          Pulmonary    Chronic obstructive pulmonary disease - Primary     Patient presents today for refills the medicines she is been having little more trouble with shortness of breath she has quit smoking last 3 months she is interested in a lung transplant I think were we start here is to get some PFTs and see what her function looks like we have not done them in over 7 years.  Change her medicines from Flovent 2 Trelegy.  Continue theophylline and Ventolin         Relevant Medications    fluticasone-umeclidin-vilanter (TRELEGY ELLIPTA) 200-62.5-25 mcg inhaler    theophylline (THEODUR) 300 mg 24 hr capsule    albuterol (PROAIR HFA) 90 mcg/actuation inhaler    Other Relevant Orders    Complete PFT with bronchodilator       Other    Tobacco use     Quit smoking

## 2023-07-05 NOTE — ASSESSMENT & PLAN NOTE
Patient presents today for refills the medicines she is been having little more trouble with shortness of breath she has quit smoking last 3 months she is interested in a lung transplant I think were we start here is to get some PFTs and see what her function looks like we have not done them in over 7 years.  Change her medicines from Flovent 2 Trelegy.  Continue theophylline and Ventolin

## 2023-07-09 DIAGNOSIS — Z71.89 COMPLEX CARE COORDINATION: ICD-10-CM

## 2023-07-11 ENCOUNTER — OFFICE VISIT (OUTPATIENT)
Dept: FAMILY MEDICINE | Facility: CLINIC | Age: 63
End: 2023-07-11
Payer: COMMERCIAL

## 2023-07-11 VITALS
HEIGHT: 66 IN | HEART RATE: 77 BPM | BODY MASS INDEX: 33.11 KG/M2 | TEMPERATURE: 98 F | RESPIRATION RATE: 18 BRPM | WEIGHT: 206 LBS | OXYGEN SATURATION: 97 % | SYSTOLIC BLOOD PRESSURE: 112 MMHG | DIASTOLIC BLOOD PRESSURE: 78 MMHG

## 2023-07-11 DIAGNOSIS — Z79.891 LONG TERM (CURRENT) USE OF OPIATE ANALGESIC: Primary | ICD-10-CM

## 2023-07-11 DIAGNOSIS — M51.9 LUMBAR DISC DISEASE: ICD-10-CM

## 2023-07-11 PROCEDURE — 99212 OFFICE O/P EST SF 10 MIN: CPT | Mod: ,,, | Performed by: NURSE PRACTITIONER

## 2023-07-11 PROCEDURE — 3008F BODY MASS INDEX DOCD: CPT | Mod: ,,, | Performed by: NURSE PRACTITIONER

## 2023-07-11 PROCEDURE — 3074F SYST BP LT 130 MM HG: CPT | Mod: ,,, | Performed by: NURSE PRACTITIONER

## 2023-07-11 PROCEDURE — 3008F PR BODY MASS INDEX (BMI) DOCUMENTED: ICD-10-PCS | Mod: ,,, | Performed by: NURSE PRACTITIONER

## 2023-07-11 PROCEDURE — 1160F RVW MEDS BY RX/DR IN RCRD: CPT | Mod: ,,, | Performed by: NURSE PRACTITIONER

## 2023-07-11 PROCEDURE — 1159F MED LIST DOCD IN RCRD: CPT | Mod: ,,, | Performed by: NURSE PRACTITIONER

## 2023-07-11 PROCEDURE — 99212 PR OFFICE/OUTPT VISIT, EST, LEVL II, 10-19 MIN: ICD-10-PCS | Mod: ,,, | Performed by: NURSE PRACTITIONER

## 2023-07-11 PROCEDURE — 3074F PR MOST RECENT SYSTOLIC BLOOD PRESSURE < 130 MM HG: ICD-10-PCS | Mod: ,,, | Performed by: NURSE PRACTITIONER

## 2023-07-11 PROCEDURE — 3078F DIAST BP <80 MM HG: CPT | Mod: ,,, | Performed by: NURSE PRACTITIONER

## 2023-07-11 PROCEDURE — 1159F PR MEDICATION LIST DOCUMENTED IN MEDICAL RECORD: ICD-10-PCS | Mod: ,,, | Performed by: NURSE PRACTITIONER

## 2023-07-11 PROCEDURE — 3044F PR MOST RECENT HEMOGLOBIN A1C LEVEL <7.0%: ICD-10-PCS | Mod: ,,, | Performed by: NURSE PRACTITIONER

## 2023-07-11 PROCEDURE — 3044F HG A1C LEVEL LT 7.0%: CPT | Mod: ,,, | Performed by: NURSE PRACTITIONER

## 2023-07-11 PROCEDURE — 1160F PR REVIEW ALL MEDS BY PRESCRIBER/CLIN PHARMACIST DOCUMENTED: ICD-10-PCS | Mod: ,,, | Performed by: NURSE PRACTITIONER

## 2023-07-11 PROCEDURE — 3078F PR MOST RECENT DIASTOLIC BLOOD PRESSURE < 80 MM HG: ICD-10-PCS | Mod: ,,, | Performed by: NURSE PRACTITIONER

## 2023-07-11 NOTE — PROGRESS NOTES
[facility] Medicine    Chief Complaint      Chief Complaint   Patient presents with    Medication Refill     [place]- last taken last night       History of Present Illness      Nneka Gomes is a 62 y.o. female. She  has a past medical history of Abdominal bloating (01/09/2020), Abdominal pain, right upper quadrant (01/09/2020), Abnormal liver enzymes (03/04/2020), Abnormal results of liver function studies (11/18/2020), Acute conjunctivitis (05/30/2014), Acute exacerbation of chronic obstructive airways disease (02/13/2017), Acute pharyngitis (05/19/2020), Acute sinusitis (10/09/2017), Acute upper respiratory infection (05/19/2020), Allergic rhinitis (09/23/2020), Anemia (03/07/2014), Anesthesia of skin (09/06/2017), Bilateral primary osteoarthritis of knee (12/02/2019), Bradycardia (01/16/2020), Carpal tunnel syndrome (08/10/2017), Chest pain (03/06/2020), Chronic idiopathic constipation (11/18/2020), Chronic low back pain (11/05/2018), Chronic pain (04/23/2019), Chronic pain syndrome (01/30/2020), COPD (chronic obstructive pulmonary disease) (02/19/2021), Coronavirus infection (05/21/2020), Cough (05/19/2020), Degeneration of lumbar intervertebral disc (09/26/2014), Depressive disorder (07/03/2019), Disorder of gallbladder (03/30/2015), Dysphagia (01/09/2020), Dyspnea (05/19/2020), Dysuria (05/19/2020), Fatigue (05/19/2020), Frequency of urination (02/16/2015), GERD (gastroesophageal reflux disease) (11/18/2020), Hematuria (06/30/2020), Hemoptysis (02/27/2020), Herpes zoster (09/23/2020), Hyperlipidemia (09/23/2020), Hypertension (03/06/2020), Joint pain (11/04/2019), Knee pain (01/04/2019), Left inguinal hernia (06/10/2019), Long term (current) use of opiate analgesic (02/19/2021), Lumbar spondylosis (11/23/2020), Lumbar sprain (07/16/2012), Malaise (11/20/2014), Melena (09/25/2017), Microscopic hematuria (04/02/2020), Migraine without aura (02/06/2012), Nausea (02/27/2020), Nicotine dependence, cigarettes,  uncomplicated (11/11/2020), Nonulcer dyspepsia (01/23/2017), On home O2, Other long term (current) drug therapy (09/23/2020), Other specified urinary incontinence (06/17/2020), Pain in left shoulder (05/02/2019), Pain in left wrist (09/06/2017), Pain in right shoulder (08/30/2019), Pain in right wrist (09/04/2018), Palpitations (03/06/2020), Shoulder joint pain (04/23/2019), Smoker (07/03/2019), Snoring (08/02/2019), Synovial cyst of popliteal space (04/08/2013), Tobacco dependence syndrome (02/19/2021), Unstable angina (01/16/2020), and UTI (urinary tract infection) (03/27/2020)., who presents today for refill of her pain medication. Her UDS and controlled substance agreement are UTD.  Rx was printed by Dr. Yan.   reviewed.     Past Medical History:  Past Medical History:   Diagnosis Date    Abdominal bloating 01/09/2020    Abdominal pain, right upper quadrant 01/09/2020    Abnormal liver enzymes 03/04/2020    Abnormal results of liver function studies 11/18/2020    Acute conjunctivitis 05/30/2014    Acute exacerbation of chronic obstructive airways disease 02/13/2017    Acute pharyngitis 05/19/2020    Acute sinusitis 10/09/2017    Acute upper respiratory infection 05/19/2020    Allergic rhinitis 09/23/2020    Anemia 03/07/2014    Anesthesia of skin 09/06/2017    bilateral fingers    Bilateral primary osteoarthritis of knee 12/02/2019    Bradycardia 01/16/2020    Carpal tunnel syndrome 08/10/2017    Chest pain 03/06/2020    Chronic idiopathic constipation 11/18/2020    Chronic low back pain 11/05/2018    Chronic pain 04/23/2019    Chronic pain syndrome 01/30/2020    COPD (chronic obstructive pulmonary disease) 02/19/2021    Coronavirus infection 05/21/2020    Cough 05/19/2020    Degeneration of lumbar intervertebral disc 09/26/2014    L3-L4 L4-L5 Greater than L5-S1    Depressive disorder 07/03/2019    Disorder of gallbladder 03/30/2015    Dysphagia 01/09/2020    Dyspnea 05/19/2020    Dysuria 05/19/2020     Fatigue 2020    Frequency of urination 2015    GERD (gastroesophageal reflux disease) 2020    Hematuria 2020    Hemoptysis 2020    Herpes zoster 2020    Hyperlipidemia 2020    Hypertension 2020    Joint pain 2019    Knee pain 2019    Left inguinal hernia 06/10/2019    Long term (current) use of opiate analgesic 2021    Lumbar spondylosis 2020    Lumbar sprain 2012    Malaise 2014    Melena 2017    Microscopic hematuria 2020    Migraine without aura 2012    Nausea 2020    Nicotine dependence, cigarettes, uncomplicated 2020    Nonulcer dyspepsia 2017    On home O2     Other long term (current) drug therapy 2020    Other specified urinary incontinence 2020    Pain in left shoulder 2019    Pain in left wrist 2017    and hand    Pain in right shoulder 2019    Pain in right wrist 2018    Right hand    Palpitations 2020    Shoulder joint pain 2019    Smoker 2019    Snoring 2019    Synovial cyst of popliteal space 2013    Tobacco dependence syndrome 2021    Unstable angina 2020    UTI (urinary tract infection) 2020       Past Surgical History:   has a past surgical history that includes Cholecystectomy; Hernia repair; Rotator cuff repair; and Cardiac catheterization.    Social History:  Social History     Tobacco Use    Smoking status: Former     Packs/day: 1.00     Years: 40.00     Pack years: 40.00     Types: Cigarettes     Quit date: 2021     Years since quittin.0     Passive exposure: Current    Smokeless tobacco: Former    Tobacco comments:     Pt has been quit for almost 3 months   Substance Use Topics    Alcohol use: Not Currently     Comment: occ    Drug use: Not Currently     Types: Marijuana       I personally reviewed all past medical, surgical, and social.     Review of Systems   Constitutional:  Negative  for fatigue.   Respiratory:  Negative for shortness of breath.    Cardiovascular: Negative.    Gastrointestinal:  Negative for abdominal pain and constipation.   Musculoskeletal:  Positive for arthralgias and back pain.   Skin: Negative.    Neurological:  Positive for numbness. Negative for weakness.      Medications:  Outpatient Encounter Medications as of 7/11/2023   Medication Sig Dispense Refill    albuterol (PROAIR HFA) 90 mcg/actuation inhaler Inhale 2 puffs into the lungs every 4 (four) hours as needed for Wheezing. 18 g 5    albuterol (PROVENTIL) 2.5 mg /3 mL (0.083 %) nebulizer solution Take 3 mLs (2.5 mg total) by nebulization 4 (four) times daily as needed for Wheezing. 100 each 5    azelastine (ASTELIN) 137 mcg (0.1 %) nasal spray USE 2 SPRAYS NASALLY TWICE A DAY 90 mL 3    buPROPion (WELLBUTRIN XL) 150 MG TB24 tablet Take 1 tablet (150 mg total) by mouth 2 (two) times a day. 60 tablet 3    fluticasone propionate (FLONASE) 50 mcg/actuation nasal spray 2 sprays (100 mcg total) by Each Nostril route once daily. 16 g 5    fluticasone-umeclidin-vilanter (TRELEGY ELLIPTA) 200-62.5-25 mcg inhaler Inhale 1 puff into the lungs once daily. 60 each 5    gabapentin (NEURONTIN) 100 MG capsule Take 1 capsule (100 mg total) by mouth 3 (three) times daily. 180 capsule 1    hydroCHLOROthiazide (HYDRODIURIL) 25 MG tablet Take 1 tablet (25 mg total) by mouth once daily. 90 tablet 1    HYDROcodone-acetaminophen (NORCO)  mg per tablet Take 1 tablet by mouth every 6 (six) hours as needed for Pain. Every 4 to 6 hours prn for Chronic back pain 90 tablet 0    levocetirizine (XYZAL) 5 MG tablet Take 1 tablet (5 mg total) by mouth every evening. 90 tablet 3    linaCLOtide (LINZESS) 145 mcg Cap capsule Take 1 capsule (145 mcg total) by mouth before breakfast. 90 capsule 3    pantoprazole (PROTONIX) 40 MG tablet Take 1 tablet (40 mg total) by mouth once daily. 90 tablet 0    RESTASIS 0.05 % ophthalmic emulsion        rosuvastatin (CRESTOR) 20 MG tablet Take 1 tablet (20 mg total) by mouth once daily. 90 tablet 1    theophylline (THEODUR) 300 mg 24 hr capsule Take 300 mg by mouth once daily. One tab Daily with food: (replaced previous script for 100mg tab 3 daily) 90 capsule 3    [DISCONTINUED] albuterol (PROAIR HFA) 90 mcg/actuation inhaler Inhale 2 puffs into the lungs every 4 (four) hours as needed for Wheezing. 18 g 5    [DISCONTINUED] fluticasone propionate (FLOVENT HFA) 220 mcg/actuation inhaler Inhale 2 puffs into the lungs 2 (two) times daily. 36 g 3    [DISCONTINUED] HYDROcodone-acetaminophen (NORCO)  mg per tablet Take 1 tablet by mouth every 6 (six) hours as needed for Pain. Every 4 to 6 hours prn for Chronic back pain 90 tablet 0    [DISCONTINUED] nicotine (NICODERM CQ) 14 mg/24 hr Place 1 patch onto the skin once daily. (Patient not taking: Reported on 7/5/2023) 30 patch 2    [DISCONTINUED] theophylline (THEODUR) 300 mg 24 hr capsule Take 300 mg by mouth once daily. One tab Daily with food: (replaced previous script for 100mg tab 3 daily)      [DISCONTINUED] theophylline (THEODUR) 300 mg 24 hr capsule Take 300 mg by mouth once daily. One tab Daily with food: (replaced previous script for 100mg tab 3 daily) 90 capsule 3    [DISCONTINUED] theophylline (THEODUR) 300 mg 24 hr capsule Take 300 mg by mouth once daily. One tab Daily with food: (replaced previous script for 100mg tab 3 daily) 90 capsule 3     No facility-administered encounter medications on file as of 7/11/2023.       Allergies:  Review of patient's allergies indicates:  No Known Allergies    Health Maintenance:  Immunization History   Administered Date(s) Administered    COVID-19, MRNA, LN-S, PF (Pfizer) (Purple Cap) 04/29/2021, 10/28/2021, 04/29/2022    Influenza - Quadrivalent - PF *Preferred* (6 months and older) 09/16/2021    Influenza Split 10/03/2019    Pneumococcal Conjugate - 13 Valent 11/05/2018    Pneumococcal Conjugate - 20 Valent  "01/09/2023    Pneumococcal Polysaccharide - 23 Valent 11/05/2019      Health Maintenance   Topic Date Due    TETANUS VACCINE  Never done    Mammogram  09/29/2023    Lipid Panel  04/10/2024    LDCT Lung Screen  04/19/2024    Hepatitis C Screening  Completed        Physical Exam      Vital Signs  Temp: 98.2 °F (36.8 °C)  Temp Source: Oral  Pulse: 77  Resp: 18  SpO2: 97 %  BP: 112/78  BP Location: Right arm  Patient Position: Sitting  Pain Score: 0-No pain  Height and Weight  Height: 5' 6" (167.6 cm)  Weight: 93.4 kg (206 lb)  BSA (Calculated - sq m): 2.09 sq meters  BMI (Calculated): 33.3  Weight in (lb) to have BMI = 25: 154.6]    Physical Exam  Vitals and nursing note reviewed.   Constitutional:       Appearance: Normal appearance. She is well-developed.   HENT:      Head: Normocephalic.      Right Ear: Hearing normal.      Left Ear: Hearing normal.      Nose: Nose normal.   Eyes:      General: Lids are normal.      Conjunctiva/sclera: Conjunctivae normal.   Cardiovascular:      Rate and Rhythm: Normal rate and regular rhythm.      Heart sounds: Normal heart sounds.   Pulmonary:      Effort: Pulmonary effort is normal.      Breath sounds: Normal breath sounds.   Musculoskeletal:         General: Normal range of motion.      Cervical back: Normal range of motion and neck supple.   Skin:     General: Skin is warm and dry.   Neurological:      Mental Status: She is alert and oriented to person, place, and time.      Gait: Gait is intact.   Psychiatric:         Behavior: Behavior is cooperative.        Laboratory:  CBC:  Recent Labs   Lab 05/11/22  0847 01/09/23  1309 05/10/23  1055   WBC 7.20 9.42 7.38   RBC 4.63 4.62 4.54   Hemoglobin 13.7 13.3 12.9   Hematocrit 41.3 41.1 40.8   Platelet Count 308 310 290   MCV 89.2 89.0 89.9   MCH 29.6 28.8 28.4   MCHC 33.2 32.4 31.6 L     CMP:  Recent Labs   Lab 05/11/22  0847 01/09/23  1309 05/10/23  1055   Glucose 103 79 115 H   Calcium 9.6 9.5 9.7   Albumin 3.8 3.9 3.7   Total " Protein 7.3 7.3 6.8   Sodium 141 143 141   Potassium 3.6 3.1 L 3.3 L   CO2 29 34 H 30   Chloride 103 103 107   BUN 11 8 14   Alk Phos 191 H 178 H 186 H   ALT 28 26 26   AST 26 26 31   Bilirubin, Total 0.7 0.4 0.8     LIPIDS:  Recent Labs   Lab 01/24/22  0828 05/11/22  0847 04/10/23  0915   HDL Cholesterol 49 50 58   Cholesterol 147 134 154   Triglycerides 57 82 74   LDL Calculated 87 68 81   Cholesterol/HDL Ratio (Risk Factor) 3.0 2.7 2.7   Non-HDL 98 84 96     TSH:      A1C:  Recent Labs   Lab 01/09/23  1309 05/10/23  1055   Hemoglobin A1C 6.2 6.4       Assessment/Plan     Nneka Gomes is a 62 y.o.female with:     1. Long term (current) use of opiate analgesic    2. Lumbar disc disease       Total time spent face-to-face and non-face-to-face coordinating care for this encounter was:  10  min    Chronic conditions status updated as per HPI.  Other than changes above, cont current medications and maintain follow up with specialists.  Return to clinic in 1 month(s).    Fiona Finch, FNP  [facility]

## 2023-08-02 ENCOUNTER — OFFICE VISIT (OUTPATIENT)
Dept: PULMONOLOGY | Facility: CLINIC | Age: 63
End: 2023-08-02
Payer: COMMERCIAL

## 2023-08-02 ENCOUNTER — CLINICAL SUPPORT (OUTPATIENT)
Dept: PULMONOLOGY | Facility: HOSPITAL | Age: 63
End: 2023-08-02
Attending: INTERNAL MEDICINE
Payer: COMMERCIAL

## 2023-08-02 VITALS
RESPIRATION RATE: 20 BRPM | WEIGHT: 206 LBS | HEART RATE: 81 BPM | DIASTOLIC BLOOD PRESSURE: 86 MMHG | BODY MASS INDEX: 33.11 KG/M2 | OXYGEN SATURATION: 97 % | OXYGEN SATURATION: 98 % | SYSTOLIC BLOOD PRESSURE: 122 MMHG | HEIGHT: 66 IN

## 2023-08-02 DIAGNOSIS — J44.9 CHRONIC OBSTRUCTIVE PULMONARY DISEASE, UNSPECIFIED COPD TYPE: ICD-10-CM

## 2023-08-02 PROCEDURE — 3079F PR MOST RECENT DIASTOLIC BLOOD PRESSURE 80-89 MM HG: ICD-10-PCS | Mod: CPTII,,, | Performed by: INTERNAL MEDICINE

## 2023-08-02 PROCEDURE — 3008F BODY MASS INDEX DOCD: CPT | Mod: CPTII,,, | Performed by: INTERNAL MEDICINE

## 2023-08-02 PROCEDURE — 3074F PR MOST RECENT SYSTOLIC BLOOD PRESSURE < 130 MM HG: ICD-10-PCS | Mod: CPTII,,, | Performed by: INTERNAL MEDICINE

## 2023-08-02 PROCEDURE — 3044F PR MOST RECENT HEMOGLOBIN A1C LEVEL <7.0%: ICD-10-PCS | Mod: CPTII,,, | Performed by: INTERNAL MEDICINE

## 2023-08-02 PROCEDURE — 94726 PLETHYSMOGRAPHY LUNG VOLUMES: CPT | Mod: 26,,, | Performed by: INTERNAL MEDICINE

## 2023-08-02 PROCEDURE — 3008F PR BODY MASS INDEX (BMI) DOCUMENTED: ICD-10-PCS | Mod: CPTII,,, | Performed by: INTERNAL MEDICINE

## 2023-08-02 PROCEDURE — 94726 PULM FUNCT TST PLETHYSMOGRAP: ICD-10-PCS | Mod: 26,,, | Performed by: INTERNAL MEDICINE

## 2023-08-02 PROCEDURE — 94060 EVALUATION OF WHEEZING: CPT

## 2023-08-02 PROCEDURE — 94060 EVALUATION OF WHEEZING: CPT | Mod: 26,,, | Performed by: INTERNAL MEDICINE

## 2023-08-02 PROCEDURE — 94060 PR EVAL OF BRONCHOSPASM: ICD-10-PCS | Mod: 26,,, | Performed by: INTERNAL MEDICINE

## 2023-08-02 PROCEDURE — 27100098 HC SPACER

## 2023-08-02 PROCEDURE — 1159F PR MEDICATION LIST DOCUMENTED IN MEDICAL RECORD: ICD-10-PCS | Mod: CPTII,,, | Performed by: INTERNAL MEDICINE

## 2023-08-02 PROCEDURE — 1159F MED LIST DOCD IN RCRD: CPT | Mod: CPTII,,, | Performed by: INTERNAL MEDICINE

## 2023-08-02 PROCEDURE — 99215 OFFICE O/P EST HI 40 MIN: CPT | Mod: PBBFAC,25 | Performed by: INTERNAL MEDICINE

## 2023-08-02 PROCEDURE — 99213 PR OFFICE/OUTPT VISIT, EST, LEVL III, 20-29 MIN: ICD-10-PCS | Mod: S$PBB,25,, | Performed by: INTERNAL MEDICINE

## 2023-08-02 PROCEDURE — 94729 DIFFUSING CAPACITY: CPT

## 2023-08-02 PROCEDURE — 94729 DIFFUSING CAPACITY: CPT | Mod: 26,,, | Performed by: INTERNAL MEDICINE

## 2023-08-02 PROCEDURE — 99213 OFFICE O/P EST LOW 20 MIN: CPT | Mod: S$PBB,25,, | Performed by: INTERNAL MEDICINE

## 2023-08-02 PROCEDURE — 3044F HG A1C LEVEL LT 7.0%: CPT | Mod: CPTII,,, | Performed by: INTERNAL MEDICINE

## 2023-08-02 PROCEDURE — 94729 PR C02/MEMBANE DIFFUSE CAPACITY: ICD-10-PCS | Mod: 26,,, | Performed by: INTERNAL MEDICINE

## 2023-08-02 PROCEDURE — 3074F SYST BP LT 130 MM HG: CPT | Mod: CPTII,,, | Performed by: INTERNAL MEDICINE

## 2023-08-02 PROCEDURE — 94726 PLETHYSMOGRAPHY LUNG VOLUMES: CPT

## 2023-08-02 PROCEDURE — 3079F DIAST BP 80-89 MM HG: CPT | Mod: CPTII,,, | Performed by: INTERNAL MEDICINE

## 2023-08-02 NOTE — PROGRESS NOTES
Subjective:       Patient ID: Nneka Gomes is a 62 y.o. female.    Chief Complaint: COPD    COPD  This is a chronic problem. The current episode started more than 1 month ago. The problem has been unchanged. Pertinent negatives include no abdominal pain, arthralgias, chest pain, chills, congestion, headaches or rash. The symptoms are aggravated by exertion.     Past Medical History:   Diagnosis Date    Abdominal bloating 01/09/2020    Abdominal pain, right upper quadrant 01/09/2020    Abnormal liver enzymes 03/04/2020    Abnormal results of liver function studies 11/18/2020    Acute conjunctivitis 05/30/2014    Acute exacerbation of chronic obstructive airways disease 02/13/2017    Acute pharyngitis 05/19/2020    Acute sinusitis 10/09/2017    Acute upper respiratory infection 05/19/2020    Allergic rhinitis 09/23/2020    Anemia 03/07/2014    Anesthesia of skin 09/06/2017    bilateral fingers    Bilateral primary osteoarthritis of knee 12/02/2019    Bradycardia 01/16/2020    Carpal tunnel syndrome 08/10/2017    Chest pain 03/06/2020    Chronic idiopathic constipation 11/18/2020    Chronic low back pain 11/05/2018    Chronic pain 04/23/2019    Chronic pain syndrome 01/30/2020    COPD (chronic obstructive pulmonary disease) 02/19/2021    Coronavirus infection 05/21/2020    Cough 05/19/2020    Degeneration of lumbar intervertebral disc 09/26/2014    L3-L4 L4-L5 Greater than L5-S1    Depressive disorder 07/03/2019    Disorder of gallbladder 03/30/2015    Dysphagia 01/09/2020    Dyspnea 05/19/2020    Dysuria 05/19/2020    Fatigue 05/19/2020    Frequency of urination 02/16/2015    GERD (gastroesophageal reflux disease) 11/18/2020    Hematuria 06/30/2020    Hemoptysis 02/27/2020    Herpes zoster 09/23/2020    Hyperlipidemia 09/23/2020    Hypertension 03/06/2020    Joint pain 11/04/2019    Knee pain 01/04/2019    Left inguinal hernia 06/10/2019    Long term (current) use of opiate analgesic 02/19/2021    Lumbar  spondylosis 2020    Lumbar sprain 2012    Malaise 2014    Melena 2017    Microscopic hematuria 2020    Migraine without aura 2012    Nausea 2020    Nicotine dependence, cigarettes, uncomplicated 2020    Nonulcer dyspepsia 2017    On home O2     Other long term (current) drug therapy 2020    Other specified urinary incontinence 2020    Pain in left shoulder 2019    Pain in left wrist 2017    and hand    Pain in right shoulder 2019    Pain in right wrist 2018    Right hand    Palpitations 2020    Shoulder joint pain 2019    Smoker 2019    Snoring 2019    Synovial cyst of popliteal space 2013    Tobacco dependence syndrome 2021    Unstable angina 2020    UTI (urinary tract infection) 2020     Past Surgical History:   Procedure Laterality Date    CARDIAC CATHETERIZATION      CHOLECYSTECTOMY      HERNIA REPAIR      ROTATOR CUFF REPAIR       Family History   Problem Relation Age of Onset    Rectal cancer Other     Diabetes Other     Heart disease Other     Hypertension Other     Hypertension Mother     Cancer Maternal Aunt      Review of patient's allergies indicates:  No Known Allergies   Social History     Tobacco Use    Smoking status: Former     Current packs/day: 0.00     Average packs/day: 1 pack/day for 40.0 years (40.0 ttl pk-yrs)     Types: Cigarettes     Start date: 1981     Quit date: 2021     Years since quittin.0     Passive exposure: Current    Smokeless tobacco: Former    Tobacco comments:     Pt has been quit for almost 3 months   Substance Use Topics    Alcohol use: Not Currently     Comment: occ    Drug use: Not Currently     Types: Marijuana      Review of Systems   Constitutional:  Negative for chills, activity change and night sweats.   HENT:  Negative for congestion and ear pain.    Eyes:  Negative for redness and itching.   Cardiovascular:  Negative for  chest pain and palpitations.   Musculoskeletal:  Negative for arthralgias and back pain.   Skin:  Negative for rash.   Gastrointestinal:  Negative for abdominal pain and abdominal distention.   Neurological:  Negative for dizziness and headaches.   Hematological:  Negative for adenopathy. Does not bruise/bleed easily.   Psychiatric/Behavioral:  Negative for confusion. The patient is not nervous/anxious.        Objective:      Physical Exam   Constitutional: She is oriented to person, place, and time. She appears well-developed and well-nourished.   HENT:   Head: Normocephalic.   Nose: Nose normal.   Mouth/Throat: Oropharynx is clear and moist.   Neck: No JVD present. No thyromegaly present.   Cardiovascular: Normal rate, regular rhythm, normal heart sounds and intact distal pulses.   Pulmonary/Chest: Normal expansion, hyperinflation, symmetric chest wall expansion, effort normal and breath sounds normal.   Abdominal: Soft. Bowel sounds are normal.   Musculoskeletal:         General: Normal range of motion.      Cervical back: Normal range of motion and neck supple.   Lymphadenopathy: No supraclavicular adenopathy is present.     She has no cervical adenopathy.   Neurological: She is alert and oriented to person, place, and time. She has normal reflexes.   Skin: Skin is warm and dry.   Psychiatric: She has a normal mood and affect. Her behavior is normal.     Personal Diagnostic Review  none pertinent    No flowsheet data found.      Assessment:       1. Chronic obstructive pulmonary disease, unspecified COPD type        Outpatient Encounter Medications as of 8/2/2023   Medication Sig Dispense Refill    albuterol (PROAIR HFA) 90 mcg/actuation inhaler Inhale 2 puffs into the lungs every 4 (four) hours as needed for Wheezing. 18 g 5    albuterol (PROVENTIL) 2.5 mg /3 mL (0.083 %) nebulizer solution Take 3 mLs (2.5 mg total) by nebulization 4 (four) times daily as needed for Wheezing. 100 each 5    azelastine (ASTELIN)  137 mcg (0.1 %) nasal spray USE 2 SPRAYS NASALLY TWICE A DAY 90 mL 3    buPROPion (WELLBUTRIN XL) 150 MG TB24 tablet Take 1 tablet (150 mg total) by mouth 2 (two) times a day. 60 tablet 3    fluticasone propionate (FLONASE) 50 mcg/actuation nasal spray 2 sprays (100 mcg total) by Each Nostril route once daily. 16 g 5    fluticasone-umeclidin-vilanter (TRELEGY ELLIPTA) 200-62.5-25 mcg inhaler Inhale 1 puff into the lungs once daily. 60 each 5    gabapentin (NEURONTIN) 100 MG capsule Take 1 capsule (100 mg total) by mouth 3 (three) times daily. 180 capsule 1    hydroCHLOROthiazide (HYDRODIURIL) 25 MG tablet Take 1 tablet (25 mg total) by mouth once daily. 90 tablet 1    HYDROcodone-acetaminophen (NORCO)  mg per tablet Take 1 tablet by mouth every 6 (six) hours as needed for Pain. Every 4 to 6 hours prn for Chronic back pain 90 tablet 0    levocetirizine (XYZAL) 5 MG tablet Take 1 tablet (5 mg total) by mouth every evening. 90 tablet 3    linaCLOtide (LINZESS) 145 mcg Cap capsule Take 1 capsule (145 mcg total) by mouth before breakfast. 90 capsule 3    pantoprazole (PROTONIX) 40 MG tablet Take 1 tablet (40 mg total) by mouth once daily. 90 tablet 0    RESTASIS 0.05 % ophthalmic emulsion       rosuvastatin (CRESTOR) 20 MG tablet Take 1 tablet (20 mg total) by mouth once daily. 90 tablet 1    theophylline (THEODUR) 300 mg 24 hr capsule Take 300 mg by mouth once daily. One tab Daily with food: (replaced previous script for 100mg tab 3 daily) 90 capsule 3    [DISCONTINUED] nicotine (NICODERM CQ) 14 mg/24 hr Place 1 patch onto the skin once daily. (Patient not taking: Reported on 7/5/2023) 30 patch 2     No facility-administered encounter medications on file as of 8/2/2023.     No orders of the defined types were placed in this encounter.      Plan:       Problem List Items Addressed This Visit          Pulmonary    Chronic obstructive pulmonary disease     Patient just got her Trelegy as she seems to be doing better.   Will recheck her in 6 weeks and reassess her response

## 2023-08-02 NOTE — PROCEDURES
Pulmonary function test  Forced vital capacity 3.47 L 104% predicted  FEV1 2.52 L 107% of predicted  FEV1 ratio 73%   Normal small airways except for broncho reactivity  Hyperinflation  Normal DLCO mild obstructive ventilatory impairment with reactive airways

## 2023-08-02 NOTE — ASSESSMENT & PLAN NOTE
Patient just got her Trelegy as she seems to be doing better.  Will recheck her in 6 weeks and reassess her response

## 2023-08-08 DIAGNOSIS — K21.9 GASTROESOPHAGEAL REFLUX DISEASE, UNSPECIFIED WHETHER ESOPHAGITIS PRESENT: Primary | ICD-10-CM

## 2023-08-08 DIAGNOSIS — R13.10 DYSPHAGIA, UNSPECIFIED TYPE: ICD-10-CM

## 2023-08-08 DIAGNOSIS — R49.0 HOARSENESS, PERSISTENT: ICD-10-CM

## 2023-08-10 ENCOUNTER — OFFICE VISIT (OUTPATIENT)
Dept: FAMILY MEDICINE | Facility: CLINIC | Age: 63
End: 2023-08-10
Payer: COMMERCIAL

## 2023-08-10 VITALS — BODY MASS INDEX: 32.67 KG/M2 | WEIGHT: 202.38 LBS

## 2023-08-10 DIAGNOSIS — Z79.891 LONG TERM (CURRENT) USE OF OPIATE ANALGESIC: Primary | ICD-10-CM

## 2023-08-10 DIAGNOSIS — R13.10 DYSPHAGIA, UNSPECIFIED TYPE: ICD-10-CM

## 2023-08-10 DIAGNOSIS — M51.9 LUMBAR DISC DISEASE: ICD-10-CM

## 2023-08-10 PROCEDURE — 1160F RVW MEDS BY RX/DR IN RCRD: CPT | Mod: ,,, | Performed by: FAMILY MEDICINE

## 2023-08-10 PROCEDURE — 3008F PR BODY MASS INDEX (BMI) DOCUMENTED: ICD-10-PCS | Mod: ,,, | Performed by: FAMILY MEDICINE

## 2023-08-10 PROCEDURE — 3044F HG A1C LEVEL LT 7.0%: CPT | Mod: ,,, | Performed by: FAMILY MEDICINE

## 2023-08-10 PROCEDURE — 80305 DRUG TEST PRSMV DIR OPT OBS: CPT | Mod: QW,,, | Performed by: FAMILY MEDICINE

## 2023-08-10 PROCEDURE — 1160F PR REVIEW ALL MEDS BY PRESCRIBER/CLIN PHARMACIST DOCUMENTED: ICD-10-PCS | Mod: ,,, | Performed by: FAMILY MEDICINE

## 2023-08-10 PROCEDURE — 1159F MED LIST DOCD IN RCRD: CPT | Mod: ,,, | Performed by: FAMILY MEDICINE

## 2023-08-10 PROCEDURE — 99213 OFFICE O/P EST LOW 20 MIN: CPT | Mod: ,,, | Performed by: FAMILY MEDICINE

## 2023-08-10 PROCEDURE — 3008F BODY MASS INDEX DOCD: CPT | Mod: ,,, | Performed by: FAMILY MEDICINE

## 2023-08-10 PROCEDURE — 1159F PR MEDICATION LIST DOCUMENTED IN MEDICAL RECORD: ICD-10-PCS | Mod: ,,, | Performed by: FAMILY MEDICINE

## 2023-08-10 PROCEDURE — 99213 PR OFFICE/OUTPT VISIT, EST, LEVL III, 20-29 MIN: ICD-10-PCS | Mod: ,,, | Performed by: FAMILY MEDICINE

## 2023-08-10 PROCEDURE — 3044F PR MOST RECENT HEMOGLOBIN A1C LEVEL <7.0%: ICD-10-PCS | Mod: ,,, | Performed by: FAMILY MEDICINE

## 2023-08-10 PROCEDURE — 80305 POCT URINE DRUG SCREEN PRESUMP: ICD-10-PCS | Mod: QW,,, | Performed by: FAMILY MEDICINE

## 2023-08-10 RX ORDER — HYDROCODONE BITARTRATE AND ACETAMINOPHEN 10; 325 MG/1; MG/1
1 TABLET ORAL EVERY 6 HOURS PRN
Qty: 90 TABLET | Refills: 0 | Status: SHIPPED | OUTPATIENT
Start: 2023-08-10 | End: 2023-09-11 | Stop reason: SDUPTHER

## 2023-08-10 NOTE — PROGRESS NOTES
Nneka Gomes is a 62 y.o. female seen today for follow-up on her chronic pain due to lumbar disc disease.  She reports good symptom control with no side effects and she is meeting her ADLs.  She has had no signs or symptoms of tolerance or addiction and her  today was appropriate.  Her urine drug screens have only shown her prescribed opiate.  Patient is complaining today of worsening dysphagia is requesting an urgent evaluation for dilation.  I recommended a soft diet and the patient will be set up for an urgent evaluation by Gastroenterology next week.      Past Medical History:   Diagnosis Date    Abdominal bloating 01/09/2020    Abdominal pain, right upper quadrant 01/09/2020    Abnormal liver enzymes 03/04/2020    Abnormal results of liver function studies 11/18/2020    Acute conjunctivitis 05/30/2014    Acute exacerbation of chronic obstructive airways disease 02/13/2017    Acute pharyngitis 05/19/2020    Acute sinusitis 10/09/2017    Acute upper respiratory infection 05/19/2020    Allergic rhinitis 09/23/2020    Anemia 03/07/2014    Anesthesia of skin 09/06/2017    bilateral fingers    Bilateral primary osteoarthritis of knee 12/02/2019    Bradycardia 01/16/2020    Carpal tunnel syndrome 08/10/2017    Chest pain 03/06/2020    Chronic idiopathic constipation 11/18/2020    Chronic low back pain 11/05/2018    Chronic pain 04/23/2019    Chronic pain syndrome 01/30/2020    COPD (chronic obstructive pulmonary disease) 02/19/2021    Coronavirus infection 05/21/2020    Cough 05/19/2020    Degeneration of lumbar intervertebral disc 09/26/2014    L3-L4 L4-L5 Greater than L5-S1    Depressive disorder 07/03/2019    Disorder of gallbladder 03/30/2015    Dysphagia 01/09/2020    Dyspnea 05/19/2020    Dysuria 05/19/2020    Fatigue 05/19/2020    Frequency of urination 02/16/2015    GERD (gastroesophageal reflux disease) 11/18/2020    Hematuria 06/30/2020    Hemoptysis 02/27/2020    Herpes zoster 09/23/2020     Hyperlipidemia 09/23/2020    Hypertension 03/06/2020    Joint pain 11/04/2019    Knee pain 01/04/2019    Left inguinal hernia 06/10/2019    Long term (current) use of opiate analgesic 02/19/2021    Lumbar spondylosis 11/23/2020    Lumbar sprain 07/16/2012    Malaise 11/20/2014    Melena 09/25/2017    Microscopic hematuria 04/02/2020    Migraine without aura 02/06/2012    Nausea 02/27/2020    Nicotine dependence, cigarettes, uncomplicated 11/11/2020    Nonulcer dyspepsia 01/23/2017    On home O2     Other long term (current) drug therapy 09/23/2020    Other specified urinary incontinence 06/17/2020    Pain in left shoulder 05/02/2019    Pain in left wrist 09/06/2017    and hand    Pain in right shoulder 08/30/2019    Pain in right wrist 09/04/2018    Right hand    Palpitations 03/06/2020    Shoulder joint pain 04/23/2019    Smoker 07/03/2019    Snoring 08/02/2019    Synovial cyst of popliteal space 04/08/2013    Tobacco dependence syndrome 02/19/2021    Unstable angina 01/16/2020    UTI (urinary tract infection) 03/27/2020     Family History   Problem Relation Age of Onset    Rectal cancer Other     Diabetes Other     Heart disease Other     Hypertension Other     Hypertension Mother     Cancer Maternal Aunt      Current Outpatient Medications on File Prior to Visit   Medication Sig Dispense Refill    albuterol (PROAIR HFA) 90 mcg/actuation inhaler Inhale 2 puffs into the lungs every 4 (four) hours as needed for Wheezing. 18 g 5    albuterol (PROVENTIL) 2.5 mg /3 mL (0.083 %) nebulizer solution Take 3 mLs (2.5 mg total) by nebulization 4 (four) times daily as needed for Wheezing. 100 each 5    azelastine (ASTELIN) 137 mcg (0.1 %) nasal spray USE 2 SPRAYS NASALLY TWICE A DAY 90 mL 3    buPROPion (WELLBUTRIN XL) 150 MG TB24 tablet Take 1 tablet (150 mg total) by mouth 2 (two) times a day. 60 tablet 3    fluticasone propionate (FLONASE) 50 mcg/actuation nasal spray 2 sprays (100 mcg total) by Each Nostril route once  daily. 16 g 5    fluticasone-umeclidin-vilanter (TRELEGY ELLIPTA) 200-62.5-25 mcg inhaler Inhale 1 puff into the lungs once daily. 60 each 5    gabapentin (NEURONTIN) 100 MG capsule Take 1 capsule (100 mg total) by mouth 3 (three) times daily. 180 capsule 1    hydroCHLOROthiazide (HYDRODIURIL) 25 MG tablet Take 1 tablet (25 mg total) by mouth once daily. 90 tablet 1    levocetirizine (XYZAL) 5 MG tablet Take 1 tablet (5 mg total) by mouth every evening. 90 tablet 3    linaCLOtide (LINZESS) 145 mcg Cap capsule Take 1 capsule (145 mcg total) by mouth before breakfast. 90 capsule 3    pantoprazole (PROTONIX) 40 MG tablet Take 1 tablet (40 mg total) by mouth once daily. 90 tablet 0    RESTASIS 0.05 % ophthalmic emulsion       rosuvastatin (CRESTOR) 20 MG tablet Take 1 tablet (20 mg total) by mouth once daily. 90 tablet 1    theophylline (THEODUR) 300 mg 24 hr capsule Take 300 mg by mouth once daily. One tab Daily with food: (replaced previous script for 100mg tab 3 daily) 90 capsule 3    [DISCONTINUED] HYDROcodone-acetaminophen (NORCO)  mg per tablet Take 1 tablet by mouth every 6 (six) hours as needed for Pain. Every 4 to 6 hours prn for Chronic back pain 90 tablet 0     No current facility-administered medications on file prior to visit.     Immunization History   Administered Date(s) Administered    COVID-19, MRNA, LN-S, PF (Pfizer) (Purple Cap) 04/29/2021, 10/28/2021, 04/29/2022    Influenza - Quadrivalent - PF *Preferred* (6 months and older) 09/16/2021    Influenza Split 10/03/2019    Pneumococcal Conjugate - 13 Valent 11/05/2018    Pneumococcal Conjugate - 20 Valent 01/09/2023    Pneumococcal Polysaccharide - 23 Valent 11/05/2019       Review of Systems   Constitutional:  Negative for fever, malaise/fatigue and weight loss.   Respiratory:  Negative for shortness of breath.    Cardiovascular:  Negative for chest pain and palpitations.   Gastrointestinal:  Negative for nausea and vomiting.   Musculoskeletal:   Positive for back pain and myalgias.   Psychiatric/Behavioral:  Negative for depression.         There were no vitals filed for this visit.    Physical Exam  Vitals reviewed.   Constitutional:       Appearance: Normal appearance.   HENT:      Head: Normocephalic.   Eyes:      Extraocular Movements: Extraocular movements intact.      Conjunctiva/sclera: Conjunctivae normal.      Pupils: Pupils are equal, round, and reactive to light.   Neck:      Thyroid: No thyroid mass or thyromegaly.   Cardiovascular:      Rate and Rhythm: Normal rate and regular rhythm.      Heart sounds: Normal heart sounds. No murmur heard.     No gallop.   Pulmonary:      Effort: Pulmonary effort is normal. No respiratory distress.      Breath sounds: Normal breath sounds. No wheezing or rales.   Musculoskeletal:      Lumbar back: Spasms and tenderness present. Decreased range of motion.        Back:    Skin:     General: Skin is warm and dry.      Coloration: Skin is not jaundiced or pale.   Neurological:      Mental Status: She is alert.   Psychiatric:         Mood and Affect: Mood normal.         Behavior: Behavior normal.         Thought Content: Thought content normal.         Judgment: Judgment normal.          Assessment and Plan  Long term (current) use of opiate analgesic  -     POCT Urine Drug Screen Presump    Lumbar disc disease  -     HYDROcodone-acetaminophen (NORCO)  mg per tablet; Take 1 tablet by mouth every 6 (six) hours as needed for Pain. Every 4 to 6 hours prn for Chronic back pain  Dispense: 90 tablet; Refill: 0    Dysphagia, unspecified type  -     Ambulatory referral/consult to Gastroenterology; Future; Expected date: 08/17/2023            Return to clinic in 1 month or as needed.    Health Maintenance Topics with due status: Not Due       Topic Last Completion Date    Influenza Vaccine 10/25/2022    Cervical Cancer Screening 10/31/2022    Colorectal Cancer Screening 11/08/2022    Lipid Panel 04/10/2023    LDCT  Lung Screen 04/19/2023    Hemoglobin A1c (Prediabetes) 05/10/2023

## 2023-08-11 ENCOUNTER — OFFICE VISIT (OUTPATIENT)
Dept: GASTROENTEROLOGY | Facility: CLINIC | Age: 63
End: 2023-08-11
Payer: COMMERCIAL

## 2023-08-11 VITALS
HEIGHT: 66 IN | BODY MASS INDEX: 32.62 KG/M2 | WEIGHT: 203 LBS | HEART RATE: 92 BPM | DIASTOLIC BLOOD PRESSURE: 87 MMHG | OXYGEN SATURATION: 97 % | SYSTOLIC BLOOD PRESSURE: 123 MMHG

## 2023-08-11 DIAGNOSIS — R10.11 RIGHT UPPER QUADRANT ABDOMINAL PAIN: ICD-10-CM

## 2023-08-11 DIAGNOSIS — K59.04 CHRONIC IDIOPATHIC CONSTIPATION: ICD-10-CM

## 2023-08-11 DIAGNOSIS — R13.10 DYSPHAGIA, UNSPECIFIED TYPE: Primary | ICD-10-CM

## 2023-08-11 PROCEDURE — 3079F DIAST BP 80-89 MM HG: CPT | Mod: CPTII,,,

## 2023-08-11 PROCEDURE — 3044F PR MOST RECENT HEMOGLOBIN A1C LEVEL <7.0%: ICD-10-PCS | Mod: CPTII,,,

## 2023-08-11 PROCEDURE — 1159F MED LIST DOCD IN RCRD: CPT | Mod: CPTII,,,

## 2023-08-11 PROCEDURE — 3074F SYST BP LT 130 MM HG: CPT | Mod: CPTII,,,

## 2023-08-11 PROCEDURE — 3008F PR BODY MASS INDEX (BMI) DOCUMENTED: ICD-10-PCS | Mod: CPTII,,,

## 2023-08-11 PROCEDURE — 3008F BODY MASS INDEX DOCD: CPT | Mod: CPTII,,,

## 2023-08-11 PROCEDURE — 99214 PR OFFICE/OUTPT VISIT, EST, LEVL IV, 30-39 MIN: ICD-10-PCS | Mod: S$PBB,,,

## 2023-08-11 PROCEDURE — 1159F PR MEDICATION LIST DOCUMENTED IN MEDICAL RECORD: ICD-10-PCS | Mod: CPTII,,,

## 2023-08-11 PROCEDURE — 1160F RVW MEDS BY RX/DR IN RCRD: CPT | Mod: CPTII,,,

## 2023-08-11 PROCEDURE — 99214 OFFICE O/P EST MOD 30 MIN: CPT | Mod: S$PBB,,,

## 2023-08-11 PROCEDURE — 1160F PR REVIEW ALL MEDS BY PRESCRIBER/CLIN PHARMACIST DOCUMENTED: ICD-10-PCS | Mod: CPTII,,,

## 2023-08-11 PROCEDURE — 3044F HG A1C LEVEL LT 7.0%: CPT | Mod: CPTII,,,

## 2023-08-11 PROCEDURE — 99215 OFFICE O/P EST HI 40 MIN: CPT | Mod: PBBFAC

## 2023-08-11 PROCEDURE — 3079F PR MOST RECENT DIASTOLIC BLOOD PRESSURE 80-89 MM HG: ICD-10-PCS | Mod: CPTII,,,

## 2023-08-11 PROCEDURE — 3074F PR MOST RECENT SYSTOLIC BLOOD PRESSURE < 130 MM HG: ICD-10-PCS | Mod: CPTII,,,

## 2023-08-11 NOTE — PROGRESS NOTES
Nneka Gomes is a 62 y.o. female here for Dysphagia        PCP: Magno Yan  Referring Provider: Magno Yan, Md  [address]  [facility] [place] [id number]     HPI:  Ms. Gomes is a 63 yo female who is referred to clinic by Dr. Yan for dypshagia. Reports symptoms have been present for several months now and are worsening. She has trouble with solid foods. Also reports GERD. Takes Protonix 40 mg daily. Has experienced some improvement in symptoms of reflux since starting this medication. She has chronic constipation on Linzess. This works well for her. She also reports chronic RUQ pain. Surgical history includes cholecystectomy. She denies use of NSAIDs. Stopped smoking 5 months ago. Rare, occasional use of alcohol. Denies any significant FMH for GI related disease. She is UTD on screening colonoscopy.           ROS:  Review of Systems   Constitutional:  Negative for appetite change, fever and unexpected weight change.   HENT:  Positive for trouble swallowing.    Gastrointestinal:  Positive for abdominal pain (RUQ), constipation and reflux. Negative for blood in stool, diarrhea, nausea and vomiting.   Musculoskeletal:  Negative for gait problem.   Integumentary:  Negative for color change.          PMHX:  has a past medical history of Abdominal bloating (01/09/2020), Abdominal pain, right upper quadrant (01/09/2020), Abnormal liver enzymes (03/04/2020), Abnormal results of liver function studies (11/18/2020), Acute conjunctivitis (05/30/2014), Acute exacerbation of chronic obstructive airways disease (02/13/2017), Acute pharyngitis (05/19/2020), Acute sinusitis (10/09/2017), Acute upper respiratory infection (05/19/2020), Allergic rhinitis (09/23/2020), Anemia (03/07/2014), Anesthesia of skin (09/06/2017), Bilateral primary osteoarthritis of knee (12/02/2019), Bradycardia (01/16/2020), Carpal tunnel syndrome (08/10/2017), Chest pain (03/06/2020),  Chronic idiopathic constipation (11/18/2020), Chronic low back pain (11/05/2018), Chronic pain (04/23/2019), Chronic pain syndrome (01/30/2020), COPD (chronic obstructive pulmonary disease) (02/19/2021), Coronavirus infection (05/21/2020), Cough (05/19/2020), Degeneration of lumbar intervertebral disc (09/26/2014), Depressive disorder (07/03/2019), Disorder of gallbladder (03/30/2015), Dysphagia (01/09/2020), Dyspnea (05/19/2020), Dysuria (05/19/2020), Fatigue (05/19/2020), Frequency of urination (02/16/2015), GERD (gastroesophageal reflux disease) (11/18/2020), Hematuria (06/30/2020), Hemoptysis (02/27/2020), Herpes zoster (09/23/2020), Hyperlipidemia (09/23/2020), Hypertension (03/06/2020), Joint pain (11/04/2019), Knee pain (01/04/2019), Left inguinal hernia (06/10/2019), Long term (current) use of opiate analgesic (02/19/2021), Lumbar spondylosis (11/23/2020), Lumbar sprain (07/16/2012), Malaise (11/20/2014), Melena (09/25/2017), Microscopic hematuria (04/02/2020), Migraine without aura (02/06/2012), Nausea (02/27/2020), Nicotine dependence, cigarettes, uncomplicated (11/11/2020), Nonulcer dyspepsia (01/23/2017), On home O2, Other long term (current) drug therapy (09/23/2020), Other specified urinary incontinence (06/17/2020), Pain in left shoulder (05/02/2019), Pain in left wrist (09/06/2017), Pain in right shoulder (08/30/2019), Pain in right wrist (09/04/2018), Palpitations (03/06/2020), Shoulder joint pain (04/23/2019), Smoker (07/03/2019), Snoring (08/02/2019), Synovial cyst of popliteal space (04/08/2013), Tobacco dependence syndrome (02/19/2021), Unstable angina (01/16/2020), and UTI (urinary tract infection) (03/27/2020).    PSHX:  has a past surgical history that includes Cholecystectomy; Hernia repair; Rotator cuff repair; and Cardiac catheterization.    PFHX: family history includes Cancer in her maternal aunt; Diabetes in an other family member; Heart disease in an other family member; Hypertension  in her mother and another family member; Rectal cancer in an other family member.    PSlHX:  reports that she quit smoking about 2 years ago. Her smoking use included cigarettes. She started smoking about 42 years ago. She has a 40.0 pack-year smoking history. She has been exposed to tobacco smoke. She has quit using smokeless tobacco. She reports current alcohol use. She reports that she does not currently use drugs after having used the following drugs: Marijuana.        Review of patient's allergies indicates:  No Known Allergies    Medication List with Changes/Refills   Current Medications    ALBUTEROL (PROAIR HFA) 90 MCG/ACTUATION INHALER    Inhale 2 puffs into the lungs every 4 (four) hours as needed for Wheezing.    ALBUTEROL (PROVENTIL) 2.5 MG /3 ML (0.083 %) NEBULIZER SOLUTION    Take 3 mLs (2.5 mg total) by nebulization 4 (four) times daily as needed for Wheezing.    AZELASTINE (ASTELIN) 137 MCG (0.1 %) NASAL SPRAY    USE 2 SPRAYS NASALLY TWICE A DAY    BUPROPION (WELLBUTRIN XL) 150 MG TB24 TABLET    Take 1 tablet (150 mg total) by mouth 2 (two) times a day.    FLUTICASONE PROPIONATE (FLONASE) 50 MCG/ACTUATION NASAL SPRAY    2 sprays (100 mcg total) by Each Nostril route once daily.    FLUTICASONE-UMECLIDIN-VILANTER (TRELEGY ELLIPTA) 200-62.5-25 MCG INHALER    Inhale 1 puff into the lungs once daily.    GABAPENTIN (NEURONTIN) 100 MG CAPSULE    Take 1 capsule (100 mg total) by mouth 3 (three) times daily.    HYDROCHLOROTHIAZIDE (HYDRODIURIL) 25 MG TABLET    Take 1 tablet (25 mg total) by mouth once daily.    HYDROCODONE-ACETAMINOPHEN (NORCO)  MG PER TABLET    Take 1 tablet by mouth every 6 (six) hours as needed for Pain. Every 4 to 6 hours prn for Chronic back pain    LEVOCETIRIZINE (XYZAL) 5 MG TABLET    Take 1 tablet (5 mg total) by mouth every evening.    PANTOPRAZOLE (PROTONIX) 40 MG TABLET    Take 1 tablet (40 mg total) by mouth once daily.    RESTASIS 0.05 % OPHTHALMIC EMULSION         "ROSUVASTATIN (CRESTOR) 20 MG TABLET    Take 1 tablet (20 mg total) by mouth once daily.    THEOPHYLLINE (THEODUR) 300 MG 24 HR CAPSULE    Take 300 mg by mouth once daily. One tab Daily with food: (replaced previous script for 100mg tab 3 daily)   Changed and/or Refilled Medications    Modified Medication Previous Medication    LINACLOTIDE (LINZESS) 145 MCG CAP CAPSULE linaCLOtide (LINZESS) 145 mcg Cap capsule       Take 1 capsule (145 mcg total) by mouth before breakfast.    Take 1 capsule (145 mcg total) by mouth before breakfast.        Objective Findings:  Vital Signs:  /87   Pulse 92   Ht 5' 6" (1.676 m)   Wt 92.1 kg (203 lb)   SpO2 97%   BMI 32.77 kg/m²  Body mass index is 32.77 kg/m².    Physical Exam:  Physical Exam  Vitals reviewed.   Constitutional:       General: She is not in acute distress.     Appearance: Normal appearance.   HENT:      Mouth/Throat:      Mouth: Mucous membranes are moist.   Cardiovascular:      Rate and Rhythm: Normal rate.   Pulmonary:      Effort: Pulmonary effort is normal.   Abdominal:      General: There is no distension.      Palpations: Abdomen is soft.      Tenderness: There is no abdominal tenderness. There is no guarding.   Skin:     General: Skin is warm and dry.   Neurological:      Mental Status: She is alert and oriented to person, place, and time.   Psychiatric:         Mood and Affect: Mood normal.          Labs:  Lab Results   Component Value Date    WBC 7.38 05/10/2023    HGB 12.9 05/10/2023    HCT 40.8 05/10/2023    MCV 89.9 05/10/2023    RDW 14.1 05/10/2023     05/10/2023    LYMPH 33.1 05/10/2023    LYMPH 2.44 05/10/2023    MONO 6.0 05/10/2023    EOS 0.13 05/10/2023    BASO 0.04 05/10/2023     Lab Results   Component Value Date     05/10/2023    K 3.3 (L) 05/10/2023     05/10/2023    CO2 30 05/10/2023     (H) 05/10/2023    BUN 14 05/10/2023    CREATININE 0.87 05/10/2023    CALCIUM 9.7 05/10/2023    PROT 6.8 05/10/2023    ALBUMIN " 3.7 05/10/2023    BILITOT 0.8 05/10/2023    ALKPHOS 186 (H) 05/10/2023    AST 31 05/10/2023    ALT 26 05/10/2023         Imaging: No results found.      Assessment:  Nneka Gomes is a 62 y.o. female here with:  1. Dysphagia, unspecified type    2. Chronic idiopathic constipation    3. Right upper quadrant abdominal pain          Recommendations:  1. EGD w/ dilation for dysphagia, GERD, chronic RUQ pain  2. Continue Protonix 40 mg daily; may consider adjusting this following EGD  3. Refill Linzess at patient's request - her constipation is managed well with this regimen     Follow up in about 3 months (around 11/11/2023).      Order summary:  Orders Placed This Encounter    linaCLOtide (LINZESS) 145 mcg Cap capsule    EGD w Dilation       Thank you for allowing me to participate in the care of Nneka Gomes.      Angela Sanabria, FNP-BC, TOOTIE-ACNP-BC

## 2023-08-29 ENCOUNTER — ANESTHESIA EVENT (OUTPATIENT)
Dept: GASTROENTEROLOGY | Facility: HOSPITAL | Age: 63
End: 2023-08-29
Payer: COMMERCIAL

## 2023-08-29 ENCOUNTER — HOSPITAL ENCOUNTER (OUTPATIENT)
Dept: GASTROENTEROLOGY | Facility: HOSPITAL | Age: 63
Discharge: HOME | End: 2023-08-29
Payer: COMMERCIAL

## 2023-08-29 ENCOUNTER — ANESTHESIA (OUTPATIENT)
Dept: GASTROENTEROLOGY | Facility: HOSPITAL | Age: 63
End: 2023-08-29
Payer: COMMERCIAL

## 2023-08-29 VITALS
HEART RATE: 79 BPM | SYSTOLIC BLOOD PRESSURE: 121 MMHG | DIASTOLIC BLOOD PRESSURE: 76 MMHG | OXYGEN SATURATION: 96 % | RESPIRATION RATE: 17 BRPM | TEMPERATURE: 98 F

## 2023-08-29 DIAGNOSIS — R13.19 ESOPHAGEAL DYSPHAGIA: ICD-10-CM

## 2023-08-29 DIAGNOSIS — R13.10 DYSPHAGIA, UNSPECIFIED TYPE: ICD-10-CM

## 2023-08-29 PROCEDURE — 43239 PR EGD, FLEX, W/BIOPSY, SGL/MULTI: ICD-10-PCS | Mod: 59,,, | Performed by: INTERNAL MEDICINE

## 2023-08-29 PROCEDURE — 27201423 OPTIME MED/SURG SUP & DEVICES STERILE SUPPLY

## 2023-08-29 PROCEDURE — 43239 EGD BIOPSY SINGLE/MULTIPLE: CPT | Mod: 59,,, | Performed by: INTERNAL MEDICINE

## 2023-08-29 PROCEDURE — 37000008 HC ANESTHESIA 1ST 15 MINUTES

## 2023-08-29 PROCEDURE — 43248 PR EGD, FLEX, W/DILATION OVER GUIDEWIRE: ICD-10-PCS | Mod: ,,, | Performed by: INTERNAL MEDICINE

## 2023-08-29 PROCEDURE — 25000003 PHARM REV CODE 250

## 2023-08-29 PROCEDURE — 88305 TISSUE EXAM BY PATHOLOGIST: CPT | Mod: 26,,, | Performed by: PATHOLOGY

## 2023-08-29 PROCEDURE — D9220A PRA ANESTHESIA: ICD-10-PCS | Mod: ,,,

## 2023-08-29 PROCEDURE — 88305 SURGICAL PATHOLOGY: ICD-10-PCS | Mod: 26,,, | Performed by: PATHOLOGY

## 2023-08-29 PROCEDURE — 43239 EGD BIOPSY SINGLE/MULTIPLE: CPT | Mod: 59 | Performed by: INTERNAL MEDICINE

## 2023-08-29 PROCEDURE — 88305 TISSUE EXAM BY PATHOLOGIST: CPT | Mod: TC,SUR | Performed by: INTERNAL MEDICINE

## 2023-08-29 PROCEDURE — 43248 EGD GUIDE WIRE INSERTION: CPT | Performed by: INTERNAL MEDICINE

## 2023-08-29 PROCEDURE — 63600175 PHARM REV CODE 636 W HCPCS

## 2023-08-29 PROCEDURE — 43248 EGD GUIDE WIRE INSERTION: CPT | Mod: ,,, | Performed by: INTERNAL MEDICINE

## 2023-08-29 PROCEDURE — D9220A PRA ANESTHESIA: Mod: ,,,

## 2023-08-29 RX ORDER — SODIUM CHLORIDE 0.9 % (FLUSH) 0.9 %
10 SYRINGE (ML) INJECTION
Status: DISCONTINUED | OUTPATIENT
Start: 2023-08-29 | End: 2023-08-30 | Stop reason: HOSPADM

## 2023-08-29 RX ORDER — LIDOCAINE HYDROCHLORIDE 20 MG/ML
INJECTION, SOLUTION EPIDURAL; INFILTRATION; INTRACAUDAL; PERINEURAL
Status: DISCONTINUED | OUTPATIENT
Start: 2023-08-29 | End: 2023-08-29

## 2023-08-29 RX ORDER — PROPOFOL 10 MG/ML
VIAL (ML) INTRAVENOUS
Status: DISCONTINUED | OUTPATIENT
Start: 2023-08-29 | End: 2023-08-29

## 2023-08-29 RX ORDER — SODIUM CHLORIDE 9 MG/ML
INJECTION, SOLUTION INTRAVENOUS CONTINUOUS PRN
Status: DISCONTINUED | OUTPATIENT
Start: 2023-08-29 | End: 2023-08-29

## 2023-08-29 RX ADMIN — PROPOFOL 30 MG: 10 INJECTION, EMULSION INTRAVENOUS at 01:08

## 2023-08-29 RX ADMIN — LIDOCAINE HYDROCHLORIDE 100 MG: 20 INJECTION, SOLUTION INTRAVENOUS at 01:08

## 2023-08-29 RX ADMIN — SODIUM CHLORIDE: 9 INJECTION, SOLUTION INTRAVENOUS at 01:08

## 2023-08-29 RX ADMIN — PROPOFOL 120 MG: 10 INJECTION, EMULSION INTRAVENOUS at 01:08

## 2023-08-29 NOTE — DISCHARGE INSTRUCTIONS
Procedure Date  8/29/23     Impression  Overall Impression:   Small type I hiatal hernia  The upper third of the esophagus, middle third of the esophagus, lower third of the esophagus, Z-line, cardia, body of the stomach, incisura, antrum, duodenal bulb, 1st part of the duodenum and 2nd part of the duodenum appeared normal. Performed random biopsy to rule out eosinophilic esophagitis.  Dilated in the esophagus with Hantec Marketsary-Jayjay dilator to 51 Fr ending size. Dilation caused mucosal tears; post-dilation mucosal tears were superficial        Recommendation    Await pathology results  Continue current medications  Keep appt in GI clinic with ZENA Dempsey  Repeat dilation in the future as needed      Outcome of procedure: successful EGD  Disposition: patient to recovery following procedure; discharge to home when appropriate parameters met  Provisions for follow up: please call my office for any unexpected symptoms like chest or abdominal pain or bleeding following your procedure.  Final Diagnosis: dysphagia         Indication  Dysphagia, unspecified type     NO DRIVING, OPERATING EQUIPMENT, OR SIGNING LEGAL DOCUMENTS FOR 24 HOURS.     THE NURSE WILL CALL YOU WITH YOUR BIOPSY RESULTS IN A FEW DAYS.

## 2023-08-29 NOTE — TRANSFER OF CARE
Anesthesia Transfer of Care Note    Patient: Nneka Gomes    Procedure(s) Performed: EGD with dilation    Patient location: GI    Anesthesia Type: general and MAC    Transport from OR: Transported from OR on room air with adequate spontaneous ventilation    Post pain: adequate analgesia    Post assessment: no apparent anesthetic complications    Post vital signs: stable    Level of consciousness: awake, alert and oriented    Nausea/Vomiting: no nausea/vomiting    Complications: none    Transfer of care protocol was followedComments: Refer to nursing note for pain yulia score upon discharge from recovery      Last vitals:   Visit Vitals  /76   Pulse 86   Temp 36.4 °C (97.6 °F)   Resp 19   SpO2 97%   Breastfeeding No

## 2023-08-29 NOTE — ANESTHESIA POSTPROCEDURE EVALUATION
Anesthesia Post Evaluation    Patient: Nneka Gomes    Procedure(s) Performed: EGD with dilation    Final Anesthesia Type: general      Patient location during evaluation: GI PACU  Patient participation: Yes- Able to Participate  Level of consciousness: awake and alert  Post-procedure vital signs: reviewed and stable  Pain management: adequate  Airway patency: patent    PONV status at discharge: No PONV  Anesthetic complications: no      Cardiovascular status: blood pressure returned to baseline  Respiratory status: unassisted and spontaneous ventilation  Hydration status: euvolemic  Follow-up not needed.          Vitals Value Taken Time   /81 08/29/23 1427   Temp 36.4 °C (97.6 °F) 08/29/23 1403   Pulse 74 08/29/23 1428   Resp 16 08/29/23 1428   SpO2 99 % 08/29/23 1428   Vitals shown include unvalidated device data.      No case tracking events are documented in the log.      Pain/Gerri Score: Gerri Score: 8 (8/29/2023  2:06 PM)

## 2023-08-29 NOTE — ANESTHESIA PREPROCEDURE EVALUATION
08/29/2023  Nneka Gomes is a 62 y.o., female.      Pre-op Assessment    I have reviewed the Patient Summary Reports.     I have reviewed the Nursing Notes. I have reviewed the NPO Status.   I have reviewed the Medications.     Review of Systems  Anesthesia Hx:  No problems with previous Anesthesia    Social:  No Alcohol Use, Former Smoker    Hematology/Oncology:  Hematology Normal   Oncology Normal     EENT/Dental:EENT/Dental Normal   Cardiovascular:   Hypertension Angina hyperlipidemia    Pulmonary:   COPD Asthma Shortness of breath    Renal/:  Renal/ Normal     Hepatic/GI:   GERD    Musculoskeletal:   Arthritis     Neurological:   Neuromuscular Disease, Headaches    Endocrine:  Endocrine Normal    Dermatological:  Skin Normal    Psych:   Psychiatric History          Physical Exam  General: Well nourished, Cooperative, Alert and Oriented    Airway:  Mallampati: II   Mouth Opening: Normal  TM Distance: Normal  Tongue: Normal  Neck ROM: Normal ROM    Dental:  Intact    Chest/Lungs:  Clear to auscultation, Normal Respiratory Rate    Heart:  Rate: Normal  Rhythm: Regular Rhythm  Sounds: Normal    Abdomen:  Normal, Soft, Nontender        Anesthesia Plan  Type of Anesthesia, risks & benefits discussed:    Anesthesia Type: Gen Natural Airway, MAC  Intra-op Monitoring Plan: Standard ASA Monitors  Post Op Pain Control Plan: multimodal analgesia and IV/PO Opioids PRN  Induction:  IV  Informed Consent: Informed consent signed with the Patient and all parties understand the risks and agree with anesthesia plan.  All questions answered.   ASA Score: 3  Day of Surgery Review of History & Physical: I have interviewed and examined the patient. I have reviewed the patient's H&P dated:     Ready For Surgery From Anesthesia Perspective.     .

## 2023-08-29 NOTE — H&P
Gastroenterology Pre-procedure H&P    Chief Complaint: Esophageal dysphagia    History of Present Illness    Nneka Gomes is a 62 y.o. female that  has a past medical history of Abdominal bloating (01/09/2020), Abdominal pain, right upper quadrant (01/09/2020), Abnormal liver enzymes (03/04/2020), Abnormal results of liver function studies (11/18/2020), Acute conjunctivitis (05/30/2014), Acute exacerbation of chronic obstructive airways disease (02/13/2017), Acute pharyngitis (05/19/2020), Acute sinusitis (10/09/2017), Acute upper respiratory infection (05/19/2020), Allergic rhinitis (09/23/2020), Anemia (03/07/2014), Anesthesia of skin (09/06/2017), Bilateral primary osteoarthritis of knee (12/02/2019), Bradycardia (01/16/2020), Carpal tunnel syndrome (08/10/2017), Chest pain (03/06/2020), Chronic idiopathic constipation (11/18/2020), Chronic low back pain (11/05/2018), Chronic pain (04/23/2019), Chronic pain syndrome (01/30/2020), COPD (chronic obstructive pulmonary disease) (02/19/2021), Coronavirus infection (05/21/2020), Cough (05/19/2020), Degeneration of lumbar intervertebral disc (09/26/2014), Depressive disorder (07/03/2019), Disorder of gallbladder (03/30/2015), Dysphagia (01/09/2020), Dyspnea (05/19/2020), Dysuria (05/19/2020), Fatigue (05/19/2020), Frequency of urination (02/16/2015), GERD (gastroesophageal reflux disease) (11/18/2020), Hematuria (06/30/2020), Hemoptysis (02/27/2020), Herpes zoster (09/23/2020), Hyperlipidemia (09/23/2020), Hypertension (03/06/2020), Joint pain (11/04/2019), Knee pain (01/04/2019), Left inguinal hernia (06/10/2019), Long term (current) use of opiate analgesic (02/19/2021), Lumbar spondylosis (11/23/2020), Lumbar sprain (07/16/2012), Malaise (11/20/2014), Melena (09/25/2017), Microscopic hematuria (04/02/2020), Migraine without aura (02/06/2012), Nausea (02/27/2020), Nicotine dependence, cigarettes, uncomplicated (11/11/2020), Nonulcer dyspepsia (01/23/2017), On home  O2, Other long term (current) drug therapy (09/23/2020), Other specified urinary incontinence (06/17/2020), Pain in left shoulder (05/02/2019), Pain in left wrist (09/06/2017), Pain in right shoulder (08/30/2019), Pain in right wrist (09/04/2018), Palpitations (03/06/2020), Shoulder joint pain (04/23/2019), Smoker (07/03/2019), Snoring (08/02/2019), Synovial cyst of popliteal space (04/08/2013), Tobacco dependence syndrome (02/19/2021), Unstable angina (01/16/2020), and UTI (urinary tract infection) (03/27/2020).     Patient with GERD on protonix, CIC on linzess, here with dysphagia for EGD dilation.      Past Medical History:   Diagnosis Date    Abdominal bloating 01/09/2020    Abdominal pain, right upper quadrant 01/09/2020    Abnormal liver enzymes 03/04/2020    Abnormal results of liver function studies 11/18/2020    Acute conjunctivitis 05/30/2014    Acute exacerbation of chronic obstructive airways disease 02/13/2017    Acute pharyngitis 05/19/2020    Acute sinusitis 10/09/2017    Acute upper respiratory infection 05/19/2020    Allergic rhinitis 09/23/2020    Anemia 03/07/2014    Anesthesia of skin 09/06/2017    bilateral fingers    Bilateral primary osteoarthritis of knee 12/02/2019    Bradycardia 01/16/2020    Carpal tunnel syndrome 08/10/2017    Chest pain 03/06/2020    Chronic idiopathic constipation 11/18/2020    Chronic low back pain 11/05/2018    Chronic pain 04/23/2019    Chronic pain syndrome 01/30/2020    COPD (chronic obstructive pulmonary disease) 02/19/2021    Coronavirus infection 05/21/2020    Cough 05/19/2020    Degeneration of lumbar intervertebral disc 09/26/2014    L3-L4 L4-L5 Greater than L5-S1    Depressive disorder 07/03/2019    Disorder of gallbladder 03/30/2015    Dysphagia 01/09/2020    Dyspnea 05/19/2020    Dysuria 05/19/2020    Fatigue 05/19/2020    Frequency of urination 02/16/2015    GERD (gastroesophageal reflux disease) 11/18/2020    Hematuria 06/30/2020    Hemoptysis 02/27/2020     Herpes zoster 2020    Hyperlipidemia 2020    Hypertension 2020    Joint pain 2019    Knee pain 2019    Left inguinal hernia 06/10/2019    Long term (current) use of opiate analgesic 2021    Lumbar spondylosis 2020    Lumbar sprain 2012    Malaise 2014    Melena 2017    Microscopic hematuria 2020    Migraine without aura 2012    Nausea 2020    Nicotine dependence, cigarettes, uncomplicated 2020    Nonulcer dyspepsia 2017    On home O2     Other long term (current) drug therapy 2020    Other specified urinary incontinence 2020    Pain in left shoulder 2019    Pain in left wrist 2017    and hand    Pain in right shoulder 2019    Pain in right wrist 2018    Right hand    Palpitations 2020    Shoulder joint pain 2019    Smoker 2019    Snoring 2019    Synovial cyst of popliteal space 2013    Tobacco dependence syndrome 2021    Unstable angina 2020    UTI (urinary tract infection) 2020       Past Surgical History:   Procedure Laterality Date    CARDIAC CATHETERIZATION      CHOLECYSTECTOMY      HERNIA REPAIR      ROTATOR CUFF REPAIR         Family History   Problem Relation Age of Onset    Rectal cancer Other     Diabetes Other     Heart disease Other     Hypertension Other     Hypertension Mother     Cancer Maternal Aunt        Social History     Socioeconomic History    Marital status: Single    Number of children: 1   Tobacco Use    Smoking status: Former     Current packs/day: 0.00     Average packs/day: 1 pack/day for 40.0 years (40.0 ttl pk-yrs)     Types: Cigarettes     Start date: 1981     Quit date: 2021     Years since quittin.1     Passive exposure: Current    Smokeless tobacco: Former    Tobacco comments:     Pt has been quit for almost 3 months   Substance and Sexual Activity    Alcohol use: Yes     Comment: occasionally     Drug use: Not Currently     Types: Marijuana    Sexual activity: Not Currently       Current Outpatient Medications   Medication Sig Dispense Refill    albuterol (PROAIR HFA) 90 mcg/actuation inhaler Inhale 2 puffs into the lungs every 4 (four) hours as needed for Wheezing. 18 g 5    albuterol (PROVENTIL) 2.5 mg /3 mL (0.083 %) nebulizer solution Take 3 mLs (2.5 mg total) by nebulization 4 (four) times daily as needed for Wheezing. 100 each 5    azelastine (ASTELIN) 137 mcg (0.1 %) nasal spray USE 2 SPRAYS NASALLY TWICE A DAY 90 mL 3    buPROPion (WELLBUTRIN XL) 150 MG TB24 tablet Take 1 tablet (150 mg total) by mouth 2 (two) times a day. 60 tablet 3    fluticasone propionate (FLONASE) 50 mcg/actuation nasal spray 2 sprays (100 mcg total) by Each Nostril route once daily. 16 g 5    fluticasone-umeclidin-vilanter (TRELEGY ELLIPTA) 200-62.5-25 mcg inhaler Inhale 1 puff into the lungs once daily. 60 each 5    gabapentin (NEURONTIN) 100 MG capsule Take 1 capsule (100 mg total) by mouth 3 (three) times daily. 180 capsule 1    hydroCHLOROthiazide (HYDRODIURIL) 25 MG tablet Take 1 tablet (25 mg total) by mouth once daily. 90 tablet 1    HYDROcodone-acetaminophen (NORCO)  mg per tablet Take 1 tablet by mouth every 6 (six) hours as needed for Pain. Every 4 to 6 hours prn for Chronic back pain 90 tablet 0    levocetirizine (XYZAL) 5 MG tablet Take 1 tablet (5 mg total) by mouth every evening. 90 tablet 3    linaCLOtide (LINZESS) 145 mcg Cap capsule Take 1 capsule (145 mcg total) by mouth before breakfast. 90 capsule 3    pantoprazole (PROTONIX) 40 MG tablet Take 1 tablet (40 mg total) by mouth once daily. 90 tablet 0    RESTASIS 0.05 % ophthalmic emulsion       rosuvastatin (CRESTOR) 20 MG tablet Take 1 tablet (20 mg total) by mouth once daily. 90 tablet 1    theophylline (THEODUR) 300 mg 24 hr capsule Take 300 mg by mouth once daily. One tab Daily with food: (replaced previous script for 100mg tab 3 daily) 90 capsule 3      Current Facility-Administered Medications   Medication Dose Route Frequency Provider Last Rate Last Admin    sodium chloride 0.9% flush 10 mL  10 mL Intravenous PRN Henok Delarosa MD           Review of patient's allergies indicates:  No Known Allergies    Objective:  Vitals:    08/29/23 1324   BP: 122/74   Pulse: 77   Resp: 13   SpO2: 97%        GEN: normal appearing, NAD, AAO x3  HENT: NCAT, anicteric, OP benign  CV: normal rate, regular rhythm  RESP: CTA, symmetric rise, unlabored  ABD: soft, ND, no guarding or TTP  SKIN: warm and dry  NEURO: grossly afocal    Assessment and Plan:    Proceed with:    EGD for dysphagia       Henok Delarosa MD  Gastroenterology     There are no Wet Read(s) to document.

## 2023-08-30 LAB
ESTROGEN SERPL-MCNC: NORMAL PG/ML
INSULIN SERPL-ACNC: NORMAL U[IU]/ML
LAB AP GROSS DESCRIPTION: NORMAL
LAB AP LABORATORY NOTES: NORMAL
T3RU NFR SERPL: NORMAL %

## 2023-09-11 ENCOUNTER — OFFICE VISIT (OUTPATIENT)
Dept: FAMILY MEDICINE | Facility: CLINIC | Age: 63
End: 2023-09-11
Payer: COMMERCIAL

## 2023-09-11 VITALS
BODY MASS INDEX: 32.77 KG/M2 | HEIGHT: 66 IN | TEMPERATURE: 98 F | SYSTOLIC BLOOD PRESSURE: 118 MMHG | RESPIRATION RATE: 18 BRPM | HEART RATE: 95 BPM | DIASTOLIC BLOOD PRESSURE: 82 MMHG | OXYGEN SATURATION: 96 %

## 2023-09-11 DIAGNOSIS — M51.9 LUMBAR DISC DISEASE: ICD-10-CM

## 2023-09-11 DIAGNOSIS — K21.9 GASTROESOPHAGEAL REFLUX DISEASE, UNSPECIFIED WHETHER ESOPHAGITIS PRESENT: ICD-10-CM

## 2023-09-11 DIAGNOSIS — F17.200 SMOKER: ICD-10-CM

## 2023-09-11 DIAGNOSIS — I10 HYPERTENSION, UNSPECIFIED TYPE: ICD-10-CM

## 2023-09-11 DIAGNOSIS — E78.5 DYSLIPIDEMIA: ICD-10-CM

## 2023-09-11 LAB
ALBUMIN SERPL BCP-MCNC: 4.2 G/DL (ref 3.5–5)
ALBUMIN/GLOB SERPL: 1.3 {RATIO}
ALP SERPL-CCNC: 209 U/L (ref 50–130)
ALT SERPL W P-5'-P-CCNC: 32 U/L (ref 13–56)
ANION GAP SERPL CALCULATED.3IONS-SCNC: 11 MMOL/L (ref 7–16)
AST SERPL W P-5'-P-CCNC: 34 U/L (ref 15–37)
BASOPHILS # BLD AUTO: 0.03 K/UL (ref 0–0.2)
BASOPHILS NFR BLD AUTO: 0.5 % (ref 0–1)
BILIRUB SERPL-MCNC: 0.9 MG/DL (ref ?–1.2)
BUN SERPL-MCNC: 12 MG/DL (ref 7–18)
BUN/CREAT SERPL: 11 (ref 6–20)
CALCIUM SERPL-MCNC: 9.7 MG/DL (ref 8.5–10.1)
CHLORIDE SERPL-SCNC: 103 MMOL/L (ref 98–107)
CO2 SERPL-SCNC: 28 MMOL/L (ref 21–32)
CREAT SERPL-MCNC: 1.05 MG/DL (ref 0.55–1.02)
DIFFERENTIAL METHOD BLD: ABNORMAL
EGFR (NO RACE VARIABLE) (RUSH/TITUS): 61 ML/MIN/1.73M2
EOSINOPHIL # BLD AUTO: 0.06 K/UL (ref 0–0.5)
EOSINOPHIL NFR BLD AUTO: 1 % (ref 1–4)
ERYTHROCYTE [DISTWIDTH] IN BLOOD BY AUTOMATED COUNT: 14.4 % (ref 11.5–14.5)
GLOBULIN SER-MCNC: 3.2 G/DL (ref 2–4)
GLUCOSE SERPL-MCNC: 92 MG/DL (ref 74–106)
HCT VFR BLD AUTO: 40.2 % (ref 38–47)
HGB BLD-MCNC: 13.2 G/DL (ref 12–16)
IMM GRANULOCYTES # BLD AUTO: 0.02 K/UL (ref 0–0.04)
IMM GRANULOCYTES NFR BLD: 0.3 % (ref 0–0.4)
LYMPHOCYTES # BLD AUTO: 2.39 K/UL (ref 1–4.8)
LYMPHOCYTES NFR BLD AUTO: 38.1 % (ref 27–41)
MCH RBC QN AUTO: 28.9 PG (ref 27–31)
MCHC RBC AUTO-ENTMCNC: 32.8 G/DL (ref 32–36)
MCV RBC AUTO: 88 FL (ref 80–96)
MONOCYTES # BLD AUTO: 0.73 K/UL (ref 0–0.8)
MONOCYTES NFR BLD AUTO: 11.6 % (ref 2–6)
MPC BLD CALC-MCNC: 9.8 FL (ref 9.4–12.4)
NEUTROPHILS # BLD AUTO: 3.05 K/UL (ref 1.8–7.7)
NEUTROPHILS NFR BLD AUTO: 48.5 % (ref 53–65)
NRBC # BLD AUTO: 0 X10E3/UL
NRBC, AUTO (.00): 0 %
PLATELET # BLD AUTO: 257 K/UL (ref 150–400)
POTASSIUM SERPL-SCNC: 3.4 MMOL/L (ref 3.5–5.1)
PROT SERPL-MCNC: 7.4 G/DL (ref 6.4–8.2)
RBC # BLD AUTO: 4.57 M/UL (ref 4.2–5.4)
SODIUM SERPL-SCNC: 139 MMOL/L (ref 136–145)
WBC # BLD AUTO: 6.28 K/UL (ref 4.5–11)

## 2023-09-11 PROCEDURE — 99213 OFFICE O/P EST LOW 20 MIN: CPT | Mod: ,,, | Performed by: FAMILY MEDICINE

## 2023-09-11 PROCEDURE — 1160F PR REVIEW ALL MEDS BY PRESCRIBER/CLIN PHARMACIST DOCUMENTED: ICD-10-PCS | Mod: ,,, | Performed by: FAMILY MEDICINE

## 2023-09-11 PROCEDURE — 85025 COMPLETE CBC W/AUTO DIFF WBC: CPT | Mod: ,,, | Performed by: CLINICAL MEDICAL LABORATORY

## 2023-09-11 PROCEDURE — 1159F MED LIST DOCD IN RCRD: CPT | Mod: ,,, | Performed by: FAMILY MEDICINE

## 2023-09-11 PROCEDURE — 80053 COMPREHENSIVE METABOLIC PANEL: ICD-10-PCS | Mod: ,,, | Performed by: CLINICAL MEDICAL LABORATORY

## 2023-09-11 PROCEDURE — 1159F PR MEDICATION LIST DOCUMENTED IN MEDICAL RECORD: ICD-10-PCS | Mod: ,,, | Performed by: FAMILY MEDICINE

## 2023-09-11 PROCEDURE — 3044F PR MOST RECENT HEMOGLOBIN A1C LEVEL <7.0%: ICD-10-PCS | Mod: ,,, | Performed by: FAMILY MEDICINE

## 2023-09-11 PROCEDURE — 85025 CBC WITH DIFFERENTIAL: ICD-10-PCS | Mod: ,,, | Performed by: CLINICAL MEDICAL LABORATORY

## 2023-09-11 PROCEDURE — 80053 COMPREHEN METABOLIC PANEL: CPT | Mod: ,,, | Performed by: CLINICAL MEDICAL LABORATORY

## 2023-09-11 PROCEDURE — 3008F PR BODY MASS INDEX (BMI) DOCUMENTED: ICD-10-PCS | Mod: ,,, | Performed by: FAMILY MEDICINE

## 2023-09-11 PROCEDURE — 3008F BODY MASS INDEX DOCD: CPT | Mod: ,,, | Performed by: FAMILY MEDICINE

## 2023-09-11 PROCEDURE — 3044F HG A1C LEVEL LT 7.0%: CPT | Mod: ,,, | Performed by: FAMILY MEDICINE

## 2023-09-11 PROCEDURE — 3074F PR MOST RECENT SYSTOLIC BLOOD PRESSURE < 130 MM HG: ICD-10-PCS | Mod: ,,, | Performed by: FAMILY MEDICINE

## 2023-09-11 PROCEDURE — 3079F PR MOST RECENT DIASTOLIC BLOOD PRESSURE 80-89 MM HG: ICD-10-PCS | Mod: ,,, | Performed by: FAMILY MEDICINE

## 2023-09-11 PROCEDURE — 1160F RVW MEDS BY RX/DR IN RCRD: CPT | Mod: ,,, | Performed by: FAMILY MEDICINE

## 2023-09-11 PROCEDURE — 99213 PR OFFICE/OUTPT VISIT, EST, LEVL III, 20-29 MIN: ICD-10-PCS | Mod: ,,, | Performed by: FAMILY MEDICINE

## 2023-09-11 PROCEDURE — 3079F DIAST BP 80-89 MM HG: CPT | Mod: ,,, | Performed by: FAMILY MEDICINE

## 2023-09-11 PROCEDURE — 3074F SYST BP LT 130 MM HG: CPT | Mod: ,,, | Performed by: FAMILY MEDICINE

## 2023-09-11 RX ORDER — HYDROCHLOROTHIAZIDE 25 MG/1
25 TABLET ORAL DAILY
Qty: 90 TABLET | Refills: 1 | Status: SHIPPED | OUTPATIENT
Start: 2023-09-11 | End: 2023-12-11 | Stop reason: SDUPTHER

## 2023-09-11 RX ORDER — PANTOPRAZOLE SODIUM 40 MG/1
40 TABLET, DELAYED RELEASE ORAL DAILY
Qty: 90 TABLET | Refills: 0 | Status: SHIPPED | OUTPATIENT
Start: 2023-09-11 | End: 2024-01-10 | Stop reason: SDUPTHER

## 2023-09-11 RX ORDER — BUPROPION HYDROCHLORIDE 150 MG/1
150 TABLET ORAL 2 TIMES DAILY
Qty: 60 TABLET | Refills: 3 | Status: SHIPPED | OUTPATIENT
Start: 2023-09-11 | End: 2024-01-10

## 2023-09-11 RX ORDER — HYDROCODONE BITARTRATE AND ACETAMINOPHEN 10; 325 MG/1; MG/1
1 TABLET ORAL EVERY 6 HOURS PRN
Qty: 90 TABLET | Refills: 0 | Status: SHIPPED | OUTPATIENT
Start: 2023-09-11 | End: 2023-10-11 | Stop reason: SDUPTHER

## 2023-09-11 RX ORDER — ROSUVASTATIN CALCIUM 20 MG/1
20 TABLET, COATED ORAL DAILY
Qty: 90 TABLET | Refills: 1 | Status: SHIPPED | OUTPATIENT
Start: 2023-09-11

## 2023-09-11 NOTE — PROGRESS NOTES
Nneka Gomes is a 62 y.o. female seen today for follow-up on her chronic pain secondary to lumbar disc disease.  She reports good symptom control with no side effects and she is meeting her ADLs.  She is had no signs or symptoms of tolerance or addiction and her  today was appropriate.  Her urine drug screen is only shown her prescribed opiate.  Patient does have a history of COPD and is up-to-date on her Prevnar 20 but will need the RSV COVID and flu vaccinations over the next month or so.      Past Medical History:   Diagnosis Date    Abdominal bloating 01/09/2020    Abdominal pain, right upper quadrant 01/09/2020    Abnormal liver enzymes 03/04/2020    Abnormal results of liver function studies 11/18/2020    Acute conjunctivitis 05/30/2014    Acute exacerbation of chronic obstructive airways disease 02/13/2017    Acute pharyngitis 05/19/2020    Acute sinusitis 10/09/2017    Acute upper respiratory infection 05/19/2020    Allergic rhinitis 09/23/2020    Anemia 03/07/2014    Anesthesia of skin 09/06/2017    bilateral fingers    Bilateral primary osteoarthritis of knee 12/02/2019    Bradycardia 01/16/2020    Carpal tunnel syndrome 08/10/2017    Chest pain 03/06/2020    Chronic idiopathic constipation 11/18/2020    Chronic low back pain 11/05/2018    Chronic pain 04/23/2019    Chronic pain syndrome 01/30/2020    COPD (chronic obstructive pulmonary disease) 02/19/2021    Coronavirus infection 05/21/2020    Cough 05/19/2020    Degeneration of lumbar intervertebral disc 09/26/2014    L3-L4 L4-L5 Greater than L5-S1    Depressive disorder 07/03/2019    Disorder of gallbladder 03/30/2015    Dysphagia 01/09/2020    Dyspnea 05/19/2020    Dysuria 05/19/2020    Fatigue 05/19/2020    Frequency of urination 02/16/2015    GERD (gastroesophageal reflux disease) 11/18/2020    Hematuria 06/30/2020    Hemoptysis 02/27/2020    Herpes zoster 09/23/2020    Hyperlipidemia 09/23/2020    Hypertension 03/06/2020    Joint pain  11/04/2019    Knee pain 01/04/2019    Left inguinal hernia 06/10/2019    Long term (current) use of opiate analgesic 02/19/2021    Lumbar spondylosis 11/23/2020    Lumbar sprain 07/16/2012    Malaise 11/20/2014    Melena 09/25/2017    Microscopic hematuria 04/02/2020    Migraine without aura 02/06/2012    Nausea 02/27/2020    Nicotine dependence, cigarettes, uncomplicated 11/11/2020    Nonulcer dyspepsia 01/23/2017    On home O2     Other long term (current) drug therapy 09/23/2020    Other specified urinary incontinence 06/17/2020    Pain in left shoulder 05/02/2019    Pain in left wrist 09/06/2017    and hand    Pain in right shoulder 08/30/2019    Pain in right wrist 09/04/2018    Right hand    Palpitations 03/06/2020    Shoulder joint pain 04/23/2019    Smoker 07/03/2019    Snoring 08/02/2019    Synovial cyst of popliteal space 04/08/2013    Tobacco dependence syndrome 02/19/2021    Unstable angina 01/16/2020    UTI (urinary tract infection) 03/27/2020     Family History   Problem Relation Age of Onset    Rectal cancer Other     Diabetes Other     Heart disease Other     Hypertension Other     Hypertension Mother     Cancer Maternal Aunt      Current Outpatient Medications on File Prior to Visit   Medication Sig Dispense Refill    albuterol (PROAIR HFA) 90 mcg/actuation inhaler Inhale 2 puffs into the lungs every 4 (four) hours as needed for Wheezing. 18 g 5    albuterol (PROVENTIL) 2.5 mg /3 mL (0.083 %) nebulizer solution Take 3 mLs (2.5 mg total) by nebulization 4 (four) times daily as needed for Wheezing. 100 each 5    azelastine (ASTELIN) 137 mcg (0.1 %) nasal spray USE 2 SPRAYS NASALLY TWICE A DAY 90 mL 3    fluticasone propionate (FLONASE) 50 mcg/actuation nasal spray 2 sprays (100 mcg total) by Each Nostril route once daily. 16 g 5    fluticasone-umeclidin-vilanter (TRELEGY ELLIPTA) 200-62.5-25 mcg inhaler Inhale 1 puff into the lungs once daily. 60 each 5    gabapentin (NEURONTIN) 100 MG capsule Take 1  capsule (100 mg total) by mouth 3 (three) times daily. 180 capsule 1    levocetirizine (XYZAL) 5 MG tablet Take 1 tablet (5 mg total) by mouth every evening. 90 tablet 3    linaCLOtide (LINZESS) 145 mcg Cap capsule Take 1 capsule (145 mcg total) by mouth before breakfast. 90 capsule 3    RESTASIS 0.05 % ophthalmic emulsion       theophylline (THEODUR) 300 mg 24 hr capsule Take 300 mg by mouth once daily. One tab Daily with food: (replaced previous script for 100mg tab 3 daily) 90 capsule 3    [DISCONTINUED] buPROPion (WELLBUTRIN XL) 150 MG TB24 tablet Take 1 tablet (150 mg total) by mouth 2 (two) times a day. 60 tablet 3    [DISCONTINUED] hydroCHLOROthiazide (HYDRODIURIL) 25 MG tablet Take 1 tablet (25 mg total) by mouth once daily. 90 tablet 1    [DISCONTINUED] HYDROcodone-acetaminophen (NORCO)  mg per tablet Take 1 tablet by mouth every 6 (six) hours as needed for Pain. Every 4 to 6 hours prn for Chronic back pain 90 tablet 0    [DISCONTINUED] pantoprazole (PROTONIX) 40 MG tablet Take 1 tablet (40 mg total) by mouth once daily. 90 tablet 0    [DISCONTINUED] rosuvastatin (CRESTOR) 20 MG tablet Take 1 tablet (20 mg total) by mouth once daily. 90 tablet 1     No current facility-administered medications on file prior to visit.     Immunization History   Administered Date(s) Administered    COVID-19, MRNA, LN-S, PF (Pfizer) (Purple Cap) 04/29/2021, 10/28/2021, 04/29/2022    Influenza - Quadrivalent - PF *Preferred* (6 months and older) 09/16/2021    Influenza Split 10/03/2019    Pneumococcal Conjugate - 13 Valent 11/05/2018    Pneumococcal Conjugate - 20 Valent 01/09/2023    Pneumococcal Polysaccharide - 23 Valent 11/05/2019       ROS     Vitals:    09/11/23 1457   BP: 118/82   Pulse: 95   Resp: 18   Temp: 98.4 °F (36.9 °C)       Physical Exam  Vitals reviewed.   Constitutional:       Appearance: Normal appearance.   HENT:      Head: Normocephalic.   Eyes:      Extraocular Movements: Extraocular movements  intact.      Conjunctiva/sclera: Conjunctivae normal.      Pupils: Pupils are equal, round, and reactive to light.   Neck:      Thyroid: No thyroid mass or thyromegaly.   Cardiovascular:      Rate and Rhythm: Normal rate and regular rhythm.      Heart sounds: Normal heart sounds. No murmur heard.     No gallop.   Pulmonary:      Effort: Pulmonary effort is normal. No respiratory distress.      Breath sounds: Normal breath sounds. Decreased air movement present. No wheezing or rales.   Musculoskeletal:      Lumbar back: Spasms and tenderness present. Decreased range of motion.      Comments: She is slow to rise from seated an antalgic gait   Skin:     General: Skin is warm and dry.      Coloration: Skin is not jaundiced or pale.   Neurological:      Mental Status: She is alert.   Psychiatric:         Mood and Affect: Mood normal.         Behavior: Behavior normal.         Thought Content: Thought content normal.         Judgment: Judgment normal.          Assessment and Plan  Lumbar disc disease  -     HYDROcodone-acetaminophen (NORCO)  mg per tablet; Take 1 tablet by mouth every 6 (six) hours as needed for Pain. Every 4 to 6 hours prn for Chronic back pain  Dispense: 90 tablet; Refill: 0    Dyslipidemia  -     CBC Auto Differential; Future; Expected date: 09/11/2023  -     Comprehensive Metabolic Panel; Future; Expected date: 09/11/2023  -     rosuvastatin (CRESTOR) 20 MG tablet; Take 1 tablet (20 mg total) by mouth once daily.  Dispense: 90 tablet; Refill: 1    Gastroesophageal reflux disease, unspecified whether esophagitis present  -     pantoprazole (PROTONIX) 40 MG tablet; Take 1 tablet (40 mg total) by mouth once daily.  Dispense: 90 tablet; Refill: 0    Hypertension, unspecified type  -     CBC Auto Differential; Future; Expected date: 09/11/2023  -     Comprehensive Metabolic Panel; Future; Expected date: 09/11/2023  -     hydroCHLOROthiazide (HYDRODIURIL) 25 MG tablet; Take 1 tablet (25 mg total) by  mouth once daily.  Dispense: 90 tablet; Refill: 1    Smoker  -     buPROPion (WELLBUTRIN XL) 150 MG TB24 tablet; Take 1 tablet (150 mg total) by mouth 2 (two) times a day.  Dispense: 60 tablet; Refill: 3            Return to clinic in 1 month or as needed.    Health Maintenance Topics with due status: Not Due       Topic Last Completion Date    Cervical Cancer Screening 10/31/2022    Colorectal Cancer Screening 11/08/2022    Lipid Panel 04/10/2023    LDCT Lung Screen 04/19/2023    Hemoglobin A1c (Prediabetes) 05/10/2023

## 2023-09-22 ENCOUNTER — TELEPHONE (OUTPATIENT)
Dept: FAMILY MEDICINE | Facility: CLINIC | Age: 63
End: 2023-09-22
Payer: COMMERCIAL

## 2023-09-22 NOTE — TELEPHONE ENCOUNTER
----- Message from Magno Yan MD sent at 9/12/2023  7:47 AM CDT -----  Mildly impaired kidney function patient needs to increase her intake of fluids and avoid all NSAIDs.

## 2023-10-05 ENCOUNTER — HOSPITAL ENCOUNTER (OUTPATIENT)
Dept: RADIOLOGY | Facility: HOSPITAL | Age: 63
Discharge: HOME | End: 2023-10-05
Attending: FAMILY MEDICINE
Payer: MEDICARE

## 2023-10-05 VITALS — BODY MASS INDEX: 31.96 KG/M2 | WEIGHT: 198 LBS

## 2023-10-05 DIAGNOSIS — Z12.31 OTHER SCREENING MAMMOGRAM: ICD-10-CM

## 2023-10-05 PROCEDURE — 77067 SCR MAMMO BI INCL CAD: CPT | Mod: TC

## 2023-10-11 ENCOUNTER — OFFICE VISIT (OUTPATIENT)
Dept: FAMILY MEDICINE | Facility: CLINIC | Age: 63
End: 2023-10-11
Payer: COMMERCIAL

## 2023-10-11 VITALS
RESPIRATION RATE: 18 BRPM | OXYGEN SATURATION: 99 % | TEMPERATURE: 98 F | BODY MASS INDEX: 32.47 KG/M2 | SYSTOLIC BLOOD PRESSURE: 122 MMHG | DIASTOLIC BLOOD PRESSURE: 84 MMHG | HEIGHT: 66 IN | HEART RATE: 83 BPM | WEIGHT: 202 LBS

## 2023-10-11 DIAGNOSIS — Z23 NEED FOR INFLUENZA VACCINATION: ICD-10-CM

## 2023-10-11 DIAGNOSIS — K73.9 CHRONIC HEPATITIS, UNSPECIFIED: ICD-10-CM

## 2023-10-11 DIAGNOSIS — M51.9 LUMBAR DISC DISEASE: Primary | ICD-10-CM

## 2023-10-11 PROCEDURE — 3008F BODY MASS INDEX DOCD: CPT | Mod: ,,, | Performed by: FAMILY MEDICINE

## 2023-10-11 PROCEDURE — 99213 PR OFFICE/OUTPT VISIT, EST, LEVL III, 20-29 MIN: ICD-10-PCS | Mod: ,,, | Performed by: FAMILY MEDICINE

## 2023-10-11 PROCEDURE — 90686 IIV4 VACC NO PRSV 0.5 ML IM: CPT | Mod: ,,, | Performed by: FAMILY MEDICINE

## 2023-10-11 PROCEDURE — 1159F MED LIST DOCD IN RCRD: CPT | Mod: ,,, | Performed by: FAMILY MEDICINE

## 2023-10-11 PROCEDURE — 3074F SYST BP LT 130 MM HG: CPT | Mod: ,,, | Performed by: FAMILY MEDICINE

## 2023-10-11 PROCEDURE — 1159F PR MEDICATION LIST DOCUMENTED IN MEDICAL RECORD: ICD-10-PCS | Mod: ,,, | Performed by: FAMILY MEDICINE

## 2023-10-11 PROCEDURE — 1160F PR REVIEW ALL MEDS BY PRESCRIBER/CLIN PHARMACIST DOCUMENTED: ICD-10-PCS | Mod: ,,, | Performed by: FAMILY MEDICINE

## 2023-10-11 PROCEDURE — 3079F DIAST BP 80-89 MM HG: CPT | Mod: ,,, | Performed by: FAMILY MEDICINE

## 2023-10-11 PROCEDURE — G0008 FLU VACCINE (QUAD) GREATER THAN OR EQUAL TO 3YO PRESERVATIVE FREE IM: ICD-10-PCS | Mod: ,,, | Performed by: FAMILY MEDICINE

## 2023-10-11 PROCEDURE — 1160F RVW MEDS BY RX/DR IN RCRD: CPT | Mod: ,,, | Performed by: FAMILY MEDICINE

## 2023-10-11 PROCEDURE — 3074F PR MOST RECENT SYSTOLIC BLOOD PRESSURE < 130 MM HG: ICD-10-PCS | Mod: ,,, | Performed by: FAMILY MEDICINE

## 2023-10-11 PROCEDURE — G0008 ADMIN INFLUENZA VIRUS VAC: HCPCS | Mod: ,,, | Performed by: FAMILY MEDICINE

## 2023-10-11 PROCEDURE — 3044F HG A1C LEVEL LT 7.0%: CPT | Mod: ,,, | Performed by: FAMILY MEDICINE

## 2023-10-11 PROCEDURE — 3008F PR BODY MASS INDEX (BMI) DOCUMENTED: ICD-10-PCS | Mod: ,,, | Performed by: FAMILY MEDICINE

## 2023-10-11 PROCEDURE — 99213 OFFICE O/P EST LOW 20 MIN: CPT | Mod: ,,, | Performed by: FAMILY MEDICINE

## 2023-10-11 PROCEDURE — 90686 FLU VACCINE (QUAD) GREATER THAN OR EQUAL TO 3YO PRESERVATIVE FREE IM: ICD-10-PCS | Mod: ,,, | Performed by: FAMILY MEDICINE

## 2023-10-11 PROCEDURE — 3044F PR MOST RECENT HEMOGLOBIN A1C LEVEL <7.0%: ICD-10-PCS | Mod: ,,, | Performed by: FAMILY MEDICINE

## 2023-10-11 PROCEDURE — 3079F PR MOST RECENT DIASTOLIC BLOOD PRESSURE 80-89 MM HG: ICD-10-PCS | Mod: ,,, | Performed by: FAMILY MEDICINE

## 2023-10-11 RX ORDER — HYDROCODONE BITARTRATE AND ACETAMINOPHEN 10; 325 MG/1; MG/1
1 TABLET ORAL EVERY 6 HOURS PRN
Qty: 90 TABLET | Refills: 0 | Status: SHIPPED | OUTPATIENT
Start: 2023-10-11 | End: 2023-11-08 | Stop reason: SDUPTHER

## 2023-10-11 NOTE — PROGRESS NOTES
Nneka Gomes is a 62 y.o. female seen today for follow-up on her chronic pain secondary to lumbar disc disease.  She reports good symptom control with no side effects and she is meeting her ADLs.  She has had no signs or symptoms of tolerance or addiction and her  today was appropriate.  Patient's urine drug screen is only shown her prescribed opiate.  Patient has had no signs or symptoms of tolerance or addiction and has no new complaints today.  Patient reports with her new diet she is lost several lbs already.  Patient is due for her flu vaccine today.      Past Medical History:   Diagnosis Date    Abdominal bloating 01/09/2020    Abdominal pain, right upper quadrant 01/09/2020    Abnormal liver enzymes 03/04/2020    Abnormal results of liver function studies 11/18/2020    Acute conjunctivitis 05/30/2014    Acute exacerbation of chronic obstructive airways disease 02/13/2017    Acute pharyngitis 05/19/2020    Acute sinusitis 10/09/2017    Acute upper respiratory infection 05/19/2020    Allergic rhinitis 09/23/2020    Anemia 03/07/2014    Anesthesia of skin 09/06/2017    bilateral fingers    Bilateral primary osteoarthritis of knee 12/02/2019    Bradycardia 01/16/2020    Carpal tunnel syndrome 08/10/2017    Chest pain 03/06/2020    Chronic idiopathic constipation 11/18/2020    Chronic low back pain 11/05/2018    Chronic pain 04/23/2019    Chronic pain syndrome 01/30/2020    COPD (chronic obstructive pulmonary disease) 02/19/2021    Coronavirus infection 05/21/2020    Cough 05/19/2020    Degeneration of lumbar intervertebral disc 09/26/2014    L3-L4 L4-L5 Greater than L5-S1    Depressive disorder 07/03/2019    Disorder of gallbladder 03/30/2015    Dysphagia 01/09/2020    Dyspnea 05/19/2020    Dysuria 05/19/2020    Fatigue 05/19/2020    Frequency of urination 02/16/2015    GERD (gastroesophageal reflux disease) 11/18/2020    Hematuria 06/30/2020    Hemoptysis 02/27/2020    Herpes zoster 09/23/2020     Hyperlipidemia 09/23/2020    Hypertension 03/06/2020    Joint pain 11/04/2019    Knee pain 01/04/2019    Left inguinal hernia 06/10/2019    Long term (current) use of opiate analgesic 02/19/2021    Lumbar spondylosis 11/23/2020    Lumbar sprain 07/16/2012    Malaise 11/20/2014    Melena 09/25/2017    Microscopic hematuria 04/02/2020    Migraine without aura 02/06/2012    Nausea 02/27/2020    Nicotine dependence, cigarettes, uncomplicated 11/11/2020    Nonulcer dyspepsia 01/23/2017    On home O2     Other long term (current) drug therapy 09/23/2020    Other specified urinary incontinence 06/17/2020    Pain in left shoulder 05/02/2019    Pain in left wrist 09/06/2017    and hand    Pain in right shoulder 08/30/2019    Pain in right wrist 09/04/2018    Right hand    Palpitations 03/06/2020    Shoulder joint pain 04/23/2019    Smoker 07/03/2019    Snoring 08/02/2019    Synovial cyst of popliteal space 04/08/2013    Tobacco dependence syndrome 02/19/2021    Unstable angina 01/16/2020    UTI (urinary tract infection) 03/27/2020     Family History   Problem Relation Age of Onset    Rectal cancer Other     Diabetes Other     Heart disease Other     Hypertension Other     Hypertension Mother     Cancer Maternal Aunt      Current Outpatient Medications on File Prior to Visit   Medication Sig Dispense Refill    albuterol (PROAIR HFA) 90 mcg/actuation inhaler Inhale 2 puffs into the lungs every 4 (four) hours as needed for Wheezing. 18 g 5    albuterol (PROVENTIL) 2.5 mg /3 mL (0.083 %) nebulizer solution Take 3 mLs (2.5 mg total) by nebulization 4 (four) times daily as needed for Wheezing. 100 each 5    azelastine (ASTELIN) 137 mcg (0.1 %) nasal spray USE 2 SPRAYS NASALLY TWICE A DAY 90 mL 3    buPROPion (WELLBUTRIN XL) 150 MG TB24 tablet Take 1 tablet (150 mg total) by mouth 2 (two) times a day. 60 tablet 3    fluticasone propionate (FLONASE) 50 mcg/actuation nasal spray 2 sprays (100 mcg total) by Each Nostril route once  daily. 16 g 5    fluticasone-umeclidin-vilanter (TRELEGY ELLIPTA) 200-62.5-25 mcg inhaler Inhale 1 puff into the lungs once daily. 60 each 5    gabapentin (NEURONTIN) 100 MG capsule Take 1 capsule (100 mg total) by mouth 3 (three) times daily. 180 capsule 1    hydroCHLOROthiazide (HYDRODIURIL) 25 MG tablet Take 1 tablet (25 mg total) by mouth once daily. 90 tablet 1    levocetirizine (XYZAL) 5 MG tablet Take 1 tablet (5 mg total) by mouth every evening. 90 tablet 3    linaCLOtide (LINZESS) 145 mcg Cap capsule Take 1 capsule (145 mcg total) by mouth before breakfast. 90 capsule 3    pantoprazole (PROTONIX) 40 MG tablet Take 1 tablet (40 mg total) by mouth once daily. 90 tablet 0    RESTASIS 0.05 % ophthalmic emulsion       rosuvastatin (CRESTOR) 20 MG tablet Take 1 tablet (20 mg total) by mouth once daily. 90 tablet 1    theophylline (THEODUR) 300 mg 24 hr capsule Take 300 mg by mouth once daily. One tab Daily with food: (replaced previous script for 100mg tab 3 daily) 90 capsule 3    [DISCONTINUED] HYDROcodone-acetaminophen (NORCO)  mg per tablet Take 1 tablet by mouth every 6 (six) hours as needed for Pain. Every 4 to 6 hours prn for Chronic back pain 90 tablet 0     No current facility-administered medications on file prior to visit.     Immunization History   Administered Date(s) Administered    COVID-19, MRNA, LN-S, PF (Pfizer) (Purple Cap) 04/29/2021, 10/28/2021, 04/29/2022    Influenza - Quadrivalent - PF *Preferred* (6 months and older) 09/16/2021, 10/11/2023    Influenza Split 10/03/2019    Pneumococcal Conjugate - 13 Valent 11/05/2018    Pneumococcal Conjugate - 20 Valent 01/09/2023    Pneumococcal Polysaccharide - 23 Valent 11/05/2019       Review of Systems   Constitutional:  Negative for fever, malaise/fatigue and weight loss.   Respiratory:  Positive for shortness of breath.    Cardiovascular:  Negative for chest pain and palpitations.   Gastrointestinal:  Negative for nausea and vomiting.    Musculoskeletal:  Positive for back pain and myalgias.   Psychiatric/Behavioral:  Negative for depression.         Vitals:    10/11/23 1443   BP: 122/84   Pulse: 83   Resp: 18   Temp: 98.2 °F (36.8 °C)       Physical Exam  Vitals reviewed.   Constitutional:       Appearance: Normal appearance.   HENT:      Head: Normocephalic.   Eyes:      Extraocular Movements: Extraocular movements intact.      Conjunctiva/sclera: Conjunctivae normal.      Pupils: Pupils are equal, round, and reactive to light.   Neck:      Thyroid: No thyroid mass or thyromegaly.   Cardiovascular:      Rate and Rhythm: Normal rate and regular rhythm.      Heart sounds: Normal heart sounds. No murmur heard.     No gallop.   Pulmonary:      Effort: Pulmonary effort is normal. No respiratory distress.      Breath sounds: Decreased breath sounds present. No wheezing or rales.   Musculoskeletal:      Lumbar back: Spasms and tenderness present. Decreased range of motion.        Back:    Skin:     General: Skin is warm and dry.      Coloration: Skin is not jaundiced or pale.   Neurological:      Mental Status: She is alert.   Psychiatric:         Mood and Affect: Mood normal.         Behavior: Behavior normal.         Thought Content: Thought content normal.         Judgment: Judgment normal.          Assessment and Plan  1. Lumbar disc disease  -     HYDROcodone-acetaminophen (NORCO)  mg per tablet; Take 1 tablet by mouth every 6 (six) hours as needed for Pain. Every 4 to 6 hours prn for Chronic back pain  Dispense: 90 tablet; Refill: 0    2. Need for influenza vaccination  -     Influenza - Quadrivalent *Preferred* (6 months+) (PF)    3. Chronic hepatitis, unspecified  Assessment & Plan:  We will continue to monitor the patient for exacerbations and recurrence.               Return to clinic in 1 month or as needed.    Health Maintenance Topics with due status: Not Due       Topic Last Completion Date    Cervical Cancer Screening 10/31/2022     Colorectal Cancer Screening 11/08/2022    Lipid Panel 04/10/2023    LDCT Lung Screen 04/19/2023    Hemoglobin A1c (Prediabetes) 05/10/2023    Mammogram 10/05/2023

## 2023-11-08 ENCOUNTER — OFFICE VISIT (OUTPATIENT)
Dept: FAMILY MEDICINE | Facility: CLINIC | Age: 63
End: 2023-11-08
Payer: COMMERCIAL

## 2023-11-08 VITALS
SYSTOLIC BLOOD PRESSURE: 118 MMHG | RESPIRATION RATE: 18 BRPM | OXYGEN SATURATION: 95 % | WEIGHT: 202 LBS | BODY MASS INDEX: 32.47 KG/M2 | TEMPERATURE: 98 F | DIASTOLIC BLOOD PRESSURE: 76 MMHG | HEIGHT: 66 IN | HEART RATE: 88 BPM

## 2023-11-08 DIAGNOSIS — M51.9 LUMBAR DISC DISEASE: ICD-10-CM

## 2023-11-08 DIAGNOSIS — R73.03 PREDIABETES: Primary | ICD-10-CM

## 2023-11-08 LAB
EST. AVERAGE GLUCOSE BLD GHB EST-MCNC: 117 MG/DL
HBA1C MFR BLD HPLC: 6.1 % (ref 4.5–6.6)

## 2023-11-08 PROCEDURE — 1159F MED LIST DOCD IN RCRD: CPT | Mod: ,,, | Performed by: FAMILY MEDICINE

## 2023-11-08 PROCEDURE — 3074F PR MOST RECENT SYSTOLIC BLOOD PRESSURE < 130 MM HG: ICD-10-PCS | Mod: ,,, | Performed by: FAMILY MEDICINE

## 2023-11-08 PROCEDURE — 3008F PR BODY MASS INDEX (BMI) DOCUMENTED: ICD-10-PCS | Mod: ,,, | Performed by: FAMILY MEDICINE

## 2023-11-08 PROCEDURE — 3074F SYST BP LT 130 MM HG: CPT | Mod: ,,, | Performed by: FAMILY MEDICINE

## 2023-11-08 PROCEDURE — 99213 PR OFFICE/OUTPT VISIT, EST, LEVL III, 20-29 MIN: ICD-10-PCS | Mod: ,,, | Performed by: FAMILY MEDICINE

## 2023-11-08 PROCEDURE — 99213 OFFICE O/P EST LOW 20 MIN: CPT | Mod: ,,, | Performed by: FAMILY MEDICINE

## 2023-11-08 PROCEDURE — 83036 HEMOGLOBIN A1C: ICD-10-PCS | Mod: ,,, | Performed by: CLINICAL MEDICAL LABORATORY

## 2023-11-08 PROCEDURE — 3044F PR MOST RECENT HEMOGLOBIN A1C LEVEL <7.0%: ICD-10-PCS | Mod: ,,, | Performed by: FAMILY MEDICINE

## 2023-11-08 PROCEDURE — 3044F HG A1C LEVEL LT 7.0%: CPT | Mod: ,,, | Performed by: FAMILY MEDICINE

## 2023-11-08 PROCEDURE — 1159F PR MEDICATION LIST DOCUMENTED IN MEDICAL RECORD: ICD-10-PCS | Mod: ,,, | Performed by: FAMILY MEDICINE

## 2023-11-08 PROCEDURE — 3008F BODY MASS INDEX DOCD: CPT | Mod: ,,, | Performed by: FAMILY MEDICINE

## 2023-11-08 PROCEDURE — 3078F DIAST BP <80 MM HG: CPT | Mod: ,,, | Performed by: FAMILY MEDICINE

## 2023-11-08 PROCEDURE — 3078F PR MOST RECENT DIASTOLIC BLOOD PRESSURE < 80 MM HG: ICD-10-PCS | Mod: ,,, | Performed by: FAMILY MEDICINE

## 2023-11-08 PROCEDURE — 83036 HEMOGLOBIN GLYCOSYLATED A1C: CPT | Mod: ,,, | Performed by: CLINICAL MEDICAL LABORATORY

## 2023-11-08 RX ORDER — HYDROCODONE BITARTRATE AND ACETAMINOPHEN 10; 325 MG/1; MG/1
1 TABLET ORAL EVERY 6 HOURS PRN
Qty: 90 TABLET | Refills: 0 | Status: SHIPPED | OUTPATIENT
Start: 2023-11-08 | End: 2023-12-11 | Stop reason: SDUPTHER

## 2023-11-08 NOTE — PROGRESS NOTES
Nneka Gomes is a 63 y.o. female seen today for follow-up on her chronic pain due to lumbar disc disease.  Patient reports her medication is working well with no side effects and she is meeting her ADLs.  She has had no signs or symptoms of tolerance or addiction her  today was appropriate.  Her urine drug screen is only shown her prescribed opiate.  Patient also has a past medical history of prediabetes and needs follow-up blood work today.       Past Medical History:   Diagnosis Date    Abdominal bloating 01/09/2020    Abdominal pain, right upper quadrant 01/09/2020    Abnormal liver enzymes 03/04/2020    Abnormal results of liver function studies 11/18/2020    Acute conjunctivitis 05/30/2014    Acute exacerbation of chronic obstructive airways disease 02/13/2017    Acute pharyngitis 05/19/2020    Acute sinusitis 10/09/2017    Acute upper respiratory infection 05/19/2020    Allergic rhinitis 09/23/2020    Anemia 03/07/2014    Anesthesia of skin 09/06/2017    bilateral fingers    Bilateral primary osteoarthritis of knee 12/02/2019    Bradycardia 01/16/2020    Carpal tunnel syndrome 08/10/2017    Chest pain 03/06/2020    Chronic idiopathic constipation 11/18/2020    Chronic low back pain 11/05/2018    Chronic pain 04/23/2019    Chronic pain syndrome 01/30/2020    COPD (chronic obstructive pulmonary disease) 02/19/2021    Coronavirus infection 05/21/2020    Cough 05/19/2020    Degeneration of lumbar intervertebral disc 09/26/2014    L3-L4 L4-L5 Greater than L5-S1    Depressive disorder 07/03/2019    Disorder of gallbladder 03/30/2015    Dysphagia 01/09/2020    Dyspnea 05/19/2020    Dysuria 05/19/2020    Fatigue 05/19/2020    Frequency of urination 02/16/2015    GERD (gastroesophageal reflux disease) 11/18/2020    Hematuria 06/30/2020    Hemoptysis 02/27/2020    Herpes zoster 09/23/2020    Hyperlipidemia 09/23/2020    Hypertension 03/06/2020    Joint pain 11/04/2019    Knee pain 01/04/2019    Left inguinal  hernia 06/10/2019    Long term (current) use of opiate analgesic 02/19/2021    Lumbar spondylosis 11/23/2020    Lumbar sprain 07/16/2012    Malaise 11/20/2014    Melena 09/25/2017    Microscopic hematuria 04/02/2020    Migraine without aura 02/06/2012    Nausea 02/27/2020    Nicotine dependence, cigarettes, uncomplicated 11/11/2020    Nonulcer dyspepsia 01/23/2017    On home O2     Other long term (current) drug therapy 09/23/2020    Other specified urinary incontinence 06/17/2020    Pain in left shoulder 05/02/2019    Pain in left wrist 09/06/2017    and hand    Pain in right shoulder 08/30/2019    Pain in right wrist 09/04/2018    Right hand    Palpitations 03/06/2020    Shoulder joint pain 04/23/2019    Smoker 07/03/2019    Snoring 08/02/2019    Synovial cyst of popliteal space 04/08/2013    Tobacco dependence syndrome 02/19/2021    Unstable angina 01/16/2020    UTI (urinary tract infection) 03/27/2020     Family History   Problem Relation Age of Onset    Rectal cancer Other     Diabetes Other     Heart disease Other     Hypertension Other     Hypertension Mother     Cancer Maternal Aunt      Current Outpatient Medications on File Prior to Visit   Medication Sig Dispense Refill    albuterol (PROAIR HFA) 90 mcg/actuation inhaler Inhale 2 puffs into the lungs every 4 (four) hours as needed for Wheezing. 18 g 5    albuterol (PROVENTIL) 2.5 mg /3 mL (0.083 %) nebulizer solution Take 3 mLs (2.5 mg total) by nebulization 4 (four) times daily as needed for Wheezing. 100 each 5    azelastine (ASTELIN) 137 mcg (0.1 %) nasal spray USE 2 SPRAYS NASALLY TWICE A DAY 90 mL 3    buPROPion (WELLBUTRIN XL) 150 MG TB24 tablet Take 1 tablet (150 mg total) by mouth 2 (two) times a day. 60 tablet 3    fluticasone propionate (FLONASE) 50 mcg/actuation nasal spray 2 sprays (100 mcg total) by Each Nostril route once daily. 16 g 5    fluticasone-umeclidin-vilanter (TRELEGY ELLIPTA) 200-62.5-25 mcg inhaler Inhale 1 puff into the lungs  once daily. 60 each 5    gabapentin (NEURONTIN) 100 MG capsule Take 1 capsule (100 mg total) by mouth 3 (three) times daily. 180 capsule 1    hydroCHLOROthiazide (HYDRODIURIL) 25 MG tablet Take 1 tablet (25 mg total) by mouth once daily. 90 tablet 1    levocetirizine (XYZAL) 5 MG tablet Take 1 tablet (5 mg total) by mouth every evening. 90 tablet 3    linaCLOtide (LINZESS) 145 mcg Cap capsule Take 1 capsule (145 mcg total) by mouth before breakfast. 90 capsule 3    pantoprazole (PROTONIX) 40 MG tablet Take 1 tablet (40 mg total) by mouth once daily. 90 tablet 0    RESTASIS 0.05 % ophthalmic emulsion       rosuvastatin (CRESTOR) 20 MG tablet Take 1 tablet (20 mg total) by mouth once daily. 90 tablet 1    theophylline (THEODUR) 300 mg 24 hr capsule Take 300 mg by mouth once daily. One tab Daily with food: (replaced previous script for 100mg tab 3 daily) 90 capsule 3    [DISCONTINUED] HYDROcodone-acetaminophen (NORCO)  mg per tablet Take 1 tablet by mouth every 6 (six) hours as needed for Pain. Every 4 to 6 hours prn for Chronic back pain 90 tablet 0     No current facility-administered medications on file prior to visit.     Immunization History   Administered Date(s) Administered    COVID-19, MRNA, LN-S, PF (Pfizer) (Purple Cap) 04/29/2021, 10/28/2021, 04/29/2022    Influenza - Quadrivalent - PF *Preferred* (6 months and older) 09/16/2021, 10/11/2023    Influenza Split 10/03/2019    Pneumococcal Conjugate - 13 Valent 11/05/2018    Pneumococcal Conjugate - 20 Valent 01/09/2023    Pneumococcal Polysaccharide - 23 Valent 11/05/2019       Review of Systems   Constitutional:  Negative for fever, malaise/fatigue and weight loss.   Respiratory:  Negative for shortness of breath.    Cardiovascular:  Negative for chest pain and palpitations.   Gastrointestinal:  Negative for nausea and vomiting.   Musculoskeletal:  Positive for back pain and myalgias.   Psychiatric/Behavioral:  Negative for depression.         There  were no vitals filed for this visit.    Physical Exam  Vitals reviewed.   Constitutional:       Appearance: Normal appearance.   HENT:      Head: Normocephalic.   Eyes:      Extraocular Movements: Extraocular movements intact.      Conjunctiva/sclera: Conjunctivae normal.      Pupils: Pupils are equal, round, and reactive to light.   Neck:      Thyroid: No thyroid mass or thyromegaly.   Cardiovascular:      Rate and Rhythm: Normal rate and regular rhythm.      Heart sounds: Normal heart sounds. No murmur heard.     No gallop.   Pulmonary:      Effort: Pulmonary effort is normal. No respiratory distress.      Breath sounds: Normal breath sounds. No wheezing or rales.   Musculoskeletal:      Lumbar back: Spasms and tenderness present. Decreased range of motion.        Back:    Skin:     General: Skin is warm and dry.      Coloration: Skin is not jaundiced or pale.   Neurological:      Mental Status: She is alert.   Psychiatric:         Mood and Affect: Mood normal.         Behavior: Behavior normal.         Thought Content: Thought content normal.         Judgment: Judgment normal.          Assessment and Plan  1. Prediabetes  -     Hemoglobin A1C; Future; Expected date: 11/08/2023    2. Lumbar disc disease  -     HYDROcodone-acetaminophen (NORCO)  mg per tablet; Take 1 tablet by mouth every 6 (six) hours as needed for Pain. Every 4 to 6 hours prn for Chronic back pain  Dispense: 90 tablet; Refill: 0             Return to clinic in 1 month or as needed.    Health Maintenance Topics with due status: Not Due       Topic Last Completion Date    Cervical Cancer Screening 10/31/2022    Colorectal Cancer Screening 11/08/2022    Lipid Panel 04/10/2023    LDCT Lung Screen 04/19/2023    Hemoglobin A1c (Prediabetes) 05/10/2023    Mammogram 10/05/2023

## 2023-11-15 ENCOUNTER — TELEPHONE (OUTPATIENT)
Dept: FAMILY MEDICINE | Facility: CLINIC | Age: 63
End: 2023-11-15
Payer: COMMERCIAL

## 2023-11-15 NOTE — TELEPHONE ENCOUNTER
----- Message from Paulette Murry LPN sent at 11/9/2023  8:28 AM CST -----    ----- Message -----  From: Magno Yan MD  Sent: 11/9/2023   7:50 AM CST  To: Yuri[facility] Kiran Staff    A1c remains in the prediabetic range but is improved.

## 2023-12-11 ENCOUNTER — OFFICE VISIT (OUTPATIENT)
Dept: FAMILY MEDICINE | Facility: CLINIC | Age: 63
End: 2023-12-11
Payer: COMMERCIAL

## 2023-12-11 VITALS
SYSTOLIC BLOOD PRESSURE: 127 MMHG | WEIGHT: 202 LBS | DIASTOLIC BLOOD PRESSURE: 81 MMHG | RESPIRATION RATE: 18 BRPM | TEMPERATURE: 97 F | HEART RATE: 83 BPM | BODY MASS INDEX: 32.47 KG/M2 | HEIGHT: 66 IN | OXYGEN SATURATION: 98 %

## 2023-12-11 DIAGNOSIS — I10 HYPERTENSION, UNSPECIFIED TYPE: ICD-10-CM

## 2023-12-11 DIAGNOSIS — M51.9 LUMBAR DISC DISEASE: Primary | ICD-10-CM

## 2023-12-11 DIAGNOSIS — Z79.891 LONG TERM (CURRENT) USE OF OPIATE ANALGESIC: ICD-10-CM

## 2023-12-11 PROCEDURE — 1159F PR MEDICATION LIST DOCUMENTED IN MEDICAL RECORD: ICD-10-PCS | Mod: ,,, | Performed by: FAMILY MEDICINE

## 2023-12-11 PROCEDURE — 3044F HG A1C LEVEL LT 7.0%: CPT | Mod: ,,, | Performed by: FAMILY MEDICINE

## 2023-12-11 PROCEDURE — 99213 PR OFFICE/OUTPT VISIT, EST, LEVL III, 20-29 MIN: ICD-10-PCS | Mod: ,,, | Performed by: FAMILY MEDICINE

## 2023-12-11 PROCEDURE — 3044F PR MOST RECENT HEMOGLOBIN A1C LEVEL <7.0%: ICD-10-PCS | Mod: ,,, | Performed by: FAMILY MEDICINE

## 2023-12-11 PROCEDURE — 3074F PR MOST RECENT SYSTOLIC BLOOD PRESSURE < 130 MM HG: ICD-10-PCS | Mod: ,,, | Performed by: FAMILY MEDICINE

## 2023-12-11 PROCEDURE — 80305 POCT URINE DRUG SCREEN PRESUMP: ICD-10-PCS | Mod: QW,,, | Performed by: FAMILY MEDICINE

## 2023-12-11 PROCEDURE — 1160F RVW MEDS BY RX/DR IN RCRD: CPT | Mod: ,,, | Performed by: FAMILY MEDICINE

## 2023-12-11 PROCEDURE — 80305 DRUG TEST PRSMV DIR OPT OBS: CPT | Mod: QW,,, | Performed by: FAMILY MEDICINE

## 2023-12-11 PROCEDURE — 3074F SYST BP LT 130 MM HG: CPT | Mod: ,,, | Performed by: FAMILY MEDICINE

## 2023-12-11 PROCEDURE — 3079F DIAST BP 80-89 MM HG: CPT | Mod: ,,, | Performed by: FAMILY MEDICINE

## 2023-12-11 PROCEDURE — 3008F PR BODY MASS INDEX (BMI) DOCUMENTED: ICD-10-PCS | Mod: ,,, | Performed by: FAMILY MEDICINE

## 2023-12-11 PROCEDURE — 3008F BODY MASS INDEX DOCD: CPT | Mod: ,,, | Performed by: FAMILY MEDICINE

## 2023-12-11 PROCEDURE — 1159F MED LIST DOCD IN RCRD: CPT | Mod: ,,, | Performed by: FAMILY MEDICINE

## 2023-12-11 PROCEDURE — 3079F PR MOST RECENT DIASTOLIC BLOOD PRESSURE 80-89 MM HG: ICD-10-PCS | Mod: ,,, | Performed by: FAMILY MEDICINE

## 2023-12-11 PROCEDURE — 1160F PR REVIEW ALL MEDS BY PRESCRIBER/CLIN PHARMACIST DOCUMENTED: ICD-10-PCS | Mod: ,,, | Performed by: FAMILY MEDICINE

## 2023-12-11 PROCEDURE — 99213 OFFICE O/P EST LOW 20 MIN: CPT | Mod: ,,, | Performed by: FAMILY MEDICINE

## 2023-12-11 RX ORDER — HYDROCODONE BITARTRATE AND ACETAMINOPHEN 10; 325 MG/1; MG/1
1 TABLET ORAL EVERY 6 HOURS PRN
Qty: 90 TABLET | Refills: 0 | Status: SHIPPED | OUTPATIENT
Start: 2023-12-11 | End: 2024-01-10 | Stop reason: SDUPTHER

## 2023-12-11 RX ORDER — HYDROCHLOROTHIAZIDE 25 MG/1
25 TABLET ORAL DAILY
Qty: 90 TABLET | Refills: 1 | Status: SHIPPED | OUTPATIENT
Start: 2023-12-11

## 2023-12-11 NOTE — PROGRESS NOTES
Nneka Gomes is a 63 y.o. female seen today for her chronic pain due to lumbar disc disease.  She reports her Norco is helping with no side effects and she is meeting her ADLs.  She has had no signs or symptoms of tolerance or addiction and her  today was appropriate.  Patient's urine drug screen today only showed her prescribed opiate.  Today, she has no other complaints and is up-to-date on her flu vaccination.  We did discuss the RSV vaccine as well.    Past Medical History:   Diagnosis Date    Abdominal bloating 01/09/2020    Abdominal pain, right upper quadrant 01/09/2020    Abnormal liver enzymes 03/04/2020    Abnormal results of liver function studies 11/18/2020    Acute conjunctivitis 05/30/2014    Acute exacerbation of chronic obstructive airways disease 02/13/2017    Acute pharyngitis 05/19/2020    Acute sinusitis 10/09/2017    Acute upper respiratory infection 05/19/2020    Allergic rhinitis 09/23/2020    Anemia 03/07/2014    Anesthesia of skin 09/06/2017    bilateral fingers    Bilateral primary osteoarthritis of knee 12/02/2019    Bradycardia 01/16/2020    Carpal tunnel syndrome 08/10/2017    Chest pain 03/06/2020    Chronic idiopathic constipation 11/18/2020    Chronic low back pain 11/05/2018    Chronic pain 04/23/2019    Chronic pain syndrome 01/30/2020    COPD (chronic obstructive pulmonary disease) 02/19/2021    Coronavirus infection 05/21/2020    Cough 05/19/2020    Degeneration of lumbar intervertebral disc 09/26/2014    L3-L4 L4-L5 Greater than L5-S1    Depressive disorder 07/03/2019    Disorder of gallbladder 03/30/2015    Dysphagia 01/09/2020    Dyspnea 05/19/2020    Dysuria 05/19/2020    Fatigue 05/19/2020    Frequency of urination 02/16/2015    GERD (gastroesophageal reflux disease) 11/18/2020    Hematuria 06/30/2020    Hemoptysis 02/27/2020    Herpes zoster 09/23/2020    Hyperlipidemia 09/23/2020    Hypertension 03/06/2020    Joint pain 11/04/2019    Knee pain 01/04/2019    Left  inguinal hernia 06/10/2019    Long term (current) use of opiate analgesic 02/19/2021    Lumbar spondylosis 11/23/2020    Lumbar sprain 07/16/2012    Malaise 11/20/2014    Melena 09/25/2017    Microscopic hematuria 04/02/2020    Migraine without aura 02/06/2012    Nausea 02/27/2020    Nicotine dependence, cigarettes, uncomplicated 11/11/2020    Nonulcer dyspepsia 01/23/2017    On home O2     Other long term (current) drug therapy 09/23/2020    Other specified urinary incontinence 06/17/2020    Pain in left shoulder 05/02/2019    Pain in left wrist 09/06/2017    and hand    Pain in right shoulder 08/30/2019    Pain in right wrist 09/04/2018    Right hand    Palpitations 03/06/2020    Shoulder joint pain 04/23/2019    Smoker 07/03/2019    Snoring 08/02/2019    Synovial cyst of popliteal space 04/08/2013    Tobacco dependence syndrome 02/19/2021    Unstable angina 01/16/2020    UTI (urinary tract infection) 03/27/2020     Family History   Problem Relation Age of Onset    Rectal cancer Other     Diabetes Other     Heart disease Other     Hypertension Other     Hypertension Mother     Cancer Maternal Aunt      Current Outpatient Medications on File Prior to Visit   Medication Sig Dispense Refill    albuterol (PROAIR HFA) 90 mcg/actuation inhaler Inhale 2 puffs into the lungs every 4 (four) hours as needed for Wheezing. 18 g 5    albuterol (PROVENTIL) 2.5 mg /3 mL (0.083 %) nebulizer solution Take 3 mLs (2.5 mg total) by nebulization 4 (four) times daily as needed for Wheezing. 100 each 5    azelastine (ASTELIN) 137 mcg (0.1 %) nasal spray USE 2 SPRAYS NASALLY TWICE A DAY 90 mL 3    buPROPion (WELLBUTRIN XL) 150 MG TB24 tablet Take 1 tablet (150 mg total) by mouth 2 (two) times a day. 60 tablet 3    fluticasone propionate (FLONASE) 50 mcg/actuation nasal spray 2 sprays (100 mcg total) by Each Nostril route once daily. 16 g 5    fluticasone-umeclidin-vilanter (TRELEGY ELLIPTA) 200-62.5-25 mcg inhaler Inhale 1 puff into the  lungs once daily. 60 each 5    gabapentin (NEURONTIN) 100 MG capsule Take 1 capsule (100 mg total) by mouth 3 (three) times daily. 180 capsule 1    levocetirizine (XYZAL) 5 MG tablet Take 1 tablet (5 mg total) by mouth every evening. 90 tablet 3    linaCLOtide (LINZESS) 145 mcg Cap capsule Take 1 capsule (145 mcg total) by mouth before breakfast. 90 capsule 3    RESTASIS 0.05 % ophthalmic emulsion       rosuvastatin (CRESTOR) 20 MG tablet Take 1 tablet (20 mg total) by mouth once daily. 90 tablet 1    theophylline (THEODUR) 300 mg 24 hr capsule Take 300 mg by mouth once daily. One tab Daily with food: (replaced previous script for 100mg tab 3 daily) 90 capsule 3    [DISCONTINUED] hydroCHLOROthiazide (HYDRODIURIL) 25 MG tablet Take 1 tablet (25 mg total) by mouth once daily. 90 tablet 1    [DISCONTINUED] HYDROcodone-acetaminophen (NORCO)  mg per tablet Take 1 tablet by mouth every 6 (six) hours as needed for Pain. Every 4 to 6 hours prn for Chronic back pain 90 tablet 0    pantoprazole (PROTONIX) 40 MG tablet Take 1 tablet (40 mg total) by mouth once daily. 90 tablet 0     No current facility-administered medications on file prior to visit.     Immunization History   Administered Date(s) Administered    COVID-19, MRNA, LN-S, PF (Pfizer) (Purple Cap) 04/29/2021, 10/28/2021, 04/29/2022    Influenza - Quadrivalent - PF *Preferred* (6 months and older) 09/16/2021, 10/11/2023    Influenza Split 10/03/2019    Pneumococcal Conjugate - 13 Valent 11/05/2018    Pneumococcal Conjugate - 20 Valent 01/09/2023    Pneumococcal Polysaccharide - 23 Valent 11/05/2019       Review of Systems   Constitutional:  Negative for fever, malaise/fatigue and weight loss.   Respiratory:  Negative for shortness of breath.    Cardiovascular:  Negative for chest pain and palpitations.   Gastrointestinal:  Negative for nausea and vomiting.   Musculoskeletal:  Positive for back pain and myalgias. Negative for falls.   Psychiatric/Behavioral:   Negative for depression.         Vitals:    12/11/23 1438   BP: 127/81   Pulse: 83   Resp: 18   Temp: 97.3 °F (36.3 °C)       Physical Exam  Vitals reviewed.   Constitutional:       Appearance: Normal appearance.   HENT:      Head: Normocephalic.   Eyes:      Extraocular Movements: Extraocular movements intact.      Conjunctiva/sclera: Conjunctivae normal.      Pupils: Pupils are equal, round, and reactive to light.   Neck:      Thyroid: No thyroid mass or thyromegaly.   Cardiovascular:      Rate and Rhythm: Normal rate and regular rhythm.      Heart sounds: Normal heart sounds. No murmur heard.     No gallop.   Pulmonary:      Effort: Pulmonary effort is normal. No respiratory distress.      Breath sounds: Normal breath sounds. No wheezing or rales.   Musculoskeletal:      Lumbar back: Spasms and tenderness present. Decreased range of motion.        Back:    Skin:     General: Skin is warm and dry.      Coloration: Skin is not jaundiced or pale.   Neurological:      Mental Status: She is alert.   Psychiatric:         Mood and Affect: Mood normal.         Behavior: Behavior normal.         Thought Content: Thought content normal.         Judgment: Judgment normal.          Assessment and Plan  1. Lumbar disc disease  -     HYDROcodone-acetaminophen (NORCO)  mg per tablet; Take 1 tablet by mouth every 6 (six) hours as needed for Pain. Every 4 to 6 hours prn for Chronic back pain  Dispense: 90 tablet; Refill: 0    2. Hypertension, unspecified type  -     hydroCHLOROthiazide (HYDRODIURIL) 25 MG tablet; Take 1 tablet (25 mg total) by mouth once daily.  Dispense: 90 tablet; Refill: 1    3. Long term (current) use of opiate analgesic  -     POCT Urine Drug Screen Presump             Return to clinic in 1 month or as needed.    Health Maintenance Topics with due status: Not Due       Topic Last Completion Date    Cervical Cancer Screening 10/31/2022    Colorectal Cancer Screening 11/08/2022    Lipid Panel 04/10/2023     LDCT Lung Screen 04/19/2023    Mammogram 10/05/2023    Hemoglobin A1c (Prediabetes) 11/08/2023

## 2023-12-31 DIAGNOSIS — J30.9 ALLERGIC RHINITIS, UNSPECIFIED SEASONALITY, UNSPECIFIED TRIGGER: ICD-10-CM

## 2024-01-02 RX ORDER — AZELASTINE 1 MG/ML
SPRAY, METERED NASAL
Qty: 90 ML | Refills: 3 | Status: SHIPPED | OUTPATIENT
Start: 2024-01-02

## 2024-01-10 ENCOUNTER — OFFICE VISIT (OUTPATIENT)
Dept: FAMILY MEDICINE | Facility: CLINIC | Age: 64
End: 2024-01-10
Payer: COMMERCIAL

## 2024-01-10 VITALS
SYSTOLIC BLOOD PRESSURE: 112 MMHG | HEIGHT: 66 IN | WEIGHT: 198 LBS | BODY MASS INDEX: 31.82 KG/M2 | RESPIRATION RATE: 19 BRPM | HEART RATE: 80 BPM | OXYGEN SATURATION: 96 % | DIASTOLIC BLOOD PRESSURE: 74 MMHG

## 2024-01-10 DIAGNOSIS — K73.9 CHRONIC HEPATITIS, UNSPECIFIED: ICD-10-CM

## 2024-01-10 DIAGNOSIS — F17.200 SMOKER: ICD-10-CM

## 2024-01-10 DIAGNOSIS — K21.9 GASTROESOPHAGEAL REFLUX DISEASE, UNSPECIFIED WHETHER ESOPHAGITIS PRESENT: ICD-10-CM

## 2024-01-10 DIAGNOSIS — J44.9 CHRONIC OBSTRUCTIVE PULMONARY DISEASE, UNSPECIFIED COPD TYPE: ICD-10-CM

## 2024-01-10 DIAGNOSIS — M51.9 LUMBAR DISC DISEASE: ICD-10-CM

## 2024-01-10 PROCEDURE — 1159F MED LIST DOCD IN RCRD: CPT | Mod: ,,, | Performed by: FAMILY MEDICINE

## 2024-01-10 PROCEDURE — 3074F SYST BP LT 130 MM HG: CPT | Mod: ,,, | Performed by: FAMILY MEDICINE

## 2024-01-10 PROCEDURE — 3078F DIAST BP <80 MM HG: CPT | Mod: ,,, | Performed by: FAMILY MEDICINE

## 2024-01-10 PROCEDURE — 99213 OFFICE O/P EST LOW 20 MIN: CPT | Mod: ,,, | Performed by: FAMILY MEDICINE

## 2024-01-10 PROCEDURE — 3008F BODY MASS INDEX DOCD: CPT | Mod: ,,, | Performed by: FAMILY MEDICINE

## 2024-01-10 PROCEDURE — 1160F RVW MEDS BY RX/DR IN RCRD: CPT | Mod: ,,, | Performed by: FAMILY MEDICINE

## 2024-01-10 RX ORDER — PANTOPRAZOLE SODIUM 40 MG/1
40 TABLET, DELAYED RELEASE ORAL DAILY
Qty: 90 TABLET | Refills: 1 | Status: SHIPPED | OUTPATIENT
Start: 2024-01-10 | End: 2024-07-08

## 2024-01-10 RX ORDER — BUPROPION HYDROCHLORIDE 150 MG/1
150 TABLET ORAL 2 TIMES DAILY
Qty: 60 TABLET | Refills: 3 | Status: CANCELLED | OUTPATIENT
Start: 2024-01-10 | End: 2024-05-09

## 2024-01-10 RX ORDER — BUPROPION HYDROCHLORIDE 300 MG/1
300 TABLET ORAL DAILY
Qty: 90 TABLET | Refills: 0 | Status: SHIPPED | OUTPATIENT
Start: 2024-01-10 | End: 2025-01-09

## 2024-01-10 RX ORDER — GABAPENTIN 100 MG/1
100 CAPSULE ORAL 3 TIMES DAILY
Qty: 180 CAPSULE | Refills: 1 | Status: SHIPPED | OUTPATIENT
Start: 2024-01-10

## 2024-01-10 RX ORDER — HYDROCODONE BITARTRATE AND ACETAMINOPHEN 10; 325 MG/1; MG/1
1 TABLET ORAL EVERY 6 HOURS PRN
Qty: 90 TABLET | Refills: 0 | Status: SHIPPED | OUTPATIENT
Start: 2024-01-10 | End: 2024-02-08 | Stop reason: SDUPTHER

## 2024-01-10 NOTE — ASSESSMENT & PLAN NOTE
We will continue current plan and encouraged the patient to avoid any liver toxic medications and alcohol.

## 2024-01-10 NOTE — PROGRESS NOTES
Nneka Gomes is a 63 y.o. female seen today for follow-up on her chronic pain secondary to lumbar disc disease.  She reports her medications are working well with no side effects and she is active in meeting her ADLs.  She has had no signs or symptoms of tolerance or addiction and her  today was appropriate.  Her urine drug screen is only shown her prescribed opiate.  Today she has no new complaints and remains smoke-free and we discussed tapering her off the Wellbutrin.    Past Medical History:   Diagnosis Date    Abdominal bloating 01/09/2020    Abdominal pain, right upper quadrant 01/09/2020    Abnormal liver enzymes 03/04/2020    Abnormal results of liver function studies 11/18/2020    Acute conjunctivitis 05/30/2014    Acute exacerbation of chronic obstructive airways disease 02/13/2017    Acute pharyngitis 05/19/2020    Acute sinusitis 10/09/2017    Acute upper respiratory infection 05/19/2020    Allergic rhinitis 09/23/2020    Anemia 03/07/2014    Anesthesia of skin 09/06/2017    bilateral fingers    Bilateral primary osteoarthritis of knee 12/02/2019    Bradycardia 01/16/2020    Carpal tunnel syndrome 08/10/2017    Chest pain 03/06/2020    Chronic idiopathic constipation 11/18/2020    Chronic low back pain 11/05/2018    Chronic pain 04/23/2019    Chronic pain syndrome 01/30/2020    COPD (chronic obstructive pulmonary disease) 02/19/2021    Coronavirus infection 05/21/2020    Cough 05/19/2020    Degeneration of lumbar intervertebral disc 09/26/2014    L3-L4 L4-L5 Greater than L5-S1    Depressive disorder 07/03/2019    Disorder of gallbladder 03/30/2015    Dysphagia 01/09/2020    Dyspnea 05/19/2020    Dysuria 05/19/2020    Fatigue 05/19/2020    Frequency of urination 02/16/2015    GERD (gastroesophageal reflux disease) 11/18/2020    Hematuria 06/30/2020    Hemoptysis 02/27/2020    Herpes zoster 09/23/2020    Hyperlipidemia 09/23/2020    Hypertension 03/06/2020    Joint pain 11/04/2019    Knee pain  01/04/2019    Left inguinal hernia 06/10/2019    Long term (current) use of opiate analgesic 02/19/2021    Lumbar spondylosis 11/23/2020    Lumbar sprain 07/16/2012    Malaise 11/20/2014    Melena 09/25/2017    Microscopic hematuria 04/02/2020    Migraine without aura 02/06/2012    Nausea 02/27/2020    Nicotine dependence, cigarettes, uncomplicated 11/11/2020    Nonulcer dyspepsia 01/23/2017    On home O2     Other long term (current) drug therapy 09/23/2020    Other specified urinary incontinence 06/17/2020    Pain in left shoulder 05/02/2019    Pain in left wrist 09/06/2017    and hand    Pain in right shoulder 08/30/2019    Pain in right wrist 09/04/2018    Right hand    Palpitations 03/06/2020    Shoulder joint pain 04/23/2019    Smoker 07/03/2019    Snoring 08/02/2019    Synovial cyst of popliteal space 04/08/2013    Tobacco dependence syndrome 02/19/2021    Unstable angina 01/16/2020    UTI (urinary tract infection) 03/27/2020     Family History   Problem Relation Age of Onset    Rectal cancer Other     Diabetes Other     Heart disease Other     Hypertension Other     Hypertension Mother     Cancer Maternal Aunt      Current Outpatient Medications on File Prior to Visit   Medication Sig Dispense Refill    albuterol (PROAIR HFA) 90 mcg/actuation inhaler Inhale 2 puffs into the lungs every 4 (four) hours as needed for Wheezing. 18 g 5    albuterol (PROVENTIL) 2.5 mg /3 mL (0.083 %) nebulizer solution Take 3 mLs (2.5 mg total) by nebulization 4 (four) times daily as needed for Wheezing. 100 each 5    azelastine (ASTELIN) 137 mcg (0.1 %) nasal spray USE 2 SPRAYS NASALLY TWICE A DAY 90 mL 3    fluticasone propionate (FLONASE) 50 mcg/actuation nasal spray 2 sprays (100 mcg total) by Each Nostril route once daily. 16 g 5    fluticasone-umeclidin-vilanter (TRELEGY ELLIPTA) 200-62.5-25 mcg inhaler Inhale 1 puff into the lungs once daily. 60 each 5    hydroCHLOROthiazide (HYDRODIURIL) 25 MG tablet Take 1 tablet (25 mg  total) by mouth once daily. 90 tablet 1    levocetirizine (XYZAL) 5 MG tablet Take 1 tablet (5 mg total) by mouth every evening. 90 tablet 3    linaCLOtide (LINZESS) 145 mcg Cap capsule Take 1 capsule (145 mcg total) by mouth before breakfast. 90 capsule 3    RESTASIS 0.05 % ophthalmic emulsion       rosuvastatin (CRESTOR) 20 MG tablet Take 1 tablet (20 mg total) by mouth once daily. 90 tablet 1    theophylline (THEODUR) 300 mg 24 hr capsule Take 300 mg by mouth once daily. One tab Daily with food: (replaced previous script for 100mg tab 3 daily) 90 capsule 3    [DISCONTINUED] buPROPion (WELLBUTRIN XL) 150 MG TB24 tablet Take 1 tablet (150 mg total) by mouth 2 (two) times a day. 60 tablet 3    [DISCONTINUED] gabapentin (NEURONTIN) 100 MG capsule Take 1 capsule (100 mg total) by mouth 3 (three) times daily. 180 capsule 1    [DISCONTINUED] HYDROcodone-acetaminophen (NORCO)  mg per tablet Take 1 tablet by mouth every 6 (six) hours as needed for Pain. Every 4 to 6 hours prn for Chronic back pain 90 tablet 0    [DISCONTINUED] pantoprazole (PROTONIX) 40 MG tablet Take 1 tablet (40 mg total) by mouth once daily. 90 tablet 0     No current facility-administered medications on file prior to visit.     Immunization History   Administered Date(s) Administered    COVID-19, MRNA, LN-S, PF (Pfizer) (Purple Cap) 04/29/2021, 10/28/2021, 04/29/2022    Influenza - Quadrivalent - PF *Preferred* (6 months and older) 09/16/2021, 10/11/2023    Influenza Split 10/03/2019    Pneumococcal Conjugate - 13 Valent 11/05/2018    Pneumococcal Conjugate - 20 Valent 01/09/2023    Pneumococcal Polysaccharide - 23 Valent 11/05/2019       Review of Systems   Constitutional:  Negative for fever, malaise/fatigue and weight loss.   Respiratory:  Positive for shortness of breath.    Cardiovascular:  Negative for chest pain and palpitations.   Gastrointestinal:  Negative for nausea and vomiting.   Musculoskeletal:  Positive for back pain and  myalgias.   Psychiatric/Behavioral:  Negative for depression.         Vitals:    01/10/24 1334   BP: 112/74   Pulse: 80   Resp: 19       Physical Exam  Vitals reviewed.   Constitutional:       Appearance: Normal appearance.   HENT:      Head: Normocephalic.   Eyes:      Extraocular Movements: Extraocular movements intact.      Conjunctiva/sclera: Conjunctivae normal.      Pupils: Pupils are equal, round, and reactive to light.   Neck:      Thyroid: No thyroid mass or thyromegaly.   Cardiovascular:      Rate and Rhythm: Normal rate and regular rhythm.      Heart sounds: Normal heart sounds. No murmur heard.     No gallop.   Pulmonary:      Effort: Pulmonary effort is normal. No respiratory distress.      Breath sounds: Decreased breath sounds present. No wheezing or rales.   Musculoskeletal:      Lumbar back: Spasms and tenderness present. Decreased range of motion.        Back:       Comments: She is slow to rise from a seated position with a mildly stooped antalgic gait.   Skin:     General: Skin is warm and dry.      Coloration: Skin is not jaundiced or pale.   Neurological:      Mental Status: She is alert.   Psychiatric:         Mood and Affect: Mood normal.         Behavior: Behavior normal.         Thought Content: Thought content normal.         Judgment: Judgment normal.          Assessment and Plan  1. Lumbar disc disease  -     HYDROcodone-acetaminophen (NORCO)  mg per tablet; Take 1 tablet by mouth every 6 (six) hours as needed for Pain. Every 4 to 6 hours prn for Chronic back pain  Dispense: 90 tablet; Refill: 0  -     gabapentin (NEURONTIN) 100 MG capsule; Take 1 capsule (100 mg total) by mouth 3 (three) times daily.  Dispense: 180 capsule; Refill: 1    2. Smoker    3. Gastroesophageal reflux disease, unspecified whether esophagitis present  -     pantoprazole (PROTONIX) 40 MG tablet; Take 1 tablet (40 mg total) by mouth once daily.  Dispense: 90 tablet; Refill: 1    4. Chronic obstructive  pulmonary disease, unspecified COPD type  Assessment & Plan:  We will continue current plan and encouraged the patient to quit smoking.      5. Chronic hepatitis, unspecified  Assessment & Plan:  We will continue current plan and encouraged the patient to avoid any liver toxic medications and alcohol.      Other orders  -     buPROPion (WELLBUTRIN XL) 300 MG 24 hr tablet; Take 1 tablet (300 mg total) by mouth once daily.  Dispense: 90 tablet; Refill: 0             Return to clinic in 1 month or as needed.  At that visit we may discontinue the Wellbutrin.    Health Maintenance Topics with due status: Not Due       Topic Last Completion Date    Cervical Cancer Screening 10/31/2022    Colorectal Cancer Screening 11/08/2022    Lipid Panel 04/10/2023    LDCT Lung Screen 04/19/2023    Mammogram 10/05/2023    Hemoglobin A1c (Prediabetes) 11/08/2023    High Dose Statin 12/11/2023

## 2024-02-08 ENCOUNTER — OFFICE VISIT (OUTPATIENT)
Dept: FAMILY MEDICINE | Facility: CLINIC | Age: 64
End: 2024-02-08
Payer: COMMERCIAL

## 2024-02-08 VITALS
SYSTOLIC BLOOD PRESSURE: 114 MMHG | BODY MASS INDEX: 31.72 KG/M2 | HEART RATE: 78 BPM | OXYGEN SATURATION: 98 % | DIASTOLIC BLOOD PRESSURE: 82 MMHG | WEIGHT: 197.38 LBS | HEIGHT: 66 IN | RESPIRATION RATE: 14 BRPM

## 2024-02-08 DIAGNOSIS — R73.03 PREDIABETES: ICD-10-CM

## 2024-02-08 DIAGNOSIS — M51.9 LUMBAR DISC DISEASE: Primary | ICD-10-CM

## 2024-02-08 DIAGNOSIS — R73.9 HYPERGLYCEMIA: ICD-10-CM

## 2024-02-08 DIAGNOSIS — Z79.891 LONG TERM (CURRENT) USE OF OPIATE ANALGESIC: ICD-10-CM

## 2024-02-08 LAB
CTP QC/QA: YES
EST. AVERAGE GLUCOSE BLD GHB EST-MCNC: 128 MG/DL
HBA1C MFR BLD HPLC: 6.1 % (ref 4.5–6.6)
POC (AMP) AMPHETAMINE: NEGATIVE
POC (BAR) BARBITURATES: NEGATIVE
POC (BUP) BUPRENORPHINE: NEGATIVE
POC (BZO) BENZODIAZEPINES: NEGATIVE
POC (COC) COCAINE: NEGATIVE
POC (MDMA) METHYLENEDIOXYMETHAMPHETAMINE 3,4: NEGATIVE
POC (MET) METHAMPHETAMINE: NEGATIVE
POC (MOP) OPIATES: NEGATIVE
POC (MTD) METHADONE: NEGATIVE
POC (OXY) OXYCODONE: NEGATIVE
POC (PCP) PHENCYCLIDINE: NEGATIVE
POC (TCA) TRICYCLIC ANTIDEPRESSANTS: NEGATIVE
POC TEMPERATURE (URINE): 92
POC THC: NEGATIVE

## 2024-02-08 PROCEDURE — 1160F RVW MEDS BY RX/DR IN RCRD: CPT | Mod: ,,, | Performed by: FAMILY MEDICINE

## 2024-02-08 PROCEDURE — 99213 OFFICE O/P EST LOW 20 MIN: CPT | Mod: ,,, | Performed by: FAMILY MEDICINE

## 2024-02-08 PROCEDURE — 3074F SYST BP LT 130 MM HG: CPT | Mod: ,,, | Performed by: FAMILY MEDICINE

## 2024-02-08 PROCEDURE — 83036 HEMOGLOBIN GLYCOSYLATED A1C: CPT | Mod: ,,, | Performed by: CLINICAL MEDICAL LABORATORY

## 2024-02-08 PROCEDURE — 3079F DIAST BP 80-89 MM HG: CPT | Mod: ,,, | Performed by: FAMILY MEDICINE

## 2024-02-08 PROCEDURE — 80305 DRUG TEST PRSMV DIR OPT OBS: CPT | Mod: QW,,, | Performed by: FAMILY MEDICINE

## 2024-02-08 PROCEDURE — 3008F BODY MASS INDEX DOCD: CPT | Mod: ,,, | Performed by: FAMILY MEDICINE

## 2024-02-08 PROCEDURE — 1159F MED LIST DOCD IN RCRD: CPT | Mod: ,,, | Performed by: FAMILY MEDICINE

## 2024-02-08 RX ORDER — HYDROCODONE BITARTRATE AND ACETAMINOPHEN 10; 325 MG/1; MG/1
1 TABLET ORAL EVERY 6 HOURS PRN
Qty: 90 TABLET | Refills: 0 | Status: SHIPPED | OUTPATIENT
Start: 2024-02-08 | End: 2024-03-07 | Stop reason: SDUPTHER

## 2024-02-08 NOTE — PROGRESS NOTES
Nneka Gomes is a 63 y.o. female seen today for follow-up on her chronic pain syndrome due to her lumbar disc disease.  She reports her medications are working well no side effects and her  today was appropriate.  Her urine drug screen was completely negative and we may call the patient in for random urine drug screen over the next few weeks or so.  Patient has had no signs or symptoms of tolerance or addiction.  She does have a history of prediabetes and we will check her A1c today.      Past Medical History:   Diagnosis Date    Abdominal bloating 01/09/2020    Abdominal pain, right upper quadrant 01/09/2020    Abnormal liver enzymes 03/04/2020    Abnormal results of liver function studies 11/18/2020    Acute conjunctivitis 05/30/2014    Acute exacerbation of chronic obstructive airways disease 02/13/2017    Acute pharyngitis 05/19/2020    Acute sinusitis 10/09/2017    Acute upper respiratory infection 05/19/2020    Allergic rhinitis 09/23/2020    Anemia 03/07/2014    Anesthesia of skin 09/06/2017    bilateral fingers    Bilateral primary osteoarthritis of knee 12/02/2019    Bradycardia 01/16/2020    Carpal tunnel syndrome 08/10/2017    Chest pain 03/06/2020    Chronic idiopathic constipation 11/18/2020    Chronic low back pain 11/05/2018    Chronic pain 04/23/2019    Chronic pain syndrome 01/30/2020    COPD (chronic obstructive pulmonary disease) 02/19/2021    Coronavirus infection 05/21/2020    Cough 05/19/2020    Degeneration of lumbar intervertebral disc 09/26/2014    L3-L4 L4-L5 Greater than L5-S1    Depressive disorder 07/03/2019    Disorder of gallbladder 03/30/2015    Dysphagia 01/09/2020    Dyspnea 05/19/2020    Dysuria 05/19/2020    Fatigue 05/19/2020    Frequency of urination 02/16/2015    GERD (gastroesophageal reflux disease) 11/18/2020    Hematuria 06/30/2020    Hemoptysis 02/27/2020    Herpes zoster 09/23/2020    Hyperlipidemia 09/23/2020    Hypertension 03/06/2020    Joint pain 11/04/2019     Knee pain 01/04/2019    Left inguinal hernia 06/10/2019    Long term (current) use of opiate analgesic 02/19/2021    Lumbar spondylosis 11/23/2020    Lumbar sprain 07/16/2012    Malaise 11/20/2014    Melena 09/25/2017    Microscopic hematuria 04/02/2020    Migraine without aura 02/06/2012    Nausea 02/27/2020    Nicotine dependence, cigarettes, uncomplicated 11/11/2020    Nonulcer dyspepsia 01/23/2017    On home O2     Other long term (current) drug therapy 09/23/2020    Other specified urinary incontinence 06/17/2020    Pain in left shoulder 05/02/2019    Pain in left wrist 09/06/2017    and hand    Pain in right shoulder 08/30/2019    Pain in right wrist 09/04/2018    Right hand    Palpitations 03/06/2020    Shoulder joint pain 04/23/2019    Smoker 07/03/2019    Snoring 08/02/2019    Synovial cyst of popliteal space 04/08/2013    Tobacco dependence syndrome 02/19/2021    Unstable angina 01/16/2020    UTI (urinary tract infection) 03/27/2020     Family History   Problem Relation Age of Onset    Rectal cancer Other     Diabetes Other     Heart disease Other     Hypertension Other     Hypertension Mother     Cancer Maternal Aunt      Current Outpatient Medications on File Prior to Visit   Medication Sig Dispense Refill    albuterol (PROAIR HFA) 90 mcg/actuation inhaler Inhale 2 puffs into the lungs every 4 (four) hours as needed for Wheezing. 18 g 5    albuterol (PROVENTIL) 2.5 mg /3 mL (0.083 %) nebulizer solution Take 3 mLs (2.5 mg total) by nebulization 4 (four) times daily as needed for Wheezing. 100 each 5    azelastine (ASTELIN) 137 mcg (0.1 %) nasal spray USE 2 SPRAYS NASALLY TWICE A DAY 90 mL 3    buPROPion (WELLBUTRIN XL) 300 MG 24 hr tablet Take 1 tablet (300 mg total) by mouth once daily. 90 tablet 0    fluticasone propionate (FLONASE) 50 mcg/actuation nasal spray 2 sprays (100 mcg total) by Each Nostril route once daily. 16 g 5    fluticasone-umeclidin-vilanter (TRELEGY ELLIPTA) 200-62.5-25 mcg inhaler  Inhale 1 puff into the lungs once daily. 60 each 5    gabapentin (NEURONTIN) 100 MG capsule Take 1 capsule (100 mg total) by mouth 3 (three) times daily. 180 capsule 1    hydroCHLOROthiazide (HYDRODIURIL) 25 MG tablet Take 1 tablet (25 mg total) by mouth once daily. 90 tablet 1    HYDROcodone-acetaminophen (NORCO)  mg per tablet Take 1 tablet by mouth every 6 (six) hours as needed for Pain. Every 4 to 6 hours prn for Chronic back pain 90 tablet 0    levocetirizine (XYZAL) 5 MG tablet Take 1 tablet (5 mg total) by mouth every evening. 90 tablet 3    linaCLOtide (LINZESS) 145 mcg Cap capsule Take 1 capsule (145 mcg total) by mouth before breakfast. 90 capsule 3    pantoprazole (PROTONIX) 40 MG tablet Take 1 tablet (40 mg total) by mouth once daily. 90 tablet 1    RESTASIS 0.05 % ophthalmic emulsion       rosuvastatin (CRESTOR) 20 MG tablet Take 1 tablet (20 mg total) by mouth once daily. 90 tablet 1    theophylline (THEODUR) 300 mg 24 hr capsule Take 300 mg by mouth once daily. One tab Daily with food: (replaced previous script for 100mg tab 3 daily) 90 capsule 3     No current facility-administered medications on file prior to visit.     Immunization History   Administered Date(s) Administered    COVID-19, MRNA, LN-S, PF (Pfizer) (Purple Cap) 04/29/2021, 10/28/2021, 04/29/2022    Influenza - Quadrivalent - PF *Preferred* (6 months and older) 09/16/2021, 10/11/2023    Influenza Split 10/03/2019    Pneumococcal Conjugate - 13 Valent 11/05/2018    Pneumococcal Conjugate - 20 Valent 01/09/2023    Pneumococcal Polysaccharide - 23 Valent 11/05/2019       Review of Systems   Constitutional:  Negative for fever, malaise/fatigue and weight loss.   Respiratory:  Positive for shortness of breath.    Cardiovascular:  Negative for chest pain and palpitations.   Gastrointestinal:  Negative for nausea and vomiting.   Musculoskeletal:  Positive for back pain and myalgias.   Psychiatric/Behavioral:  Negative for depression.          Vitals:    02/08/24 1309   BP: 114/82   Pulse: 78   Resp: 14       Physical Exam  Vitals reviewed.   Constitutional:       Appearance: Normal appearance.   HENT:      Head: Normocephalic.   Eyes:      Extraocular Movements: Extraocular movements intact.      Conjunctiva/sclera: Conjunctivae normal.      Pupils: Pupils are equal, round, and reactive to light.   Neck:      Thyroid: No thyroid mass or thyromegaly.   Cardiovascular:      Rate and Rhythm: Normal rate and regular rhythm.      Heart sounds: Normal heart sounds. No murmur heard.     No gallop.   Pulmonary:      Effort: Pulmonary effort is normal. No respiratory distress.      Breath sounds: Normal breath sounds. Decreased air movement present. No wheezing or rales.   Musculoskeletal:      Lumbar back: Spasms and tenderness present. Decreased range of motion.        Back:       Comments: She is slow to rise from a seated position with a slightly stooped antalgic gait.   Skin:     General: Skin is warm and dry.      Coloration: Skin is not jaundiced or pale.   Neurological:      Mental Status: She is alert.   Psychiatric:         Mood and Affect: Mood normal.         Behavior: Behavior normal.         Thought Content: Thought content normal.         Judgment: Judgment normal.          Assessment and Plan  1. Long term (current) use of opiate analgesic  -     POCT Urine Drug Screen Presump    2. Prediabetes  -     Hemoglobin A1C; Future; Expected date: 02/08/2024    3. Hyperglycemia  -     Hemoglobin A1C             Return to clinic in 1 month or as needed.    Health Maintenance Topics with due status: Not Due       Topic Last Completion Date    Cervical Cancer Screening 10/31/2022    Colorectal Cancer Screening 11/08/2022    Lipid Panel 04/10/2023    LDCT Lung Screen 04/19/2023    Mammogram 10/05/2023    Hemoglobin A1c (Prediabetes) 11/08/2023

## 2024-02-09 DIAGNOSIS — Z71.89 COMPLEX CARE COORDINATION: ICD-10-CM

## 2024-02-13 ENCOUNTER — TELEPHONE (OUTPATIENT)
Dept: FAMILY MEDICINE | Facility: CLINIC | Age: 64
End: 2024-02-13
Payer: COMMERCIAL

## 2024-02-13 NOTE — LETTER
February 13, 2024    Nneka Gomes  [address] [place] [address]             [facility]  [address] [place] [id number]  Phone: [phone]  Fax: [phone] Dear Ms. Gomes:    Dr Yan reviewed your recent labs and is content with the results. We look forward to seeing you at your next appointment.      If you have any questions or concerns, please don't hesitate to call.    Sincerely,      George Watts LPN

## 2024-03-07 ENCOUNTER — OFFICE VISIT (OUTPATIENT)
Dept: FAMILY MEDICINE | Facility: CLINIC | Age: 64
End: 2024-03-07
Payer: MEDICARE

## 2024-03-07 VITALS
OXYGEN SATURATION: 98 % | DIASTOLIC BLOOD PRESSURE: 86 MMHG | WEIGHT: 197 LBS | SYSTOLIC BLOOD PRESSURE: 130 MMHG | HEART RATE: 100 BPM | BODY MASS INDEX: 31.66 KG/M2 | HEIGHT: 66 IN

## 2024-03-07 DIAGNOSIS — J44.9 CHRONIC OBSTRUCTIVE PULMONARY DISEASE, UNSPECIFIED COPD TYPE: ICD-10-CM

## 2024-03-07 DIAGNOSIS — Z79.891 LONG TERM (CURRENT) USE OF OPIATE ANALGESIC: ICD-10-CM

## 2024-03-07 DIAGNOSIS — M51.9 LUMBAR DISC DISEASE: Primary | ICD-10-CM

## 2024-03-07 PROCEDURE — 3044F HG A1C LEVEL LT 7.0%: CPT | Mod: ,,, | Performed by: FAMILY MEDICINE

## 2024-03-07 PROCEDURE — 99213 OFFICE O/P EST LOW 20 MIN: CPT | Mod: ,,, | Performed by: FAMILY MEDICINE

## 2024-03-07 PROCEDURE — 3075F SYST BP GE 130 - 139MM HG: CPT | Mod: ,,, | Performed by: FAMILY MEDICINE

## 2024-03-07 PROCEDURE — 3079F DIAST BP 80-89 MM HG: CPT | Mod: ,,, | Performed by: FAMILY MEDICINE

## 2024-03-07 PROCEDURE — 1160F RVW MEDS BY RX/DR IN RCRD: CPT | Mod: ,,, | Performed by: FAMILY MEDICINE

## 2024-03-07 PROCEDURE — 80305 DRUG TEST PRSMV DIR OPT OBS: CPT | Mod: QW,,, | Performed by: FAMILY MEDICINE

## 2024-03-07 PROCEDURE — 1159F MED LIST DOCD IN RCRD: CPT | Mod: ,,, | Performed by: FAMILY MEDICINE

## 2024-03-07 PROCEDURE — 3008F BODY MASS INDEX DOCD: CPT | Mod: ,,, | Performed by: FAMILY MEDICINE

## 2024-03-07 RX ORDER — HYDROCODONE BITARTRATE AND ACETAMINOPHEN 10; 325 MG/1; MG/1
1 TABLET ORAL EVERY 6 HOURS PRN
Qty: 90 TABLET | Refills: 0 | Status: SHIPPED | OUTPATIENT
Start: 2024-03-07 | End: 2024-04-09 | Stop reason: SDUPTHER

## 2024-03-07 RX ORDER — ALBUTEROL SULFATE 0.83 MG/ML
2.5 SOLUTION RESPIRATORY (INHALATION) 4 TIMES DAILY PRN
Qty: 100 EACH | Refills: 5 | Status: SHIPPED | OUTPATIENT
Start: 2024-03-07

## 2024-03-07 RX ORDER — ALBUTEROL SULFATE 90 UG/1
2 AEROSOL, METERED RESPIRATORY (INHALATION) EVERY 4 HOURS PRN
Qty: 18 G | Refills: 5 | Status: SHIPPED | OUTPATIENT
Start: 2024-03-07

## 2024-03-07 NOTE — PROGRESS NOTES
Nneka Gomes is a 63 y.o. female seen today for follow-up on her chronic pain syndrome secondary to lumbar disc disease.  She reports her chronic pain is well controlled with Norco with a 3/10 level today.  She has had no medication side effects and is meeting her ADLs.  Her  today was appropriate and urine drug screens have only shown her prescribed opiate.  She has had no signs or symptoms of tolerance or addiction.  Patient has been smoke free for 1 year now and I encouraged her to continue.  Patient is due for follow-up lab work and I have asked her to come fasting for next visit.      Past Medical History:   Diagnosis Date    Abdominal bloating 01/09/2020    Abdominal pain, right upper quadrant 01/09/2020    Abnormal liver enzymes 03/04/2020    Abnormal results of liver function studies 11/18/2020    Acute conjunctivitis 05/30/2014    Acute exacerbation of chronic obstructive airways disease 02/13/2017    Acute pharyngitis 05/19/2020    Acute sinusitis 10/09/2017    Acute upper respiratory infection 05/19/2020    Allergic rhinitis 09/23/2020    Anemia 03/07/2014    Anesthesia of skin 09/06/2017    bilateral fingers    Bilateral primary osteoarthritis of knee 12/02/2019    Bradycardia 01/16/2020    Carpal tunnel syndrome 08/10/2017    Chest pain 03/06/2020    Chronic idiopathic constipation 11/18/2020    Chronic low back pain 11/05/2018    Chronic pain 04/23/2019    Chronic pain syndrome 01/30/2020    COPD (chronic obstructive pulmonary disease) 02/19/2021    Coronavirus infection 05/21/2020    Cough 05/19/2020    Degeneration of lumbar intervertebral disc 09/26/2014    L3-L4 L4-L5 Greater than L5-S1    Depressive disorder 07/03/2019    Disorder of gallbladder 03/30/2015    Dysphagia 01/09/2020    Dyspnea 05/19/2020    Dysuria 05/19/2020    Fatigue 05/19/2020    Frequency of urination 02/16/2015    GERD (gastroesophageal reflux disease) 11/18/2020    Hematuria 06/30/2020    Hemoptysis 02/27/2020    Herpes  zoster 09/23/2020    Hyperlipidemia 09/23/2020    Hypertension 03/06/2020    Joint pain 11/04/2019    Knee pain 01/04/2019    Left inguinal hernia 06/10/2019    Long term (current) use of opiate analgesic 02/19/2021    Lumbar spondylosis 11/23/2020    Lumbar sprain 07/16/2012    Malaise 11/20/2014    Melena 09/25/2017    Microscopic hematuria 04/02/2020    Migraine without aura 02/06/2012    Nausea 02/27/2020    Nicotine dependence, cigarettes, uncomplicated 11/11/2020    Nonulcer dyspepsia 01/23/2017    On home O2     Other long term (current) drug therapy 09/23/2020    Other specified urinary incontinence 06/17/2020    Pain in left shoulder 05/02/2019    Pain in left wrist 09/06/2017    and hand    Pain in right shoulder 08/30/2019    Pain in right wrist 09/04/2018    Right hand    Palpitations 03/06/2020    Shoulder joint pain 04/23/2019    Smoker 07/03/2019    Snoring 08/02/2019    Synovial cyst of popliteal space 04/08/2013    Tobacco dependence syndrome 02/19/2021    Unstable angina 01/16/2020    UTI (urinary tract infection) 03/27/2020     Family History   Problem Relation Age of Onset    Rectal cancer Other     Diabetes Other     Heart disease Other     Hypertension Other     Hypertension Mother     Cancer Maternal Aunt      Current Outpatient Medications on File Prior to Visit   Medication Sig Dispense Refill    azelastine (ASTELIN) 137 mcg (0.1 %) nasal spray USE 2 SPRAYS NASALLY TWICE A DAY 90 mL 3    buPROPion (WELLBUTRIN XL) 300 MG 24 hr tablet Take 1 tablet (300 mg total) by mouth once daily. 90 tablet 0    fluticasone propionate (FLONASE) 50 mcg/actuation nasal spray 2 sprays (100 mcg total) by Each Nostril route once daily. 16 g 5    fluticasone-umeclidin-vilanter (TRELEGY ELLIPTA) 200-62.5-25 mcg inhaler Inhale 1 puff into the lungs once daily. 60 each 5    gabapentin (NEURONTIN) 100 MG capsule Take 1 capsule (100 mg total) by mouth 3 (three) times daily. 180 capsule 1    hydroCHLOROthiazide  (HYDRODIURIL) 25 MG tablet Take 1 tablet (25 mg total) by mouth once daily. 90 tablet 1    levocetirizine (XYZAL) 5 MG tablet Take 1 tablet (5 mg total) by mouth every evening. 90 tablet 3    linaCLOtide (LINZESS) 145 mcg Cap capsule Take 1 capsule (145 mcg total) by mouth before breakfast. 90 capsule 3    pantoprazole (PROTONIX) 40 MG tablet Take 1 tablet (40 mg total) by mouth once daily. 90 tablet 1    RESTASIS 0.05 % ophthalmic emulsion       rosuvastatin (CRESTOR) 20 MG tablet Take 1 tablet (20 mg total) by mouth once daily. 90 tablet 1    [DISCONTINUED] albuterol (PROAIR HFA) 90 mcg/actuation inhaler Inhale 2 puffs into the lungs every 4 (four) hours as needed for Wheezing. 18 g 5    [DISCONTINUED] albuterol (PROVENTIL) 2.5 mg /3 mL (0.083 %) nebulizer solution Take 3 mLs (2.5 mg total) by nebulization 4 (four) times daily as needed for Wheezing. 100 each 5    [DISCONTINUED] HYDROcodone-acetaminophen (NORCO)  mg per tablet Take 1 tablet by mouth every 6 (six) hours as needed for Pain. Every 4 to 6 hours prn for Chronic back pain 90 tablet 0    [DISCONTINUED] theophylline (THEODUR) 300 mg 24 hr capsule Take 300 mg by mouth once daily. One tab Daily with food: (replaced previous script for 100mg tab 3 daily) 90 capsule 3     No current facility-administered medications on file prior to visit.     Immunization History   Administered Date(s) Administered    COVID-19, MRNA, LN-S, PF (Pfizer) (Purple Cap) 04/29/2021, 10/28/2021, 04/29/2022    Influenza - Quadrivalent - PF *Preferred* (6 months and older) 09/16/2021, 10/11/2023    Influenza Split 10/03/2019    Pneumococcal Conjugate - 13 Valent 11/05/2018    Pneumococcal Conjugate - 20 Valent 01/09/2023    Pneumococcal Polysaccharide - 23 Valent 11/05/2019       Review of Systems   Constitutional:  Negative for fever, malaise/fatigue and weight loss.   Respiratory:  Negative for shortness of breath.    Cardiovascular:  Negative for chest pain and palpitations.    Gastrointestinal:  Negative for nausea and vomiting.   Musculoskeletal:  Positive for back pain and myalgias.   Psychiatric/Behavioral:  Negative for depression.         Vitals:    03/07/24 1350   BP: 130/86   Pulse: 100       Physical Exam  Vitals reviewed.   Constitutional:       Appearance: Normal appearance.   HENT:      Head: Normocephalic.   Eyes:      Extraocular Movements: Extraocular movements intact.      Conjunctiva/sclera: Conjunctivae normal.      Pupils: Pupils are equal, round, and reactive to light.   Neck:      Thyroid: No thyroid mass or thyromegaly.   Cardiovascular:      Rate and Rhythm: Normal rate and regular rhythm.      Heart sounds: Normal heart sounds. No murmur heard.     No gallop.   Pulmonary:      Effort: Pulmonary effort is normal. No respiratory distress.      Breath sounds: Normal breath sounds. No wheezing or rales.   Musculoskeletal:      Lumbar back: Spasms and tenderness present. Decreased range of motion.        Back:       Comments: She is slow to rise from a seated position with slight antalgic gait   Skin:     General: Skin is warm and dry.      Coloration: Skin is not jaundiced or pale.   Neurological:      Mental Status: She is alert.   Psychiatric:         Mood and Affect: Mood normal.         Behavior: Behavior normal.         Thought Content: Thought content normal.         Judgment: Judgment normal.          Assessment and Plan  1. Lumbar disc disease  -     HYDROcodone-acetaminophen (NORCO)  mg per tablet; Take 1 tablet by mouth every 6 (six) hours as needed for Pain. Every 4 to 6 hours prn for Chronic back pain  Dispense: 90 tablet; Refill: 0    2. Chronic obstructive pulmonary disease, unspecified COPD type  -     theophylline (THEODUR) 300 mg 24 hr capsule; Take 300 mg by mouth once daily. One tab Daily with food: (replaced previous script for 100mg tab 3 daily)  Dispense: 90 capsule; Refill: 3  -     albuterol (PROVENTIL) 2.5 mg /3 mL (0.083 %) nebulizer  solution; Take 3 mLs (2.5 mg total) by nebulization 4 (four) times daily as needed for Wheezing.  Dispense: 100 each; Refill: 5  -     albuterol (PROAIR HFA) 90 mcg/actuation inhaler; Inhale 2 puffs into the lungs every 4 (four) hours as needed for Wheezing.  Dispense: 18 g; Refill: 5    3. Long term (current) use of opiate analgesic  -     POCT Urine Drug Screen Presump             Return to clinic in 1 month or as needed fasting for lab work.    Health Maintenance Topics with due status: Not Due       Topic Last Completion Date    Cervical Cancer Screening 10/31/2022    Colorectal Cancer Screening 11/08/2022    Lipid Panel 04/10/2023    LDCT Lung Screen 04/19/2023    Mammogram 10/05/2023    Hemoglobin A1c (Prediabetes) 02/08/2024

## 2024-04-03 ENCOUNTER — OFFICE VISIT (OUTPATIENT)
Dept: OBSTETRICS AND GYNECOLOGY | Facility: CLINIC | Age: 64
End: 2024-04-03
Payer: MEDICARE

## 2024-04-03 VITALS
BODY MASS INDEX: 30.53 KG/M2 | WEIGHT: 190 LBS | SYSTOLIC BLOOD PRESSURE: 117 MMHG | DIASTOLIC BLOOD PRESSURE: 75 MMHG | HEIGHT: 66 IN | HEART RATE: 84 BPM

## 2024-04-03 DIAGNOSIS — Z12.4 SCREENING FOR MALIGNANT NEOPLASM OF THE CERVIX: ICD-10-CM

## 2024-04-03 DIAGNOSIS — M54.9 BACK PAIN, UNSPECIFIED BACK LOCATION, UNSPECIFIED BACK PAIN LATERALITY, UNSPECIFIED CHRONICITY: ICD-10-CM

## 2024-04-03 DIAGNOSIS — Z01.419 WELL WOMAN EXAM WITH ROUTINE GYNECOLOGICAL EXAM: Primary | ICD-10-CM

## 2024-04-03 LAB
BILIRUB UR QL STRIP: NEGATIVE
CLARITY UR: CLEAR
COLOR UR: ABNORMAL
GLUCOSE UR STRIP-MCNC: NORMAL MG/DL
KETONES UR STRIP-SCNC: NEGATIVE MG/DL
LEUKOCYTE ESTERASE UR QL STRIP: NEGATIVE
MUCOUS, UA: ABNORMAL /LPF
NITRITE UR QL STRIP: NEGATIVE
PH UR STRIP: 6.5 PH UNITS
PROT UR QL STRIP: NEGATIVE
RBC # UR STRIP: NEGATIVE /UL
RBC #/AREA URNS HPF: 1 /HPF
SP GR UR STRIP: 1.02
SQUAMOUS #/AREA URNS LPF: ABNORMAL /HPF
UROBILINOGEN UR STRIP-ACNC: 2 MG/DL
WBC #/AREA URNS HPF: 1 /HPF

## 2024-04-03 PROCEDURE — 3074F SYST BP LT 130 MM HG: CPT | Mod: CPTII,,, | Performed by: OBSTETRICS & GYNECOLOGY

## 2024-04-03 PROCEDURE — 99459 PELVIC EXAMINATION: CPT | Mod: S$PBB,,, | Performed by: OBSTETRICS & GYNECOLOGY

## 2024-04-03 PROCEDURE — 3044F HG A1C LEVEL LT 7.0%: CPT | Mod: CPTII,,, | Performed by: OBSTETRICS & GYNECOLOGY

## 2024-04-03 PROCEDURE — 87086 URINE CULTURE/COLONY COUNT: CPT | Mod: ,,, | Performed by: CLINICAL MEDICAL LABORATORY

## 2024-04-03 PROCEDURE — 1160F RVW MEDS BY RX/DR IN RCRD: CPT | Mod: CPTII,,, | Performed by: OBSTETRICS & GYNECOLOGY

## 2024-04-03 PROCEDURE — 1159F MED LIST DOCD IN RCRD: CPT | Mod: CPTII,,, | Performed by: OBSTETRICS & GYNECOLOGY

## 2024-04-03 PROCEDURE — 99214 OFFICE O/P EST MOD 30 MIN: CPT | Mod: PBBFAC | Performed by: OBSTETRICS & GYNECOLOGY

## 2024-04-03 PROCEDURE — 99396 PREV VISIT EST AGE 40-64: CPT | Mod: S$PBB,,, | Performed by: OBSTETRICS & GYNECOLOGY

## 2024-04-03 PROCEDURE — 3078F DIAST BP <80 MM HG: CPT | Mod: CPTII,,, | Performed by: OBSTETRICS & GYNECOLOGY

## 2024-04-03 PROCEDURE — 88142 CYTOPATH C/V THIN LAYER: CPT | Mod: TC,GCY | Performed by: OBSTETRICS & GYNECOLOGY

## 2024-04-03 PROCEDURE — 87624 HPV HI-RISK TYP POOLED RSLT: CPT | Mod: ,,, | Performed by: CLINICAL MEDICAL LABORATORY

## 2024-04-03 PROCEDURE — 99459 PELVIC EXAMINATION: CPT | Mod: PBBFAC | Performed by: OBSTETRICS & GYNECOLOGY

## 2024-04-03 PROCEDURE — 81001 URINALYSIS AUTO W/SCOPE: CPT | Mod: ,,, | Performed by: CLINICAL MEDICAL LABORATORY

## 2024-04-03 PROCEDURE — 3008F BODY MASS INDEX DOCD: CPT | Mod: CPTII,,, | Performed by: OBSTETRICS & GYNECOLOGY

## 2024-04-03 NOTE — PROGRESS NOTES
Nneka JESÚS Mireya female  for   Chief Complaint   Patient presents with    Well Woman     Some lower back pain.      OB History          1    Para   1    Term   1            AB        Living   1         SAB        IAB        Ectopic        Multiple        Live Births                      Past Medical History:   Diagnosis Date    Abdominal bloating 2020    Abdominal pain, right upper quadrant 2020    Abnormal liver enzymes 2020    Abnormal results of liver function studies 2020    Acute conjunctivitis 2014    Acute exacerbation of chronic obstructive airways disease 2017    Acute pharyngitis 2020    Acute sinusitis 10/09/2017    Acute upper respiratory infection 2020    Allergic rhinitis 2020    Anemia 2014    Anesthesia of skin 2017    bilateral fingers    Bilateral primary osteoarthritis of knee 2019    Bradycardia 2020    Carpal tunnel syndrome 08/10/2017    Chest pain 2020    Chronic idiopathic constipation 2020    Chronic low back pain 2018    Chronic pain 2019    Chronic pain syndrome 2020    COPD (chronic obstructive pulmonary disease) 2021    Coronavirus infection 2020    Cough 2020    Degeneration of lumbar intervertebral disc 2014    L3-L4 L4-L5 Greater than L5-S1    Depressive disorder 2019    Disorder of gallbladder 2015    Dysphagia 2020    Dyspnea 2020    Dysuria 2020    Fatigue 2020    Frequency of urination 2015    GERD (gastroesophageal reflux disease) 2020    Hematuria 2020    Hemoptysis 2020    Herpes zoster 2020    Hyperlipidemia 2020    Hypertension 2020    Joint pain 2019    Knee pain 2019    Left inguinal hernia 06/10/2019    Long term (current) use of opiate analgesic 2021    Lumbar spondylosis 2020    Lumbar sprain 2012    Malaise 2014     Melena 09/25/2017    Microscopic hematuria 04/02/2020    Migraine without aura 02/06/2012    Nausea 02/27/2020    Nicotine dependence, cigarettes, uncomplicated 11/11/2020    Nonulcer dyspepsia 01/23/2017    On home O2     Other long term (current) drug therapy 09/23/2020    Other specified urinary incontinence 06/17/2020    Pain in left shoulder 05/02/2019    Pain in left wrist 09/06/2017    and hand    Pain in right shoulder 08/30/2019    Pain in right wrist 09/04/2018    Right hand    Palpitations 03/06/2020    Shoulder joint pain 04/23/2019    Smoker 07/03/2019    Snoring 08/02/2019    Synovial cyst of popliteal space 04/08/2013    Tobacco dependence syndrome 02/19/2021    Unstable angina 01/16/2020    UTI (urinary tract infection) 03/27/2020      Past Surgical History:   Procedure Laterality Date    CARDIAC CATHETERIZATION      CHOLECYSTECTOMY      HERNIA REPAIR      ROTATOR CUFF REPAIR        Review of patient's allergies indicates:  No Known Allergies          Physical exam:     General Appearance: healthy    Chest:chest clear, no wheezing, rales, normal symmetric air entry, Heart exam - S1, S2 normal, no murmur, no gallop, rate regular    Breast:  normal appearance, no masses or tenderness    Abdomen:Normal, benign.    Pelvic: Pelvic exam: normal external genitalia, vulva, vagina, cervix, uterus and adnexa, VULVA: normal appearing vulva with no masses, tenderness or lesions, CERVIX: normal appearing cervix without discharge or lesions, UTERUS: uterus is normal size, shape, consistency and nontender, ADNEXA: normal adnexa in size, nontender and no masses, RECTAL:negative and stool guaiac negative normal rectal, no masses, guaiac negative stool obtained.     Extremity:normal    Skin: normal exam        Assessment:   Problem List Items Addressed This Visit          Orthopedic    Back pain    Relevant Orders    Urine culture    Urinalysis, Reflex to Urine Culture     Other Visit Diagnoses       Well woman exam  with routine gynecological exam    -  Primary    Relevant Orders    POCT occult blood stool    ThinPrep Pap Test    Screening for malignant neoplasm of the cervix        Relevant Orders    ThinPrep Pap Test             Plan:  A Pap smear was done today.  The patient had a mammogram in October.  She had a colonoscopy less than 2 years ago.  She is having some back pain and urinalysis and urine screen were obtained today.  A chaperone was used for this exam.

## 2024-04-05 LAB — UA COMPLETE W REFLEX CULTURE PNL UR: NORMAL

## 2024-04-09 ENCOUNTER — OFFICE VISIT (OUTPATIENT)
Dept: FAMILY MEDICINE | Facility: CLINIC | Age: 64
End: 2024-04-09
Payer: MEDICARE

## 2024-04-09 VITALS
HEIGHT: 66 IN | DIASTOLIC BLOOD PRESSURE: 76 MMHG | TEMPERATURE: 99 F | HEART RATE: 88 BPM | WEIGHT: 190 LBS | BODY MASS INDEX: 30.53 KG/M2 | RESPIRATION RATE: 20 BRPM | OXYGEN SATURATION: 98 % | SYSTOLIC BLOOD PRESSURE: 106 MMHG

## 2024-04-09 DIAGNOSIS — F17.211 CIGARETTE NICOTINE DEPENDENCE IN REMISSION: ICD-10-CM

## 2024-04-09 DIAGNOSIS — E78.5 DYSLIPIDEMIA: ICD-10-CM

## 2024-04-09 DIAGNOSIS — J30.9 ALLERGIC RHINITIS, UNSPECIFIED SEASONALITY, UNSPECIFIED TRIGGER: ICD-10-CM

## 2024-04-09 DIAGNOSIS — R74.8 ELEVATED LIVER ENZYMES: Primary | ICD-10-CM

## 2024-04-09 DIAGNOSIS — I10 HYPERTENSION, UNSPECIFIED TYPE: ICD-10-CM

## 2024-04-09 DIAGNOSIS — K59.04 CHRONIC IDIOPATHIC CONSTIPATION: ICD-10-CM

## 2024-04-09 DIAGNOSIS — M51.9 LUMBAR DISC DISEASE: ICD-10-CM

## 2024-04-09 DIAGNOSIS — J44.9 CHRONIC OBSTRUCTIVE PULMONARY DISEASE, UNSPECIFIED COPD TYPE: ICD-10-CM

## 2024-04-09 PROCEDURE — 3074F SYST BP LT 130 MM HG: CPT | Mod: ,,, | Performed by: FAMILY MEDICINE

## 2024-04-09 PROCEDURE — 1159F MED LIST DOCD IN RCRD: CPT | Mod: ,,, | Performed by: FAMILY MEDICINE

## 2024-04-09 PROCEDURE — 3078F DIAST BP <80 MM HG: CPT | Mod: ,,, | Performed by: FAMILY MEDICINE

## 2024-04-09 PROCEDURE — 3044F HG A1C LEVEL LT 7.0%: CPT | Mod: ,,, | Performed by: FAMILY MEDICINE

## 2024-04-09 PROCEDURE — 1160F RVW MEDS BY RX/DR IN RCRD: CPT | Mod: ,,, | Performed by: FAMILY MEDICINE

## 2024-04-09 PROCEDURE — 99213 OFFICE O/P EST LOW 20 MIN: CPT | Mod: ,,, | Performed by: FAMILY MEDICINE

## 2024-04-09 PROCEDURE — 3008F BODY MASS INDEX DOCD: CPT | Mod: ,,, | Performed by: FAMILY MEDICINE

## 2024-04-09 RX ORDER — FLUTICASONE FUROATE, UMECLIDINIUM BROMIDE AND VILANTEROL TRIFENATATE 200; 62.5; 25 UG/1; UG/1; UG/1
1 POWDER RESPIRATORY (INHALATION) DAILY
Qty: 60 EACH | Refills: 5 | Status: SHIPPED | OUTPATIENT
Start: 2024-04-09

## 2024-04-09 RX ORDER — HYDROCODONE BITARTRATE AND ACETAMINOPHEN 10; 325 MG/1; MG/1
1 TABLET ORAL EVERY 6 HOURS PRN
Qty: 90 TABLET | Refills: 0 | Status: SHIPPED | OUTPATIENT
Start: 2024-04-09 | End: 2024-04-09 | Stop reason: ALTCHOICE

## 2024-04-09 RX ORDER — ROSUVASTATIN CALCIUM 20 MG/1
20 TABLET, COATED ORAL DAILY
Qty: 90 TABLET | Refills: 1 | Status: SHIPPED | OUTPATIENT
Start: 2024-04-09

## 2024-04-09 RX ORDER — HYDROCHLOROTHIAZIDE 25 MG/1
25 TABLET ORAL DAILY
Qty: 90 TABLET | Refills: 1 | Status: CANCELLED | OUTPATIENT
Start: 2024-04-09

## 2024-04-09 RX ORDER — VALSARTAN 40 MG/1
40 TABLET ORAL DAILY
Qty: 90 TABLET | Refills: 1 | Status: SHIPPED | OUTPATIENT
Start: 2024-04-09 | End: 2024-06-10 | Stop reason: SDUPTHER

## 2024-04-09 RX ORDER — LEVOCETIRIZINE DIHYDROCHLORIDE 5 MG/1
5 TABLET, FILM COATED ORAL NIGHTLY
Qty: 90 TABLET | Refills: 3 | Status: SHIPPED | OUTPATIENT
Start: 2024-04-09

## 2024-04-09 NOTE — PROGRESS NOTES
Nneka Gomes is a 63 y.o. female seen today for follow-up on her chronic pain syndrome secondary to lumbar disc disease.  She reports her Norco is working well with no side effects and she is meeting her ADLs.  She has had no signs or symptoms of tolerance or addiction.  Her  today was appropriate and urine drug screens have only shown her prescribed opiate.  We did review her labs and lipids were to goal.  Unfortunately her renal function is declining and we discussed discontinuing her hydrochlorothiazide avoiding NSAIDs the trial of low-dose Diovan instead.      Past Medical History:   Diagnosis Date    Abdominal bloating 01/09/2020    Abdominal pain, right upper quadrant 01/09/2020    Abnormal liver enzymes 03/04/2020    Abnormal results of liver function studies 11/18/2020    Acute conjunctivitis 05/30/2014    Acute exacerbation of chronic obstructive airways disease 02/13/2017    Acute pharyngitis 05/19/2020    Acute sinusitis 10/09/2017    Acute upper respiratory infection 05/19/2020    Allergic rhinitis 09/23/2020    Anemia 03/07/2014    Anesthesia of skin 09/06/2017    bilateral fingers    Bilateral primary osteoarthritis of knee 12/02/2019    Bradycardia 01/16/2020    Carpal tunnel syndrome 08/10/2017    Chest pain 03/06/2020    Chronic idiopathic constipation 11/18/2020    Chronic low back pain 11/05/2018    Chronic pain 04/23/2019    Chronic pain syndrome 01/30/2020    COPD (chronic obstructive pulmonary disease) 02/19/2021    Coronavirus infection 05/21/2020    Cough 05/19/2020    Degeneration of lumbar intervertebral disc 09/26/2014    L3-L4 L4-L5 Greater than L5-S1    Depressive disorder 07/03/2019    Disorder of gallbladder 03/30/2015    Dysphagia 01/09/2020    Dyspnea 05/19/2020    Dysuria 05/19/2020    Fatigue 05/19/2020    Frequency of urination 02/16/2015    GERD (gastroesophageal reflux disease) 11/18/2020    Hematuria 06/30/2020    Hemoptysis 02/27/2020    Herpes zoster 09/23/2020     Hyperlipidemia 09/23/2020    Hypertension 03/06/2020    Joint pain 11/04/2019    Knee pain 01/04/2019    Left inguinal hernia 06/10/2019    Long term (current) use of opiate analgesic 02/19/2021    Lumbar spondylosis 11/23/2020    Lumbar sprain 07/16/2012    Malaise 11/20/2014    Melena 09/25/2017    Microscopic hematuria 04/02/2020    Migraine without aura 02/06/2012    Nausea 02/27/2020    Nicotine dependence, cigarettes, uncomplicated 11/11/2020    Nonulcer dyspepsia 01/23/2017    On home O2     Other long term (current) drug therapy 09/23/2020    Other specified urinary incontinence 06/17/2020    Pain in left shoulder 05/02/2019    Pain in left wrist 09/06/2017    and hand    Pain in right shoulder 08/30/2019    Pain in right wrist 09/04/2018    Right hand    Palpitations 03/06/2020    Shoulder joint pain 04/23/2019    Smoker 07/03/2019    Snoring 08/02/2019    Synovial cyst of popliteal space 04/08/2013    Tobacco dependence syndrome 02/19/2021    Unstable angina 01/16/2020    UTI (urinary tract infection) 03/27/2020     Family History   Problem Relation Age of Onset    Rectal cancer Other     Diabetes Other     Heart disease Other     Hypertension Other     Hypertension Mother     Cancer Maternal Aunt      Current Outpatient Medications on File Prior to Visit   Medication Sig Dispense Refill    albuterol (PROAIR HFA) 90 mcg/actuation inhaler Inhale 2 puffs into the lungs every 4 (four) hours as needed for Wheezing. 18 g 5    albuterol (PROVENTIL) 2.5 mg /3 mL (0.083 %) nebulizer solution Take 3 mLs (2.5 mg total) by nebulization 4 (four) times daily as needed for Wheezing. 100 each 5    azelastine (ASTELIN) 137 mcg (0.1 %) nasal spray USE 2 SPRAYS NASALLY TWICE A DAY 90 mL 3    buPROPion (WELLBUTRIN XL) 300 MG 24 hr tablet Take 1 tablet (300 mg total) by mouth once daily. 90 tablet 0    fluticasone propionate (FLONASE) 50 mcg/actuation nasal spray 2 sprays (100 mcg total) by Each Nostril route once daily. 16 g  5    gabapentin (NEURONTIN) 100 MG capsule Take 1 capsule (100 mg total) by mouth 3 (three) times daily. 180 capsule 1    hydroCHLOROthiazide (HYDRODIURIL) 25 MG tablet Take 1 tablet (25 mg total) by mouth once daily. 90 tablet 1    pantoprazole (PROTONIX) 40 MG tablet Take 1 tablet (40 mg total) by mouth once daily. 90 tablet 1    RESTASIS 0.05 % ophthalmic emulsion       theophylline (THEODUR) 300 mg 24 hr capsule Take 300 mg by mouth once daily. One tab Daily with food: (replaced previous script for 100mg tab 3 daily) 90 capsule 3    [DISCONTINUED] fluticasone-umeclidin-vilanter (TRELEGY ELLIPTA) 200-62.5-25 mcg inhaler Inhale 1 puff into the lungs once daily. 60 each 5    [DISCONTINUED] HYDROcodone-acetaminophen (NORCO)  mg per tablet Take 1 tablet by mouth every 6 (six) hours as needed for Pain. Every 4 to 6 hours prn for Chronic back pain 90 tablet 0    [DISCONTINUED] levocetirizine (XYZAL) 5 MG tablet Take 1 tablet (5 mg total) by mouth every evening. 90 tablet 3    [DISCONTINUED] linaCLOtide (LINZESS) 145 mcg Cap capsule Take 1 capsule (145 mcg total) by mouth before breakfast. 90 capsule 3    [DISCONTINUED] rosuvastatin (CRESTOR) 20 MG tablet Take 1 tablet (20 mg total) by mouth once daily. 90 tablet 1     No current facility-administered medications on file prior to visit.     Immunization History   Administered Date(s) Administered    COVID-19, MRNA, LN-S, PF (Pfizer) (Purple Cap) 04/29/2021, 10/28/2021, 04/29/2022    Influenza - Quadrivalent - PF *Preferred* (6 months and older) 09/16/2021, 10/11/2023    Influenza Split 10/03/2019    Pneumococcal Conjugate - 13 Valent 11/05/2018    Pneumococcal Conjugate - 20 Valent 01/09/2023    Pneumococcal Polysaccharide - 23 Valent 11/05/2019       Review of Systems   Constitutional:  Negative for fever, malaise/fatigue and weight loss.   Respiratory:  Negative for shortness of breath.    Cardiovascular:  Negative for chest pain and palpitations.    Gastrointestinal:  Negative for nausea and vomiting.   Musculoskeletal:  Positive for back pain and myalgias.   Psychiatric/Behavioral:  Negative for depression.         Vitals:    04/09/24 1257   BP: 106/76   Pulse: 88   Resp: 20   Temp: 98.5 °F (36.9 °C)       Physical Exam  Vitals reviewed.   Constitutional:       Appearance: Normal appearance.   HENT:      Head: Normocephalic.   Eyes:      Extraocular Movements: Extraocular movements intact.      Conjunctiva/sclera: Conjunctivae normal.      Pupils: Pupils are equal, round, and reactive to light.   Neck:      Thyroid: No thyroid mass or thyromegaly.   Cardiovascular:      Rate and Rhythm: Normal rate and regular rhythm.      Heart sounds: Normal heart sounds. No murmur heard.     No gallop.   Pulmonary:      Effort: Pulmonary effort is normal. No respiratory distress.      Breath sounds: Normal breath sounds. No wheezing or rales.   Musculoskeletal:      Lumbar back: Spasms and tenderness present. Decreased range of motion.        Back:       Comments: She is slow to rise from seated position with an antalgic gait   Skin:     General: Skin is warm and dry.      Coloration: Skin is not jaundiced or pale.   Neurological:      Mental Status: She is alert.   Psychiatric:         Mood and Affect: Mood normal.         Behavior: Behavior normal.         Thought Content: Thought content normal.         Judgment: Judgment normal.          Assessment and Plan  1. Elevated liver enzymes  -     Alkaline Phosphatase, Isoenzymes; Future; Expected date: 04/10/2024    2. Hypertension, unspecified type  -     valsartan (DIOVAN) 40 MG tablet; Take 1 tablet (40 mg total) by mouth once daily.  Dispense: 90 tablet; Refill: 1    3. Lumbar disc disease  -     Discontinue: HYDROcodone-acetaminophen (NORCO)  mg per tablet; Take 1 tablet by mouth every 6 (six) hours as needed for Pain. Every 4 to 6 hours prn for Chronic back pain  Dispense: 90 tablet; Refill: 0    4. Allergic  rhinitis, unspecified seasonality, unspecified trigger  -     levocetirizine (XYZAL) 5 MG tablet; Take 1 tablet (5 mg total) by mouth every evening.  Dispense: 90 tablet; Refill: 3    5. Chronic idiopathic constipation  -     linaCLOtide (LINZESS) 145 mcg Cap capsule; Take 1 capsule (145 mcg total) by mouth before breakfast.  Dispense: 90 capsule; Refill: 3    6. Dyslipidemia  -     rosuvastatin (CRESTOR) 20 MG tablet; Take 1 tablet (20 mg total) by mouth once daily.  Dispense: 90 tablet; Refill: 1    7. Chronic obstructive pulmonary disease, unspecified COPD type  -     fluticasone-umeclidin-vilanter (TRELEGY ELLIPTA) 200-62.5-25 mcg inhaler; Inhale 1 puff into the lungs once daily.  Dispense: 60 each; Refill: 5    8. Cigarette nicotine dependence in remission  -     CT Chest Lung Screening Low Dose; Future; Expected date: 04/09/2024             Return to clinic in 1 month with home blood pressure log patient will stop by with her new blood pressure cuff for accuracy confirmation.      Health Maintenance Topics with due status: Not Due       Topic Last Completion Date    Colorectal Cancer Screening 11/08/2022    LDCT Lung Screen 04/19/2023    Mammogram 10/05/2023    Hemoglobin A1c (Prediabetes) 02/08/2024    Cervical Cancer Screening 04/03/2024    Lipid Panel 04/08/2024

## 2024-04-10 ENCOUNTER — CLINICAL SUPPORT (OUTPATIENT)
Dept: FAMILY MEDICINE | Facility: CLINIC | Age: 64
End: 2024-04-10
Payer: MEDICARE

## 2024-04-10 VITALS — DIASTOLIC BLOOD PRESSURE: 85 MMHG | SYSTOLIC BLOOD PRESSURE: 125 MMHG

## 2024-04-10 DIAGNOSIS — I10 HYPERTENSION, UNSPECIFIED TYPE: Primary | ICD-10-CM

## 2024-04-10 NOTE — PROGRESS NOTES
Patient returned to clinic today with her recently purchased automatic home blood pressure cuff. This cuff was calibrated to manual cuff and was appropriate. Dr Yan was notified.

## 2024-04-12 LAB
HPV 16: NEGATIVE
HPV 18: NEGATIVE
HPV OTHER: POSITIVE

## 2024-04-17 ENCOUNTER — TELEPHONE (OUTPATIENT)
Dept: FAMILY MEDICINE | Facility: CLINIC | Age: 64
End: 2024-04-17
Payer: MEDICARE

## 2024-04-17 NOTE — TELEPHONE ENCOUNTER
----- Message from Magno Yan MD sent at 4/15/2024  1:20 PM CDT -----  Liver sub fractions remain elevated and she may need follow-up Gastroenterology.

## 2024-04-19 ENCOUNTER — TELEPHONE (OUTPATIENT)
Dept: FAMILY MEDICINE | Facility: CLINIC | Age: 64
End: 2024-04-19
Payer: MEDICARE

## 2024-04-19 DIAGNOSIS — R74.8 ELEVATED LIVER ENZYMES: Primary | ICD-10-CM

## 2024-04-19 NOTE — TELEPHONE ENCOUNTER
Notified pt of results showing liver sub fractions remaining elevated and provider recommending she may need follow-up Gastroenterology. Pt verbalized understanding and consented to GI referral placement.

## 2024-05-01 ENCOUNTER — HOSPITAL ENCOUNTER (OUTPATIENT)
Dept: RADIOLOGY | Facility: HOSPITAL | Age: 64
Discharge: HOME | End: 2024-05-01
Attending: FAMILY MEDICINE
Payer: MEDICARE

## 2024-05-01 VITALS — HEIGHT: 66 IN | WEIGHT: 194 LBS | BODY MASS INDEX: 31.18 KG/M2

## 2024-05-01 DIAGNOSIS — F17.211 CIGARETTE NICOTINE DEPENDENCE IN REMISSION: ICD-10-CM

## 2024-05-01 PROCEDURE — 71271 CT THORAX LUNG CANCER SCR C-: CPT | Mod: 26,,, | Performed by: RADIOLOGY

## 2024-05-01 PROCEDURE — 71271 CT THORAX LUNG CANCER SCR C-: CPT | Mod: TC

## 2024-05-02 ENCOUNTER — TELEPHONE (OUTPATIENT)
Dept: FAMILY MEDICINE | Facility: CLINIC | Age: 64
End: 2024-05-02
Payer: MEDICARE

## 2024-05-02 DIAGNOSIS — I25.10 CORONARY ARTERY DISEASE, UNSPECIFIED VESSEL OR LESION TYPE, UNSPECIFIED WHETHER ANGINA PRESENT, UNSPECIFIED WHETHER NATIVE OR TRANSPLANTED HEART: Primary | ICD-10-CM

## 2024-05-02 NOTE — TELEPHONE ENCOUNTER
----- Message from Magno Yan MD sent at 5/1/2024  4:15 PM CDT -----  Patient's low-dose CT scan of the lungs did not show any significant findings worrisome for lung cancer however she did have evidence of coronary artery disease.  If she has not seen cardiology lately she likely will need an evaluation.

## 2024-05-02 NOTE — TELEPHONE ENCOUNTER
Notified patient that low-dose CT scan of the lungs did not show any significant findings worrisome for lung cancer however she did have evidence of coronary artery disease.  Patient did say she does not have a cardiologist and would like for a referral to be placed to one.

## 2024-05-06 ENCOUNTER — OFFICE VISIT (OUTPATIENT)
Dept: GASTROENTEROLOGY | Facility: CLINIC | Age: 64
End: 2024-05-06
Payer: MEDICARE

## 2024-05-06 VITALS
WEIGHT: 189 LBS | HEIGHT: 66 IN | DIASTOLIC BLOOD PRESSURE: 82 MMHG | SYSTOLIC BLOOD PRESSURE: 131 MMHG | BODY MASS INDEX: 30.37 KG/M2 | HEART RATE: 77 BPM

## 2024-05-06 DIAGNOSIS — R74.8 ELEVATED LIVER ENZYMES: ICD-10-CM

## 2024-05-06 PROCEDURE — 99215 OFFICE O/P EST HI 40 MIN: CPT | Mod: PBBFAC

## 2024-05-06 PROCEDURE — 99214 OFFICE O/P EST MOD 30 MIN: CPT | Mod: S$PBB,,,

## 2024-05-06 PROCEDURE — 3044F HG A1C LEVEL LT 7.0%: CPT | Mod: CPTII,,,

## 2024-05-06 PROCEDURE — 3079F DIAST BP 80-89 MM HG: CPT | Mod: CPTII,,,

## 2024-05-06 PROCEDURE — 99999 PR PBB SHADOW E&M-EST. PATIENT-LVL V: CPT | Mod: PBBFAC,,,

## 2024-05-06 PROCEDURE — 1160F RVW MEDS BY RX/DR IN RCRD: CPT | Mod: CPTII,,,

## 2024-05-06 PROCEDURE — 1159F MED LIST DOCD IN RCRD: CPT | Mod: CPTII,,,

## 2024-05-06 PROCEDURE — 4010F ACE/ARB THERAPY RXD/TAKEN: CPT | Mod: CPTII,,,

## 2024-05-06 PROCEDURE — 3075F SYST BP GE 130 - 139MM HG: CPT | Mod: CPTII,,,

## 2024-05-06 PROCEDURE — 3008F BODY MASS INDEX DOCD: CPT | Mod: CPTII,,,

## 2024-05-06 NOTE — PROGRESS NOTES
Gastroenterology Clinic Note    Patient ID: [id number]   Referring MD: Magno Yan MD   Chief Complaint:   Chief Complaint   Patient presents with    Follow-up    Elevated Hepatic Enzymes       History of Present Illness   Nneka Gomes is an 63 y.o. AAF who is referred for elevated liver enzymes.  CMP from 04/2024 revealed alk-phos 226.  ALT and AST within normal range.  Fractionated alk-phos revealed had elevation of liver 1.  Upon chart review, patient has had a chronically elevated alk-phos with a positive ASMA and negative AMA in 2021.  She also underwent liver biopsy in 2021.    Previous workup:  Liver biopsy    A. LIVER, BIOPSY ([facility], [place], [place], [id number] A1, 08/11/2021):      - Portal-based inflammation, lymphocyte predominant with rare plasma cells including rare lymphoid aggregates.  - Moderate perisinusoidal fibrosis, supported by trichrome stain.  - Mild bile duct lymphocytosis.  - No chronic cholestasis, supported by copper stain.  - Minimal macrovesicular steatosis.  - No lobular inflammation or apoptotic bodies identified.  - No iron deposition, supported by iron stain.  - No alpha-1 antitrypsin globules, supported by PAS and PAS-D stain (with bacterial and fungal contaminant).  - No hepatocellular carcinoma identified.     NOTE: Anti smooth muscle antibodies, ALP, and GGT are increased due to which clinical suspicion of autoimmune hepatitis is noted. On the basis of clinical picture and histological findings in the submitted specimen, I favor drug induced liver toxicity (possibly bupropion and sertraline). The other differential for this pattern may include primary biliary cholangitis (PBC), and less likely autoimmune hepatitis (AIH). Clinical correlation and follow up of trending of LFT  is recommended after cessation of possible hepatotoxic drugs is recommended.     Last colonoscopy was 11/08/2022 with 10 year recall for screening purposes.    Review of Systems    Constitutional:  Negative for weight loss.   Gastrointestinal:  Positive for constipation. Negative for abdominal pain, blood in stool, diarrhea, melena, nausea and vomiting.       Past Medical History      Past Medical History:   Diagnosis Date    Abdominal bloating 01/09/2020    Abdominal pain, right upper quadrant 01/09/2020    Abnormal liver enzymes 03/04/2020    Abnormal results of liver function studies 11/18/2020    Acute conjunctivitis 05/30/2014    Acute exacerbation of chronic obstructive airways disease 02/13/2017    Acute pharyngitis 05/19/2020    Acute sinusitis 10/09/2017    Acute upper respiratory infection 05/19/2020    Allergic rhinitis 09/23/2020    Anemia 03/07/2014    Anesthesia of skin 09/06/2017    bilateral fingers    Bilateral primary osteoarthritis of knee 12/02/2019    Bradycardia 01/16/2020    Carpal tunnel syndrome 08/10/2017    Chest pain 03/06/2020    Chronic idiopathic constipation 11/18/2020    Chronic low back pain 11/05/2018    Chronic pain 04/23/2019    Chronic pain syndrome 01/30/2020    COPD (chronic obstructive pulmonary disease) 02/19/2021    Coronavirus infection 05/21/2020    Cough 05/19/2020    Degeneration of lumbar intervertebral disc 09/26/2014    L3-L4 L4-L5 Greater than L5-S1    Depressive disorder 07/03/2019    Disorder of gallbladder 03/30/2015    Dysphagia 01/09/2020    Dyspnea 05/19/2020    Dysuria 05/19/2020    Fatigue 05/19/2020    Frequency of urination 02/16/2015    GERD (gastroesophageal reflux disease) 11/18/2020    Hematuria 06/30/2020    Hemoptysis 02/27/2020    Herpes zoster 09/23/2020    Hyperlipidemia 09/23/2020    Hypertension 03/06/2020    Joint pain 11/04/2019    Knee pain 01/04/2019    Left inguinal hernia 06/10/2019    Long term (current) use of opiate analgesic 02/19/2021    Lumbar spondylosis 11/23/2020    Lumbar sprain 07/16/2012    Malaise 11/20/2014    Melena 09/25/2017    Microscopic hematuria 04/02/2020    Migraine without aura 02/06/2012     Nausea 2020    Nicotine dependence, cigarettes, uncomplicated 2020    Nonulcer dyspepsia 2017    On home O2     Other long term (current) drug therapy 2020    Other specified urinary incontinence 2020    Pain in left shoulder 2019    Pain in left wrist 2017    and hand    Pain in right shoulder 2019    Pain in right wrist 2018    Right hand    Palpitations 2020    Shoulder joint pain 2019    Smoker 2019    Snoring 2019    Synovial cyst of popliteal space 2013    Tobacco dependence syndrome 2021    Unstable angina 2020    UTI (urinary tract infection) 2020       Past Surgical History     Past Surgical History:   Procedure Laterality Date    CARDIAC CATHETERIZATION      CHOLECYSTECTOMY      HERNIA REPAIR      ROTATOR CUFF REPAIR         Allergies   Review of patient's allergies indicates:  No Known Allergies    Immunization History     Immunization History   Administered Date(s) Administered    COVID-19, MRNA, LN-S, PF (Pfizer) (Purple Cap) 2021, 10/28/2021, 2022    Influenza - Quadrivalent - PF *Preferred* (6 months and older) 2021, 10/11/2023    Influenza Split 10/03/2019    Pneumococcal Conjugate - 13 Valent 2018    Pneumococcal Conjugate - 20 Valent 2023    Pneumococcal Polysaccharide - 23 Valent 2019       Past Family History      Family History   Problem Relation Name Age of Onset    Rectal cancer Other      Diabetes Other      Heart disease Other      Hypertension Other      Hypertension Mother      Cancer Maternal Aunt         Past Social History      Social History     Socioeconomic History    Marital status: Single    Number of children: 1   Tobacco Use    Smoking status: Former     Current packs/day: 0.00     Average packs/day: 1 pack/day for 40.0 years (40.0 ttl pk-yrs)     Types: Cigarettes     Start date: 1981     Quit date: 2021     Years since quittin.8      Passive exposure: Current    Smokeless tobacco: Former   Substance and Sexual Activity    Alcohol use: Yes     Comment: occasionally    Drug use: Not Currently     Types: Marijuana    Sexual activity: Not Currently       Current Medications     Current Outpatient Medications   Medication Sig Dispense Refill    albuterol (PROAIR HFA) 90 mcg/actuation inhaler Inhale 2 puffs into the lungs every 4 (four) hours as needed for Wheezing. 18 g 5    albuterol (PROVENTIL) 2.5 mg /3 mL (0.083 %) nebulizer solution Take 3 mLs (2.5 mg total) by nebulization 4 (four) times daily as needed for Wheezing. 100 each 5    azelastine (ASTELIN) 137 mcg (0.1 %) nasal spray USE 2 SPRAYS NASALLY TWICE A DAY 90 mL 3    buPROPion (WELLBUTRIN XL) 300 MG 24 hr tablet Take 1 tablet (300 mg total) by mouth once daily. 90 tablet 0    fluticasone propionate (FLONASE) 50 mcg/actuation nasal spray 2 sprays (100 mcg total) by Each Nostril route once daily. 16 g 5    fluticasone-umeclidin-vilanter (TRELEGY ELLIPTA) 200-62.5-25 mcg inhaler Inhale 1 puff into the lungs once daily. 60 each 5    gabapentin (NEURONTIN) 100 MG capsule Take 1 capsule (100 mg total) by mouth 3 (three) times daily. 180 capsule 1    hydroCHLOROthiazide (HYDRODIURIL) 25 MG tablet Take 1 tablet (25 mg total) by mouth once daily. 90 tablet 1    levocetirizine (XYZAL) 5 MG tablet Take 1 tablet (5 mg total) by mouth every evening. 90 tablet 3    linaCLOtide (LINZESS) 145 mcg Cap capsule Take 1 capsule (145 mcg total) by mouth before breakfast. 90 capsule 3    pantoprazole (PROTONIX) 40 MG tablet Take 1 tablet (40 mg total) by mouth once daily. 90 tablet 1    RESTASIS 0.05 % ophthalmic emulsion       rosuvastatin (CRESTOR) 20 MG tablet Take 1 tablet (20 mg total) by mouth once daily. 90 tablet 1    theophylline (THEODUR) 300 mg 24 hr capsule Take 300 mg by mouth once daily. One tab Daily with food: (replaced previous script for 100mg tab 3 daily) 90 capsule 3    valsartan (DIOVAN) 40  "MG tablet Take 1 tablet (40 mg total) by mouth once daily. 90 tablet 1     No current facility-administered medications for this visit.        I have reviewed the current medications, allergies, vital signs, past medical and surgical history, family medical history, and social history for this encounter and agree with all findings.    OBJECTIVE    Physical Exam    /82   Pulse 77   Ht 5' 6" (1.676 m)   Wt 85.7 kg (189 lb)   BMI 30.51 kg/m²   GEN: Well appearing, cooperative, NAD  NECK: Supple, no LAD  CV: Normal rate  RESP: Unlabored  ABD: ND, NT, soft, no guarding  EXT: No clubbing, cyanosis, or edema  SKIN: Warm and dry  NEURO: AAO x4.     LABS    CBC (with or without Differential):   Lab Results   Component Value Date    WBC 6.91 04/08/2024    HGB 13.0 04/08/2024    HCT 41.5 04/08/2024    MCV 89.8 04/08/2024    MCH 28.1 04/08/2024    MCHC 31.3 (L) 04/08/2024    RDW 14.3 04/08/2024     04/08/2024    MPV 9.7 04/08/2024    NEUTOPHILPCT 49.2 (L) 04/08/2024    DIFFTYPE Auto 04/08/2024     BMP/CMP:   Lab Results   Component Value Date     04/08/2024    K 3.3 (L) 04/08/2024     04/08/2024    CO2 30 04/08/2024    BUN 13 04/08/2024    CREATININE 1.05 (H) 04/08/2024     (H) 04/08/2024    CALCIUM 9.9 04/08/2024    ALBUMIN 4.0 04/08/2024    AST 29 04/08/2024    ALT 30 04/08/2024    ALKPHOS 226 (H) 04/08/2024        IMAGING  Ultrasound abdomen limited with elastography 07/2021  - No significant sonographic abnormality.  - Normal to mild fibrosis (F0-F1 METAVIR score), with minimal risk of clinically significant fibrosis.  No followup is required.       ASSESSMENT  Nneka Gomes is a 63 y.o. AAF with history of COPD, hypertension, hyperlipidemia, GERD, and elevated liver enzymes who is referred for elevated liver enzymes.    1. Elevated liver enzymes           PLAN    - discussed with Dr. Delarosa and we reviewed patient's previous liver biopsy and pathology; plan to obtain imaging for " surveillance and follow-up in 3 months with repeat labs    There are no Patient Instructions on file for this visit.      Orders Placed This Encounter   Procedures    US Elastography Liver w/imaging     Standing Status:   Future     Standing Expiration Date:   5/6/2025     Order Specific Question:   May the Radiologist modify the order per protocol to meet the clinical needs of the patient?     Answer:   Yes     Order Specific Question:   Release to patient     Answer:   Immediate    US Abdomen Limited_Liver     Standing Status:   Future     Standing Expiration Date:   5/6/2025     Order Specific Question:   May the Radiologist modify the order per protocol to meet the clinical needs of the patient?     Answer:   Yes     Order Specific Question:   Release to patient     Answer:   Immediate         The risks and benefits of my recommendations, as well as other treatment options were discussed with the patient today. All questions were answered.    35 minutes of total time spent on the encounter, which includes face to face time and non-face to face time preparing to see the patient (eg, review of tests), obtaining and/or reviewing separately obtained history, documenting clinical information in the electronic or other health record, Independently interpreting results (not separately reported) and communicating results to the patient/family/caregiver, or care coordination (not separately reported).        Angela Sanabria, MAIDAP/ACNP  [facility]

## 2024-05-08 ENCOUNTER — OFFICE VISIT (OUTPATIENT)
Dept: FAMILY MEDICINE | Facility: CLINIC | Age: 64
End: 2024-05-08
Payer: MEDICARE

## 2024-05-08 VITALS
BODY MASS INDEX: 30.37 KG/M2 | HEIGHT: 66 IN | SYSTOLIC BLOOD PRESSURE: 108 MMHG | TEMPERATURE: 98 F | OXYGEN SATURATION: 99 % | RESPIRATION RATE: 20 BRPM | HEART RATE: 79 BPM | WEIGHT: 189 LBS | DIASTOLIC BLOOD PRESSURE: 78 MMHG

## 2024-05-08 DIAGNOSIS — M51.9 LUMBAR DISC DISEASE: Primary | ICD-10-CM

## 2024-05-08 DIAGNOSIS — I10 HYPERTENSION, UNSPECIFIED TYPE: ICD-10-CM

## 2024-05-08 LAB
ANION GAP SERPL CALCULATED.3IONS-SCNC: 9 MMOL/L (ref 7–16)
BUN SERPL-MCNC: 12 MG/DL (ref 7–18)
BUN/CREAT SERPL: 15 (ref 6–20)
CALCIUM SERPL-MCNC: 9.2 MG/DL (ref 8.5–10.1)
CHLORIDE SERPL-SCNC: 107 MMOL/L (ref 98–107)
CO2 SERPL-SCNC: 30 MMOL/L (ref 21–32)
CREAT SERPL-MCNC: 0.8 MG/DL (ref 0.55–1.02)
EGFR (NO RACE VARIABLE) (RUSH/TITUS): 83 ML/MIN/1.73M2
GLUCOSE SERPL-MCNC: 80 MG/DL (ref 74–106)
POTASSIUM SERPL-SCNC: 3.9 MMOL/L (ref 3.5–5.1)
SODIUM SERPL-SCNC: 142 MMOL/L (ref 136–145)

## 2024-05-08 PROCEDURE — 99213 OFFICE O/P EST LOW 20 MIN: CPT | Mod: ,,, | Performed by: FAMILY MEDICINE

## 2024-05-08 PROCEDURE — 3074F SYST BP LT 130 MM HG: CPT | Mod: ,,, | Performed by: FAMILY MEDICINE

## 2024-05-08 PROCEDURE — 4010F ACE/ARB THERAPY RXD/TAKEN: CPT | Mod: ,,, | Performed by: FAMILY MEDICINE

## 2024-05-08 PROCEDURE — 3044F HG A1C LEVEL LT 7.0%: CPT | Mod: ,,, | Performed by: FAMILY MEDICINE

## 2024-05-08 PROCEDURE — 1159F MED LIST DOCD IN RCRD: CPT | Mod: ,,, | Performed by: FAMILY MEDICINE

## 2024-05-08 PROCEDURE — 80048 BASIC METABOLIC PNL TOTAL CA: CPT | Mod: ,,, | Performed by: CLINICAL MEDICAL LABORATORY

## 2024-05-08 PROCEDURE — 3008F BODY MASS INDEX DOCD: CPT | Mod: ,,, | Performed by: FAMILY MEDICINE

## 2024-05-08 PROCEDURE — 1160F RVW MEDS BY RX/DR IN RCRD: CPT | Mod: ,,, | Performed by: FAMILY MEDICINE

## 2024-05-08 PROCEDURE — 3078F DIAST BP <80 MM HG: CPT | Mod: ,,, | Performed by: FAMILY MEDICINE

## 2024-05-08 RX ORDER — HYDROCODONE BITARTRATE AND ACETAMINOPHEN 10; 325 MG/1; MG/1
1 TABLET ORAL EVERY 8 HOURS PRN
Qty: 90 TABLET | Refills: 0 | Status: SHIPPED | OUTPATIENT
Start: 2024-05-08 | End: 2024-06-10 | Stop reason: SDUPTHER

## 2024-05-08 RX ORDER — HYDROCODONE BITARTRATE AND ACETAMINOPHEN 10; 325 MG/1; MG/1
1 TABLET ORAL EVERY 8 HOURS PRN
COMMUNITY
End: 2024-05-08 | Stop reason: SDUPTHER

## 2024-05-08 NOTE — PROGRESS NOTES
Nneka Gomes is a 63 y.o. female seen today for follow-up on her chronic pain syndrome secondary to lumbar disc disease.  She reports good symptom control with no side effects and she is active in meeting her ADLs.  She has had no signs or symptoms of tolerance or addiction and her  today was appropriate.  Her urine drug screens have only shown her prescribed opiate.  Patient reports she feels well after discontinuing her hydrochlorothiazide and starting valsartan.  Today we will recheck her renal function.      Past Medical History:   Diagnosis Date    Abdominal bloating 01/09/2020    Abdominal pain, right upper quadrant 01/09/2020    Abnormal liver enzymes 03/04/2020    Abnormal results of liver function studies 11/18/2020    Acute conjunctivitis 05/30/2014    Acute exacerbation of chronic obstructive airways disease 02/13/2017    Acute pharyngitis 05/19/2020    Acute sinusitis 10/09/2017    Acute upper respiratory infection 05/19/2020    Allergic rhinitis 09/23/2020    Anemia 03/07/2014    Anesthesia of skin 09/06/2017    bilateral fingers    Bilateral primary osteoarthritis of knee 12/02/2019    Bradycardia 01/16/2020    Carpal tunnel syndrome 08/10/2017    Chest pain 03/06/2020    Chronic idiopathic constipation 11/18/2020    Chronic low back pain 11/05/2018    Chronic pain 04/23/2019    Chronic pain syndrome 01/30/2020    COPD (chronic obstructive pulmonary disease) 02/19/2021    Coronavirus infection 05/21/2020    Cough 05/19/2020    Degeneration of lumbar intervertebral disc 09/26/2014    L3-L4 L4-L5 Greater than L5-S1    Depressive disorder 07/03/2019    Disorder of gallbladder 03/30/2015    Dysphagia 01/09/2020    Dyspnea 05/19/2020    Dysuria 05/19/2020    Fatigue 05/19/2020    Frequency of urination 02/16/2015    GERD (gastroesophageal reflux disease) 11/18/2020    Hematuria 06/30/2020    Hemoptysis 02/27/2020    Herpes zoster 09/23/2020    Hyperlipidemia 09/23/2020    Hypertension 03/06/2020     Joint pain 11/04/2019    Knee pain 01/04/2019    Left inguinal hernia 06/10/2019    Long term (current) use of opiate analgesic 02/19/2021    Lumbar spondylosis 11/23/2020    Lumbar sprain 07/16/2012    Malaise 11/20/2014    Melena 09/25/2017    Microscopic hematuria 04/02/2020    Migraine without aura 02/06/2012    Nausea 02/27/2020    Nicotine dependence, cigarettes, uncomplicated 11/11/2020    Nonulcer dyspepsia 01/23/2017    On home O2     Other long term (current) drug therapy 09/23/2020    Other specified urinary incontinence 06/17/2020    Pain in left shoulder 05/02/2019    Pain in left wrist 09/06/2017    and hand    Pain in right shoulder 08/30/2019    Pain in right wrist 09/04/2018    Right hand    Palpitations 03/06/2020    Shoulder joint pain 04/23/2019    Smoker 07/03/2019    Snoring 08/02/2019    Synovial cyst of popliteal space 04/08/2013    Tobacco dependence syndrome 02/19/2021    Unstable angina 01/16/2020    UTI (urinary tract infection) 03/27/2020     Family History   Problem Relation Name Age of Onset    Rectal cancer Other      Diabetes Other      Heart disease Other      Hypertension Other      Hypertension Mother      Cancer Maternal Aunt       Current Outpatient Medications on File Prior to Visit   Medication Sig Dispense Refill    albuterol (PROAIR HFA) 90 mcg/actuation inhaler Inhale 2 puffs into the lungs every 4 (four) hours as needed for Wheezing. 18 g 5    albuterol (PROVENTIL) 2.5 mg /3 mL (0.083 %) nebulizer solution Take 3 mLs (2.5 mg total) by nebulization 4 (four) times daily as needed for Wheezing. 100 each 5    azelastine (ASTELIN) 137 mcg (0.1 %) nasal spray USE 2 SPRAYS NASALLY TWICE A DAY 90 mL 3    buPROPion (WELLBUTRIN XL) 300 MG 24 hr tablet Take 1 tablet (300 mg total) by mouth once daily. 90 tablet 0    fluticasone propionate (FLONASE) 50 mcg/actuation nasal spray 2 sprays (100 mcg total) by Each Nostril route once daily. 16 g 5    fluticasone-umeclidin-vilanter (TRELEGY  ELLIPTA) 200-62.5-25 mcg inhaler Inhale 1 puff into the lungs once daily. 60 each 5    gabapentin (NEURONTIN) 100 MG capsule Take 1 capsule (100 mg total) by mouth 3 (three) times daily. 180 capsule 1    levocetirizine (XYZAL) 5 MG tablet Take 1 tablet (5 mg total) by mouth every evening. 90 tablet 3    linaCLOtide (LINZESS) 145 mcg Cap capsule Take 1 capsule (145 mcg total) by mouth before breakfast. 90 capsule 3    pantoprazole (PROTONIX) 40 MG tablet Take 1 tablet (40 mg total) by mouth once daily. 90 tablet 1    RESTASIS 0.05 % ophthalmic emulsion       rosuvastatin (CRESTOR) 20 MG tablet Take 1 tablet (20 mg total) by mouth once daily. 90 tablet 1    theophylline (THEODUR) 300 mg 24 hr capsule Take 300 mg by mouth once daily. One tab Daily with food: (replaced previous script for 100mg tab 3 daily) 90 capsule 3    valsartan (DIOVAN) 40 MG tablet Take 1 tablet (40 mg total) by mouth once daily. 90 tablet 1    [DISCONTINUED] HYDROcodone-acetaminophen (NORCO)  mg per tablet Take 1 tablet by mouth every 8 (eight) hours as needed for Pain.      hydroCHLOROthiazide (HYDRODIURIL) 25 MG tablet Take 1 tablet (25 mg total) by mouth once daily. (Patient not taking: Reported on 5/8/2024) 90 tablet 1     No current facility-administered medications on file prior to visit.     Immunization History   Administered Date(s) Administered    COVID-19, MRNA, LN-S, PF (Pfizer) (Purple Cap) 04/29/2021, 10/28/2021, 04/29/2022    Influenza - Quadrivalent - PF *Preferred* (6 months and older) 09/16/2021, 10/11/2023    Influenza Split 10/03/2019    Pneumococcal Conjugate - 13 Valent 11/05/2018    Pneumococcal Conjugate - 20 Valent 01/09/2023    Pneumococcal Polysaccharide - 23 Valent 11/05/2019       Review of Systems   Constitutional:  Negative for fever, malaise/fatigue and weight loss.   Respiratory:  Negative for shortness of breath.    Cardiovascular:  Negative for chest pain and palpitations.   Gastrointestinal:  Negative for  nausea and vomiting.   Musculoskeletal:  Positive for back pain and myalgias.   Psychiatric/Behavioral:  Negative for depression.       Vitals:    05/08/24 1041   BP: 108/78   Pulse: 79   Resp: 20   Temp: 98.4 °F (36.9 °C)       Physical Exam  Vitals reviewed.   Constitutional:       Appearance: Normal appearance.   HENT:      Head: Normocephalic.   Eyes:      Extraocular Movements: Extraocular movements intact.      Conjunctiva/sclera: Conjunctivae normal.      Pupils: Pupils are equal, round, and reactive to light.   Neck:      Thyroid: No thyroid mass or thyromegaly.   Cardiovascular:      Rate and Rhythm: Normal rate and regular rhythm.      Heart sounds: Normal heart sounds. No murmur heard.     No gallop.   Pulmonary:      Effort: Pulmonary effort is normal. No respiratory distress.      Breath sounds: Normal breath sounds. No wheezing or rales.   Musculoskeletal:      Lumbar back: Spasms and tenderness present. Decreased range of motion.        Back:    Skin:     General: Skin is warm and dry.      Coloration: Skin is not jaundiced or pale.   Neurological:      Mental Status: She is alert.   Psychiatric:         Mood and Affect: Mood normal.         Behavior: Behavior normal.         Thought Content: Thought content normal.         Judgment: Judgment normal.        Assessment and Plan  1. Lumbar disc disease  -     HYDROcodone-acetaminophen (NORCO)  mg per tablet; Take 1 tablet by mouth every 8 (eight) hours as needed (Chronic lumbar disc disease).  Dispense: 90 tablet; Refill: 0    2. Hypertension, unspecified type  -     Basic Metabolic Panel; Future; Expected date: 05/08/2024             Return to clinic in 1 month or as needed.    Health Maintenance Topics with due status: Not Due       Topic Last Completion Date    Colorectal Cancer Screening 11/08/2022    Mammogram 10/05/2023    Hemoglobin A1c (Prediabetes) 02/08/2024    Cervical Cancer Screening 04/03/2024    Lipid Panel 04/08/2024    LDCT Lung  Screen 05/01/2024

## 2024-05-09 ENCOUNTER — TELEPHONE (OUTPATIENT)
Dept: FAMILY MEDICINE | Facility: CLINIC | Age: 64
End: 2024-05-09
Payer: MEDICARE

## 2024-05-09 NOTE — TELEPHONE ENCOUNTER
----- Message from Magno Yan MD sent at 5/9/2024  7:49 AM CDT -----  Normal potassium and normal renal function.

## 2024-05-14 ENCOUNTER — HOSPITAL ENCOUNTER (OUTPATIENT)
Dept: RADIOLOGY | Facility: HOSPITAL | Age: 64
Discharge: HOME | End: 2024-05-14
Payer: MEDICARE

## 2024-05-14 DIAGNOSIS — R74.8 ELEVATED LIVER ENZYMES: ICD-10-CM

## 2024-05-14 PROCEDURE — 76705 ECHO EXAM OF ABDOMEN: CPT | Mod: TC

## 2024-05-14 PROCEDURE — 76981 USE PARENCHYMA: CPT | Mod: 26,,, | Performed by: RADIOLOGY

## 2024-05-14 PROCEDURE — 76981 USE PARENCHYMA: CPT | Mod: TC

## 2024-05-14 PROCEDURE — 76705 ECHO EXAM OF ABDOMEN: CPT | Mod: 26,,, | Performed by: RADIOLOGY

## 2024-06-03 ENCOUNTER — TELEPHONE (OUTPATIENT)
Dept: GASTROENTEROLOGY | Facility: CLINIC | Age: 64
End: 2024-06-03
Payer: MEDICARE

## 2024-06-10 ENCOUNTER — OFFICE VISIT (OUTPATIENT)
Dept: FAMILY MEDICINE | Facility: CLINIC | Age: 64
End: 2024-06-10
Payer: MEDICARE

## 2024-06-10 VITALS
WEIGHT: 188 LBS | HEIGHT: 66 IN | RESPIRATION RATE: 18 BRPM | BODY MASS INDEX: 30.22 KG/M2 | TEMPERATURE: 98 F | HEART RATE: 84 BPM | DIASTOLIC BLOOD PRESSURE: 72 MMHG | SYSTOLIC BLOOD PRESSURE: 114 MMHG | OXYGEN SATURATION: 98 %

## 2024-06-10 DIAGNOSIS — F17.200 TOBACCO DEPENDENCE SYNDROME: ICD-10-CM

## 2024-06-10 DIAGNOSIS — Z79.891 LONG TERM (CURRENT) USE OF OPIATE ANALGESIC: Primary | ICD-10-CM

## 2024-06-10 DIAGNOSIS — K21.9 GASTROESOPHAGEAL REFLUX DISEASE, UNSPECIFIED WHETHER ESOPHAGITIS PRESENT: ICD-10-CM

## 2024-06-10 DIAGNOSIS — M51.9 LUMBAR DISC DISEASE: ICD-10-CM

## 2024-06-10 DIAGNOSIS — I10 HYPERTENSION, UNSPECIFIED TYPE: ICD-10-CM

## 2024-06-10 LAB
CTP QC/QA: YES
POC (AMP) AMPHETAMINE: NEGATIVE
POC (BAR) BARBITURATES: NEGATIVE
POC (BUP) BUPRENORPHINE: NEGATIVE
POC (BZO) BENZODIAZEPINES: NEGATIVE
POC (COC) COCAINE: NEGATIVE
POC (MDMA) METHYLENEDIOXYMETHAMPHETAMINE 3,4: NEGATIVE
POC (MET) METHAMPHETAMINE: NEGATIVE
POC (MOP) OPIATES: ABNORMAL
POC (MTD) METHADONE: NEGATIVE
POC (OXY) OXYCODONE: NEGATIVE
POC (PCP) PHENCYCLIDINE: NEGATIVE
POC (TCA) TRICYCLIC ANTIDEPRESSANTS: NEGATIVE
POC TEMPERATURE (URINE): 93
POC THC: NEGATIVE

## 2024-06-10 PROCEDURE — 80305 DRUG TEST PRSMV DIR OPT OBS: CPT | Mod: QW,,, | Performed by: FAMILY MEDICINE

## 2024-06-10 PROCEDURE — 3044F HG A1C LEVEL LT 7.0%: CPT | Mod: ,,, | Performed by: FAMILY MEDICINE

## 2024-06-10 PROCEDURE — 3078F DIAST BP <80 MM HG: CPT | Mod: ,,, | Performed by: FAMILY MEDICINE

## 2024-06-10 PROCEDURE — 1160F RVW MEDS BY RX/DR IN RCRD: CPT | Mod: ,,, | Performed by: FAMILY MEDICINE

## 2024-06-10 PROCEDURE — 99213 OFFICE O/P EST LOW 20 MIN: CPT | Mod: ,,, | Performed by: FAMILY MEDICINE

## 2024-06-10 PROCEDURE — 1159F MED LIST DOCD IN RCRD: CPT | Mod: ,,, | Performed by: FAMILY MEDICINE

## 2024-06-10 PROCEDURE — 3074F SYST BP LT 130 MM HG: CPT | Mod: ,,, | Performed by: FAMILY MEDICINE

## 2024-06-10 PROCEDURE — 4010F ACE/ARB THERAPY RXD/TAKEN: CPT | Mod: ,,, | Performed by: FAMILY MEDICINE

## 2024-06-10 PROCEDURE — 3008F BODY MASS INDEX DOCD: CPT | Mod: ,,, | Performed by: FAMILY MEDICINE

## 2024-06-10 RX ORDER — PANTOPRAZOLE SODIUM 40 MG/1
40 TABLET, DELAYED RELEASE ORAL DAILY
Qty: 90 TABLET | Refills: 1 | Status: SHIPPED | OUTPATIENT
Start: 2024-06-10 | End: 2024-12-07

## 2024-06-10 RX ORDER — VALSARTAN 40 MG/1
40 TABLET ORAL DAILY
Qty: 90 TABLET | Refills: 1 | Status: SHIPPED | OUTPATIENT
Start: 2024-06-10 | End: 2024-12-07

## 2024-06-10 RX ORDER — BUPROPION HYDROCHLORIDE 300 MG/1
300 TABLET ORAL DAILY
Qty: 90 TABLET | Refills: 1 | Status: SHIPPED | OUTPATIENT
Start: 2024-06-10 | End: 2024-12-07

## 2024-06-10 RX ORDER — HYDROCODONE BITARTRATE AND ACETAMINOPHEN 10; 325 MG/1; MG/1
1 TABLET ORAL EVERY 8 HOURS PRN
Qty: 90 TABLET | Refills: 0 | Status: SHIPPED | OUTPATIENT
Start: 2024-06-10

## 2024-06-10 RX ORDER — GABAPENTIN 100 MG/1
100 CAPSULE ORAL 3 TIMES DAILY
Qty: 180 CAPSULE | Refills: 1 | Status: SHIPPED | OUTPATIENT
Start: 2024-06-10

## 2024-06-10 NOTE — PROGRESS NOTES
Nneka Gomes is a 63 y.o. female seen today for follow-up on her chronic pain syndrome secondary to lumbar disc disease.  She reports good symptom control with no side effects and she is meeting her ADLs.  She has had no signs or symptoms of tolerance or addiction and her  today was appropriate.  Today she urine drug screen only showed her prescribed opiate.  Today she has no new complaints and denies any chest pain or shortness for breath and is up-to-date on her mammogram and colon cancer screening.  Patient did have a question about her latest Pap smear since she was not call with these results and I reviewed with her.  Patient is positive for nonspecific HPV and we reviewed the implications of this.  Patient remains smoke free.    Past Medical History:   Diagnosis Date    Abdominal bloating 01/09/2020    Abdominal pain, right upper quadrant 01/09/2020    Abnormal liver enzymes 03/04/2020    Abnormal results of liver function studies 11/18/2020    Acute conjunctivitis 05/30/2014    Acute exacerbation of chronic obstructive airways disease 02/13/2017    Acute pharyngitis 05/19/2020    Acute sinusitis 10/09/2017    Acute upper respiratory infection 05/19/2020    Allergic rhinitis 09/23/2020    Anemia 03/07/2014    Anesthesia of skin 09/06/2017    bilateral fingers    Bilateral primary osteoarthritis of knee 12/02/2019    Bradycardia 01/16/2020    Carpal tunnel syndrome 08/10/2017    Chest pain 03/06/2020    Chronic idiopathic constipation 11/18/2020    Chronic low back pain 11/05/2018    Chronic pain 04/23/2019    Chronic pain syndrome 01/30/2020    COPD (chronic obstructive pulmonary disease) 02/19/2021    Coronavirus infection 05/21/2020    Cough 05/19/2020    Degeneration of lumbar intervertebral disc 09/26/2014    L3-L4 L4-L5 Greater than L5-S1    Depressive disorder 07/03/2019    Disorder of gallbladder 03/30/2015    Dysphagia 01/09/2020    Dyspnea 05/19/2020    Dysuria 05/19/2020    Fatigue 05/19/2020     Frequency of urination 02/16/2015    GERD (gastroesophageal reflux disease) 11/18/2020    Hematuria 06/30/2020    Hemoptysis 02/27/2020    Herpes zoster 09/23/2020    Hyperlipidemia 09/23/2020    Hypertension 03/06/2020    Joint pain 11/04/2019    Knee pain 01/04/2019    Left inguinal hernia 06/10/2019    Long term (current) use of opiate analgesic 02/19/2021    Lumbar spondylosis 11/23/2020    Lumbar sprain 07/16/2012    Malaise 11/20/2014    Melena 09/25/2017    Microscopic hematuria 04/02/2020    Migraine without aura 02/06/2012    Nausea 02/27/2020    Nicotine dependence, cigarettes, uncomplicated 11/11/2020    Nonulcer dyspepsia 01/23/2017    On home O2     Other long term (current) drug therapy 09/23/2020    Other specified urinary incontinence 06/17/2020    Pain in left shoulder 05/02/2019    Pain in left wrist 09/06/2017    and hand    Pain in right shoulder 08/30/2019    Pain in right wrist 09/04/2018    Right hand    Palpitations 03/06/2020    Shoulder joint pain 04/23/2019    Smoker 07/03/2019    Snoring 08/02/2019    Synovial cyst of popliteal space 04/08/2013    Tobacco dependence syndrome 02/19/2021    Unstable angina 01/16/2020    UTI (urinary tract infection) 03/27/2020     Family History   Problem Relation Name Age of Onset    Rectal cancer Other      Diabetes Other      Heart disease Other      Hypertension Other      Hypertension Mother      Cancer Maternal Aunt       Current Outpatient Medications on File Prior to Visit   Medication Sig Dispense Refill    albuterol (PROAIR HFA) 90 mcg/actuation inhaler Inhale 2 puffs into the lungs every 4 (four) hours as needed for Wheezing. 18 g 5    albuterol (PROVENTIL) 2.5 mg /3 mL (0.083 %) nebulizer solution Take 3 mLs (2.5 mg total) by nebulization 4 (four) times daily as needed for Wheezing. 100 each 5    azelastine (ASTELIN) 137 mcg (0.1 %) nasal spray USE 2 SPRAYS NASALLY TWICE A DAY 90 mL 3    fluticasone propionate (FLONASE) 50 mcg/actuation nasal  spray 2 sprays (100 mcg total) by Each Nostril route once daily. 16 g 5    fluticasone-umeclidin-vilanter (TRELEGY ELLIPTA) 200-62.5-25 mcg inhaler Inhale 1 puff into the lungs once daily. 60 each 5    hydroCHLOROthiazide (HYDRODIURIL) 25 MG tablet Take 1 tablet (25 mg total) by mouth once daily. 90 tablet 1    levocetirizine (XYZAL) 5 MG tablet Take 1 tablet (5 mg total) by mouth every evening. 90 tablet 3    linaCLOtide (LINZESS) 145 mcg Cap capsule Take 1 capsule (145 mcg total) by mouth before breakfast. 90 capsule 3    RESTASIS 0.05 % ophthalmic emulsion       rosuvastatin (CRESTOR) 20 MG tablet Take 1 tablet (20 mg total) by mouth once daily. 90 tablet 1    theophylline (THEODUR) 300 mg 24 hr capsule Take 300 mg by mouth once daily. One tab Daily with food: (replaced previous script for 100mg tab 3 daily) 90 capsule 3    [DISCONTINUED] buPROPion (WELLBUTRIN XL) 300 MG 24 hr tablet Take 1 tablet (300 mg total) by mouth once daily. 90 tablet 0    [DISCONTINUED] gabapentin (NEURONTIN) 100 MG capsule Take 1 capsule (100 mg total) by mouth 3 (three) times daily. 180 capsule 1    [DISCONTINUED] HYDROcodone-acetaminophen (NORCO)  mg per tablet Take 1 tablet by mouth every 8 (eight) hours as needed (Chronic lumbar disc disease). 90 tablet 0    [DISCONTINUED] pantoprazole (PROTONIX) 40 MG tablet Take 1 tablet (40 mg total) by mouth once daily. 90 tablet 1    [DISCONTINUED] valsartan (DIOVAN) 40 MG tablet Take 1 tablet (40 mg total) by mouth once daily. 90 tablet 1     No current facility-administered medications on file prior to visit.     Immunization History   Administered Date(s) Administered    COVID-19, MRNA, LN-S, PF (Pfizer) (Purple Cap) 04/29/2021, 10/28/2021, 04/29/2022    Influenza - Quadrivalent - PF *Preferred* (6 months and older) 09/16/2021, 10/11/2023    Influenza Split 10/03/2019    Pneumococcal Conjugate - 13 Valent 11/05/2018    Pneumococcal Conjugate - 20 Valent 01/09/2023    Pneumococcal  Polysaccharide - 23 Valent 11/05/2019       Review of Systems   Constitutional:  Negative for fever, malaise/fatigue and weight loss.   Respiratory:  Negative for shortness of breath.    Cardiovascular:  Negative for chest pain and palpitations.   Gastrointestinal:  Negative for nausea and vomiting.   Musculoskeletal:  Positive for back pain and myalgias.   Psychiatric/Behavioral:  Negative for depression.         Vitals:    06/10/24 1007   BP: 114/72   Pulse: 84   Resp: 18   Temp: 98.2 °F (36.8 °C)       Physical Exam  Vitals reviewed.   Constitutional:       Appearance: Normal appearance.   HENT:      Head: Normocephalic.   Eyes:      Extraocular Movements: Extraocular movements intact.      Conjunctiva/sclera: Conjunctivae normal.      Pupils: Pupils are equal, round, and reactive to light.   Neck:      Thyroid: No thyroid mass or thyromegaly.   Cardiovascular:      Rate and Rhythm: Normal rate and regular rhythm.      Heart sounds: Normal heart sounds. No murmur heard.     No gallop.   Pulmonary:      Effort: Pulmonary effort is normal. No respiratory distress.      Breath sounds: Normal breath sounds. No wheezing or rales.   Musculoskeletal:      Lumbar back: Spasms and tenderness present. Decreased range of motion.        Back:       Comments: Patient is slow to rise from a seated position with an antalgic gait.   Skin:     General: Skin is warm and dry.      Coloration: Skin is not jaundiced or pale.   Neurological:      Mental Status: She is alert.   Psychiatric:         Mood and Affect: Mood normal.         Behavior: Behavior normal.         Thought Content: Thought content normal.         Judgment: Judgment normal.          Assessment and Plan  1. Long term (current) use of opiate analgesic  -     POCT Urine Drug Screen Presump    2. Lumbar disc disease  -     HYDROcodone-acetaminophen (NORCO)  mg per tablet; Take 1 tablet by mouth every 8 (eight) hours as needed (Chronic lumbar disc disease).   Dispense: 90 tablet; Refill: 0  -     gabapentin (NEURONTIN) 100 MG capsule; Take 1 capsule (100 mg total) by mouth 3 (three) times daily.  Dispense: 180 capsule; Refill: 1    3. Gastroesophageal reflux disease, unspecified whether esophagitis present  -     pantoprazole (PROTONIX) 40 MG tablet; Take 1 tablet (40 mg total) by mouth once daily.  Dispense: 90 tablet; Refill: 1    4. Hypertension, unspecified type  -     valsartan (DIOVAN) 40 MG tablet; Take 1 tablet (40 mg total) by mouth once daily.  Dispense: 90 tablet; Refill: 1    5. Tobacco dependence syndrome  -     buPROPion (WELLBUTRIN XL) 300 MG 24 hr tablet; Take 1 tablet (300 mg total) by mouth once daily.  Dispense: 90 tablet; Refill: 1             Return to clinic in 1 month or as needed.    Health Maintenance Topics with due status: Not Due       Topic Last Completion Date    Colorectal Cancer Screening 11/08/2022    Mammogram 10/05/2023    Hemoglobin A1c (Prediabetes) 02/08/2024    Cervical Cancer Screening 04/03/2024    Lipid Panel 04/08/2024    LDCT Lung Screen 05/01/2024

## 2024-06-17 ENCOUNTER — OFFICE VISIT (OUTPATIENT)
Dept: CARDIOLOGY | Facility: CLINIC | Age: 64
End: 2024-06-17
Payer: MEDICARE

## 2024-06-17 VITALS
HEIGHT: 66 IN | WEIGHT: 193 LBS | DIASTOLIC BLOOD PRESSURE: 76 MMHG | BODY MASS INDEX: 31.02 KG/M2 | SYSTOLIC BLOOD PRESSURE: 120 MMHG | HEART RATE: 85 BPM | OXYGEN SATURATION: 97 %

## 2024-06-17 DIAGNOSIS — I25.10 CORONARY ARTERY DISEASE, UNSPECIFIED VESSEL OR LESION TYPE, UNSPECIFIED WHETHER ANGINA PRESENT, UNSPECIFIED WHETHER NATIVE OR TRANSPLANTED HEART: ICD-10-CM

## 2024-06-17 DIAGNOSIS — R06.00 DYSPNEA, UNSPECIFIED TYPE: Primary | ICD-10-CM

## 2024-06-17 PROCEDURE — 93005 ELECTROCARDIOGRAM TRACING: CPT | Mod: PBBFAC | Performed by: INTERNAL MEDICINE

## 2024-06-17 PROCEDURE — 1159F MED LIST DOCD IN RCRD: CPT | Mod: CPTII,,, | Performed by: INTERNAL MEDICINE

## 2024-06-17 PROCEDURE — 1160F RVW MEDS BY RX/DR IN RCRD: CPT | Mod: CPTII,,, | Performed by: INTERNAL MEDICINE

## 2024-06-17 PROCEDURE — 3074F SYST BP LT 130 MM HG: CPT | Mod: CPTII,,, | Performed by: INTERNAL MEDICINE

## 2024-06-17 PROCEDURE — 99215 OFFICE O/P EST HI 40 MIN: CPT | Mod: PBBFAC,25 | Performed by: INTERNAL MEDICINE

## 2024-06-17 PROCEDURE — 99999 PR PBB SHADOW E&M-EST. PATIENT-LVL V: CPT | Mod: PBBFAC,,, | Performed by: INTERNAL MEDICINE

## 2024-06-17 PROCEDURE — 99204 OFFICE O/P NEW MOD 45 MIN: CPT | Mod: S$PBB,,, | Performed by: INTERNAL MEDICINE

## 2024-06-17 PROCEDURE — 93010 ELECTROCARDIOGRAM REPORT: CPT | Mod: S$PBB,,, | Performed by: INTERNAL MEDICINE

## 2024-06-17 PROCEDURE — 3078F DIAST BP <80 MM HG: CPT | Mod: CPTII,,, | Performed by: INTERNAL MEDICINE

## 2024-06-17 PROCEDURE — 4010F ACE/ARB THERAPY RXD/TAKEN: CPT | Mod: CPTII,,, | Performed by: INTERNAL MEDICINE

## 2024-06-17 PROCEDURE — 3044F HG A1C LEVEL LT 7.0%: CPT | Mod: CPTII,,, | Performed by: INTERNAL MEDICINE

## 2024-06-17 PROCEDURE — 3008F BODY MASS INDEX DOCD: CPT | Mod: CPTII,,, | Performed by: INTERNAL MEDICINE

## 2024-06-18 LAB
OHS QRS DURATION: 82 MS
OHS QTC CALCULATION: 435 MS

## 2024-07-01 NOTE — PROGRESS NOTES
PCP: Lottie Handley MD    Referring Provider:     Subjective:   Nneka Gomes is a 63 y.o. female who presents for evaluation of chest pain.    Pt complains of left sided 3/10 chest pain, comes and goes spontaneously, lasts from seconds to several minutes, not associated with nausea, diaphoresis or shortness of breath.  She notes symptoms present for several months, do not limit normal activity.  She reports had a stress test and left heart cathmany years ago for similar symptoms, reportedly no significant obstruction, did not have intervention. .  She is active, able to perform normal activities of daily living without symptoms of chest pain.  She admits to mild SOB with exertion which she attributes to COPD.         Fhx:  Family History   Problem Relation Name Age of Onset    Rectal cancer Other      Diabetes Other      Heart disease Other      Hypertension Other      Hypertension Mother      Cancer Maternal Aunt        Shx:   Past Surgical History:   Procedure Laterality Date    CARDIAC CATHETERIZATION      CHOLECYSTECTOMY      HERNIA REPAIR      ROTATOR CUFF REPAIR        EKG -   Test Reason : R06.00,    Vent. Rate : 080 BPM     Atrial Rate : 080 BPM     P-R Int : 158 ms          QRS Dur : 082 ms      QT Int : 378 ms       P-R-T Axes : 081 077 067 degrees     QTc Int : 435 ms    Normal sinus rhythm  Normal ECG  When compared with ECG of 16-DEC-2021 13:17,  No significant change was found  Confirmed by Gene Kumar DO [id number]) on 6/18/2024 2:04:38 PM    Referred By: LOTTIE HANDLEY           Confirmed By:Gene Kumar DO      Specimen Collected: 06/17/24 09:53 CDT Last Resulted: 06/18/24 14:04 CDT               CATH - No results found for this or any previous visit.       Stress - No results found for this or any previous visit.       Lab Results   Component Value Date     05/08/2024    K 3.9 05/08/2024     05/08/2024    CO2 30 05/08/2024    BUN 12 05/08/2024    CREATININE 0.80  05/08/2024    CALCIUM 9.2 05/08/2024    ANIONGAP 9 05/08/2024    ESTGFRAFRICA 90 12/13/2021    EGFRNONAA 68 05/11/2022       Lab Results   Component Value Date    CHOL 155 04/08/2024    CHOL 154 04/10/2023    CHOL 134 05/11/2022     Lab Results   Component Value Date    HDL 68 (H) 04/08/2024    HDL 58 04/10/2023    HDL 50 05/11/2022     Lab Results   Component Value Date    LDLCALC 78 04/08/2024    LDLCALC 81 04/10/2023    LDLCALC 68 05/11/2022     Lab Results   Component Value Date    TRIG 46 04/08/2024    TRIG 74 04/10/2023    TRIG 82 05/11/2022     Lab Results   Component Value Date    CHOLHDL 2.3 04/08/2024    CHOLHDL 2.7 04/10/2023    CHOLHDL 2.7 05/11/2022       Lab Results   Component Value Date    WBC 6.91 04/08/2024    HGB 13.0 04/08/2024    HCT 41.5 04/08/2024    MCV 89.8 04/08/2024     04/08/2024           Current Outpatient Medications:     albuterol (PROAIR HFA) 90 mcg/actuation inhaler, Inhale 2 puffs into the lungs every 4 (four) hours as needed for Wheezing., Disp: 18 g, Rfl: 5    albuterol (PROVENTIL) 2.5 mg /3 mL (0.083 %) nebulizer solution, Take 3 mLs (2.5 mg total) by nebulization 4 (four) times daily as needed for Wheezing., Disp: 100 each, Rfl: 5    azelastine (ASTELIN) 137 mcg (0.1 %) nasal spray, USE 2 SPRAYS NASALLY TWICE A DAY, Disp: 90 mL, Rfl: 3    buPROPion (WELLBUTRIN XL) 300 MG 24 hr tablet, Take 1 tablet (300 mg total) by mouth once daily., Disp: 90 tablet, Rfl: 1    fluticasone propionate (FLONASE) 50 mcg/actuation nasal spray, 2 sprays (100 mcg total) by Each Nostril route once daily., Disp: 16 g, Rfl: 5    fluticasone-umeclidin-vilanter (TRELEGY ELLIPTA) 200-62.5-25 mcg inhaler, Inhale 1 puff into the lungs once daily., Disp: 60 each, Rfl: 5    gabapentin (NEURONTIN) 100 MG capsule, Take 1 capsule (100 mg total) by mouth 3 (three) times daily., Disp: 180 capsule, Rfl: 1    levocetirizine (XYZAL) 5 MG tablet, Take 1 tablet (5 mg total) by mouth every evening., Disp:  90 tablet, Rfl: 3    linaCLOtide (LINZESS) 145 mcg Cap capsule, Take 1 capsule (145 mcg total) by mouth before breakfast., Disp: 90 capsule, Rfl: 3    pantoprazole (PROTONIX) 40 MG tablet, Take 1 tablet (40 mg total) by mouth once daily., Disp: 90 tablet, Rfl: 1    RESTASIS 0.05 % ophthalmic emulsion, , Disp: , Rfl:     rosuvastatin (CRESTOR) 20 MG tablet, Take 1 tablet (20 mg total) by mouth once daily., Disp: 90 tablet, Rfl: 1    theophylline (THEODUR) 300 mg 24 hr capsule, Take 300 mg by mouth once daily. One tab Daily with food: (replaced previous script for 100mg tab 3 daily), Disp: 90 capsule, Rfl: 3    valsartan (DIOVAN) 40 MG tablet, Take 1 tablet (40 mg total) by mouth once daily., Disp: 90 tablet, Rfl: 1    HYDROcodone-acetaminophen (NORCO)  mg per tablet, Take 1 tablet by mouth every 8 (eight) hours as needed (Chronic lumbar disc disease)., Disp: 90 tablet, Rfl: 0    Review of Systems   Constitutional: Negative for diaphoresis, malaise/fatigue, night sweats and weight gain.   HENT:  Negative for congestion, ear pain, hearing loss, nosebleeds and sore throat.    Eyes:  Negative for blurred vision, double vision, pain, photophobia and visual disturbance.   Cardiovascular:  Positive for chest pain and dyspnea on exertion. Negative for claudication, irregular heartbeat, leg swelling, near-syncope, orthopnea, palpitations and syncope.   Respiratory:  Positive for shortness of breath. Negative for cough, sleep disturbances due to breathing, snoring and wheezing.    Endocrine: Negative for cold intolerance, heat intolerance, polydipsia, polyphagia and polyuria.   Hematologic/Lymphatic: Negative for bleeding problem. Does not bruise/bleed easily.   Skin:  Negative for dry skin, flushing, itching, rash and skin cancer.   Musculoskeletal:  Negative for arthritis, back pain, falls, joint pain, muscle cramps, muscle weakness and myalgias.   Gastrointestinal:  Negative for abdominal pain, change in bowel  "habit, constipation, diarrhea, dysphagia, heartburn, nausea and vomiting.   Genitourinary:  Negative for bladder incontinence, dysuria, flank pain, frequency and nocturia.   Neurological:  Negative for dizziness, focal weakness, headaches, light-headedness, loss of balance, numbness, paresthesias and seizures.   Psychiatric/Behavioral:  Negative for depression, memory loss and substance abuse. The patient is not nervous/anxious.    Allergic/Immunologic: Negative for environmental allergies.        Objective:   /76 (BP Location: Left arm, Patient Position: Sitting)   Pulse 85   Ht 5' 6" (1.676 m)   Wt 87.5 kg (193 lb)   SpO2 97%   BMI 31.15 kg/m²       Physical Exam  Vitals and nursing note reviewed.   Constitutional:       Appearance: Normal appearance. She is obese.   HENT:      Head: Normocephalic and atraumatic.      Right Ear: External ear normal.      Left Ear: External ear normal.   Eyes:      General: No scleral icterus.        Right eye: No discharge.         Left eye: No discharge.      Extraocular Movements: Extraocular movements intact.      Conjunctiva/sclera: Conjunctivae normal.      Pupils: Pupils are equal, round, and reactive to light.   Cardiovascular:      Rate and Rhythm: Normal rate and regular rhythm.      Pulses: Normal pulses.      Heart sounds: Normal heart sounds. No murmur heard.     No friction rub. No gallop.   Pulmonary:      Effort: Pulmonary effort is normal.      Breath sounds: Normal breath sounds. No wheezing, rhonchi or rales.   Chest:      Chest wall: No tenderness.   Abdominal:      General: Abdomen is flat. Bowel sounds are normal. There is no distension.      Palpations: Abdomen is soft.      Tenderness: There is no abdominal tenderness. There is no guarding or rebound.   Musculoskeletal:         General: No swelling or tenderness. Normal range of motion.      Cervical back: Normal range of motion and neck supple.   Skin:     General: Skin is warm and dry.      " Findings: No erythema or rash.   Neurological:      General: No focal deficit present.      Mental Status: She is alert and oriented to person, place, and time.      Cranial Nerves: No cranial nerve deficit.      Motor: No weakness.      Gait: Gait normal.   Psychiatric:         Mood and Affect: Mood normal.         Behavior: Behavior normal.         Thought Content: Thought content normal.         Judgment: Judgment normal.     Assessment:     1. Dyspnea, unspecified type  Echo    NM Myocardial Perfusion Spect Multi Pharmacologic    Nuclear Stress Test    EKG 12-lead    EKG 12-lead      2. Coronary artery disease, unspecified vessel or lesion type, unspecified whether angina present, unspecified whether native or transplanted heart  Ambulatory referral/consult to Cardiology    Echo    NM Myocardial Perfusion Spect Multi Pharmacologic    Nuclear Stress Test            Plan:   No problem-specific Assessment & Plan notes found for this encounter.

## 2024-07-08 NOTE — PROGRESS NOTES
"Nneka Gomes presented for a  Medicare AWV and comprehensive Health Risk Assessment today. The following components were reviewed and updated:    Medical history  Family History  Social history  Allergies and Current Medications  Health Risk Assessment  Health Maintenance  Care Team         See Completed Assessments for Annual Wellness Visit within the encounter summary.         The following assessments were completed:  Living Situation  CAGE  Depression Screening  Timed Get Up and Go  Whisper Test  Cognitive Function Screening  Nutrition Screening  ADL Screening  PAQ Screening        Opioid Documentation    does have a current opioid prescription.      Patient accepted further discussion regarding opioid medication use.      Patient is currently taking hydrocodone narcotic for back pain.        Pain level today is 3/10.       In addition to narcotic pain medications, patient is also using (no other oral, topical, or alternative treatments) for pain control.       Patient is followed by a specialist currently for their pain and will not be referred today.       Patient's opioid risk potential based on ORT-OUD tool:       [place] each box that applies   No   Yes     Family history of substance abuse   Alcohol [] []   Illegal drugs [] []   Rx drugs [] []     Personal history of substance abuse   Alcohol [] []   Illegal drugs [] []   Rx drugs [] []     Age between 16-45 years   []   []     Patient with ADD, OCD, Bipolar disorder, schizoprenia   []   []     Patient with depression   []   []                         Scoring total                                                                 Non-opioid treatment options have been discussed today and added to the patient's after visit summary.       Vitals:    07/09/24 1440   BP: 124/83   Pulse: 82   Resp: 14   Temp: 98.4 °F (36.9 °C)   SpO2: 98%   Weight: 85.6 kg (188 lb 12.8 oz)   Height: 5' 6" (1.676 m)     Body mass index is 30.47 kg/m².  Physical Exam      "     Diagnoses and health risks identified today and associated recommendations/orders:    Problem List Items Addressed This Visit          Pulmonary    Chronic obstructive pulmonary disease       Cardiac/Vascular    Hypertension       GI    Gastroesophageal reflux disease     Other Visit Diagnoses       Encounter for initial annual wellness visit (AWV) in Medicare patient    -  Primary    Coronary artery disease, unspecified vessel or lesion type, unspecified whether angina present, unspecified whether native or transplanted heart        BMI 30.0-30.9,adult                Provided Nneka with a 5-10 year written screening schedule and personal prevention plan. Recommendations were developed using the USPSTF age appropriate recommendations. Education, counseling, and referrals were provided as needed. After Visit Summary printed and given to patient which includes a list of additional screenings\tests needed.    No follow-ups on file.    CARMEN HERNANDEZ, RN     I offered to discuss advanced care planning, including how to pick a person who would make decisions for you if you were unable to make them for yourself, called a health care power of , and what kind of decisions you might make such as use of life sustaining treatments such as ventilators and tube feeding when faced with a life limiting illness recorded on a living will that they will need to know. (How you want to be cared for as you near the end of your natural life)     X Patient is interested in learning more about how to make advanced directives.  I provided them paperwork and offered to discuss this with them.

## 2024-07-09 ENCOUNTER — OFFICE VISIT (OUTPATIENT)
Dept: FAMILY MEDICINE | Facility: CLINIC | Age: 64
End: 2024-07-09
Payer: MEDICARE

## 2024-07-09 VITALS
WEIGHT: 188.81 LBS | BODY MASS INDEX: 30.34 KG/M2 | DIASTOLIC BLOOD PRESSURE: 83 MMHG | OXYGEN SATURATION: 98 % | TEMPERATURE: 98 F | SYSTOLIC BLOOD PRESSURE: 124 MMHG | HEART RATE: 82 BPM | RESPIRATION RATE: 14 BRPM | HEIGHT: 66 IN

## 2024-07-09 DIAGNOSIS — I25.10 CORONARY ARTERY DISEASE, UNSPECIFIED VESSEL OR LESION TYPE, UNSPECIFIED WHETHER ANGINA PRESENT, UNSPECIFIED WHETHER NATIVE OR TRANSPLANTED HEART: ICD-10-CM

## 2024-07-09 DIAGNOSIS — K21.9 GASTROESOPHAGEAL REFLUX DISEASE, UNSPECIFIED WHETHER ESOPHAGITIS PRESENT: ICD-10-CM

## 2024-07-09 DIAGNOSIS — Z00.00 ENCOUNTER FOR INITIAL ANNUAL WELLNESS VISIT (AWV) IN MEDICARE PATIENT: Primary | ICD-10-CM

## 2024-07-09 DIAGNOSIS — Z99.81 DEPENDENCE ON SUPPLEMENTAL OXYGEN: ICD-10-CM

## 2024-07-09 DIAGNOSIS — J44.9 CHRONIC OBSTRUCTIVE PULMONARY DISEASE, UNSPECIFIED COPD TYPE: ICD-10-CM

## 2024-07-09 DIAGNOSIS — I10 HYPERTENSION, UNSPECIFIED TYPE: ICD-10-CM

## 2024-07-09 DIAGNOSIS — Z00.00 ENCOUNTER FOR PREVENTIVE HEALTH EXAMINATION: ICD-10-CM

## 2024-07-09 PROCEDURE — 3044F HG A1C LEVEL LT 7.0%: CPT | Mod: ,,, | Performed by: NURSE PRACTITIONER

## 2024-07-09 PROCEDURE — G0439 PPPS, SUBSEQ VISIT: HCPCS | Mod: ,,, | Performed by: NURSE PRACTITIONER

## 2024-07-09 PROCEDURE — 4010F ACE/ARB THERAPY RXD/TAKEN: CPT | Mod: ,,, | Performed by: NURSE PRACTITIONER

## 2024-07-09 PROCEDURE — 3074F SYST BP LT 130 MM HG: CPT | Mod: ,,, | Performed by: NURSE PRACTITIONER

## 2024-07-09 PROCEDURE — 3079F DIAST BP 80-89 MM HG: CPT | Mod: ,,, | Performed by: NURSE PRACTITIONER

## 2024-07-09 NOTE — PATIENT INSTRUCTIONS
Counseling and Referral of Other Preventative  (Italic type indicates deductible and co-insurance are waived)    Patient Name: Nneka Gomes  Today's Date: 7/9/2024    Health Maintenance       Date Due Completion Date    TETANUS VACCINE Never done ---    Aspirin/Antiplatelet Therapy Never done ---    Shingles Vaccine (1 of 2) Never done ---    RSV Vaccine (Age 60+ and Pregnant patients) (1 - 1-dose 60+ series) Never done ---    COVID-19 Vaccine (4 - 2023-24 season) 09/01/2023 4/29/2022    Mammogram 10/05/2024 10/5/2023    Influenza Vaccine (1) 09/01/2024 10/11/2023    Override on 10/25/2022: Done    Hemoglobin A1c (Prediabetes) 02/08/2025 2/8/2024    Lipid Panel 04/08/2025 4/8/2024    LDCT Lung Screen 05/01/2025 5/1/2024    Override on 11/20/2020: Done    Cervical Cancer Screening 04/03/2029 4/3/2024    Colorectal Cancer Screening 11/08/2032 11/8/2022        No orders of the defined types were placed in this encounter.    The following information is provided to all patients.  This information is to help you find resources for any of the problems found today that may be affecting your health:                  Living healthy guide: [place].gov    Understanding Diabetes: www.diabetes.org      Eating healthy: www.cdc.gov/healthyweight      CDC home safety checklist: www.cdc.gov/steadi/patient.html      Agency on Aging: [place].gov    Alcoholics anonymous (AA): www.aa.org      Physical Activity: www.mimi.nih.gov/ag1jncy      Tobacco use: [place].gov

## 2024-07-10 ENCOUNTER — OFFICE VISIT (OUTPATIENT)
Dept: FAMILY MEDICINE | Facility: CLINIC | Age: 64
End: 2024-07-10
Payer: MEDICARE

## 2024-07-10 DIAGNOSIS — M51.9 LUMBAR DISC DISEASE: Primary | ICD-10-CM

## 2024-07-10 PROCEDURE — 1159F MED LIST DOCD IN RCRD: CPT | Mod: ,,, | Performed by: FAMILY MEDICINE

## 2024-07-10 PROCEDURE — 4010F ACE/ARB THERAPY RXD/TAKEN: CPT | Mod: ,,, | Performed by: FAMILY MEDICINE

## 2024-07-10 PROCEDURE — 1160F RVW MEDS BY RX/DR IN RCRD: CPT | Mod: ,,, | Performed by: FAMILY MEDICINE

## 2024-07-10 PROCEDURE — 3044F HG A1C LEVEL LT 7.0%: CPT | Mod: ,,, | Performed by: FAMILY MEDICINE

## 2024-07-10 PROCEDURE — 99213 OFFICE O/P EST LOW 20 MIN: CPT | Mod: ,,, | Performed by: FAMILY MEDICINE

## 2024-07-10 RX ORDER — HYDROCODONE BITARTRATE AND ACETAMINOPHEN 10; 325 MG/1; MG/1
1 TABLET ORAL EVERY 8 HOURS PRN
Qty: 90 TABLET | Refills: 0 | Status: SHIPPED | OUTPATIENT
Start: 2024-07-10

## 2024-07-10 RX ORDER — HYDROCODONE BITARTRATE AND ACETAMINOPHEN 10; 325 MG/1; MG/1
1 TABLET ORAL EVERY 8 HOURS PRN
Qty: 90 TABLET | Refills: 0 | Status: SHIPPED | OUTPATIENT
Start: 2024-07-10 | End: 2024-07-10

## 2024-07-10 NOTE — PROGRESS NOTES
Nneka Gomes is a 63 y.o. female seen today for follow-up on her chronic low back pain secondary to lumbar disc disease.  She reports good symptom control with no side effects and she is working and meeting her ADLs.  She has had no signs or symptoms of tolerance or addiction her  today was appropriate.  Her drug screen last month only showed her prescribed opiate.  Today she is doing well with no new complaints.      Past Medical History:   Diagnosis Date    Abdominal bloating 01/09/2020    Abdominal pain, right upper quadrant 01/09/2020    Abnormal liver enzymes 03/04/2020    Abnormal results of liver function studies 11/18/2020    Acute conjunctivitis 05/30/2014    Acute exacerbation of chronic obstructive airways disease 02/13/2017    Acute pharyngitis 05/19/2020    Acute sinusitis 10/09/2017    Acute upper respiratory infection 05/19/2020    Allergic rhinitis 09/23/2020    Anemia 03/07/2014    Anesthesia of skin 09/06/2017    bilateral fingers    Bilateral primary osteoarthritis of knee 12/02/2019    Bradycardia 01/16/2020    Carpal tunnel syndrome 08/10/2017    Chest pain 03/06/2020    Chronic idiopathic constipation 11/18/2020    Chronic low back pain 11/05/2018    Chronic pain 04/23/2019    Chronic pain syndrome 01/30/2020    COPD (chronic obstructive pulmonary disease) 02/19/2021    Coronavirus infection 05/21/2020    Cough 05/19/2020    Degeneration of lumbar intervertebral disc 09/26/2014    L3-L4 L4-L5 Greater than L5-S1    Depressive disorder 07/03/2019    Disorder of gallbladder 03/30/2015    Dysphagia 01/09/2020    Dyspnea 05/19/2020    Dysuria 05/19/2020    Fatigue 05/19/2020    Frequency of urination 02/16/2015    GERD (gastroesophageal reflux disease) 11/18/2020    Hematuria 06/30/2020    Hemoptysis 02/27/2020    Herpes zoster 09/23/2020    Hyperlipidemia 09/23/2020    Hypertension 03/06/2020    Joint pain 11/04/2019    Knee pain 01/04/2019    Left inguinal hernia 06/10/2019    Long term  (current) use of opiate analgesic 02/19/2021    Lumbar spondylosis 11/23/2020    Lumbar sprain 07/16/2012    Malaise 11/20/2014    Melena 09/25/2017    Microscopic hematuria 04/02/2020    Migraine without aura 02/06/2012    Nausea 02/27/2020    Nicotine dependence, cigarettes, uncomplicated 11/11/2020    Nonulcer dyspepsia 01/23/2017    On home O2     Other long term (current) drug therapy 09/23/2020    Other specified urinary incontinence 06/17/2020    Pain in left shoulder 05/02/2019    Pain in left wrist 09/06/2017    and hand    Pain in right shoulder 08/30/2019    Pain in right wrist 09/04/2018    Right hand    Palpitations 03/06/2020    Shoulder joint pain 04/23/2019    Smoker 07/03/2019    Snoring 08/02/2019    Synovial cyst of popliteal space 04/08/2013    Tobacco dependence syndrome 02/19/2021    Unstable angina 01/16/2020    UTI (urinary tract infection) 03/27/2020     Family History   Problem Relation Name Age of Onset    Rectal cancer Other      Diabetes Other      Heart disease Other      Hypertension Other      Hypertension Mother      Cancer Maternal Aunt       Current Outpatient Medications on File Prior to Visit   Medication Sig Dispense Refill    albuterol (PROAIR HFA) 90 mcg/actuation inhaler Inhale 2 puffs into the lungs every 4 (four) hours as needed for Wheezing. 18 g 5    albuterol (PROVENTIL) 2.5 mg /3 mL (0.083 %) nebulizer solution Take 3 mLs (2.5 mg total) by nebulization 4 (four) times daily as needed for Wheezing. 100 each 5    azelastine (ASTELIN) 137 mcg (0.1 %) nasal spray USE 2 SPRAYS NASALLY TWICE A DAY 90 mL 3    buPROPion (WELLBUTRIN XL) 300 MG 24 hr tablet Take 1 tablet (300 mg total) by mouth once daily. 90 tablet 1    fluticasone propionate (FLONASE) 50 mcg/actuation nasal spray 2 sprays (100 mcg total) by Each Nostril route once daily. 16 g 5    fluticasone-umeclidin-vilanter (TRELEGY ELLIPTA) 200-62.5-25 mcg inhaler Inhale 1 puff into the lungs once daily. 60 each 5     gabapentin (NEURONTIN) 100 MG capsule Take 1 capsule (100 mg total) by mouth 3 (three) times daily. 180 capsule 1    hydroCHLOROthiazide (HYDRODIURIL) 25 MG tablet Take 1 tablet (25 mg total) by mouth once daily. (Patient not taking: Reported on 6/17/2024) 90 tablet 1    levocetirizine (XYZAL) 5 MG tablet Take 1 tablet (5 mg total) by mouth every evening. 90 tablet 3    linaCLOtide (LINZESS) 145 mcg Cap capsule Take 1 capsule (145 mcg total) by mouth before breakfast. 90 capsule 3    pantoprazole (PROTONIX) 40 MG tablet Take 1 tablet (40 mg total) by mouth once daily. 90 tablet 1    RESTASIS 0.05 % ophthalmic emulsion       rosuvastatin (CRESTOR) 20 MG tablet Take 1 tablet (20 mg total) by mouth once daily. 90 tablet 1    theophylline (THEODUR) 300 mg 24 hr capsule Take 300 mg by mouth once daily. One tab Daily with food: (replaced previous script for 100mg tab 3 daily) 90 capsule 3    valsartan (DIOVAN) 40 MG tablet Take 1 tablet (40 mg total) by mouth once daily. 90 tablet 1    [DISCONTINUED] HYDROcodone-acetaminophen (NORCO)  mg per tablet Take 1 tablet by mouth every 8 (eight) hours as needed (Chronic lumbar disc disease). 90 tablet 0     No current facility-administered medications on file prior to visit.     Immunization History   Administered Date(s) Administered    COVID-19, MRNA, LN-S, PF (Pfizer) (Purple Cap) 04/29/2021, 10/28/2021, 04/29/2022    Influenza - Quadrivalent - PF *Preferred* (6 months and older) 09/16/2021, 10/11/2023    Influenza Split 10/03/2019    Pneumococcal Conjugate - 13 Valent 11/05/2018    Pneumococcal Conjugate - 20 Valent 01/09/2023    Pneumococcal Polysaccharide - 23 Valent 11/05/2019       Review of Systems   Constitutional:  Negative for fever, malaise/fatigue and weight loss.   Respiratory:  Negative for shortness of breath.    Cardiovascular:  Negative for chest pain and palpitations.   Gastrointestinal:  Negative for nausea and vomiting.   Musculoskeletal:  Positive for  back pain and myalgias.   Psychiatric/Behavioral:  Negative for depression.         There were no vitals filed for this visit.    Physical Exam  Vitals reviewed.   Constitutional:       Appearance: Normal appearance.   HENT:      Head: Normocephalic.   Eyes:      Extraocular Movements: Extraocular movements intact.      Conjunctiva/sclera: Conjunctivae normal.      Pupils: Pupils are equal, round, and reactive to light.   Neck:      Thyroid: No thyroid mass or thyromegaly.   Cardiovascular:      Rate and Rhythm: Normal rate and regular rhythm.      Heart sounds: Normal heart sounds. No murmur heard.     No gallop.   Pulmonary:      Effort: Pulmonary effort is normal. No respiratory distress.      Breath sounds: Normal breath sounds. No wheezing or rales.   Musculoskeletal:      Lumbar back: Spasms and tenderness present. Decreased range of motion.        Back:    Skin:     General: Skin is warm and dry.      Coloration: Skin is not jaundiced or pale.   Neurological:      Mental Status: She is alert.   Psychiatric:         Mood and Affect: Mood normal.         Behavior: Behavior normal.         Thought Content: Thought content normal.         Judgment: Judgment normal.          Assessment and Plan  1. Lumbar disc disease  -     Discontinue: HYDROcodone-acetaminophen (NORCO)  mg per tablet; Take 1 tablet by mouth every 8 (eight) hours as needed (Chronic lumbar disc disease).  Dispense: 90 tablet; Refill: 0  -     HYDROcodone-acetaminophen (NORCO)  mg per tablet; Take 1 tablet by mouth every 8 (eight) hours as needed (Chronic lumbar disc disease).  Dispense: 90 tablet; Refill: 0             Return to clinic in 2 months or as needed.  The patient is given a 2nd prescription for Norco postdated 08/09/2024.    Health Maintenance Topics with due status: Not Due       Topic Last Completion Date    Colorectal Cancer Screening 11/08/2022    Influenza Vaccine 10/11/2023    Hemoglobin A1c (Prediabetes) 02/08/2024     Cervical Cancer Screening 04/03/2024    Lipid Panel 04/08/2024    LDCT Lung Screen 05/01/2024

## 2024-08-05 ENCOUNTER — TELEPHONE (OUTPATIENT)
Dept: OBSTETRICS AND GYNECOLOGY | Facility: CLINIC | Age: 64
End: 2024-08-05
Payer: MEDICARE

## 2024-08-09 ENCOUNTER — HOSPITAL ENCOUNTER (OUTPATIENT)
Dept: CARDIOLOGY | Facility: HOSPITAL | Age: 64
Discharge: HOME | End: 2024-08-09
Attending: INTERNAL MEDICINE
Payer: MEDICARE

## 2024-08-09 ENCOUNTER — HOSPITAL ENCOUNTER (OUTPATIENT)
Dept: RADIOLOGY | Facility: HOSPITAL | Age: 64
Discharge: HOME | End: 2024-08-09
Attending: INTERNAL MEDICINE
Payer: MEDICARE

## 2024-08-09 VITALS
SYSTOLIC BLOOD PRESSURE: 132 MMHG | DIASTOLIC BLOOD PRESSURE: 87 MMHG | HEART RATE: 71 BPM | BODY MASS INDEX: 30.34 KG/M2 | HEIGHT: 66 IN

## 2024-08-09 VITALS — BODY MASS INDEX: 30.22 KG/M2 | WEIGHT: 188 LBS | HEIGHT: 66 IN

## 2024-08-09 DIAGNOSIS — I25.10 CORONARY ARTERY DISEASE, UNSPECIFIED VESSEL OR LESION TYPE, UNSPECIFIED WHETHER ANGINA PRESENT, UNSPECIFIED WHETHER NATIVE OR TRANSPLANTED HEART: ICD-10-CM

## 2024-08-09 DIAGNOSIS — R06.00 DYSPNEA, UNSPECIFIED TYPE: ICD-10-CM

## 2024-08-09 LAB
AORTIC ROOT ANNULUS: 2.51 CM
AORTIC VALVE CUSP SEPERATION: 1.99 CM
AV INDEX (PROSTH): 0.81
AV MEAN GRADIENT: 3 MMHG
AV PEAK GRADIENT: 5 MMHG
AV VALVE AREA BY VELOCITY RATIO: 2.68 CM²
AV VALVE AREA: 2.55 CM²
AV VELOCITY RATIO: 0.85
BSA FOR ECHO PROCEDURE: 1.99 M2
CV ECHO LV RWT: 0.4 CM
CV STRESS BASE HR: 71 BPM
DIASTOLIC BLOOD PRESSURE: 87 MMHG
DOP CALC AO PEAK VEL: 1.17 M/S
DOP CALC AO VTI: 25 CM
DOP CALC LVOT AREA: 3.1 CM2
DOP CALC LVOT DIAMETER: 2 CM
DOP CALC LVOT PEAK VEL: 1 M/S
DOP CALC LVOT STROKE VOLUME: 63.74 CM3
DOP CALCLVOT PEAK VEL VTI: 20.3 CM
E WAVE DECELERATION TIME: 220.43 MSEC
E/A RATIO: 0.84
E/E' RATIO: 8.82 M/S
ECHO LV POSTERIOR WALL: 0.81 CM (ref 0.6–1.1)
FRACTIONAL SHORTENING: 28 % (ref 28–44)
INTERVENTRICULAR SEPTUM: 0.85 CM (ref 0.6–1.1)
IVC DIAMETER: 1.05 CM
LEFT ATRIUM AREA SYSTOLIC (APICAL 2 CHAMBER): 17.09 CM2
LEFT ATRIUM AREA SYSTOLIC (APICAL 4 CHAMBER): 18.67 CM2
LEFT ATRIUM VOLUME INDEX MOD: 25.1 ML/M2
LEFT ATRIUM VOLUME MOD: 48.88 CM3
LEFT INTERNAL DIMENSION IN SYSTOLE: 2.92 CM (ref 2.1–4)
LEFT VENTRICLE DIASTOLIC VOLUME INDEX: 36.55 ML/M2
LEFT VENTRICLE DIASTOLIC VOLUME: 71.27 ML
LEFT VENTRICLE END SYSTOLIC VOLUME APICAL 2 CHAMBER: 45.73 ML
LEFT VENTRICLE END SYSTOLIC VOLUME APICAL 4 CHAMBER: 47.54 ML
LEFT VENTRICLE MASS INDEX: 51 G/M2
LEFT VENTRICLE SYSTOLIC VOLUME INDEX: 16.8 ML/M2
LEFT VENTRICLE SYSTOLIC VOLUME: 32.71 ML
LEFT VENTRICULAR INTERNAL DIMENSION IN DIASTOLE: 4.03 CM (ref 3.5–6)
LEFT VENTRICULAR MASS: 99.42 G
LV LATERAL E/E' RATIO: 6.82 M/S
LV SEPTAL E/E' RATIO: 12.5 M/S
LVED V (TEICH): 71.27 ML
LVES V (TEICH): 32.71 ML
LVOT MG: 2.07 MMHG
LVOT MV: 0.68 CM/S
MV PEAK A VEL: 0.89 M/S
MV PEAK E VEL: 0.75 M/S
MV STENOSIS PRESSURE HALF TIME: 63.92 MS
MV VALVE AREA P 1/2 METHOD: 3.44 CM2
OHS CV CPX 1 MINUTE RECOVERY HEART RATE: 85 BPM
OHS CV CPX 85 PERCENT MAX PREDICTED HEART RATE MALE: 134
OHS CV CPX MAX PREDICTED HEART RATE: 158
OHS CV CPX PATIENT IS FEMALE: 1
OHS CV CPX PATIENT IS MALE: 0
OHS CV CPX PEAK DIASTOLIC BLOOD PRESSURE: 90 MMHG
OHS CV CPX PEAK HEAR RATE: 89 BPM
OHS CV CPX PEAK RATE PRESSURE PRODUCT: NORMAL
OHS CV CPX PEAK SYSTOLIC BLOOD PRESSURE: 134 MMHG
OHS CV CPX PERCENT MAX PREDICTED HEART RATE ACHIEVED: 59
OHS CV CPX RATE PRESSURE PRODUCT PRESENTING: 9372
PISA MRMAX VEL: 3.95 M/S
PISA TR MAX VEL: 2.96 M/S
PV PEAK GRADIENT: 4 MMHG
PV PEAK VELOCITY: 0.97 M/S
RA VOL SYS: 34.74 ML
RIGHT ATRIAL AREA: 13.5 CM2
RIGHT ATRIUM VOLUME AREA LENGTH APICAL 4 CHAMBER: 33.49 ML
RIGHT VENTRICLE DIASTOLIC BASEL DIMENSION: 3.4 CM
RIGHT VENTRICLE DIASTOLIC LENGTH: 7.3 CM
RIGHT VENTRICLE DIASTOLIC MID DIMENSION: 1.5 CM
RIGHT VENTRICULAR LENGTH IN DIASTOLE (APICAL 4-CHAMBER VIEW): 7.31 CM
RV MID DIAMA: 1.49 CM
SYSTOLIC BLOOD PRESSURE: 132 MMHG
TDI LATERAL: 0.11 M/S
TDI SEPTAL: 0.06 M/S
TDI: 0.09 M/S
TR MAX PG: 35 MMHG
TRICUSPID ANNULAR PLANE SYSTOLIC EXCURSION: 2 CM
Z-SCORE OF LEFT VENTRICULAR DIMENSION IN END DIASTOLE: -3.22
Z-SCORE OF LEFT VENTRICULAR DIMENSION IN END SYSTOLE: -1.25

## 2024-08-09 PROCEDURE — 93017 CV STRESS TEST TRACING ONLY: CPT

## 2024-08-09 PROCEDURE — A9500 TC99M SESTAMIBI: HCPCS | Performed by: INTERNAL MEDICINE

## 2024-08-09 PROCEDURE — 93306 TTE W/DOPPLER COMPLETE: CPT

## 2024-08-09 PROCEDURE — 93018 CV STRESS TEST I&R ONLY: CPT | Mod: ,,, | Performed by: INTERNAL MEDICINE

## 2024-08-09 PROCEDURE — 78452 HT MUSCLE IMAGE SPECT MULT: CPT | Mod: 26,,, | Performed by: STUDENT IN AN ORGANIZED HEALTH CARE EDUCATION/TRAINING PROGRAM

## 2024-08-09 PROCEDURE — 63600175 PHARM REV CODE 636 W HCPCS: Performed by: INTERNAL MEDICINE

## 2024-08-09 PROCEDURE — 78452 HT MUSCLE IMAGE SPECT MULT: CPT | Mod: TC

## 2024-08-09 PROCEDURE — 93016 CV STRESS TEST SUPVJ ONLY: CPT | Mod: ,,, | Performed by: INTERNAL MEDICINE

## 2024-08-09 PROCEDURE — 93306 TTE W/DOPPLER COMPLETE: CPT | Mod: 26,,, | Performed by: INTERNAL MEDICINE

## 2024-08-09 RX ORDER — REGADENOSON 0.08 MG/ML
0.4 INJECTION, SOLUTION INTRAVENOUS ONCE
Status: COMPLETED | OUTPATIENT
Start: 2024-08-09 | End: 2024-08-09

## 2024-08-09 RX ORDER — TETRAKIS(2-METHOXYISOBUTYLISOCYANIDE)COPPER(I) TETRAFLUOROBORATE 1 MG/ML
11.3 INJECTION, POWDER, LYOPHILIZED, FOR SOLUTION INTRAVENOUS
Status: COMPLETED | OUTPATIENT
Start: 2024-08-09 | End: 2024-08-09

## 2024-08-09 RX ORDER — TETRAKIS(2-METHOXYISOBUTYLISOCYANIDE)COPPER(I) TETRAFLUOROBORATE 1 MG/ML
34.7 INJECTION, POWDER, LYOPHILIZED, FOR SOLUTION INTRAVENOUS
Status: COMPLETED | OUTPATIENT
Start: 2024-08-09 | End: 2024-08-09

## 2024-08-09 RX ADMIN — KIT FOR THE PREPARATION OF TECHNETIUM TC99M SESTAMIBI 11.3 MILLICURIE: 1 INJECTION, POWDER, LYOPHILIZED, FOR SOLUTION PARENTERAL at 08:08

## 2024-08-09 RX ADMIN — KIT FOR THE PREPARATION OF TECHNETIUM TC99M SESTAMIBI 34.7 MILLICURIE: 1 INJECTION, POWDER, LYOPHILIZED, FOR SOLUTION PARENTERAL at 09:08

## 2024-08-09 RX ADMIN — REGADENOSON 0.4 MG: 0.08 INJECTION, SOLUTION INTRAVENOUS at 09:08

## 2024-08-12 LAB
CV STRESS BASE HR: 71 BPM
DIASTOLIC BLOOD PRESSURE: 87 MMHG
OHS CV CPX 1 MINUTE RECOVERY HEART RATE: 85 BPM
OHS CV CPX 85 PERCENT MAX PREDICTED HEART RATE MALE: 134
OHS CV CPX MAX PREDICTED HEART RATE: 158
OHS CV CPX PATIENT IS FEMALE: 1
OHS CV CPX PATIENT IS MALE: 0
OHS CV CPX PEAK DIASTOLIC BLOOD PRESSURE: 90 MMHG
OHS CV CPX PEAK HEAR RATE: 89 BPM
OHS CV CPX PEAK RATE PRESSURE PRODUCT: NORMAL
OHS CV CPX PEAK SYSTOLIC BLOOD PRESSURE: 134 MMHG
OHS CV CPX PERCENT MAX PREDICTED HEART RATE ACHIEVED: 59
OHS CV CPX RATE PRESSURE PRODUCT PRESENTING: 9372
SYSTOLIC BLOOD PRESSURE: 132 MMHG

## 2024-08-13 LAB
AORTIC ROOT ANNULUS: 2.51 CM
AORTIC VALVE CUSP SEPERATION: 1.99 CM
AV INDEX (PROSTH): 0.81
AV MEAN GRADIENT: 3 MMHG
AV PEAK GRADIENT: 5 MMHG
AV VALVE AREA BY VELOCITY RATIO: 2.68 CM²
AV VALVE AREA: 2.55 CM²
AV VELOCITY RATIO: 0.85
BSA FOR ECHO PROCEDURE: 1.99 M2
CV ECHO LV RWT: 0.4 CM
DOP CALC AO PEAK VEL: 1.17 M/S
DOP CALC AO VTI: 25 CM
DOP CALC LVOT AREA: 3.1 CM2
DOP CALC LVOT DIAMETER: 2 CM
DOP CALC LVOT PEAK VEL: 1 M/S
DOP CALC LVOT STROKE VOLUME: 63.74 CM3
DOP CALCLVOT PEAK VEL VTI: 20.3 CM
E WAVE DECELERATION TIME: 220.43 MSEC
E/A RATIO: 0.84
E/E' RATIO: 8.82 M/S
ECHO LV POSTERIOR WALL: 0.81 CM (ref 0.6–1.1)
FRACTIONAL SHORTENING: 28 % (ref 28–44)
INTERVENTRICULAR SEPTUM: 0.85 CM (ref 0.6–1.1)
IVC DIAMETER: 1.05 CM
LEFT ATRIUM AREA SYSTOLIC (APICAL 2 CHAMBER): 17.09 CM2
LEFT ATRIUM AREA SYSTOLIC (APICAL 4 CHAMBER): 18.67 CM2
LEFT ATRIUM VOLUME INDEX MOD: 25.1 ML/M2
LEFT ATRIUM VOLUME MOD: 48.88 CM3
LEFT INTERNAL DIMENSION IN SYSTOLE: 2.92 CM (ref 2.1–4)
LEFT VENTRICLE DIASTOLIC VOLUME INDEX: 36.55 ML/M2
LEFT VENTRICLE DIASTOLIC VOLUME: 71.27 ML
LEFT VENTRICLE END SYSTOLIC VOLUME APICAL 2 CHAMBER: 45.73 ML
LEFT VENTRICLE END SYSTOLIC VOLUME APICAL 4 CHAMBER: 47.54 ML
LEFT VENTRICLE MASS INDEX: 51 G/M2
LEFT VENTRICLE SYSTOLIC VOLUME INDEX: 16.8 ML/M2
LEFT VENTRICLE SYSTOLIC VOLUME: 32.71 ML
LEFT VENTRICULAR INTERNAL DIMENSION IN DIASTOLE: 4.03 CM (ref 3.5–6)
LEFT VENTRICULAR MASS: 99.42 G
LV LATERAL E/E' RATIO: 6.82 M/S
LV SEPTAL E/E' RATIO: 12.5 M/S
LVED V (TEICH): 71.27 ML
LVES V (TEICH): 32.71 ML
LVOT MG: 2.07 MMHG
LVOT MV: 0.68 CM/S
MV PEAK A VEL: 0.89 M/S
MV PEAK E VEL: 0.75 M/S
MV STENOSIS PRESSURE HALF TIME: 63.92 MS
MV VALVE AREA P 1/2 METHOD: 3.44 CM2
PISA MRMAX VEL: 3.95 M/S
PISA TR MAX VEL: 2.96 M/S
PV PEAK GRADIENT: 4 MMHG
PV PEAK VELOCITY: 0.97 M/S
RA VOL SYS: 34.74 ML
RIGHT ATRIAL AREA: 13.5 CM2
RIGHT ATRIUM VOLUME AREA LENGTH APICAL 4 CHAMBER: 33.49 ML
RIGHT VENTRICLE DIASTOLIC BASEL DIMENSION: 3.4 CM
RIGHT VENTRICLE DIASTOLIC LENGTH: 7.3 CM
RIGHT VENTRICLE DIASTOLIC MID DIMENSION: 1.5 CM
RIGHT VENTRICULAR LENGTH IN DIASTOLE (APICAL 4-CHAMBER VIEW): 7.31 CM
RV MID DIAMA: 1.49 CM
TDI LATERAL: 0.11 M/S
TDI SEPTAL: 0.06 M/S
TDI: 0.09 M/S
TR MAX PG: 35 MMHG
TRICUSPID ANNULAR PLANE SYSTOLIC EXCURSION: 2 CM
Z-SCORE OF LEFT VENTRICULAR DIMENSION IN END DIASTOLE: -3.22
Z-SCORE OF LEFT VENTRICULAR DIMENSION IN END SYSTOLE: -1.25

## 2024-09-09 ENCOUNTER — TELEPHONE (OUTPATIENT)
Dept: CARDIOLOGY | Facility: CLINIC | Age: 64
End: 2024-09-09
Payer: MEDICARE

## 2024-09-09 DIAGNOSIS — Z71.89 COMPLEX CARE COORDINATION: ICD-10-CM

## 2024-09-09 NOTE — TELEPHONE ENCOUNTER
Spoke with patient on today, she explained that her appointment has already been rescheduled.----- Message from Judy Mclaughlin sent at 9/9/2024  2:06 PM CDT -----  Regarding: APPT PT CALL BACK  PT WANTS TO RESCHEDULE THE APPT FOR 9/10 TEST RESULTS FOR NEXT WEEK.         PT CALL BACK  [phone]

## 2024-09-10 ENCOUNTER — OFFICE VISIT (OUTPATIENT)
Dept: FAMILY MEDICINE | Facility: CLINIC | Age: 64
End: 2024-09-10
Payer: MEDICARE

## 2024-09-10 VITALS
RESPIRATION RATE: 18 BRPM | HEIGHT: 66 IN | HEART RATE: 91 BPM | WEIGHT: 187 LBS | OXYGEN SATURATION: 99 % | TEMPERATURE: 98 F | SYSTOLIC BLOOD PRESSURE: 128 MMHG | DIASTOLIC BLOOD PRESSURE: 80 MMHG | BODY MASS INDEX: 30.05 KG/M2

## 2024-09-10 DIAGNOSIS — Z79.891 LONG TERM (CURRENT) USE OF OPIATE ANALGESIC: Primary | ICD-10-CM

## 2024-09-10 DIAGNOSIS — F17.200 TOBACCO DEPENDENCE SYNDROME: ICD-10-CM

## 2024-09-10 DIAGNOSIS — K21.9 GASTROESOPHAGEAL REFLUX DISEASE, UNSPECIFIED WHETHER ESOPHAGITIS PRESENT: ICD-10-CM

## 2024-09-10 DIAGNOSIS — I10 HYPERTENSION, UNSPECIFIED TYPE: ICD-10-CM

## 2024-09-10 DIAGNOSIS — J96.11 CHRONIC RESPIRATORY FAILURE WITH HYPOXIA, ON HOME O2 THERAPY: ICD-10-CM

## 2024-09-10 DIAGNOSIS — E78.5 DYSLIPIDEMIA: ICD-10-CM

## 2024-09-10 DIAGNOSIS — Z99.81 CHRONIC RESPIRATORY FAILURE WITH HYPOXIA, ON HOME O2 THERAPY: ICD-10-CM

## 2024-09-10 DIAGNOSIS — M51.9 LUMBAR DISC DISEASE: ICD-10-CM

## 2024-09-10 PROCEDURE — 99213 OFFICE O/P EST LOW 20 MIN: CPT | Mod: ,,, | Performed by: FAMILY MEDICINE

## 2024-09-10 PROCEDURE — 1160F RVW MEDS BY RX/DR IN RCRD: CPT | Mod: ,,, | Performed by: FAMILY MEDICINE

## 2024-09-10 PROCEDURE — 1159F MED LIST DOCD IN RCRD: CPT | Mod: ,,, | Performed by: FAMILY MEDICINE

## 2024-09-10 PROCEDURE — 80305 DRUG TEST PRSMV DIR OPT OBS: CPT | Mod: QW,,, | Performed by: FAMILY MEDICINE

## 2024-09-10 PROCEDURE — 4010F ACE/ARB THERAPY RXD/TAKEN: CPT | Mod: ,,, | Performed by: FAMILY MEDICINE

## 2024-09-10 PROCEDURE — 3074F SYST BP LT 130 MM HG: CPT | Mod: ,,, | Performed by: FAMILY MEDICINE

## 2024-09-10 PROCEDURE — 3079F DIAST BP 80-89 MM HG: CPT | Mod: ,,, | Performed by: FAMILY MEDICINE

## 2024-09-10 PROCEDURE — 3008F BODY MASS INDEX DOCD: CPT | Mod: ,,, | Performed by: FAMILY MEDICINE

## 2024-09-10 PROCEDURE — 3044F HG A1C LEVEL LT 7.0%: CPT | Mod: ,,, | Performed by: FAMILY MEDICINE

## 2024-09-10 RX ORDER — GABAPENTIN 100 MG/1
100 CAPSULE ORAL 3 TIMES DAILY
Qty: 180 CAPSULE | Refills: 1 | Status: SHIPPED | OUTPATIENT
Start: 2024-09-10

## 2024-09-10 RX ORDER — ROSUVASTATIN CALCIUM 20 MG/1
20 TABLET, COATED ORAL DAILY
Qty: 90 TABLET | Refills: 1 | Status: SHIPPED | OUTPATIENT
Start: 2024-09-10

## 2024-09-10 RX ORDER — PANTOPRAZOLE SODIUM 40 MG/1
40 TABLET, DELAYED RELEASE ORAL DAILY
Qty: 90 TABLET | Refills: 1 | Status: SHIPPED | OUTPATIENT
Start: 2024-09-10 | End: 2025-03-09

## 2024-09-10 RX ORDER — HYDROCODONE BITARTRATE AND ACETAMINOPHEN 10; 325 MG/1; MG/1
1 TABLET ORAL EVERY 8 HOURS PRN
Qty: 90 TABLET | Refills: 0 | Status: SHIPPED | OUTPATIENT
Start: 2024-09-10

## 2024-09-10 RX ORDER — BUPROPION HYDROCHLORIDE 300 MG/1
300 TABLET ORAL DAILY
Qty: 90 TABLET | Refills: 1 | Status: SHIPPED | OUTPATIENT
Start: 2024-09-10 | End: 2025-03-09

## 2024-09-10 RX ORDER — HYDROCODONE BITARTRATE AND ACETAMINOPHEN 10; 325 MG/1; MG/1
1 TABLET ORAL EVERY 8 HOURS PRN
Qty: 90 TABLET | Refills: 0 | Status: SHIPPED | OUTPATIENT
Start: 2024-09-10 | End: 2024-09-10

## 2024-09-10 RX ORDER — VALSARTAN 40 MG/1
40 TABLET ORAL DAILY
Qty: 90 TABLET | Refills: 1 | Status: SHIPPED | OUTPATIENT
Start: 2024-09-10 | End: 2025-03-09

## 2024-09-10 NOTE — PROGRESS NOTES
Nneka Gomes is a 63 y.o. female seen today for follow-up on her COPD chronic lumbar disc disease and hypertension.  She denies any chest pain and has had no shortness a breath above her baseline.  Patient denies any recent COPD exacerbations we discussed the upcoming flu vaccine.  I also recommended the RSV and the patient will consider the COVID vaccination.  Patient reports the Norco is helping with her chronic low back pain without medication side effects.  She is meeting her ADLs and her  today was appropriate.  She has had no signs or symptoms of tolerance or addiction and today she urine drug screen only showed her prescribed opiate.    Past Medical History:   Diagnosis Date    Abdominal bloating 01/09/2020    Abdominal pain, right upper quadrant 01/09/2020    Abnormal liver enzymes 03/04/2020    Abnormal results of liver function studies 11/18/2020    Acute conjunctivitis 05/30/2014    Acute exacerbation of chronic obstructive airways disease 02/13/2017    Acute pharyngitis 05/19/2020    Acute sinusitis 10/09/2017    Acute upper respiratory infection 05/19/2020    Allergic rhinitis 09/23/2020    Anemia 03/07/2014    Anesthesia of skin 09/06/2017    bilateral fingers    Bilateral primary osteoarthritis of knee 12/02/2019    Bradycardia 01/16/2020    Carpal tunnel syndrome 08/10/2017    Chest pain 03/06/2020    Chronic idiopathic constipation 11/18/2020    Chronic low back pain 11/05/2018    Chronic pain 04/23/2019    Chronic pain syndrome 01/30/2020    COPD (chronic obstructive pulmonary disease) 02/19/2021    Coronavirus infection 05/21/2020    Cough 05/19/2020    Degeneration of lumbar intervertebral disc 09/26/2014    L3-L4 L4-L5 Greater than L5-S1    Depressive disorder 07/03/2019    Disorder of gallbladder 03/30/2015    Dysphagia 01/09/2020    Dyspnea 05/19/2020    Dysuria 05/19/2020    Fatigue 05/19/2020    Frequency of urination 02/16/2015    GERD (gastroesophageal reflux disease) 11/18/2020     Hematuria 06/30/2020    Hemoptysis 02/27/2020    Herpes zoster 09/23/2020    Hyperlipidemia 09/23/2020    Hypertension 03/06/2020    Joint pain 11/04/2019    Knee pain 01/04/2019    Left inguinal hernia 06/10/2019    Long term (current) use of opiate analgesic 02/19/2021    Lumbar spondylosis 11/23/2020    Lumbar sprain 07/16/2012    Malaise 11/20/2014    Melena 09/25/2017    Microscopic hematuria 04/02/2020    Migraine without aura 02/06/2012    Nausea 02/27/2020    Nicotine dependence, cigarettes, uncomplicated 11/11/2020    Nonulcer dyspepsia 01/23/2017    On home O2     Other long term (current) drug therapy 09/23/2020    Other specified urinary incontinence 06/17/2020    Pain in left shoulder 05/02/2019    Pain in left wrist 09/06/2017    and hand    Pain in right shoulder 08/30/2019    Pain in right wrist 09/04/2018    Right hand    Palpitations 03/06/2020    Shoulder joint pain 04/23/2019    Smoker 07/03/2019    Snoring 08/02/2019    Synovial cyst of popliteal space 04/08/2013    Tobacco dependence syndrome 02/19/2021    Unstable angina 01/16/2020    UTI (urinary tract infection) 03/27/2020     Family History   Problem Relation Name Age of Onset    Rectal cancer Other      Diabetes Other      Heart disease Other      Hypertension Other      Hypertension Mother      Cancer Maternal Aunt       Current Outpatient Medications on File Prior to Visit   Medication Sig Dispense Refill    albuterol (PROAIR HFA) 90 mcg/actuation inhaler Inhale 2 puffs into the lungs every 4 (four) hours as needed for Wheezing. 18 g 5    albuterol (PROVENTIL) 2.5 mg /3 mL (0.083 %) nebulizer solution Take 3 mLs (2.5 mg total) by nebulization 4 (four) times daily as needed for Wheezing. 100 each 5    azelastine (ASTELIN) 137 mcg (0.1 %) nasal spray USE 2 SPRAYS NASALLY TWICE A DAY 90 mL 3    fluticasone propionate (FLONASE) 50 mcg/actuation nasal spray 2 sprays (100 mcg total) by Each Nostril route once daily. 16 g 5     fluticasone-umeclidin-vilanter (TRELEGY ELLIPTA) 200-62.5-25 mcg inhaler Inhale 1 puff into the lungs once daily. 60 each 5    levocetirizine (XYZAL) 5 MG tablet Take 1 tablet (5 mg total) by mouth every evening. 90 tablet 3    linaCLOtide (LINZESS) 145 mcg Cap capsule Take 1 capsule (145 mcg total) by mouth before breakfast. 90 capsule 3    RESTASIS 0.05 % ophthalmic emulsion       theophylline (THEODUR) 300 mg 24 hr capsule Take 300 mg by mouth once daily. One tab Daily with food: (replaced previous script for 100mg tab 3 daily) 90 capsule 3    [DISCONTINUED] buPROPion (WELLBUTRIN XL) 300 MG 24 hr tablet Take 1 tablet (300 mg total) by mouth once daily. 90 tablet 1    [DISCONTINUED] gabapentin (NEURONTIN) 100 MG capsule Take 1 capsule (100 mg total) by mouth 3 (three) times daily. 180 capsule 1    [DISCONTINUED] HYDROcodone-acetaminophen (NORCO)  mg per tablet Take 1 tablet by mouth every 8 (eight) hours as needed (Chronic lumbar disc disease). 90 tablet 0    [DISCONTINUED] pantoprazole (PROTONIX) 40 MG tablet Take 1 tablet (40 mg total) by mouth once daily. 90 tablet 1    [DISCONTINUED] rosuvastatin (CRESTOR) 20 MG tablet Take 1 tablet (20 mg total) by mouth once daily. 90 tablet 1    [DISCONTINUED] valsartan (DIOVAN) 40 MG tablet Take 1 tablet (40 mg total) by mouth once daily. 90 tablet 1     No current facility-administered medications on file prior to visit.     Immunization History   Administered Date(s) Administered    COVID-19, MRNA, LN-S, PF (Pfizer) (Purple Cap) 04/29/2021, 10/28/2021, 04/29/2022    Influenza - Quadrivalent - PF *Preferred* (6 months and older) 09/16/2021, 10/11/2023    Influenza Split 10/03/2019    Pneumococcal Conjugate - 13 Valent 11/05/2018    Pneumococcal Conjugate - 20 Valent 01/09/2023    Pneumococcal Polysaccharide - 23 Valent 11/05/2019       Review of Systems   Constitutional:  Negative for fever, malaise/fatigue and weight loss.   Respiratory:  Positive for shortness of  breath.    Cardiovascular:  Negative for chest pain and palpitations.   Gastrointestinal:  Negative for nausea and vomiting.   Musculoskeletal:  Positive for back pain and myalgias.   Psychiatric/Behavioral:  Negative for depression.         Vitals:    09/10/24 0829   BP: 128/80   Pulse: 91   Resp: 18   Temp: 98.2 °F (36.8 °C)       Physical Exam  Vitals reviewed.   Constitutional:       Appearance: Normal appearance.   HENT:      Head: Normocephalic.   Eyes:      Extraocular Movements: Extraocular movements intact.      Conjunctiva/sclera: Conjunctivae normal.      Pupils: Pupils are equal, round, and reactive to light.   Neck:      Thyroid: No thyroid mass or thyromegaly.   Cardiovascular:      Rate and Rhythm: Normal rate and regular rhythm.      Heart sounds: Normal heart sounds. No murmur heard.     No gallop.   Pulmonary:      Effort: Pulmonary effort is normal. No respiratory distress.      Breath sounds: Normal breath sounds. No wheezing or rales.   Musculoskeletal:      Lumbar back: Tenderness present. Decreased range of motion.      Comments: She is slow to rise from a seated position with a mildly antalgic gait   Skin:     General: Skin is warm and dry.      Coloration: Skin is not jaundiced or pale.   Neurological:      Mental Status: She is alert.   Psychiatric:         Mood and Affect: Mood normal.         Behavior: Behavior normal.         Thought Content: Thought content normal.         Judgment: Judgment normal.          Assessment and Plan  1. Long term (current) use of opiate analgesic  -     POCT Urine Drug Screen Presump    2. Lumbar disc disease  -     Discontinue: HYDROcodone-acetaminophen (NORCO)  mg per tablet; Take 1 tablet by mouth every 8 (eight) hours as needed (Chronic lumbar disc disease).  Dispense: 90 tablet; Refill: 0  -     gabapentin (NEURONTIN) 100 MG capsule; Take 1 capsule (100 mg total) by mouth 3 (three) times daily.  Dispense: 180 capsule; Refill: 1  -      HYDROcodone-acetaminophen (NORCO)  mg per tablet; Take 1 tablet by mouth every 8 (eight) hours as needed (Chronic lumbar disc disease).  Dispense: 90 tablet; Refill: 0    3. Dyslipidemia  -     rosuvastatin (CRESTOR) 20 MG tablet; Take 1 tablet (20 mg total) by mouth once daily.  Dispense: 90 tablet; Refill: 1    4. Tobacco dependence syndrome  -     buPROPion (WELLBUTRIN XL) 300 MG 24 hr tablet; Take 1 tablet (300 mg total) by mouth once daily.  Dispense: 90 tablet; Refill: 1    5. Gastroesophageal reflux disease, unspecified whether esophagitis present  -     pantoprazole (PROTONIX) 40 MG tablet; Take 1 tablet (40 mg total) by mouth once daily.  Dispense: 90 tablet; Refill: 1    6. Hypertension, unspecified type  -     valsartan (DIOVAN) 40 MG tablet; Take 1 tablet (40 mg total) by mouth once daily.  Dispense: 90 tablet; Refill: 1    7. Chronic respiratory failure with hypoxia, on home O2 therapy  Assessment & Plan:  We will continue current management which includes home oxygen.               Return to clinic in two months for follow-up she received a 2nd prescription for Norco postdated for 10/09/2024.    Health Maintenance Topics with due status: Not Due       Topic Last Completion Date    Colorectal Cancer Screening 11/08/2022    Hemoglobin A1c (Prediabetes) 02/08/2024    Cervical Cancer Screening 04/03/2024    Lipid Panel 04/08/2024    LDCT Lung Screen 05/01/2024

## 2024-09-23 ENCOUNTER — OFFICE VISIT (OUTPATIENT)
Dept: CARDIOLOGY | Facility: CLINIC | Age: 64
End: 2024-09-23
Payer: MEDICARE

## 2024-09-23 VITALS
OXYGEN SATURATION: 97 % | BODY MASS INDEX: 31.66 KG/M2 | RESPIRATION RATE: 18 BRPM | DIASTOLIC BLOOD PRESSURE: 80 MMHG | SYSTOLIC BLOOD PRESSURE: 130 MMHG | WEIGHT: 197 LBS | HEART RATE: 88 BPM | HEIGHT: 66 IN

## 2024-09-23 DIAGNOSIS — I10 ESSENTIAL HYPERTENSION: ICD-10-CM

## 2024-09-23 DIAGNOSIS — R07.9 CHEST PAIN, UNSPECIFIED TYPE: ICD-10-CM

## 2024-09-23 DIAGNOSIS — J44.9 CHRONIC OBSTRUCTIVE PULMONARY DISEASE, UNSPECIFIED COPD TYPE: ICD-10-CM

## 2024-09-23 DIAGNOSIS — K22.2 ESOPHAGEAL STRICTURE: Primary | ICD-10-CM

## 2024-09-23 PROCEDURE — 99214 OFFICE O/P EST MOD 30 MIN: CPT | Mod: S$PBB,,, | Performed by: INTERNAL MEDICINE

## 2024-09-23 PROCEDURE — 3044F HG A1C LEVEL LT 7.0%: CPT | Mod: CPTII,,, | Performed by: INTERNAL MEDICINE

## 2024-09-23 PROCEDURE — 3079F DIAST BP 80-89 MM HG: CPT | Mod: CPTII,,, | Performed by: INTERNAL MEDICINE

## 2024-09-23 PROCEDURE — 3008F BODY MASS INDEX DOCD: CPT | Mod: CPTII,,, | Performed by: INTERNAL MEDICINE

## 2024-09-23 PROCEDURE — 99215 OFFICE O/P EST HI 40 MIN: CPT | Mod: PBBFAC | Performed by: INTERNAL MEDICINE

## 2024-09-23 PROCEDURE — 3075F SYST BP GE 130 - 139MM HG: CPT | Mod: CPTII,,, | Performed by: INTERNAL MEDICINE

## 2024-09-23 PROCEDURE — 99999 PR PBB SHADOW E&M-EST. PATIENT-LVL V: CPT | Mod: PBBFAC,,, | Performed by: INTERNAL MEDICINE

## 2024-09-23 PROCEDURE — 4010F ACE/ARB THERAPY RXD/TAKEN: CPT | Mod: CPTII,,, | Performed by: INTERNAL MEDICINE

## 2024-09-23 PROCEDURE — 1159F MED LIST DOCD IN RCRD: CPT | Mod: CPTII,,, | Performed by: INTERNAL MEDICINE

## 2024-09-23 NOTE — PROGRESS NOTES
PCP: Lottie Handley MD    Referring Provider:     Subjective:   Nneka Gomes is a 63 y.o. female who presents for evaluation of chest pain.    Pt complains of left sided 3/10 chest pain, comes and goes spontaneously, lasts from seconds to several minutes, not associated with nausea, diaphoresis or shortness of breath, occurring three or four times a month. .  She notes symptoms present for several months, do not limit normal activity.  She reports had a stress test and left heart cath many years ago for similar symptoms, reportedly no significant obstruction, did not have intervention. .  She is active, able to perform normal activities of daily living without symptoms of chest pain.  She admits to mild SOB with exertion which she attributes to COPD.         Fhx:  Family History   Problem Relation Name Age of Onset    Rectal cancer Other      Diabetes Other      Heart disease Other      Hypertension Other      Hypertension Mother      Cancer Maternal Aunt        Shx:   Past Surgical History:   Procedure Laterality Date    CARDIAC CATHETERIZATION      CHOLECYSTECTOMY      HERNIA REPAIR      ROTATOR CUFF REPAIR        EKG -   Test Reason : R06.00,    Vent. Rate : 080 BPM     Atrial Rate : 080 BPM     P-R Int : 158 ms          QRS Dur : 082 ms      QT Int : 378 ms       P-R-T Axes : 081 077 067 degrees     QTc Int : 435 ms    Normal sinus rhythm  Normal ECG  When compared with ECG of 16-DEC-2021 13:17,  No significant change was found  Confirmed by Gene Kumar DO ([id number]) on 6/18/2024 2:04:38 PM    Referred By: LOTTIE HANDLEY           Confirmed By:Gene Kumar DO      Specimen Collected: 06/17/24 09:53 CDT Last Resulted: 06/18/24 14:04 CDT               CATH - No results found for this or any previous visit.       Stress - No results found for this or any previous visit.       Lab Results   Component Value Date     05/08/2024    K 3.9 05/08/2024     05/08/2024    CO2 30 05/08/2024    BUN 12  05/08/2024    CREATININE 0.80 05/08/2024    CALCIUM 9.2 05/08/2024    ANIONGAP 9 05/08/2024    ESTGFRAFRICA 90 12/13/2021    EGFRNONAA 68 05/11/2022       Lab Results   Component Value Date    CHOL 155 04/08/2024    CHOL 154 04/10/2023    CHOL 134 05/11/2022     Lab Results   Component Value Date    HDL 68 (H) 04/08/2024    HDL 58 04/10/2023    HDL 50 05/11/2022     Lab Results   Component Value Date    LDLCALC 78 04/08/2024    LDLCALC 81 04/10/2023    LDLCALC 68 05/11/2022     Lab Results   Component Value Date    TRIG 46 04/08/2024    TRIG 74 04/10/2023    TRIG 82 05/11/2022     Lab Results   Component Value Date    CHOLHDL 2.3 04/08/2024    CHOLHDL 2.7 04/10/2023    CHOLHDL 2.7 05/11/2022       Lab Results   Component Value Date    WBC 6.91 04/08/2024    HGB 13.0 04/08/2024    HCT 41.5 04/08/2024    MCV 89.8 04/08/2024     04/08/2024           Current Outpatient Medications:     albuterol (PROAIR HFA) 90 mcg/actuation inhaler, Inhale 2 puffs into the lungs every 4 (four) hours as needed for Wheezing., Disp: 18 g, Rfl: 5    albuterol (PROVENTIL) 2.5 mg /3 mL (0.083 %) nebulizer solution, Take 3 mLs (2.5 mg total) by nebulization 4 (four) times daily as needed for Wheezing., Disp: 100 each, Rfl: 5    azelastine (ASTELIN) 137 mcg (0.1 %) nasal spray, USE 2 SPRAYS NASALLY TWICE A DAY, Disp: 90 mL, Rfl: 3    buPROPion (WELLBUTRIN XL) 300 MG 24 hr tablet, Take 1 tablet (300 mg total) by mouth once daily., Disp: 90 tablet, Rfl: 1    fluticasone propionate (FLONASE) 50 mcg/actuation nasal spray, 2 sprays (100 mcg total) by Each Nostril route once daily., Disp: 16 g, Rfl: 5    fluticasone-umeclidin-vilanter (TRELEGY ELLIPTA) 200-62.5-25 mcg inhaler, Inhale 1 puff into the lungs once daily., Disp: 60 each, Rfl: 5    gabapentin (NEURONTIN) 100 MG capsule, Take 1 capsule (100 mg total) by mouth 3 (three) times daily., Disp: 180 capsule, Rfl: 1    HYDROcodone-acetaminophen (NORCO)  mg per tablet, Take 1 tablet by  mouth every 8 (eight) hours as needed (Chronic lumbar disc disease)., Disp: 90 tablet, Rfl: 0    levocetirizine (XYZAL) 5 MG tablet, Take 1 tablet (5 mg total) by mouth every evening., Disp: 90 tablet, Rfl: 3    linaCLOtide (LINZESS) 145 mcg Cap capsule, Take 1 capsule (145 mcg total) by mouth before breakfast., Disp: 90 capsule, Rfl: 3    pantoprazole (PROTONIX) 40 MG tablet, Take 1 tablet (40 mg total) by mouth once daily., Disp: 90 tablet, Rfl: 1    RESTASIS 0.05 % ophthalmic emulsion, , Disp: , Rfl:     rosuvastatin (CRESTOR) 20 MG tablet, Take 1 tablet (20 mg total) by mouth once daily., Disp: 90 tablet, Rfl: 1    theophylline (THEODUR) 300 mg 24 hr capsule, Take 300 mg by mouth once daily. One tab Daily with food: (replaced previous script for 100mg tab 3 daily), Disp: 90 capsule, Rfl: 3    valsartan (DIOVAN) 40 MG tablet, Take 1 tablet (40 mg total) by mouth once daily., Disp: 90 tablet, Rfl: 1    Review of Systems   Constitutional: Negative for diaphoresis, malaise/fatigue, night sweats and weight gain.   HENT:  Negative for congestion, ear pain, hearing loss, nosebleeds and sore throat.    Eyes:  Negative for blurred vision, double vision, pain, photophobia and visual disturbance.   Cardiovascular:  Positive for chest pain and dyspnea on exertion. Negative for claudication, irregular heartbeat, leg swelling, near-syncope, orthopnea, palpitations and syncope.   Respiratory:  Positive for shortness of breath. Negative for cough, sleep disturbances due to breathing, snoring and wheezing.    Endocrine: Negative for cold intolerance, heat intolerance, polydipsia, polyphagia and polyuria.   Hematologic/Lymphatic: Negative for bleeding problem. Does not bruise/bleed easily.   Skin:  Negative for dry skin, flushing, itching, rash and skin cancer.   Musculoskeletal:  Negative for arthritis, back pain, falls, joint pain, muscle cramps, muscle weakness and myalgias.   Gastrointestinal:  Negative for abdominal pain,  "change in bowel habit, constipation, diarrhea, dysphagia, heartburn, nausea and vomiting.   Genitourinary:  Negative for bladder incontinence, dysuria, flank pain, frequency and nocturia.   Neurological:  Negative for dizziness, focal weakness, headaches, light-headedness, loss of balance, numbness, paresthesias and seizures.   Psychiatric/Behavioral:  Negative for depression, memory loss and substance abuse. The patient is not nervous/anxious.    Allergic/Immunologic: Negative for environmental allergies.          Objective:   /80   Pulse 88   Resp 18   Ht 5' 6" (1.676 m)   Wt 89.4 kg (197 lb)   SpO2 97%   BMI 31.80 kg/m²       Physical Exam  Vitals and nursing note reviewed.   Constitutional:       Appearance: Normal appearance. She is obese.   HENT:      Head: Normocephalic and atraumatic.      Right Ear: External ear normal.      Left Ear: External ear normal.   Eyes:      General: No scleral icterus.        Right eye: No discharge.         Left eye: No discharge.      Extraocular Movements: Extraocular movements intact.      Conjunctiva/sclera: Conjunctivae normal.      Pupils: Pupils are equal, round, and reactive to light.   Cardiovascular:      Rate and Rhythm: Normal rate and regular rhythm.      Pulses: Normal pulses.      Heart sounds: Normal heart sounds. No murmur heard.     No friction rub. No gallop.   Pulmonary:      Effort: Pulmonary effort is normal.      Breath sounds: Normal breath sounds. No wheezing, rhonchi or rales.   Chest:      Chest wall: No tenderness.   Abdominal:      General: Abdomen is flat. Bowel sounds are normal. There is no distension.      Palpations: Abdomen is soft.      Tenderness: There is no abdominal tenderness. There is no guarding or rebound.   Musculoskeletal:         General: No swelling or tenderness. Normal range of motion.      Cervical back: Normal range of motion and neck supple.   Skin:     General: Skin is warm and dry.      Findings: No erythema or " rash.   Neurological:      General: No focal deficit present.      Mental Status: She is alert and oriented to person, place, and time.      Cranial Nerves: No cranial nerve deficit.      Motor: No weakness.      Gait: Gait normal.   Psychiatric:         Mood and Affect: Mood normal.         Behavior: Behavior normal.         Thought Content: Thought content normal.         Judgment: Judgment normal.     Results for orders placed during the hospital encounter of 08/09/24    Echo    Interpretation Summary    Left Ventricle: The left ventricle is normal in size. Normal wall thickness. There is normal systolic function with a visually estimated ejection fraction of 55 - 60%. There is normal diastolic function.    Right Ventricle: Normal right ventricular cavity size. Systolic function is normal.    Aortic Valve: The aortic valve is a trileaflet valve.    Tricuspid Valve: There is mild regurgitation.     Results for orders placed during the hospital encounter of 08/09/24    Nuclear Stress Test    Interpretation Summary    The ECG portion of the study is negative for ischemia.    The patient reported no chest pain during the stress test.   NM Myocardial Perfusion Spect Multi Pharmacologic  Status: Final result     [facility] Results Release    [facility] Status: Pending  Results Release     PACS Images for [facility] Viewer     Show images for NM Myocardial Perfusion Spect Multi Pharmacologic  All Reviewers List    Gene Kumar DO on 8/12/2024 11:05     NM Myocardial Perfusion Spect Multi Pharmacologic  Order: [id number]  Status: Final result       Visible to patient: No (inaccessible in [facility])       Next appt: 10/08/2024 at 01:00 PM in Gastroenterology ([facility])       Dx: Dyspnea, unspecified type; Coronary a...    0 Result Notes  Details    Reading Physician Reading Date Result Priority   Aneta Davidson MD  [phone] 8/10/2024 Routine     Narrative & Impression  EXAMINATION:  NM MYOCARDIAL PERFUSION  SPECT MULTI PHARM     CLINICAL HISTORY:  Dyspnea on exertion (DAO);  Atherosclerotic heart disease of native coronary artery without angina pectoris     TECHNIQUE:  SPECT images in short, vertical and horizontal long axis were acquired 30 minutes after the injection of 11.3 mCi of Tc-99m sestamibi at rest and 34.6 mCi during a cardiac stress. The clinical stress and ECG portion of the study is to be read separately.     COMPARISON:  None.     FINDINGS:  The quality of the study is compromised by GI activity adjacent to the inferior wall.     Extensive resting perfusion defect which completely resolves with stress, consistent with artifact     The gated post-stress images reveal normal wall motion and normal systolic wall thickening with an estimated LVEF of 74 %. The LV cavity (is not) dilated with an end-diastolic volume of (71 ml- normal less than 140) ml and an end-systolic volume of (18 ml- normal less than 70) ml.     Normal TID ratio for pharmacologic stress- 1.25     Impression:     1.  Scintigraphically negative for ischemia or infarct.  2. Extensive resting perfusion defect which completely resolves with stress, consistent with artifact.  3. the global left ventricular systolic function is normal with an LV ejection fraction of 74 % and no evidence of LV dilatation. Wall motion is normal.        Electronically signed by:Aneta Davidson  Date:                                            08/10/2024  Time:                                           11:00       Assessment:     1. Chest pain, unspecified type      atypical, stress and echo essentiallly normal, suspect may be due to esophogeal pain,      2. Essential hypertension      adequate control on current meds.      3. Chronic obstructive pulmonary disease, unspecified COPD type      well controlled with MDIs      4. Esophageal stricture      hx of esophogeal dilatation, several years ago, will refer back to GI to investigate causes of non-cardiac chest pain,               Plan:   Refer to GI  Follow up in one year, sooner if symptoms change.

## 2024-10-08 ENCOUNTER — OFFICE VISIT (OUTPATIENT)
Dept: GASTROENTEROLOGY | Facility: CLINIC | Age: 64
End: 2024-10-08
Payer: MEDICARE

## 2024-10-08 VITALS
HEART RATE: 79 BPM | BODY MASS INDEX: 31.95 KG/M2 | DIASTOLIC BLOOD PRESSURE: 78 MMHG | WEIGHT: 198.81 LBS | HEIGHT: 66 IN | SYSTOLIC BLOOD PRESSURE: 123 MMHG

## 2024-10-08 DIAGNOSIS — R63.4 WEIGHT LOSS: ICD-10-CM

## 2024-10-08 DIAGNOSIS — K22.2 ESOPHAGEAL STRICTURE: ICD-10-CM

## 2024-10-08 DIAGNOSIS — K59.04 CHRONIC IDIOPATHIC CONSTIPATION: ICD-10-CM

## 2024-10-08 DIAGNOSIS — R13.19 ESOPHAGEAL DYSPHAGIA: Primary | ICD-10-CM

## 2024-10-08 DIAGNOSIS — R74.8 ELEVATED LIVER ENZYMES: ICD-10-CM

## 2024-10-08 PROCEDURE — 3008F BODY MASS INDEX DOCD: CPT | Mod: CPTII,,,

## 2024-10-08 PROCEDURE — 99215 OFFICE O/P EST HI 40 MIN: CPT | Mod: PBBFAC

## 2024-10-08 PROCEDURE — 3074F SYST BP LT 130 MM HG: CPT | Mod: CPTII,,,

## 2024-10-08 PROCEDURE — 99214 OFFICE O/P EST MOD 30 MIN: CPT | Mod: S$PBB,,,

## 2024-10-08 PROCEDURE — 4010F ACE/ARB THERAPY RXD/TAKEN: CPT | Mod: CPTII,,,

## 2024-10-08 PROCEDURE — 3044F HG A1C LEVEL LT 7.0%: CPT | Mod: CPTII,,,

## 2024-10-08 PROCEDURE — 99999 PR PBB SHADOW E&M-EST. PATIENT-LVL V: CPT | Mod: PBBFAC,,,

## 2024-10-08 PROCEDURE — 1159F MED LIST DOCD IN RCRD: CPT | Mod: CPTII,,,

## 2024-10-08 PROCEDURE — 3078F DIAST BP <80 MM HG: CPT | Mod: CPTII,,,

## 2024-10-08 PROCEDURE — 85610 PROTHROMBIN TIME: CPT

## 2024-10-08 PROCEDURE — 1160F RVW MEDS BY RX/DR IN RCRD: CPT | Mod: CPTII,,,

## 2024-10-08 NOTE — PATIENT INSTRUCTIONS
- Take all of your medicines as instructed.  - Avoid alcohol. Alcohol can make liver problems worse.  - Eat a healthy diet with plenty of vegetables, fruits, and whole grains.  - Get regular physical activity. Even gentle activity, like walking, is good for your health.  - Treat your high blood sugar if you have diabetes  - Treat your high cholesterol if you have it

## 2024-10-08 NOTE — PROGRESS NOTES
Gastroenterology Clinic Note    Patient ID: [id number]   Referring MD: Gene Kumar DO   Chief Complaint:   Chief Complaint   Patient presents with    Follow-up     3 month, no changes    Medication Refill     Linzess       History of Present Illness   Nneka Gomes is an 63 y.o. AAF who is referred for elevated liver enzymes.  CMP from 04/2024 revealed alk-phos 226.  ALT and AST within normal range.  Fractionated alk-phos revealed had elevation of liver 1.  Upon chart review, patient has had a chronically elevated alk-phos with a positive ASMA and negative AMA in 2021.  She also underwent liver biopsy in 2021.    Previous workup: EGD w/ dilation; US liver and elastography; Liver biopsy    A. LIVER, BIOPSY ([facility], [place], [place], [id number] A1, 08/11/2021):      - Portal-based inflammation, lymphocyte predominant with rare plasma cells including rare lymphoid aggregates.  - Moderate perisinusoidal fibrosis, supported by trichrome stain.  - Mild bile duct lymphocytosis.  - No chronic cholestasis, supported by copper stain.  - Minimal macrovesicular steatosis.  - No lobular inflammation or apoptotic bodies identified.  - No iron deposition, supported by iron stain.  - No alpha-1 antitrypsin globules, supported by PAS and PAS-D stain (with bacterial and fungal contaminant).  - No hepatocellular carcinoma identified.     NOTE: Anti smooth muscle antibodies, ALP, and GGT are increased due to which clinical suspicion of autoimmune hepatitis is noted. On the basis of clinical picture and histological findings in the submitted specimen, I favor drug induced liver toxicity (possibly bupropion and sertraline). The other differential for this pattern may include primary biliary cholangitis (PBC), and less likely autoimmune hepatitis (AIH). Clinical correlation and follow up of trending of LFT  is recommended after cessation of possible hepatotoxic drugs is recommended.     Last colonoscopy was 11/08/2022  with 10 year recall for screening purposes.    Interval  - patient complains of intermittent right upper quadrant abdominal pain; she is also experiencing recurrence of dysphagia with solid foods  - unintentional weight loss of 10 lb reported  - Linzess helps with constipation; no hematochezia or melena reported; requesting refill today    Review of Systems   Constitutional:  Positive for weight loss.   Gastrointestinal:  Positive for abdominal pain and constipation. Negative for blood in stool, diarrhea, melena, nausea and vomiting.       Past Medical History      Past Medical History:   Diagnosis Date    Abdominal bloating 01/09/2020    Abdominal pain, right upper quadrant 01/09/2020    Abnormal liver enzymes 03/04/2020    Abnormal results of liver function studies 11/18/2020    Acute conjunctivitis 05/30/2014    Acute exacerbation of chronic obstructive airways disease 02/13/2017    Acute pharyngitis 05/19/2020    Acute sinusitis 10/09/2017    Acute upper respiratory infection 05/19/2020    Allergic rhinitis 09/23/2020    Anemia 03/07/2014    Anesthesia of skin 09/06/2017    bilateral fingers    Bilateral primary osteoarthritis of knee 12/02/2019    Bradycardia 01/16/2020    Carpal tunnel syndrome 08/10/2017    Chest pain 03/06/2020    Chronic idiopathic constipation 11/18/2020    Chronic low back pain 11/05/2018    Chronic pain 04/23/2019    Chronic pain syndrome 01/30/2020    COPD (chronic obstructive pulmonary disease) 02/19/2021    Coronavirus infection 05/21/2020    Cough 05/19/2020    Degeneration of lumbar intervertebral disc 09/26/2014    L3-L4 L4-L5 Greater than L5-S1    Depressive disorder 07/03/2019    Disorder of gallbladder 03/30/2015    Dysphagia 01/09/2020    Dyspnea 05/19/2020    Dysuria 05/19/2020    Fatigue 05/19/2020    Frequency of urination 02/16/2015    GERD (gastroesophageal reflux disease) 11/18/2020    Hematuria 06/30/2020    Hemoptysis 02/27/2020    Herpes zoster 09/23/2020     Hyperlipidemia 09/23/2020    Hypertension 03/06/2020    Joint pain 11/04/2019    Knee pain 01/04/2019    Left inguinal hernia 06/10/2019    Long term (current) use of opiate analgesic 02/19/2021    Lumbar spondylosis 11/23/2020    Lumbar sprain 07/16/2012    Malaise 11/20/2014    Melena 09/25/2017    Microscopic hematuria 04/02/2020    Migraine without aura 02/06/2012    Nausea 02/27/2020    Nicotine dependence, cigarettes, uncomplicated 11/11/2020    Nonulcer dyspepsia 01/23/2017    On home O2     Other long term (current) drug therapy 09/23/2020    Other specified urinary incontinence 06/17/2020    Pain in left shoulder 05/02/2019    Pain in left wrist 09/06/2017    and hand    Pain in right shoulder 08/30/2019    Pain in right wrist 09/04/2018    Right hand    Palpitations 03/06/2020    Shoulder joint pain 04/23/2019    Smoker 07/03/2019    Snoring 08/02/2019    Synovial cyst of popliteal space 04/08/2013    Tobacco dependence syndrome 02/19/2021    Unstable angina 01/16/2020    UTI (urinary tract infection) 03/27/2020       Past Surgical History     Past Surgical History:   Procedure Laterality Date    CARDIAC CATHETERIZATION      CHOLECYSTECTOMY      HERNIA REPAIR      ROTATOR CUFF REPAIR         Allergies   Review of patient's allergies indicates:  No Known Allergies    Immunization History     Immunization History   Administered Date(s) Administered    COVID-19, MRNA, LN-S, PF (Pfizer) (Purple Cap) 04/29/2021, 10/28/2021, 04/29/2022    Influenza - Quadrivalent - PF *Preferred* (6 months and older) 09/16/2021, 10/11/2023    Influenza Split 10/03/2019    Pneumococcal Conjugate - 13 Valent 11/05/2018    Pneumococcal Conjugate - 20 Valent 01/09/2023    Pneumococcal Polysaccharide - 23 Valent 11/05/2019       Past Family History      Family History   Problem Relation Name Age of Onset    Rectal cancer Other      Diabetes Other      Heart disease Other      Hypertension Other      Hypertension Mother      Cancer  Maternal Aunt         Past Social History      Social History     Socioeconomic History    Marital status: Single    Number of children: 1   Tobacco Use    Smoking status: Former     Current packs/day: 0.00     Average packs/day: 1 pack/day for 40.0 years (40.0 ttl pk-yrs)     Types: Cigarettes     Start date: 07/1981     Quit date: 07/2021     Years since quitting: 3.2     Passive exposure: Current    Smokeless tobacco: Former   Substance and Sexual Activity    Alcohol use: Yes     Comment: occasionally    Drug use: Not Currently     Types: Marijuana    Sexual activity: Not Currently     Social Drivers of Health     Financial Resource Strain: Medium Risk (7/9/2024)    Overall Financial Resource Strain (CARDIA)     Difficulty of Paying Living Expenses: Somewhat hard   Food Insecurity: No Food Insecurity (7/9/2024)    Hunger Vital Sign     Worried About Running Out of Food in the Last Year: Never true     Ran Out of Food in the Last Year: Never true   Transportation Needs: No Transportation Needs (7/9/2024)    PRAPARE - Transportation     Lack of Transportation (Medical): No     Lack of Transportation (Non-Medical): No   Physical Activity: Insufficiently Active (7/9/2024)    Exercise Vital Sign     Days of Exercise per Week: 7 days     Minutes of Exercise per Session: 20 min   Stress: No Stress Concern Present (7/9/2024)    [employer] - Occupational Stress Questionnaire     Feeling of Stress : Not at all   Housing Stability: Low Risk  (7/9/2024)    Housing Stability Vital Sign     Unable to Pay for Housing in the Last Year: No     Homeless in the Last Year: No       Current Medications     Outpatient Medications Marked as Taking for the 10/8/24 encounter (Office Visit) with Angela Sanabria FNP   Medication Sig Dispense Refill    albuterol (PROAIR HFA) 90 mcg/actuation inhaler Inhale 2 puffs into the lungs every 4 (four) hours as needed for Wheezing. 18 g 5    albuterol (PROVENTIL) 2.5 mg  "/3 mL (0.083 %) nebulizer solution Take 3 mLs (2.5 mg total) by nebulization 4 (four) times daily as needed for Wheezing. 100 each 5    azelastine (ASTELIN) 137 mcg (0.1 %) nasal spray USE 2 SPRAYS NASALLY TWICE A DAY 90 mL 3    buPROPion (WELLBUTRIN XL) 300 MG 24 hr tablet Take 1 tablet (300 mg total) by mouth once daily. 90 tablet 1    fluticasone propionate (FLONASE) 50 mcg/actuation nasal spray 2 sprays (100 mcg total) by Each Nostril route once daily. 16 g 5    fluticasone-umeclidin-vilanter (TRELEGY ELLIPTA) 200-62.5-25 mcg inhaler Inhale 1 puff into the lungs once daily. 60 each 5    gabapentin (NEURONTIN) 100 MG capsule Take 1 capsule (100 mg total) by mouth 3 (three) times daily. 180 capsule 1    HYDROcodone-acetaminophen (NORCO)  mg per tablet Take 1 tablet by mouth every 8 (eight) hours as needed (Chronic lumbar disc disease). 90 tablet 0    levocetirizine (XYZAL) 5 MG tablet Take 1 tablet (5 mg total) by mouth every evening. 90 tablet 3    linaCLOtide (LINZESS) 145 mcg Cap capsule Take 1 capsule (145 mcg total) by mouth before breakfast. 90 capsule 3    pantoprazole (PROTONIX) 40 MG tablet Take 1 tablet (40 mg total) by mouth once daily. 90 tablet 1    RESTASIS 0.05 % ophthalmic emulsion       rosuvastatin (CRESTOR) 20 MG tablet Take 1 tablet (20 mg total) by mouth once daily. 90 tablet 1    theophylline (THEODUR) 300 mg 24 hr capsule Take 300 mg by mouth once daily. One tab Daily with food: (replaced previous script for 100mg tab 3 daily) 90 capsule 3    valsartan (DIOVAN) 40 MG tablet Take 1 tablet (40 mg total) by mouth once daily. 90 tablet 1        I have reviewed the current medications, allergies, vital signs, past medical and surgical history, family medical history, and social history for this encounter and agree with all findings.    OBJECTIVE    Physical Exam    /78   Pulse 79   Ht 5' 6" (1.676 m)   Wt 90.2 kg (198 lb 12.8 oz)   BMI 32.09 kg/m²   GEN: Well appearing, " cooperative, NAD  NECK: Supple, no LAD  CV: Normal rate  RESP: Unlabored  ABD: ND, no guarding  EXT: No clubbing, cyanosis. Trace bilateral lower extremity edema.  SKIN: Warm and dry  NEURO: AAO x4.     LABS    CBC (with or without Differential):   Lab Results   Component Value Date    WBC 7.75 10/08/2024    HGB 11.6 (L) 10/08/2024    HCT 37.3 (L) 10/08/2024    MCV 91.6 10/08/2024    MCH 28.5 10/08/2024    MCHC 31.1 (L) 10/08/2024    RDW 14.4 10/08/2024     10/08/2024    MPV 9.2 (L) 10/08/2024    NEUTOPHILPCT 49.8 (L) 10/08/2024    DIFFTYPE Auto 10/08/2024     BMP/CMP:   Lab Results   Component Value Date     10/08/2024    K 3.5 10/08/2024     (H) 10/08/2024    CO2 31 10/08/2024    BUN 11 10/08/2024    CREATININE 0.86 10/08/2024    GLU 94 10/08/2024    CALCIUM 8.8 10/08/2024    ALBUMIN 3.6 10/08/2024    AST 32 10/08/2024    ALT 29 10/08/2024    ALKPHOS 193 (H) 10/08/2024        IMAGING  Ultrasound abdomen limited liver 06/2024  - No evidence of abnormality demonstrated.     Ultrasound elastography liver 6/324  - Metavir score F0-1    Ultrasound abdomen limited with elastography 07/2021  - No significant sonographic abnormality.  - Normal to mild fibrosis (F0-F1 METAVIR score), with minimal risk of clinically significant fibrosis.  No followup is required.       ASSESSMENT  Nneka Gomes is a 63 y.o. AAF with history of COPD, hypertension, hyperlipidemia, GERD, and elevated liver enzymes who is referred for elevated liver enzymes.    1. Esophageal dysphagia    2. Weight loss    3. Elevated liver enzymes    4. Esophageal stricture           PLAN    - schedule EGD, discussed procedure with patient and possible esophageal dilation may be performed during procedure if indicated, patient verbalized understanding  - possible UGI/esophagram/esophageal manometry if symptoms persist  - CT abdomen plus triphasic for elevated liver enzymes and weight loss, RUQ abdominal pain  - labs as detailed below  -  refill for Linzess sent to pharmacy    Patient Instructions   - Take all of your medicines as instructed.  - Avoid alcohol. Alcohol can make liver problems worse.  - Eat a healthy diet with plenty of vegetables, fruits, and whole grains.  - Get regular physical activity. Even gentle activity, like walking, is good for your health.  - Treat your high blood sugar if you have diabetes  - Treat your high cholesterol if you have it         Orders Placed This Encounter   Procedures    CT Abdomen w/o Contrast Plus Triphasic w/Contrast     Standing Status:   Future     Standing Expiration Date:   10/8/2025     Order Specific Question:   Oral/Rectal Contrast instructions:     Answer:   Routine Oral Contrast     Order Specific Question:   Special CT ABD Protocol Request?     Answer:   Triple Phase Liver     Order Specific Question:   May the Radiologist modify the order per protocol to meet the clinical needs of the patient?     Answer:   Yes    CBC Auto Differential     Standing Status:   Future     Number of Occurrences:   1     Standing Expiration Date:   12/8/2025    Comprehensive Metabolic Panel     Standing Status:   Future     Number of Occurrences:   1     Standing Expiration Date:   12/8/2025    Protime-INR     Standing Status:   Future     Number of Occurrences:   1     Standing Expiration Date:   12/8/2025    TSH     Standing Status:   Future     Number of Occurrences:   1     Standing Expiration Date:   1/6/2026    Ferritin     Standing Status:   Future     Number of Occurrences:   1     Standing Expiration Date:   12/8/2025    Iron and TIBC     Standing Status:   Future     Number of Occurrences:   1     Standing Expiration Date:   12/8/2025         The risks and benefits of my recommendations, as well as other treatment options were discussed with the patient today. All questions were answered.    35 minutes of total time spent on the encounter, which includes face to face time and non-face to face time  preparing to see the patient (eg, review of tests), obtaining and/or reviewing separately obtained history, documenting clinical information in the electronic or other health record, Independently interpreting results (not separately reported) and communicating results to the patient/family/caregiver, or care coordination (not separately reported).        Angela Sanabria, FNP/ACNP  [facility]

## 2024-10-11 ENCOUNTER — HOSPITAL ENCOUNTER (OUTPATIENT)
Dept: RADIOLOGY | Facility: HOSPITAL | Age: 64
Discharge: HOME | End: 2024-10-11
Attending: FAMILY MEDICINE
Payer: MEDICARE

## 2024-10-11 VITALS — BODY MASS INDEX: 31.82 KG/M2 | HEIGHT: 66 IN | WEIGHT: 198 LBS

## 2024-10-11 DIAGNOSIS — Z12.31 OTHER SCREENING MAMMOGRAM: ICD-10-CM

## 2024-10-11 PROCEDURE — 77063 BREAST TOMOSYNTHESIS BI: CPT | Mod: 26,,, | Performed by: RADIOLOGY

## 2024-10-11 PROCEDURE — 77067 SCR MAMMO BI INCL CAD: CPT | Mod: 26,,, | Performed by: RADIOLOGY

## 2024-10-11 PROCEDURE — 77067 SCR MAMMO BI INCL CAD: CPT | Mod: TC

## 2024-10-17 ENCOUNTER — ANESTHESIA (OUTPATIENT)
Dept: GASTROENTEROLOGY | Facility: HOSPITAL | Age: 64
End: 2024-10-17
Payer: MEDICARE

## 2024-10-17 ENCOUNTER — HOSPITAL ENCOUNTER (OUTPATIENT)
Dept: GASTROENTEROLOGY | Facility: HOSPITAL | Age: 64
Discharge: HOME | End: 2024-10-17
Admitting: INTERNAL MEDICINE
Payer: MEDICARE

## 2024-10-17 ENCOUNTER — ANESTHESIA EVENT (OUTPATIENT)
Dept: GASTROENTEROLOGY | Facility: HOSPITAL | Age: 64
End: 2024-10-17
Payer: MEDICARE

## 2024-10-17 VITALS
SYSTOLIC BLOOD PRESSURE: 136 MMHG | DIASTOLIC BLOOD PRESSURE: 78 MMHG | BODY MASS INDEX: 31.82 KG/M2 | OXYGEN SATURATION: 95 % | TEMPERATURE: 97 F | WEIGHT: 198 LBS | RESPIRATION RATE: 16 BRPM | HEIGHT: 66 IN | HEART RATE: 62 BPM

## 2024-10-17 DIAGNOSIS — R13.19 ESOPHAGEAL DYSPHAGIA: ICD-10-CM

## 2024-10-17 PROCEDURE — 88305 TISSUE EXAM BY PATHOLOGIST: CPT | Mod: TC,SUR | Performed by: INTERNAL MEDICINE

## 2024-10-17 PROCEDURE — 37000009 HC ANESTHESIA EA ADD 15 MINS

## 2024-10-17 PROCEDURE — 27201423 OPTIME MED/SURG SUP & DEVICES STERILE SUPPLY

## 2024-10-17 PROCEDURE — 63600175 PHARM REV CODE 636 W HCPCS

## 2024-10-17 PROCEDURE — 37000008 HC ANESTHESIA 1ST 15 MINUTES

## 2024-10-17 RX ORDER — SODIUM CHLORIDE 0.9 % (FLUSH) 0.9 %
10 SYRINGE (ML) INJECTION
Status: DISCONTINUED | OUTPATIENT
Start: 2024-10-17 | End: 2024-10-18 | Stop reason: HOSPADM

## 2024-10-17 RX ORDER — FENTANYL CITRATE 50 UG/ML
INJECTION, SOLUTION INTRAMUSCULAR; INTRAVENOUS
Status: DISCONTINUED | OUTPATIENT
Start: 2024-10-17 | End: 2024-10-17

## 2024-10-17 RX ORDER — PROPOFOL 10 MG/ML
VIAL (ML) INTRAVENOUS
Status: DISCONTINUED | OUTPATIENT
Start: 2024-10-17 | End: 2024-10-17

## 2024-10-17 RX ORDER — LIDOCAINE HYDROCHLORIDE 20 MG/ML
INJECTION, SOLUTION EPIDURAL; INFILTRATION; INTRACAUDAL; PERINEURAL
Status: DISCONTINUED | OUTPATIENT
Start: 2024-10-17 | End: 2024-10-17

## 2024-10-17 RX ADMIN — LIDOCAINE HYDROCHLORIDE 40 MG: 20 INJECTION, SOLUTION INTRAVENOUS at 02:10

## 2024-10-17 RX ADMIN — PROPOFOL 40 MG: 10 INJECTION, EMULSION INTRAVENOUS at 02:10

## 2024-10-17 RX ADMIN — FENTANYL CITRATE 25 MCG: 50 INJECTION, SOLUTION INTRAMUSCULAR; INTRAVENOUS at 02:10

## 2024-10-17 RX ADMIN — PROPOFOL 70 MG: 10 INJECTION, EMULSION INTRAVENOUS at 02:10

## 2024-10-17 RX ADMIN — PROPOFOL 30 MG: 10 INJECTION, EMULSION INTRAVENOUS at 02:10

## 2024-10-17 RX ADMIN — FENTANYL CITRATE 50 MCG: 50 INJECTION, SOLUTION INTRAMUSCULAR; INTRAVENOUS at 02:10

## 2024-10-17 NOTE — ANESTHESIA PREPROCEDURE EVALUATION
10/17/2024  Nneka Gomes is a 63 y.o., female.  Current Outpatient Medications on File Prior to Encounter   Medication Sig Dispense Refill    albuterol (PROAIR HFA) 90 mcg/actuation inhaler Inhale 2 puffs into the lungs every 4 (four) hours as needed for Wheezing. 18 g 5    albuterol (PROVENTIL) 2.5 mg /3 mL (0.083 %) nebulizer solution Take 3 mLs (2.5 mg total) by nebulization 4 (four) times daily as needed for Wheezing. 100 each 5    azelastine (ASTELIN) 137 mcg (0.1 %) nasal spray USE 2 SPRAYS NASALLY TWICE A DAY 90 mL 3    buPROPion (WELLBUTRIN XL) 300 MG 24 hr tablet Take 1 tablet (300 mg total) by mouth once daily. 90 tablet 1    fluticasone propionate (FLONASE) 50 mcg/actuation nasal spray 2 sprays (100 mcg total) by Each Nostril route once daily. 16 g 5    fluticasone-umeclidin-vilanter (TRELEGY ELLIPTA) 200-62.5-25 mcg inhaler Inhale 1 puff into the lungs once daily. 60 each 5    gabapentin (NEURONTIN) 100 MG capsule Take 1 capsule (100 mg total) by mouth 3 (three) times daily. 180 capsule 1    HYDROcodone-acetaminophen (NORCO)  mg per tablet Take 1 tablet by mouth every 8 (eight) hours as needed (Chronic lumbar disc disease). 90 tablet 0    levocetirizine (XYZAL) 5 MG tablet Take 1 tablet (5 mg total) by mouth every evening. 90 tablet 3    linaCLOtide (LINZESS) 145 mcg Cap capsule Take 1 capsule (145 mcg total) by mouth before breakfast. 90 capsule 3    pantoprazole (PROTONIX) 40 MG tablet Take 1 tablet (40 mg total) by mouth once daily. 90 tablet 1    RESTASIS 0.05 % ophthalmic emulsion       rosuvastatin (CRESTOR) 20 MG tablet Take 1 tablet (20 mg total) by mouth once daily. 90 tablet 1    theophylline (THEODUR) 300 mg 24 hr capsule Take 300 mg by mouth once daily. One tab Daily with food: (replaced previous script for 100mg tab 3 daily) 90 capsule 3    valsartan (DIOVAN) 40 MG tablet  Take 1 tablet (40 mg total) by mouth once daily. 90 tablet 1     No current facility-administered medications on file prior to encounter.     Active Ambulatory Problems     Diagnosis Date Noted    Back pain 04/16/2021    Lumbar disc disease 04/16/2021    Long term (current) use of opiate analgesic 04/16/2021    Chronic obstructive pulmonary disease 04/16/2021    Essential hypertension 04/16/2021    Hyperlipidemia 04/16/2021    Gastroesophageal reflux disease 04/16/2021    Chronic left shoulder pain 06/25/2021    Os acromiale of left shoulder 07/12/2021    Chest pain 12/21/2021    Lumbar radiculopathy 02/16/2022    Numbness and tingling of both legs 02/16/2022    Tobacco use 11/21/2022    Esophageal dysphagia 08/29/2023    Chronic hepatitis, unspecified 10/11/2023    Chronic respiratory failure with hypoxia, on home O2 therapy 09/10/2024    Esophageal stricture 09/23/2024     Resolved Ambulatory Problems     Diagnosis Date Noted    No Resolved Ambulatory Problems     Past Medical History:   Diagnosis Date    Abdominal bloating 01/09/2020    Abdominal pain, right upper quadrant 01/09/2020    Abnormal liver enzymes 03/04/2020    Abnormal results of liver function studies 11/18/2020    Acute conjunctivitis 05/30/2014    Acute exacerbation of chronic obstructive airways disease 02/13/2017    Acute pharyngitis 05/19/2020    Acute sinusitis 10/09/2017    Acute upper respiratory infection 05/19/2020    Allergic rhinitis 09/23/2020    Anemia 03/07/2014    Anesthesia of skin 09/06/2017    Bilateral primary osteoarthritis of knee 12/02/2019    Bradycardia 01/16/2020    Carpal tunnel syndrome 08/10/2017    Chronic idiopathic constipation 11/18/2020    Chronic low back pain 11/05/2018    Chronic pain 04/23/2019    Chronic pain syndrome 01/30/2020    COPD (chronic obstructive pulmonary disease) 02/19/2021    Coronavirus infection 05/21/2020    Cough 05/19/2020    Degeneration of lumbar intervertebral disc 09/26/2014    Depressive  disorder 07/03/2019    Disorder of gallbladder 03/30/2015    Dysphagia 01/09/2020    Dyspnea 05/19/2020    Dysuria 05/19/2020    Fatigue 05/19/2020    Frequency of urination 02/16/2015    GERD (gastroesophageal reflux disease) 11/18/2020    Hematuria 06/30/2020    Hemoptysis 02/27/2020    Herpes zoster 09/23/2020    Hypertension 03/06/2020    Joint pain 11/04/2019    Knee pain 01/04/2019    Left inguinal hernia 06/10/2019    Lumbar spondylosis 11/23/2020    Lumbar sprain 07/16/2012    Malaise 11/20/2014    Melena 09/25/2017    Microscopic hematuria 04/02/2020    Migraine without aura 02/06/2012    Nausea 02/27/2020    Nicotine dependence, cigarettes, uncomplicated 11/11/2020    Nonulcer dyspepsia 01/23/2017    On home O2     Other long term (current) drug therapy 09/23/2020    Other specified urinary incontinence 06/17/2020    Pain in left shoulder 05/02/2019    Pain in left wrist 09/06/2017    Pain in right shoulder 08/30/2019    Pain in right wrist 09/04/2018    Palpitations 03/06/2020    Shoulder joint pain 04/23/2019    Smoker 07/03/2019    Snoring 08/02/2019    Synovial cyst of popliteal space 04/08/2013    Tobacco dependence syndrome 02/19/2021    Unstable angina 01/16/2020    UTI (urinary tract infection) 03/27/2020     Past Surgical History:   Procedure Laterality Date    CARDIAC CATHETERIZATION      CHOLECYSTECTOMY      HERNIA REPAIR      ROTATOR CUFF REPAIR       Social Drivers of Health     Tobacco Use: Medium Risk (10/17/2024)    Patient History     Smoking Tobacco Use: Former     Smokeless Tobacco Use: Former     Passive Exposure: Current   Alcohol Use: Not At Risk (7/9/2024)    AUDIT-C     Frequency of Alcohol Consumption: Never     Average Number of Drinks: Patient does not drink     Frequency of Binge Drinking: Never   Financial Resource Strain: Medium Risk (7/9/2024)    Overall Financial Resource Strain (CARDIA)     Difficulty of Paying Living Expenses: Somewhat hard   Food Insecurity: No Food  Insecurity (7/9/2024)    Hunger Vital Sign     Worried About Running Out of Food in the Last Year: Never true     Ran Out of Food in the Last Year: Never true   Transportation Needs: No Transportation Needs (7/9/2024)    PRAPARE - Transportation     Lack of Transportation (Medical): No     Lack of Transportation (Non-Medical): No   Physical Activity: Insufficiently Active (7/9/2024)    Exercise Vital Sign     Days of Exercise per Week: 7 days     Minutes of Exercise per Session: 20 min   Stress: No Stress Concern Present (7/9/2024)    [employer] - Occupational Stress Questionnaire     Feeling of Stress : Not at all   Housing Stability: Low Risk  (7/9/2024)    Housing Stability Vital Sign     Unable to Pay for Housing in the Last Year: No     Homeless in the Last Year: No   Depression: Low Risk  (9/10/2024)    Depression     Last PHQ-4: Flowsheet Data: 2   Utilities: Not At Risk (7/9/2024)    [facility] Utilities     Threatened with loss of utilities: No   Health Literacy: Adequate Health Literacy (7/9/2024)     Health Literacy     Frequency of need for help with medical instructions: Never   Social Isolation: Not on file       Pre-op Assessment    I have reviewed the Patient Summary Reports.     I have reviewed the Nursing Notes. I have reviewed the NPO Status.   I have reviewed the Medications.     Review of Systems  Anesthesia Hx:  No problems with previous Anesthesia             Denies Family Hx of Anesthesia complications.    Denies Personal Hx of Anesthesia complications.                    Social:  Former Smoker, No Alcohol Use       Hematology/Oncology:    Oncology Normal    -- Anemia:                                  EENT/Dental:  EENT/Dental Normal           Cardiovascular:     Hypertension      Angina     hyperlipidemia   ECG has been reviewed.                            Pulmonary:   COPD Asthma  Shortness of breath                  Renal/:  Renal/ Normal                  Hepatic/GI:     GERD Liver Disease, Hepatitis Esophageal dysphagia  Esophageal stricture             Musculoskeletal:  Arthritis               Neurological:    Neuromuscular Disease,  Headaches           Chronic Pain Syndrome                         Endocrine:  Endocrine Normal          Obesity / BMI > 30  Dermatological:  Skin Normal    Psych:  Psychiatric History                  Chemistry        Component Value Date/Time     10/08/2024 1321    K 3.5 10/08/2024 1321     (H) 10/08/2024 1321    CO2 31 10/08/2024 1321    BUN 11 10/08/2024 1321    CREATININE 0.86 10/08/2024 1321    GLU 94 10/08/2024 1321        Component Value Date/Time    CALCIUM 8.8 10/08/2024 1321    ALKPHOS 193 (H) 10/08/2024 1321    AST 32 10/08/2024 1321    ALT 29 10/08/2024 1321    BILITOT 0.6 10/08/2024 1321    ESTGFRAFRICA 90 12/13/2021 0948    EGFRNONAA 68 05/11/2022 0847        Lab Results   Component Value Date    WBC 7.75 10/08/2024    HGB 11.6 (L) 10/08/2024    HCT 37.3 (L) 10/08/2024    MCV 91.6 10/08/2024     10/08/2024       Results for orders placed or performed in visit on 06/17/24   EKG 12-lead    Collection Time: 06/17/24  9:53 AM   Result Value Ref Range    QRS Duration 82 ms    [facility] QTC Calculation 435 ms    Narrative    Test Reason : R06.00,    Vent. Rate : 080 BPM     Atrial Rate : 080 BPM     P-R Int : 158 ms          QRS Dur : 082 ms      QT Int : 378 ms       P-R-T Axes : 081 077 067 degrees     QTc Int : 435 ms    Normal sinus rhythm  Normal ECG  When compared with ECG of 16-DEC-2021 13:17,  No significant change was found  Confirmed by Gene Kumar DO ([id number]) on 6/18/2024 2:04:38 PM    Referred By: LOTTIE HANDLEY           Confirmed By:Gene Kumar DO     Results for orders placed during the hospital encounter of 08/09/24    Echo    Interpretation Summary    Left Ventricle: The left ventricle is normal in size. Normal wall thickness. There is normal systolic function with a visually estimated  ejection fraction of 55 - 60%. There is normal diastolic function.    Right Ventricle: Normal right ventricular cavity size. Systolic function is normal.    Aortic Valve: The aortic valve is a trileaflet valve.    Tricuspid Valve: There is mild regurgitation.      Physical Exam  General: Well nourished, Cooperative, Alert and Oriented    Airway:  Mallampati: II   Mouth Opening: Normal  TM Distance: Normal  Tongue: Normal  Neck ROM: Normal ROM    Dental:  Partial Dentures    Chest/Lungs:  Normal Respiratory Rate        Anesthesia Plan  Type of Anesthesia, risks & benefits discussed:    Anesthesia Type: MAC  Intra-op Monitoring Plan: Standard ASA Monitors  Post Op Pain Control Plan: multimodal analgesia  Induction:  IV  Informed Consent: Informed consent signed with the Patient and all parties understand the risks and agree with anesthesia plan.  All questions answered. Patient consented to blood products? Yes  ASA Score: 3  Day of Surgery Review of History & Physical: H&P Update referred to the surgeon/provider.I have interviewed and examined the patient. I have reviewed the patient's H&P dated: There are no significant changes.     Ready For Surgery From Anesthesia Perspective.     .

## 2024-10-17 NOTE — ANESTHESIA POSTPROCEDURE EVALUATION
Anesthesia Post Evaluation    Patient: Nneka Gomes    Procedure(s) Performed: *EGD*    Final Anesthesia Type: MAC      Patient location during evaluation: GI PACU  Patient participation: Yes- Able to Participate  Level of consciousness: awake and alert, oriented and awake  Post-procedure vital signs: reviewed and stable  Pain management: adequate  Airway patency: patent    PONV status at discharge: No PONV  Anesthetic complications: no      Cardiovascular status: blood pressure returned to baseline, hemodynamically stable and stable  Respiratory status: unassisted and spontaneous ventilation  Hydration status: euvolemic  Follow-up not needed.              Vitals Value Taken Time   /78 10/17/24 1525   Temp 36.1 °C (97 °F) 10/17/24 1459   Pulse 62 10/17/24 1525   Resp 16 10/17/24 1525   SpO2 95 % 10/17/24 1525         Event Time   Out of Recovery 15:40:10         Pain/Gerri Score: Gerri Score: 10 (10/17/2024  3:06 PM)

## 2024-10-17 NOTE — H&P
Gastroenterology Pre-procedure H&P    History of Present Illness    Nneka Gomes is a 63 y.o. female that  has a past medical history of Abdominal bloating (01/09/2020), Abdominal pain, right upper quadrant (01/09/2020), Abnormal liver enzymes (03/04/2020), Abnormal results of liver function studies (11/18/2020), Acute conjunctivitis (05/30/2014), Acute exacerbation of chronic obstructive airways disease (02/13/2017), Acute pharyngitis (05/19/2020), Acute sinusitis (10/09/2017), Acute upper respiratory infection (05/19/2020), Allergic rhinitis (09/23/2020), Anemia (03/07/2014), Anesthesia of skin (09/06/2017), Bilateral primary osteoarthritis of knee (12/02/2019), Bradycardia (01/16/2020), Carpal tunnel syndrome (08/10/2017), Chest pain (03/06/2020), Chronic idiopathic constipation (11/18/2020), Chronic low back pain (11/05/2018), Chronic pain (04/23/2019), Chronic pain syndrome (01/30/2020), COPD (chronic obstructive pulmonary disease) (02/19/2021), Coronavirus infection (05/21/2020), Cough (05/19/2020), Degeneration of lumbar intervertebral disc (09/26/2014), Depressive disorder (07/03/2019), Disorder of gallbladder (03/30/2015), Dysphagia (01/09/2020), Dyspnea (05/19/2020), Dysuria (05/19/2020), Fatigue (05/19/2020), Frequency of urination (02/16/2015), GERD (gastroesophageal reflux disease) (11/18/2020), Hematuria (06/30/2020), Hemoptysis (02/27/2020), Herpes zoster (09/23/2020), Hyperlipidemia (09/23/2020), Hypertension (03/06/2020), Joint pain (11/04/2019), Knee pain (01/04/2019), Left inguinal hernia (06/10/2019), Long term (current) use of opiate analgesic (02/19/2021), Lumbar spondylosis (11/23/2020), Lumbar sprain (07/16/2012), Malaise (11/20/2014), Melena (09/25/2017), Microscopic hematuria (04/02/2020), Migraine without aura (02/06/2012), Nausea (02/27/2020), Nicotine dependence, cigarettes, uncomplicated (11/11/2020), Nonulcer dyspepsia (01/23/2017), On home O2, Other long term (current) drug therapy  (09/23/2020), Other specified urinary incontinence (06/17/2020), Pain in left shoulder (05/02/2019), Pain in left wrist (09/06/2017), Pain in right shoulder (08/30/2019), Pain in right wrist (09/04/2018), Palpitations (03/06/2020), Shoulder joint pain (04/23/2019), Smoker (07/03/2019), Snoring (08/02/2019), Synovial cyst of popliteal space (04/08/2013), Tobacco dependence syndrome (02/19/2021), Unstable angina (01/16/2020), and UTI (urinary tract infection) (03/27/2020).     Patient with GERD and dysphagia to pills/solids here for dilation. Had dilation last year with improvement in symptoms for many months.       Past Medical History:   Diagnosis Date    Abdominal bloating 01/09/2020    Abdominal pain, right upper quadrant 01/09/2020    Abnormal liver enzymes 03/04/2020    Abnormal results of liver function studies 11/18/2020    Acute conjunctivitis 05/30/2014    Acute exacerbation of chronic obstructive airways disease 02/13/2017    Acute pharyngitis 05/19/2020    Acute sinusitis 10/09/2017    Acute upper respiratory infection 05/19/2020    Allergic rhinitis 09/23/2020    Anemia 03/07/2014    Anesthesia of skin 09/06/2017    bilateral fingers    Bilateral primary osteoarthritis of knee 12/02/2019    Bradycardia 01/16/2020    Carpal tunnel syndrome 08/10/2017    Chest pain 03/06/2020    Chronic idiopathic constipation 11/18/2020    Chronic low back pain 11/05/2018    Chronic pain 04/23/2019    Chronic pain syndrome 01/30/2020    COPD (chronic obstructive pulmonary disease) 02/19/2021    Coronavirus infection 05/21/2020    Cough 05/19/2020    Degeneration of lumbar intervertebral disc 09/26/2014    L3-L4 L4-L5 Greater than L5-S1    Depressive disorder 07/03/2019    Disorder of gallbladder 03/30/2015    Dysphagia 01/09/2020    Dyspnea 05/19/2020    Dysuria 05/19/2020    Fatigue 05/19/2020    Frequency of urination 02/16/2015    GERD (gastroesophageal reflux disease) 11/18/2020    Hematuria 06/30/2020    Hemoptysis  02/27/2020    Herpes zoster 09/23/2020    Hyperlipidemia 09/23/2020    Hypertension 03/06/2020    Joint pain 11/04/2019    Knee pain 01/04/2019    Left inguinal hernia 06/10/2019    Long term (current) use of opiate analgesic 02/19/2021    Lumbar spondylosis 11/23/2020    Lumbar sprain 07/16/2012    Malaise 11/20/2014    Melena 09/25/2017    Microscopic hematuria 04/02/2020    Migraine without aura 02/06/2012    Nausea 02/27/2020    Nicotine dependence, cigarettes, uncomplicated 11/11/2020    Nonulcer dyspepsia 01/23/2017    On home O2     Other long term (current) drug therapy 09/23/2020    Other specified urinary incontinence 06/17/2020    Pain in left shoulder 05/02/2019    Pain in left wrist 09/06/2017    and hand    Pain in right shoulder 08/30/2019    Pain in right wrist 09/04/2018    Right hand    Palpitations 03/06/2020    Shoulder joint pain 04/23/2019    Smoker 07/03/2019    Snoring 08/02/2019    Synovial cyst of popliteal space 04/08/2013    Tobacco dependence syndrome 02/19/2021    Unstable angina 01/16/2020    UTI (urinary tract infection) 03/27/2020       Past Surgical History:   Procedure Laterality Date    CARDIAC CATHETERIZATION      CHOLECYSTECTOMY      HERNIA REPAIR      ROTATOR CUFF REPAIR         Family History   Problem Relation Name Age of Onset    Hypertension Mother      Cancer Maternal Aunt      Rectal cancer Other      Diabetes Other      Heart disease Other      Hypertension Other      Breast cancer Paternal Cousin         Social History     Socioeconomic History    Marital status: Single    Number of children: 1   Tobacco Use    Smoking status: Former     Current packs/day: 0.00     Average packs/day: 1 pack/day for 40.0 years (40.0 ttl pk-yrs)     Types: Cigarettes     Start date: 07/1981     Quit date: 07/2021     Years since quitting: 3.2     Passive exposure: Current    Smokeless tobacco: Former   Substance and Sexual Activity    Alcohol use: Yes     Comment: occasionally    Drug  use: Not Currently     Types: Marijuana    Sexual activity: Not Currently     Social Drivers of Health     Financial Resource Strain: Medium Risk (7/9/2024)    Overall Financial Resource Strain (CARDIA)     Difficulty of Paying Living Expenses: Somewhat hard   Food Insecurity: No Food Insecurity (7/9/2024)    Hunger Vital Sign     Worried About Running Out of Food in the Last Year: Never true     Ran Out of Food in the Last Year: Never true   Transportation Needs: No Transportation Needs (7/9/2024)    PRAPARE - Transportation     Lack of Transportation (Medical): No     Lack of Transportation (Non-Medical): No   Physical Activity: Insufficiently Active (7/9/2024)    Exercise Vital Sign     Days of Exercise per Week: 7 days     Minutes of Exercise per Session: 20 min   Stress: No Stress Concern Present (7/9/2024)    [employer] - Occupational Stress Questionnaire     Feeling of Stress : Not at all   Housing Stability: Low Risk  (7/9/2024)    Housing Stability Vital Sign     Unable to Pay for Housing in the Last Year: No     Homeless in the Last Year: No       Current Outpatient Medications   Medication Sig Dispense Refill    albuterol (PROAIR HFA) 90 mcg/actuation inhaler Inhale 2 puffs into the lungs every 4 (four) hours as needed for Wheezing. 18 g 5    albuterol (PROVENTIL) 2.5 mg /3 mL (0.083 %) nebulizer solution Take 3 mLs (2.5 mg total) by nebulization 4 (four) times daily as needed for Wheezing. 100 each 5    azelastine (ASTELIN) 137 mcg (0.1 %) nasal spray USE 2 SPRAYS NASALLY TWICE A DAY 90 mL 3    buPROPion (WELLBUTRIN XL) 300 MG 24 hr tablet Take 1 tablet (300 mg total) by mouth once daily. 90 tablet 1    fluticasone propionate (FLONASE) 50 mcg/actuation nasal spray 2 sprays (100 mcg total) by Each Nostril route once daily. 16 g 5    fluticasone-umeclidin-vilanter (TRELEGY ELLIPTA) 200-62.5-25 mcg inhaler Inhale 1 puff into the lungs once daily. 60 each 5    gabapentin  "(NEURONTIN) 100 MG capsule Take 1 capsule (100 mg total) by mouth 3 (three) times daily. 180 capsule 1    HYDROcodone-acetaminophen (NORCO)  mg per tablet Take 1 tablet by mouth every 8 (eight) hours as needed (Chronic lumbar disc disease). 90 tablet 0    levocetirizine (XYZAL) 5 MG tablet Take 1 tablet (5 mg total) by mouth every evening. 90 tablet 3    linaCLOtide (LINZESS) 145 mcg Cap capsule Take 1 capsule (145 mcg total) by mouth before breakfast. 90 capsule 3    pantoprazole (PROTONIX) 40 MG tablet Take 1 tablet (40 mg total) by mouth once daily. 90 tablet 1    RESTASIS 0.05 % ophthalmic emulsion       rosuvastatin (CRESTOR) 20 MG tablet Take 1 tablet (20 mg total) by mouth once daily. 90 tablet 1    theophylline (THEODUR) 300 mg 24 hr capsule Take 300 mg by mouth once daily. One tab Daily with food: (replaced previous script for 100mg tab 3 daily) 90 capsule 3    valsartan (DIOVAN) 40 MG tablet Take 1 tablet (40 mg total) by mouth once daily. 90 tablet 1     No current facility-administered medications for this encounter.       Review of patient's allergies indicates:  No Known Allergies    Objective:  Vitals:    10/17/24 1342   BP: (!) 144/83   Pulse: 75   Resp: 18   Temp: 98.4 °F (36.9 °C)   TempSrc: Oral   SpO2: 98%   Weight: 89.8 kg (198 lb)   Height: 5' 6" (1.676 m)        GEN: normal appearing, NAD, AAO x3  HENT: NCAT, anicteric, OP benign  CV: normal rate, regular rhythm  RESP: CTA, symmetric rise, unlabored  ABD: soft, ND, no guarding or TTP  SKIN: warm and dry  NEURO: grossly afocal    Assessment and Plan:    Proceed with:    EGD for dysphagia       Henok Delarosa MD  Gastroenterology  "

## 2024-10-17 NOTE — DISCHARGE INSTRUCTIONS
Procedure Date  10/17/24     Impression  Overall Impression:   Minimal erythematous mucosa, consistent with gastritis in the antrum; biopsied   The upper third of the esophagus, middle third of the esophagus, lower third of the esophagus, Z-line, cardia, fundus of the stomach, body of the stomach, incisura, duodenal bulb, 1st part of the duodenum and 2nd part of the duodenum appeared normal.  Dilated in the esophagus with Savary-Jayjay dilator to 54 Fr ending size. Dilation caused bleeding, mucosal tears (UES) and improved passage of the scope; post-dilation bleeding was minimal; post-dilation mucosal tears were submucosal        Recommendation  Await pathology results  Continue current medications  Repeat dilation in the future as needed when symptoms recur    Follow up in clinic as needed       Outcome of procedure: successful EGD  Disposition: patient to recovery following procedure; discharge to home when appropriate parameters met  Provisions for follow up: please call my office for any unexpected symptoms like chest or abdominal pain or bleeding following your procedure.  Final Diagnosis: dysphagia, UES occult stricture     NO DRIVING, OPERATING EQUIPMENT, OR SIGNING LEGAL DOCUMENTS FOR 24 HOURS.THE NURSE WILL CALL YOU WITH YOUR BIOPSY RESULTS IN A FEW DAYS. IF YOU HAVE  [facility] MYCHART YOUR RESULTS WILL APPEAR THERE AS WELL.Please call the GI Lab if you have any nausea, vomiting, or abdominal pain.

## 2024-10-18 LAB
DHEA SERPL-MCNC: NORMAL
ESTROGEN SERPL-MCNC: NORMAL PG/ML
INSULIN SERPL-ACNC: NORMAL U[IU]/ML
LAB AP GROSS DESCRIPTION: NORMAL
LAB AP LABORATORY NOTES: NORMAL
T3RU NFR SERPL: NORMAL %

## 2024-10-18 NOTE — PROGRESS NOTES
Zaira, please call the patient and let her know that the biopsies showed mild gastritis but were otherwise normal. Continue current medications. Thank you!

## 2024-10-22 DIAGNOSIS — J30.9 ALLERGIC RHINITIS, UNSPECIFIED SEASONALITY, UNSPECIFIED TRIGGER: ICD-10-CM

## 2024-10-23 ENCOUNTER — HOSPITAL ENCOUNTER (OUTPATIENT)
Dept: RADIOLOGY | Facility: HOSPITAL | Age: 64
Discharge: HOME | End: 2024-10-23
Payer: MEDICARE

## 2024-10-23 DIAGNOSIS — R63.4 WEIGHT LOSS: ICD-10-CM

## 2024-10-23 DIAGNOSIS — R74.8 ELEVATED LIVER ENZYMES: ICD-10-CM

## 2024-10-23 RX ORDER — AZELASTINE 1 MG/ML
SPRAY, METERED NASAL
Qty: 90 ML | Refills: 3 | Status: SHIPPED | OUTPATIENT
Start: 2024-10-23

## 2024-10-23 NOTE — NURSING
Patient is in Clinic CT today. Kalpesh reported IV infiltrated. Patient received less than 10ml of contrast to Right AC. Upon arrival Romy had patient's arm elevated and warm compress on patient. No swelling noted nor any problems with extremities. Instructed patient to keep arm elevated above heart today, alternated warm and cold compresses, and Tylenol prn. Also, instructed if any problems to call back to [facility]. Let patient know I would call and follow up tomorrow. Patient instructed me to call [phone].

## 2024-10-25 ENCOUNTER — TELEPHONE (OUTPATIENT)
Dept: GASTROENTEROLOGY | Facility: CLINIC | Age: 64
End: 2024-10-25
Payer: MEDICARE

## 2024-10-25 NOTE — TELEPHONE ENCOUNTER
Tried patient multiple times with no answer and the mailbox is full.     ----- Message from Henok Delarosa MD sent at 10/18/2024  1:36 PM CDT -----  Zaira, please call the patient and let her know that the biopsies showed mild gastritis but were otherwise normal. Continue current medications. Thank you!

## 2024-10-28 ENCOUNTER — OFFICE VISIT (OUTPATIENT)
Dept: OBSTETRICS AND GYNECOLOGY | Facility: CLINIC | Age: 64
End: 2024-10-28
Payer: MEDICARE

## 2024-10-28 VITALS — HEIGHT: 66 IN | OXYGEN SATURATION: 100 % | HEART RATE: 100 BPM | WEIGHT: 194.38 LBS | BODY MASS INDEX: 31.24 KG/M2

## 2024-10-28 DIAGNOSIS — R87.618 PAP SMEAR ABNORMALITY OF CERVIX/HUMAN PAPILLOMAVIRUS (HPV) POSITIVE: Primary | ICD-10-CM

## 2024-10-28 DIAGNOSIS — N81.11 CYSTOCELE, MIDLINE: ICD-10-CM

## 2024-10-28 DIAGNOSIS — N39.46 MIXED URINARY INCONTINENCE DUE TO FEMALE GENITAL PROLAPSE: ICD-10-CM

## 2024-10-28 DIAGNOSIS — N81.0 URETHROCELE, FEMALE: ICD-10-CM

## 2024-10-28 DIAGNOSIS — N89.8 VAGINAL MASS: ICD-10-CM

## 2024-10-28 DIAGNOSIS — N95.0 POSTMENOPAUSAL BLEEDING: ICD-10-CM

## 2024-10-28 DIAGNOSIS — N89.8 VAGINAL DISCHARGE: ICD-10-CM

## 2024-10-28 DIAGNOSIS — N81.9 MIXED URINARY INCONTINENCE DUE TO FEMALE GENITAL PROLAPSE: ICD-10-CM

## 2024-10-28 LAB
CANDIDA SPECIES: NEGATIVE
GARDNERELLA: POSITIVE
TRICHOMONAS: NEGATIVE

## 2024-10-28 PROCEDURE — 99999 PR PBB SHADOW E&M-EST. PATIENT-LVL V: CPT | Mod: PBBFAC,,, | Performed by: OBSTETRICS & GYNECOLOGY

## 2024-10-28 PROCEDURE — 87480 CANDIDA DNA DIR PROBE: CPT | Performed by: OBSTETRICS & GYNECOLOGY

## 2024-10-28 PROCEDURE — 99215 OFFICE O/P EST HI 40 MIN: CPT | Mod: PBBFAC | Performed by: OBSTETRICS & GYNECOLOGY

## 2024-10-30 DIAGNOSIS — N76.0 BACTERIAL VAGINOSIS: Primary | ICD-10-CM

## 2024-10-30 DIAGNOSIS — B96.89 BACTERIAL VAGINOSIS: Primary | ICD-10-CM

## 2024-10-30 RX ORDER — METRONIDAZOLE 500 MG/1
500 TABLET ORAL EVERY 12 HOURS
Qty: 14 TABLET | Refills: 0 | Status: SHIPPED | OUTPATIENT
Start: 2024-10-30 | End: 2024-11-06

## 2024-10-31 ENCOUNTER — TELEPHONE (OUTPATIENT)
Dept: OBSTETRICS AND GYNECOLOGY | Facility: CLINIC | Age: 64
End: 2024-10-31
Payer: MEDICARE

## 2024-11-01 ENCOUNTER — TELEPHONE (OUTPATIENT)
Dept: OBSTETRICS AND GYNECOLOGY | Facility: CLINIC | Age: 64
End: 2024-11-01
Payer: MEDICARE

## 2024-11-06 ENCOUNTER — OFFICE VISIT (OUTPATIENT)
Dept: FAMILY MEDICINE | Facility: CLINIC | Age: 64
End: 2024-11-06
Payer: MEDICARE

## 2024-11-06 ENCOUNTER — TELEPHONE (OUTPATIENT)
Dept: OBSTETRICS AND GYNECOLOGY | Facility: CLINIC | Age: 64
End: 2024-11-06
Payer: MEDICARE

## 2024-11-06 VITALS
WEIGHT: 195 LBS | BODY MASS INDEX: 31.34 KG/M2 | HEIGHT: 66 IN | HEART RATE: 88 BPM | DIASTOLIC BLOOD PRESSURE: 82 MMHG | TEMPERATURE: 98 F | OXYGEN SATURATION: 96 % | SYSTOLIC BLOOD PRESSURE: 136 MMHG | RESPIRATION RATE: 20 BRPM

## 2024-11-06 DIAGNOSIS — Z23 NEED FOR IMMUNIZATION AGAINST INFLUENZA: ICD-10-CM

## 2024-11-06 DIAGNOSIS — I10 HYPERTENSION, UNSPECIFIED TYPE: ICD-10-CM

## 2024-11-06 DIAGNOSIS — M51.9 LUMBAR DISC DISEASE: Primary | ICD-10-CM

## 2024-11-06 PROCEDURE — 4010F ACE/ARB THERAPY RXD/TAKEN: CPT | Mod: ,,, | Performed by: FAMILY MEDICINE

## 2024-11-06 PROCEDURE — 1159F MED LIST DOCD IN RCRD: CPT | Mod: ,,, | Performed by: FAMILY MEDICINE

## 2024-11-06 PROCEDURE — 3079F DIAST BP 80-89 MM HG: CPT | Mod: ,,, | Performed by: FAMILY MEDICINE

## 2024-11-06 PROCEDURE — 3008F BODY MASS INDEX DOCD: CPT | Mod: ,,, | Performed by: FAMILY MEDICINE

## 2024-11-06 PROCEDURE — 99213 OFFICE O/P EST LOW 20 MIN: CPT | Mod: 25,,, | Performed by: FAMILY MEDICINE

## 2024-11-06 PROCEDURE — 90661 CCIIV3 VAC ABX FR 0.5 ML IM: CPT | Mod: ,,, | Performed by: FAMILY MEDICINE

## 2024-11-06 PROCEDURE — G0008 ADMIN INFLUENZA VIRUS VAC: HCPCS | Mod: ,,, | Performed by: FAMILY MEDICINE

## 2024-11-06 PROCEDURE — 3075F SYST BP GE 130 - 139MM HG: CPT | Mod: ,,, | Performed by: FAMILY MEDICINE

## 2024-11-06 PROCEDURE — 3044F HG A1C LEVEL LT 7.0%: CPT | Mod: ,,, | Performed by: FAMILY MEDICINE

## 2024-11-06 PROCEDURE — 1160F RVW MEDS BY RX/DR IN RCRD: CPT | Mod: ,,, | Performed by: FAMILY MEDICINE

## 2024-11-06 RX ORDER — HYDROCODONE BITARTRATE AND ACETAMINOPHEN 10; 325 MG/1; MG/1
1 TABLET ORAL EVERY 8 HOURS PRN
Qty: 90 TABLET | Refills: 0 | Status: SHIPPED | OUTPATIENT
Start: 2024-11-06 | End: 2024-11-06

## 2024-11-06 RX ORDER — VALSARTAN 40 MG/1
40 TABLET ORAL DAILY
Qty: 90 TABLET | Refills: 1 | Status: SHIPPED | OUTPATIENT
Start: 2024-11-06 | End: 2025-05-05

## 2024-11-06 RX ORDER — HYDROCODONE BITARTRATE AND ACETAMINOPHEN 10; 325 MG/1; MG/1
1 TABLET ORAL EVERY 8 HOURS PRN
Qty: 90 TABLET | Refills: 0 | Status: SHIPPED | OUTPATIENT
Start: 2024-11-06

## 2024-11-06 NOTE — LETTER
November 6, 2024    Nneka Gomes  [address] [facility] [place] [address]              [facility]  [address] [place] [address] [place] [address]  Phone: [phone]  Fax: [phone]    Ms. Nneka Gomes:    You were previously advised to have a/an colposcopy performed. We have scheduled that appointment for Tuesday, December 3, 2024 at 2:30pm.    Please call the office at your earliest convenience to reschedule this procedure if this timeframe does not work for you.        Sincerely,    Payal Chapin LPN

## 2024-11-06 NOTE — PROGRESS NOTES
Nneka Gomes is a 64 y.o. female seen today for follow-up on her chronic pain syndrome secondary to lumbar disc disease.  She reports excellent symptom control with her Norco with no side effects.  She is active in meeting her ADLs and has had no signs or symptoms of tolerance or addiction.  Her  today was appropriate and urine drug screens have only shown her prescribed opiate.  Today she has no other complaints but is due for her flu vaccine.    Past Medical History:   Diagnosis Date    Abdominal bloating 01/09/2020    Abdominal pain, right upper quadrant 01/09/2020    Abnormal liver enzymes 03/04/2020    Abnormal results of liver function studies 11/18/2020    Acute conjunctivitis 05/30/2014    Acute exacerbation of chronic obstructive airways disease 02/13/2017    Acute pharyngitis 05/19/2020    Acute sinusitis 10/09/2017    Acute upper respiratory infection 05/19/2020    Allergic rhinitis 09/23/2020    Anemia 03/07/2014    Anesthesia of skin 09/06/2017    bilateral fingers    Bilateral primary osteoarthritis of knee 12/02/2019    Bradycardia 01/16/2020    Carpal tunnel syndrome 08/10/2017    Chest pain 03/06/2020    Chronic idiopathic constipation 11/18/2020    Chronic low back pain 11/05/2018    Chronic pain 04/23/2019    Chronic pain syndrome 01/30/2020    COPD (chronic obstructive pulmonary disease) 02/19/2021    Coronavirus infection 05/21/2020    Cough 05/19/2020    Degeneration of lumbar intervertebral disc 09/26/2014    L3-L4 L4-L5 Greater than L5-S1    Depressive disorder 07/03/2019    Disorder of gallbladder 03/30/2015    Dysphagia 01/09/2020    Dyspnea 05/19/2020    Dysuria 05/19/2020    Fatigue 05/19/2020    Frequency of urination 02/16/2015    GERD (gastroesophageal reflux disease) 11/18/2020    Hematuria 06/30/2020    Hemoptysis 02/27/2020    Herpes zoster 09/23/2020    Hyperlipidemia 09/23/2020    Hypertension 03/06/2020    Joint pain 11/04/2019    Knee pain 01/04/2019    Left inguinal  hernia 06/10/2019    Long term (current) use of opiate analgesic 02/19/2021    Lumbar spondylosis 11/23/2020    Lumbar sprain 07/16/2012    Malaise 11/20/2014    Melena 09/25/2017    Microscopic hematuria 04/02/2020    Migraine without aura 02/06/2012    Nausea 02/27/2020    Nicotine dependence, cigarettes, uncomplicated 11/11/2020    Nonulcer dyspepsia 01/23/2017    On home O2     Other long term (current) drug therapy 09/23/2020    Other specified urinary incontinence 06/17/2020    Pain in left shoulder 05/02/2019    Pain in left wrist 09/06/2017    and hand    Pain in right shoulder 08/30/2019    Pain in right wrist 09/04/2018    Right hand    Palpitations 03/06/2020    Shoulder joint pain 04/23/2019    Smoker 07/03/2019    Snoring 08/02/2019    Synovial cyst of popliteal space 04/08/2013    Tobacco dependence syndrome 02/19/2021    Unstable angina 01/16/2020    UTI (urinary tract infection) 03/27/2020     Family History   Problem Relation Name Age of Onset    Hypertension Mother      Cancer Maternal Aunt      Rectal cancer Other      Diabetes Other      Heart disease Other      Hypertension Other      Breast cancer Paternal Cousin       Current Outpatient Medications on File Prior to Visit   Medication Sig Dispense Refill    albuterol (PROAIR HFA) 90 mcg/actuation inhaler Inhale 2 puffs into the lungs every 4 (four) hours as needed for Wheezing. 18 g 5    albuterol (PROVENTIL) 2.5 mg /3 mL (0.083 %) nebulizer solution Take 3 mLs (2.5 mg total) by nebulization 4 (four) times daily as needed for Wheezing. 100 each 5    azelastine (ASTELIN) 137 mcg (0.1 %) nasal spray USE 2 SPRAYS NASALLY TWICE A DAY 90 mL 3    buPROPion (WELLBUTRIN XL) 300 MG 24 hr tablet Take 1 tablet (300 mg total) by mouth once daily. 90 tablet 1    fluticasone propionate (FLONASE) 50 mcg/actuation nasal spray 2 sprays (100 mcg total) by Each Nostril route once daily. 16 g 5    fluticasone-umeclidin-vilanter (TRELEGY ELLIPTA) 200-62.5-25 mcg  inhaler Inhale 1 puff into the lungs once daily. 60 each 5    gabapentin (NEURONTIN) 100 MG capsule Take 1 capsule (100 mg total) by mouth 3 (three) times daily. 180 capsule 1    levocetirizine (XYZAL) 5 MG tablet Take 1 tablet (5 mg total) by mouth every evening. 90 tablet 3    linaCLOtide (LINZESS) 145 mcg Cap capsule Take 1 capsule (145 mcg total) by mouth before breakfast. 90 capsule 3    metroNIDAZOLE (FLAGYL) 500 MG tablet Take 1 tablet (500 mg total) by mouth every 12 (twelve) hours. for 7 days 14 tablet 0    pantoprazole (PROTONIX) 40 MG tablet Take 1 tablet (40 mg total) by mouth once daily. 90 tablet 1    RESTASIS 0.05 % ophthalmic emulsion       rosuvastatin (CRESTOR) 20 MG tablet Take 1 tablet (20 mg total) by mouth once daily. 90 tablet 1    theophylline (THEODUR) 300 mg 24 hr capsule Take 300 mg by mouth once daily. One tab Daily with food: (replaced previous script for 100mg tab 3 daily) 90 capsule 3    [DISCONTINUED] HYDROcodone-acetaminophen (NORCO)  mg per tablet Take 1 tablet by mouth every 8 (eight) hours as needed (Chronic lumbar disc disease). 90 tablet 0    [DISCONTINUED] valsartan (DIOVAN) 40 MG tablet Take 1 tablet (40 mg total) by mouth once daily. 90 tablet 1     No current facility-administered medications on file prior to visit.     Immunization History   Administered Date(s) Administered    COVID-19, MRNA, LN-S, PF (Pfizer) (Purple Cap) 04/29/2021, 10/28/2021, 04/29/2022    Influenza - Quadrivalent - PF *Preferred* (6 months and older) 09/16/2021, 10/11/2023    Influenza Split 10/03/2019    Pneumococcal Conjugate - 13 Valent 11/05/2018    Pneumococcal Conjugate - 20 Valent 01/09/2023    Pneumococcal Polysaccharide - 23 Valent 11/05/2019       Review of Systems   Constitutional:  Negative for fever, malaise/fatigue and weight loss.   Respiratory:  Positive for shortness of breath.    Cardiovascular:  Negative for chest pain and palpitations.   Gastrointestinal:  Negative for nausea  and vomiting.   Musculoskeletal:  Positive for back pain and myalgias.   Psychiatric/Behavioral:  Negative for depression.         Vitals:    11/06/24 1437   BP: 136/82   Pulse: 88   Resp: 20   Temp: 98.3 °F (36.8 °C)       Physical Exam  Vitals reviewed.   Constitutional:       Appearance: Normal appearance.   HENT:      Head: Normocephalic.   Eyes:      Extraocular Movements: Extraocular movements intact.      Conjunctiva/sclera: Conjunctivae normal.      Pupils: Pupils are equal, round, and reactive to light.   Neck:      Thyroid: No thyroid mass or thyromegaly.   Cardiovascular:      Rate and Rhythm: Normal rate and regular rhythm.      Heart sounds: Normal heart sounds. No murmur heard.     No gallop.   Pulmonary:      Effort: Pulmonary effort is normal. No respiratory distress.      Breath sounds: Normal breath sounds. Decreased air movement present. No wheezing or rales.   Musculoskeletal:      Lumbar back: Spasms and tenderness present. Decreased range of motion.      Comments: She is slow to rise from a seated position an antalgic gait   Skin:     General: Skin is warm and dry.      Coloration: Skin is not jaundiced or pale.   Neurological:      Mental Status: She is alert.   Psychiatric:         Mood and Affect: Mood normal.         Behavior: Behavior normal.         Thought Content: Thought content normal.         Judgment: Judgment normal.          Assessment and Plan  1. Lumbar disc disease  -     Discontinue: HYDROcodone-acetaminophen (NORCO)  mg per tablet; Take 1 tablet by mouth every 8 (eight) hours as needed (Chronic lumbar disc disease).  Dispense: 90 tablet; Refill: 0  -     HYDROcodone-acetaminophen (NORCO)  mg per tablet; Take 1 tablet by mouth every 8 (eight) hours as needed (Chronic lumbar disc disease).  Dispense: 90 tablet; Refill: 0    2. Need for immunization against influenza  -     influenza (Egg-FREE) (Flucelvax) 45 mcg/0.5 mL IM vaccine (> or = 6 mo) 0.5 mL    3.  Hypertension, unspecified type  -     valsartan (DIOVAN) 40 MG tablet; Take 1 tablet (40 mg total) by mouth once daily.  Dispense: 90 tablet; Refill: 1             Return to clinic in 2 months or as needed.  She received a 2nd prescription for Norco postdated 12/05/2024..    Health Maintenance Topics with due status: Not Due       Topic Last Completion Date    Colorectal Cancer Screening 11/08/2022    Hemoglobin A1c (Prediabetes) 02/08/2024    Cervical Cancer Screening 04/03/2024    Lipid Panel 04/08/2024    LDCT Lung Screen 05/01/2024    Mammogram 10/11/2024

## 2024-11-13 DIAGNOSIS — N76.0 BACTERIAL VAGINOSIS: Primary | ICD-10-CM

## 2024-11-13 DIAGNOSIS — B96.89 BACTERIAL VAGINOSIS: Primary | ICD-10-CM

## 2024-11-14 ENCOUNTER — OFFICE VISIT (OUTPATIENT)
Dept: UROLOGY | Facility: CLINIC | Age: 64
End: 2024-11-14
Payer: MEDICARE

## 2024-11-14 ENCOUNTER — TELEPHONE (OUTPATIENT)
Dept: OBSTETRICS AND GYNECOLOGY | Facility: CLINIC | Age: 64
End: 2024-11-14
Payer: MEDICARE

## 2024-11-14 VITALS
WEIGHT: 197 LBS | HEIGHT: 66 IN | DIASTOLIC BLOOD PRESSURE: 97 MMHG | BODY MASS INDEX: 31.66 KG/M2 | HEART RATE: 99 BPM | SYSTOLIC BLOOD PRESSURE: 151 MMHG | TEMPERATURE: 98 F

## 2024-11-14 DIAGNOSIS — R20.0 NUMBNESS AND TINGLING OF BOTH LEGS: ICD-10-CM

## 2024-11-14 DIAGNOSIS — R20.2 NUMBNESS AND TINGLING OF BOTH LEGS: ICD-10-CM

## 2024-11-14 DIAGNOSIS — I10 ESSENTIAL HYPERTENSION: ICD-10-CM

## 2024-11-14 DIAGNOSIS — K73.9 CHRONIC HEPATITIS, UNSPECIFIED: ICD-10-CM

## 2024-11-14 DIAGNOSIS — M54.16 LUMBAR RADICULOPATHY: ICD-10-CM

## 2024-11-14 DIAGNOSIS — Z79.891 LONG TERM (CURRENT) USE OF OPIATE ANALGESIC: ICD-10-CM

## 2024-11-14 DIAGNOSIS — J44.9 CHRONIC OBSTRUCTIVE PULMONARY DISEASE, UNSPECIFIED COPD TYPE: ICD-10-CM

## 2024-11-14 DIAGNOSIS — N81.0 URETHROCELE, FEMALE: Primary | ICD-10-CM

## 2024-11-14 PROCEDURE — 4010F ACE/ARB THERAPY RXD/TAKEN: CPT | Mod: CPTII,,, | Performed by: UROLOGY

## 2024-11-14 PROCEDURE — 3077F SYST BP >= 140 MM HG: CPT | Mod: CPTII,,, | Performed by: UROLOGY

## 2024-11-14 PROCEDURE — 99204 OFFICE O/P NEW MOD 45 MIN: CPT | Mod: S$PBB,,, | Performed by: UROLOGY

## 2024-11-14 PROCEDURE — 1160F RVW MEDS BY RX/DR IN RCRD: CPT | Mod: CPTII,,, | Performed by: UROLOGY

## 2024-11-14 PROCEDURE — 3044F HG A1C LEVEL LT 7.0%: CPT | Mod: CPTII,,, | Performed by: UROLOGY

## 2024-11-14 PROCEDURE — 1159F MED LIST DOCD IN RCRD: CPT | Mod: CPTII,,, | Performed by: UROLOGY

## 2024-11-14 PROCEDURE — 99215 OFFICE O/P EST HI 40 MIN: CPT | Mod: PBBFAC | Performed by: UROLOGY

## 2024-11-14 PROCEDURE — 3080F DIAST BP >= 90 MM HG: CPT | Mod: CPTII,,, | Performed by: UROLOGY

## 2024-11-14 PROCEDURE — 99999 PR PBB SHADOW E&M-EST. PATIENT-LVL V: CPT | Mod: PBBFAC,,, | Performed by: UROLOGY

## 2024-11-14 PROCEDURE — 3008F BODY MASS INDEX DOCD: CPT | Mod: CPTII,,, | Performed by: UROLOGY

## 2024-11-14 RX ORDER — METRONIDAZOLE 500 MG/1
500 TABLET ORAL EVERY 12 HOURS
Qty: 14 TABLET | Refills: 0 | Status: SHIPPED | OUTPATIENT
Start: 2024-11-14 | End: 2024-11-21

## 2024-11-14 NOTE — PROGRESS NOTES
Assessment:   1. Urethrocele, female  -     Ambulatory referral/consult to Urology  -     Cystoscopy; Future; Expected date: 12/09/2024  -     Prior authorization Order    2. Chronic obstructive pulmonary disease, unspecified COPD type    3. Chronic hepatitis, unspecified    4. Essential hypertension    5. Long term (current) use of opiate analgesic    6. Numbness and tingling of both legs    7. Lumbar radiculopathy         Plan:     The patient has evidence of vaginal atrophy and urethral prolapse and it is not clear whether this is a urethrocele or a diverticulum and so I have scheduled her for cystoscopy to evaluate the urethra.  We discussed that there is a possibility based on the size that if it is a urethrocele that she might respond to nonsurgical treatment with vaginal estrogen cream. Schedule cystoscopy next available.             Dashawn Etienne MD  Urology  11/14/2024          UROLOGY HISTORY AND PHYSICAL EXAM    Subjective:      Patient ID: Nneka Gomes is a 64 y.o. female.    Chief Complaint::   HPI    The patient is a 64-year-old female that is referred to Urology for the evaluation of a urethral bulge that she has noticed over the last 3 months.  She reports that it is bothersome she feels discomfort has not noticed any drainage or bleeding from the bulge and has not noticed that it expressed anything.  She reports that she found the bulge about 3 months ago and examined it with a mirror and sought evaluation where they suspected it was a urethrocele and referred to Urology.  She reports urge incontinence and stress incontinence and reports that the stress incontinence is a small amount and denies any difficulty emptying her bladder.       Past Medical History:   Diagnosis Date    Abdominal bloating 01/09/2020    Abdominal pain, right upper quadrant 01/09/2020    Abnormal liver enzymes 03/04/2020    Abnormal results of liver function studies 11/18/2020    Acute conjunctivitis 05/30/2014    Acute  exacerbation of chronic obstructive airways disease 02/13/2017    Acute pharyngitis 05/19/2020    Acute sinusitis 10/09/2017    Acute upper respiratory infection 05/19/2020    Allergic rhinitis 09/23/2020    Anemia 03/07/2014    Anesthesia of skin 09/06/2017    bilateral fingers    Bilateral primary osteoarthritis of knee 12/02/2019    Bradycardia 01/16/2020    Carpal tunnel syndrome 08/10/2017    Chest pain 03/06/2020    Chronic idiopathic constipation 11/18/2020    Chronic low back pain 11/05/2018    Chronic pain 04/23/2019    Chronic pain syndrome 01/30/2020    COPD (chronic obstructive pulmonary disease) 02/19/2021    Coronavirus infection 05/21/2020    Cough 05/19/2020    Degeneration of lumbar intervertebral disc 09/26/2014    L3-L4 L4-L5 Greater than L5-S1    Depressive disorder 07/03/2019    Disorder of gallbladder 03/30/2015    Dysphagia 01/09/2020    Dyspnea 05/19/2020    Dysuria 05/19/2020    Fatigue 05/19/2020    Frequency of urination 02/16/2015    GERD (gastroesophageal reflux disease) 11/18/2020    Hematuria 06/30/2020    Hemoptysis 02/27/2020    Herpes zoster 09/23/2020    Hyperlipidemia 09/23/2020    Hypertension 03/06/2020    Joint pain 11/04/2019    Knee pain 01/04/2019    Left inguinal hernia 06/10/2019    Long term (current) use of opiate analgesic 02/19/2021    Lumbar spondylosis 11/23/2020    Lumbar sprain 07/16/2012    Malaise 11/20/2014    Melena 09/25/2017    Microscopic hematuria 04/02/2020    Migraine without aura 02/06/2012    Nausea 02/27/2020    Nicotine dependence, cigarettes, uncomplicated 11/11/2020    Nonulcer dyspepsia 01/23/2017    On home O2     Other long term (current) drug therapy 09/23/2020    Other specified urinary incontinence 06/17/2020    Pain in left shoulder 05/02/2019    Pain in left wrist 09/06/2017    and hand    Pain in right shoulder 08/30/2019    Pain in right wrist 09/04/2018    Right hand    Palpitations 03/06/2020    Shoulder joint pain 04/23/2019    Smoker  07/03/2019    Snoring 08/02/2019    Synovial cyst of popliteal space 04/08/2013    Tobacco dependence syndrome 02/19/2021    Unstable angina 01/16/2020    UTI (urinary tract infection) 03/27/2020     Past Surgical History:   Procedure Laterality Date    CARDIAC CATHETERIZATION      CHOLECYSTECTOMY      HERNIA REPAIR      ROTATOR CUFF REPAIR          Current Outpatient Medications on File Prior to Visit   Medication Sig Dispense Refill    albuterol (PROAIR HFA) 90 mcg/actuation inhaler Inhale 2 puffs into the lungs every 4 (four) hours as needed for Wheezing. 18 g 5    albuterol (PROVENTIL) 2.5 mg /3 mL (0.083 %) nebulizer solution Take 3 mLs (2.5 mg total) by nebulization 4 (four) times daily as needed for Wheezing. 100 each 5    azelastine (ASTELIN) 137 mcg (0.1 %) nasal spray USE 2 SPRAYS NASALLY TWICE A DAY 90 mL 3    buPROPion (WELLBUTRIN XL) 300 MG 24 hr tablet Take 1 tablet (300 mg total) by mouth once daily. 90 tablet 1    fluticasone propionate (FLONASE) 50 mcg/actuation nasal spray 2 sprays (100 mcg total) by Each Nostril route once daily. 16 g 5    fluticasone-umeclidin-vilanter (TRELEGY ELLIPTA) 200-62.5-25 mcg inhaler Inhale 1 puff into the lungs once daily. 60 each 5    gabapentin (NEURONTIN) 100 MG capsule Take 1 capsule (100 mg total) by mouth 3 (three) times daily. 180 capsule 1    HYDROcodone-acetaminophen (NORCO)  mg per tablet Take 1 tablet by mouth every 8 (eight) hours as needed (Chronic lumbar disc disease). 90 tablet 0    levocetirizine (XYZAL) 5 MG tablet Take 1 tablet (5 mg total) by mouth every evening. 90 tablet 3    linaCLOtide (LINZESS) 145 mcg Cap capsule Take 1 capsule (145 mcg total) by mouth before breakfast. 90 capsule 3    pantoprazole (PROTONIX) 40 MG tablet Take 1 tablet (40 mg total) by mouth once daily. 90 tablet 1    RESTASIS 0.05 % ophthalmic emulsion       rosuvastatin (CRESTOR) 20 MG tablet Take 1 tablet (20 mg total) by mouth once daily. 90 tablet 1    theophylline  (THEODUR) 300 mg 24 hr capsule Take 300 mg by mouth once daily. One tab Daily with food: (replaced previous script for 100mg tab 3 daily) 90 capsule 3    valsartan (DIOVAN) 40 MG tablet Take 1 tablet (40 mg total) by mouth once daily. 90 tablet 1    metroNIDAZOLE (FLAGYL) 500 MG tablet Take 1 tablet (500 mg total) by mouth every 12 (twelve) hours. for 7 days (Patient not taking: Reported on 11/14/2024) 14 tablet 0     No current facility-administered medications on file prior to visit.        Review of patient's allergies indicates:  No Known Allergies  Vitals:    11/14/24 1413   BP: (!) 151/97   Pulse: 99   Temp: 98.3 °F (36.8 °C)          Review of Systems   Constitutional:  Negative for activity change and fever.   HENT:  Negative for mouth sores.    Respiratory:  Negative for wheezing.    Gastrointestinal:  Negative for anal bleeding.   Genitourinary:  Positive for genital sores. Negative for difficulty urinating, dysuria, frequency and hematuria.   Skin:  Negative for rash.   Neurological:  Negative for weakness.   Hematological:  Negative for adenopathy.   Psychiatric/Behavioral:  Negative for sleep disturbance.         Objective:     Physical Exam  Vitals and nursing note reviewed.   Constitutional:       Appearance: She is not ill-appearing.   Eyes:      General: No scleral icterus.     Conjunctiva/sclera: Conjunctivae normal.   Cardiovascular:      Rate and Rhythm: Normal rate.   Pulmonary:      Effort: Pulmonary effort is normal.      Breath sounds: No wheezing.   Abdominal:      General: There is no distension.      Palpations: Abdomen is soft. There is no mass.      Tenderness: There is no abdominal tenderness.   Genitourinary:     Comments: The patient was examined with a nurse chaperone and there was evidence of vulval vaginal atrophy with partial urethral prolapse and a bulge on the other side of the urethra that is suspicious for a urethrocele.  It was palpated and there was no drainage.  There was  no tenderness with palpation.    Musculoskeletal:         General: No swelling or deformity.   Skin:     General: Skin is warm and dry.   Neurological:      General: No focal deficit present.      Mental Status: She is alert.   Psychiatric:         Mood and Affect: Mood normal.         Behavior: Behavior normal.         Thought Content: Thought content normal.         Judgment: Judgment normal.            CMP  Sodium   Date Value Ref Range Status   10/08/2024 143 136 - 145 mmol/L Final     Potassium   Date Value Ref Range Status   10/08/2024 3.5 3.5 - 5.1 mmol/L Final     Chloride   Date Value Ref Range Status   10/08/2024 111 (H) 98 - 107 mmol/L Final     CO2   Date Value Ref Range Status   10/08/2024 31 21 - 32 mmol/L Final     Glucose   Date Value Ref Range Status   10/08/2024 94 74 - 106 mg/dL Final     BUN   Date Value Ref Range Status   10/08/2024 11 7 - 18 mg/dL Final     Creatinine   Date Value Ref Range Status   10/08/2024 0.86 0.55 - 1.02 mg/dL Final     Calcium   Date Value Ref Range Status   10/08/2024 8.8 8.5 - 10.1 mg/dL Final     Total Protein   Date Value Ref Range Status   10/08/2024 7.0 6.4 - 8.2 g/dL Final     Albumin   Date Value Ref Range Status   10/08/2024 3.6 3.5 - 5.0 g/dL Final     Bilirubin, Total   Date Value Ref Range Status   10/08/2024 0.6 >0.0 - 1.2 mg/dL Final     Alk Phos   Date Value Ref Range Status   10/08/2024 193 (H) 50 - 130 U/L Final     AST   Date Value Ref Range Status   10/08/2024 32 15 - 37 U/L Final     ALT   Date Value Ref Range Status   10/08/2024 29 13 - 56 U/L Final     Anion Gap   Date Value Ref Range Status   10/08/2024 5 (L) 7 - 16 mmol/L Final     eGFR   Date Value Ref Range Status   10/08/2024 76 >=60 mL/min/1.73m2 Final      BMP  Lab Results   Component Value Date     10/08/2024    K 3.5 10/08/2024     (H) 10/08/2024    CO2 31 10/08/2024    BUN 11 10/08/2024    CREATININE 0.86 10/08/2024    CALCIUM 8.8 10/08/2024    ANIONGAP 5 (L) 10/08/2024     EGFRNORACEVR 76 10/08/2024

## 2024-11-14 NOTE — TELEPHONE ENCOUNTER
----- Message from Tech Laturina sent at 11/13/2024  4:50 PM CST -----  Who Called: Nneka Gomes    Refill or New Rx:Refill  RX Name and Strength: flagyl        Ordering Provider: Mr Brito  [address] [place] [address]      Preferred Method of Contact: Phone Call  Patient's Preferred Phone Number on File: [phone]   Best Call Back Number, if different:  Additional Information: patient said that [facility] sent a letter saying that they could not fill it because it would have to be for a 90 day and its only for a 30 day, so she needs a new prescription to be sent into the pharmacy.

## 2024-11-14 NOTE — TELEPHONE ENCOUNTER
----- Message from Tech Laturina sent at 11/14/2024 10:15 AM CST -----  Who Called: Nneka Gomes    Patient is returning phone call    Who Left Message for Patient:Iman Payne  Does the patient know what this is regarding?: yes      Preferred Method of Contact: Phone Call  Patient's Preferred Phone Number on File: [phone]   Best Call Back Number, if different:  Additional Information: patient return call see phone encounter

## 2024-12-03 ENCOUNTER — PROCEDURE VISIT (OUTPATIENT)
Dept: OBSTETRICS AND GYNECOLOGY | Facility: CLINIC | Age: 64
End: 2024-12-03
Payer: MEDICARE

## 2024-12-03 VITALS
SYSTOLIC BLOOD PRESSURE: 148 MMHG | HEART RATE: 99 BPM | HEIGHT: 66 IN | DIASTOLIC BLOOD PRESSURE: 101 MMHG | BODY MASS INDEX: 31.69 KG/M2 | WEIGHT: 197.19 LBS

## 2024-12-03 DIAGNOSIS — Z01.818 PRE-OP TESTING: Primary | ICD-10-CM

## 2024-12-03 DIAGNOSIS — R87.618 PAP SMEAR ABNORMALITY OF CERVIX/HUMAN PAPILLOMAVIRUS (HPV) POSITIVE: ICD-10-CM

## 2024-12-03 LAB
B-HCG UR QL: NEGATIVE
CTP QC/QA: YES

## 2024-12-03 PROCEDURE — 57454 BX/CURETT OF CERVIX W/SCOPE: CPT | Mod: PBBFAC | Performed by: OBSTETRICS & GYNECOLOGY

## 2024-12-03 PROCEDURE — 88305 TISSUE EXAM BY PATHOLOGIST: CPT | Mod: TC,SUR | Performed by: OBSTETRICS & GYNECOLOGY

## 2024-12-03 PROCEDURE — 81025 URINE PREGNANCY TEST: CPT | Mod: PBBFAC | Performed by: OBSTETRICS & GYNECOLOGY

## 2024-12-03 PROCEDURE — 88305 TISSUE EXAM BY PATHOLOGIST: CPT | Mod: 26,,, | Performed by: PATHOLOGY

## 2024-12-03 PROCEDURE — 99999PBSHW POCT URINE PREGNANCY: Mod: PBBFAC,,,

## 2024-12-09 ENCOUNTER — PROCEDURE VISIT (OUTPATIENT)
Dept: UROLOGY | Facility: CLINIC | Age: 64
End: 2024-12-09
Payer: MEDICARE

## 2024-12-09 VITALS
SYSTOLIC BLOOD PRESSURE: 136 MMHG | HEART RATE: 85 BPM | BODY MASS INDEX: 31.8 KG/M2 | DIASTOLIC BLOOD PRESSURE: 91 MMHG | WEIGHT: 197 LBS | OXYGEN SATURATION: 99 %

## 2024-12-09 DIAGNOSIS — N36.8 PROLAPSE URETHRAL MUCOSA: Primary | ICD-10-CM

## 2024-12-09 DIAGNOSIS — N95.2 VAGINAL ATROPHY: ICD-10-CM

## 2024-12-09 PROBLEM — N81.0 URETHROCELE, FEMALE: Status: ACTIVE | Noted: 2024-12-09

## 2024-12-09 PROBLEM — N81.0 URETHROCELE, FEMALE: Status: RESOLVED | Noted: 2024-12-09 | Resolved: 2024-12-09

## 2024-12-09 PROCEDURE — 52000 CYSTOURETHROSCOPY: CPT | Mod: S$PBB,,, | Performed by: UROLOGY

## 2024-12-09 PROCEDURE — 87086 URINE CULTURE/COLONY COUNT: CPT | Mod: GZ,,, | Performed by: CLINICAL MEDICAL LABORATORY

## 2024-12-09 PROCEDURE — 99499 UNLISTED E&M SERVICE: CPT | Mod: S$PBB,,, | Performed by: UROLOGY

## 2024-12-09 PROCEDURE — 87077 CULTURE AEROBIC IDENTIFY: CPT | Mod: ,,, | Performed by: CLINICAL MEDICAL LABORATORY

## 2024-12-09 PROCEDURE — 52000 CYSTOURETHROSCOPY: CPT | Mod: PBBFAC | Performed by: UROLOGY

## 2024-12-09 NOTE — PROCEDURES
Office Cystoscopy Procedure Note    Date of Procedure: 12/09/2024    Indication:   Suspected urethrocele       Informed consent:  The risks, benefits, complications, treatment options, and expected outcomes were discussed with the patient. The patient concurred with the proposed plan and provided informed consent.     Anesthesia: Lidocaine jelly 2%          Procedure:  The patient was placed in the lithotomy position, was prepped and draped in the usual manner using sterile technique, and 2% lidocaine jelly instilled into the urethra.  A 17 F flexible cystoscope was then inserted into the urethra and the urethra and bladder carefully examined.  The following findings were noted:       Findings:   Urethra:  There was partial urethral prolapse.  There was no diverticulum.       Bladder:  No cystitis or trigonitis    Ureteral orifices:       -Right: Normal       -Left: Normal     Other findings:     Vaginal Atrophy          Specimens: None                   Complications: None; patient tolerated the procedure well            Disposition: Home after brief observation     Antibiotic prophylaxis: Augmentin     Condition: Stable     Plan:    We discussed a Trial of therapy with Premarin cream.  We discussed risks benefits and expected outcome.  We discussed that Premarin cream is a low dose. She wants to hold off on therapy for now. We discussed plans to have her return for discussion in 3 months or sooner if she needs.        Dashawn Etienne MD

## 2024-12-10 ENCOUNTER — TELEPHONE (OUTPATIENT)
Dept: OBSTETRICS AND GYNECOLOGY | Facility: CLINIC | Age: 64
End: 2024-12-10
Payer: MEDICARE

## 2024-12-10 NOTE — TELEPHONE ENCOUNTER
Spoke with pt about results and recommendations; pt voiced understanding and appt for next annual made.       ----- Message from James Muniz MD sent at 12/5/2024  5:03 PM CST -----  Please call her and tell her that her biopsy was negative. Therefore, the recommendation is to repeat PAP and HPV in 1 year.

## 2024-12-11 ENCOUNTER — TELEPHONE (OUTPATIENT)
Dept: OBSTETRICS AND GYNECOLOGY | Facility: CLINIC | Age: 64
End: 2024-12-11
Payer: MEDICARE

## 2024-12-11 LAB — UA COMPLETE W REFLEX CULTURE PNL UR: ABNORMAL

## 2024-12-11 NOTE — LETTER
December 11, 2024            Nneka Gomes      # [id number]  [address] [facility] [address]       Dear Ms. Gomes,    This is a reminder that your appointment with Dr. James Muniz is scheduled for Monday, April 7, 2025 at 9:00 am.  Please let us know if this appointment does not work.          Sincerely,        Payal Chapin LPN

## 2024-12-12 ENCOUNTER — TELEPHONE (OUTPATIENT)
Dept: UROLOGY | Facility: CLINIC | Age: 64
End: 2024-12-12
Payer: MEDICARE

## 2024-12-12 NOTE — TELEPHONE ENCOUNTER
----- Message from Dashawn Etienne MD sent at 12/12/2024 10:22 AM CST -----  Please ask Nneka if she can come to the office tomorrow for a repeat catheterized urine specimen to ensure that she does not have an untreated Streptococcus urinary tract infection.  Please over book the 11:30 a.m. slot if she is available.   I called pt back and spoke with her and made sure she knows to come in tomorrow at 11am to be seen by Dr Etienne and get cathed Urine specimen.  She voiced understanding and said she will be here tomorrow.

## 2024-12-12 NOTE — TELEPHONE ENCOUNTER
----- Message from Dashawn Etienne MD sent at 12/12/2024 10:22 AM CST -----  Please ask Nneka if she can come to the office tomorrow for a repeat catheterized urine specimen to ensure that she does not have an untreated Streptococcus urinary tract infection.  Please over book the 11:30 a.m. slot if she is available.   I called pt and got no answer and unable to leave a voice message.  I called the sister Carly and spoke with her and asked her if she can get in touch with her sister, please tell her to come to urology clinic tomorrow to see Dr Etienne for a  UCX about 11am.  Carly said she will get in touch with her.  I told her to ask pt to call us and let us know if she will be able to make it.

## 2024-12-13 ENCOUNTER — OFFICE VISIT (OUTPATIENT)
Dept: UROLOGY | Facility: CLINIC | Age: 64
End: 2024-12-13
Payer: MEDICARE

## 2024-12-13 VITALS
TEMPERATURE: 99 F | BODY MASS INDEX: 31.82 KG/M2 | DIASTOLIC BLOOD PRESSURE: 89 MMHG | SYSTOLIC BLOOD PRESSURE: 143 MMHG | HEART RATE: 86 BPM | WEIGHT: 198 LBS | HEIGHT: 66 IN | OXYGEN SATURATION: 100 %

## 2024-12-13 DIAGNOSIS — R31.9 URINARY TRACT INFECTION WITH HEMATURIA, SITE UNSPECIFIED: ICD-10-CM

## 2024-12-13 DIAGNOSIS — A49.1 STREPTOCOCCUS INFECTION: ICD-10-CM

## 2024-12-13 DIAGNOSIS — J44.9 CHRONIC OBSTRUCTIVE PULMONARY DISEASE, UNSPECIFIED COPD TYPE: ICD-10-CM

## 2024-12-13 DIAGNOSIS — N30.80 BACTERIAL CYSTITIS: Primary | ICD-10-CM

## 2024-12-13 DIAGNOSIS — N39.0 URINARY TRACT INFECTION WITH HEMATURIA, SITE UNSPECIFIED: ICD-10-CM

## 2024-12-13 DIAGNOSIS — N95.2 VAGINAL ATROPHY: ICD-10-CM

## 2024-12-13 PROCEDURE — 3044F HG A1C LEVEL LT 7.0%: CPT | Mod: CPTII,,, | Performed by: UROLOGY

## 2024-12-13 PROCEDURE — 96372 THER/PROPH/DIAG INJ SC/IM: CPT | Mod: PBBFAC | Performed by: UROLOGY

## 2024-12-13 PROCEDURE — 99999 PR PBB SHADOW E&M-EST. PATIENT-LVL V: CPT | Mod: PBBFAC,,, | Performed by: UROLOGY

## 2024-12-13 PROCEDURE — 1160F RVW MEDS BY RX/DR IN RCRD: CPT | Mod: CPTII,,, | Performed by: UROLOGY

## 2024-12-13 PROCEDURE — 3079F DIAST BP 80-89 MM HG: CPT | Mod: CPTII,,, | Performed by: UROLOGY

## 2024-12-13 PROCEDURE — 99999PBSHW PR PBB SHADOW TECHNICAL ONLY FILED TO HB: Mod: PBBFAC,,,

## 2024-12-13 PROCEDURE — 87086 URINE CULTURE/COLONY COUNT: CPT | Mod: ,,, | Performed by: CLINICAL MEDICAL LABORATORY

## 2024-12-13 PROCEDURE — 4010F ACE/ARB THERAPY RXD/TAKEN: CPT | Mod: CPTII,,, | Performed by: UROLOGY

## 2024-12-13 PROCEDURE — 1159F MED LIST DOCD IN RCRD: CPT | Mod: CPTII,,, | Performed by: UROLOGY

## 2024-12-13 PROCEDURE — 3077F SYST BP >= 140 MM HG: CPT | Mod: CPTII,,, | Performed by: UROLOGY

## 2024-12-13 PROCEDURE — 99215 OFFICE O/P EST HI 40 MIN: CPT | Mod: PBBFAC | Performed by: UROLOGY

## 2024-12-13 PROCEDURE — 3008F BODY MASS INDEX DOCD: CPT | Mod: CPTII,,, | Performed by: UROLOGY

## 2024-12-13 RX ORDER — AMOXICILLIN AND CLAVULANATE POTASSIUM 875; 125 MG/1; MG/1
1 TABLET, FILM COATED ORAL EVERY 12 HOURS
Qty: 20 TABLET | Refills: 0 | Status: SHIPPED | OUTPATIENT
Start: 2024-12-13 | End: 2024-12-23

## 2024-12-13 RX ORDER — CEFTRIAXONE 1 G/1
1 INJECTION, POWDER, FOR SOLUTION INTRAMUSCULAR; INTRAVENOUS
Status: COMPLETED | OUTPATIENT
Start: 2024-12-13 | End: 2024-12-13

## 2024-12-13 RX ADMIN — CEFTRIAXONE SODIUM 1 G: 1 INJECTION, POWDER, FOR SOLUTION INTRAMUSCULAR; INTRAVENOUS at 11:12

## 2024-12-13 NOTE — PROGRESS NOTES
Assessment:   1. Bacterial cystitis  -     amoxicillin-clavulanate 875-125mg (AUGMENTIN) 875-125 mg per tablet; Take 1 tablet by mouth every 12 (twelve) hours. for 10 days  Dispense: 20 tablet; Refill: 0    2. Vaginal atrophy  -     cefTRIAXone injection 1 g    3. Streptococcus infection    4. Chronic obstructive pulmonary disease, unspecified COPD type         Plan:     The patient was treated empirically with ceftriaxone and Augmentin and a catheterized urine specimen was collected  for confirmation testing.     The patient has a history of vaginal atrophy and partial meatal prolapse.     Return for test of cure in 5 weeks            Dashawn Etienne MD  Urology  12/13/2024          UROLOGY HISTORY AND PHYSICAL EXAM    Subjective:      Patient ID: Nneka Gomes is a 64 y.o. female.    Chief Complaint::   HPI    The patient is a 64-year-old female with a history of vaginal atrophy and partial urethral prolapse who presents today for a Streptococcus infection.  She reports urgency and pain at the meatus.  She denies gross hematuria.       Past Medical History:   Diagnosis Date    Abdominal bloating 01/09/2020    Abdominal pain, right upper quadrant 01/09/2020    Abnormal liver enzymes 03/04/2020    Abnormal results of liver function studies 11/18/2020    Acute conjunctivitis 05/30/2014    Acute exacerbation of chronic obstructive airways disease 02/13/2017    Acute pharyngitis 05/19/2020    Acute sinusitis 10/09/2017    Acute upper respiratory infection 05/19/2020    Allergic rhinitis 09/23/2020    Anemia 03/07/2014    Anesthesia of skin 09/06/2017    bilateral fingers    Bilateral primary osteoarthritis of knee 12/02/2019    Bradycardia 01/16/2020    Carpal tunnel syndrome 08/10/2017    Chest pain 03/06/2020    Chronic idiopathic constipation 11/18/2020    Chronic low back pain 11/05/2018    Chronic pain 04/23/2019    Chronic pain syndrome 01/30/2020    COPD (chronic obstructive pulmonary  disease) 02/19/2021    Coronavirus infection 05/21/2020    Cough 05/19/2020    Degeneration of lumbar intervertebral disc 09/26/2014    L3-L4 L4-L5 Greater than L5-S1    Depressive disorder 07/03/2019    Disorder of gallbladder 03/30/2015    Dysphagia 01/09/2020    Dyspnea 05/19/2020    Dysuria 05/19/2020    Fatigue 05/19/2020    Frequency of urination 02/16/2015    GERD (gastroesophageal reflux disease) 11/18/2020    Hematuria 06/30/2020    Hemoptysis 02/27/2020    Herpes zoster 09/23/2020    Hyperlipidemia 09/23/2020    Hypertension 03/06/2020    Joint pain 11/04/2019    Knee pain 01/04/2019    Left inguinal hernia 06/10/2019    Long term (current) use of opiate analgesic 02/19/2021    Lumbar spondylosis 11/23/2020    Lumbar sprain 07/16/2012    Malaise 11/20/2014    Melena 09/25/2017    Microscopic hematuria 04/02/2020    Migraine without aura 02/06/2012    Nausea 02/27/2020    Nicotine dependence, cigarettes, uncomplicated 11/11/2020    Nonulcer dyspepsia 01/23/2017    On home O2     Other long term (current) drug therapy 09/23/2020    Other specified urinary incontinence 06/17/2020    Pain in left shoulder 05/02/2019    Pain in left wrist 09/06/2017    and hand    Pain in right shoulder 08/30/2019    Pain in right wrist 09/04/2018    Right hand    Palpitations 03/06/2020    Shoulder joint pain 04/23/2019    Smoker 07/03/2019    Snoring 08/02/2019    Synovial cyst of popliteal space 04/08/2013    Tobacco dependence syndrome 02/19/2021    Unstable angina 01/16/2020    UTI (urinary tract infection) 03/27/2020     Past Surgical History:   Procedure Laterality Date    CARDIAC CATHETERIZATION      CHOLECYSTECTOMY      HERNIA REPAIR      ROTATOR CUFF REPAIR          Current Outpatient Medications on File Prior to Visit   Medication Sig Dispense Refill    albuterol (PROAIR HFA) 90 mcg/actuation inhaler Inhale 2 puffs into the lungs every 4 (four) hours as needed for  Wheezing. 18 g 5    albuterol (PROVENTIL) 2.5 mg /3 mL (0.083 %) nebulizer solution Take 3 mLs (2.5 mg total) by nebulization 4 (four) times daily as needed for Wheezing. 100 each 5    azelastine (ASTELIN) 137 mcg (0.1 %) nasal spray USE 2 SPRAYS NASALLY TWICE A DAY 90 mL 3    buPROPion (WELLBUTRIN XL) 300 MG 24 hr tablet Take 1 tablet (300 mg total) by mouth once daily. 90 tablet 1    fluticasone propionate (FLONASE) 50 mcg/actuation nasal spray 2 sprays (100 mcg total) by Each Nostril route once daily. 16 g 5    fluticasone-umeclidin-vilanter (TRELEGY ELLIPTA) 200-62.5-25 mcg inhaler Inhale 1 puff into the lungs once daily. 60 each 5    gabapentin (NEURONTIN) 100 MG capsule Take 1 capsule (100 mg total) by mouth 3 (three) times daily. 180 capsule 1    HYDROcodone-acetaminophen (NORCO)  mg per tablet Take 1 tablet by mouth every 8 (eight) hours as needed (Chronic lumbar disc disease). 90 tablet 0    levocetirizine (XYZAL) 5 MG tablet Take 1 tablet (5 mg total) by mouth every evening. 90 tablet 3    linaCLOtide (LINZESS) 145 mcg Cap capsule Take 1 capsule (145 mcg total) by mouth before breakfast. 90 capsule 3    pantoprazole (PROTONIX) 40 MG tablet Take 1 tablet (40 mg total) by mouth once daily. 90 tablet 1    RESTASIS 0.05 % ophthalmic emulsion       rosuvastatin (CRESTOR) 20 MG tablet Take 1 tablet (20 mg total) by mouth once daily. 90 tablet 1    theophylline (THEODUR) 300 mg 24 hr capsule Take 300 mg by mouth once daily. One tab Daily with food: (replaced previous script for 100mg tab 3 daily) 90 capsule 3    valsartan (DIOVAN) 40 MG tablet Take 1 tablet (40 mg total) by mouth once daily. 90 tablet 1     No current facility-administered medications on file prior to visit.        Review of patient's allergies indicates:  No Known Allergies  Vitals:    12/13/24 1049   BP: (!) 143/89   Pulse: 86   Temp: 98.7 °F (37.1 °C)          Review of Systems   Constitutional:  Negative for fatigue.    Cardiovascular:  Negative for leg swelling.   Gastrointestinal:  Negative for abdominal pain.   Endocrine: Positive for polyuria.   Genitourinary:  Positive for dysuria and urgency. Negative for hematuria and vaginal bleeding.   Neurological:  Negative for weakness.   Psychiatric/Behavioral:  Negative for confusion.       Objective:     Physical Exam  Vitals reviewed. Exam conducted with a chaperone present.   Constitutional:       Appearance: Normal appearance. She is not diaphoretic.   Eyes:      Conjunctiva/sclera: Conjunctivae normal.   Cardiovascular:      Rate and Rhythm: Normal rate.   Pulmonary:      Effort: Pulmonary effort is normal. No respiratory distress.   Abdominal:      General: There is no distension.      Palpations: Abdomen is soft.      Tenderness: There is no abdominal tenderness.   Genitourinary:     Comments: A catheterized urine specimen was obtained.   Musculoskeletal:         General: No deformity.   Skin:     General: Skin is warm.   Neurological:      General: No focal deficit present.      Mental Status: She is alert. Mental status is at baseline.   Psychiatric:         Mood and Affect: Mood normal.         Behavior: Behavior normal.         Thought Content: Thought content normal.         Judgment: Judgment normal.            CMP  Sodium   Date Value Ref Range Status   10/08/2024 143 136 - 145 mmol/L Final     Potassium   Date Value Ref Range Status   10/08/2024 3.5 3.5 - 5.1 mmol/L Final     Chloride   Date Value Ref Range Status   10/08/2024 111 (H) 98 - 107 mmol/L Final     CO2   Date Value Ref Range Status   10/08/2024 31 21 - 32 mmol/L Final     Glucose   Date Value Ref Range Status   10/08/2024 94 74 - 106 mg/dL Final     BUN   Date Value Ref Range Status   10/08/2024 11 7 - 18 mg/dL Final     Creatinine   Date Value Ref Range Status   10/08/2024 0.86 0.55 - 1.02 mg/dL Final     Calcium   Date Value Ref Range Status   10/08/2024 8.8 8.5 - 10.1 mg/dL Final     Total Protein    Date Value Ref Range Status   10/08/2024 7.0 6.4 - 8.2 g/dL Final     Albumin   Date Value Ref Range Status   10/08/2024 3.6 3.5 - 5.0 g/dL Final     Bilirubin, Total   Date Value Ref Range Status   10/08/2024 0.6 >0.0 - 1.2 mg/dL Final     Alk Phos   Date Value Ref Range Status   10/08/2024 193 (H) 50 - 130 U/L Final     AST   Date Value Ref Range Status   10/08/2024 32 15 - 37 U/L Final     ALT   Date Value Ref Range Status   10/08/2024 29 13 - 56 U/L Final     Anion Gap   Date Value Ref Range Status   10/08/2024 5 (L) 7 - 16 mmol/L Final     eGFR   Date Value Ref Range Status   10/08/2024 76 >=60 mL/min/1.73m2 Final      BMP  Lab Results   Component Value Date     10/08/2024    K 3.5 10/08/2024     (H) 10/08/2024    CO2 31 10/08/2024    BUN 11 10/08/2024    CREATININE 0.86 10/08/2024    CALCIUM 8.8 10/08/2024    ANIONGAP 5 (L) 10/08/2024    EGFRNORACEVR 76 10/08/2024

## 2024-12-14 LAB — UA COMPLETE W REFLEX CULTURE PNL UR: NORMAL

## 2024-12-24 NOTE — PROCEDURES
Colposcopy W/BIOPSY AND ECC- Future    Date/Time: 12/3/2024 2:30 PM    Performed by: James Muniz MD  Authorized by: James Muniz MD    Consent obatined:  Prior to procedure the appropriate consent was completed and verified  Timeout:Immediately prior to procedure a time out was called to verify the correct patient, procedure, equipment, support staff and site/side marked as required    Colposcopy Site:  Cervix  Position:  Supine  Acrowhite Lesion? Yes    Atypical Vessels: No    Transformation Zone Adequate?: Yes    Biopsy?: Yes         Location:  Cervix ((10 00))  ECC Performed?: Yes    LEEP Performed?: No    Estimated blood loss (cc):  2   Patient tolerated the procedure well with no immediate complications.   Post-operative instructions were provided for the patient.     Colposcopy Procedure Note    Colposcopy Procedure Note    Indications: Pt presents for colposcopy secondary to negative PAP and HPV+.    Procedure Details   The risks and benefits of the procedure were discussed in detail with the patient.  She was aware the risks may include, but are not limited to bleeding, infection and damage to surround structures.  She acknowledged these risks and elected to proceed.  Written consent was obtained.    Urine HCG testing (today): neg    A speculum was placed in vagina and excellent visualization of cervix was achieved, the cervix was swabbed x3 with acetic acid solution. 1 lesion was visualized that was acetowhite at 10 o'clock. Biopsies were obtained at 10 o'clock.  ECC was performed.  Colposcopy was adequate.    Findings:  Cervix: acteowhite lesion at 10 o'clock  Vaginal inspection: Normal without gross visible lesions  Vulvar inspection: Normal without gross visible lesions      Specimens: 10 o'clock cervical biopsy and ECC.    Complications: none.    Plan:  Specimens labeled and sent to Pathology.  Treatment plan will be discussed with results in 2 weeks.

## 2025-01-02 ENCOUNTER — OFFICE VISIT (OUTPATIENT)
Dept: FAMILY MEDICINE | Facility: CLINIC | Age: 65
End: 2025-01-02
Payer: MEDICARE

## 2025-01-02 VITALS
HEIGHT: 66 IN | BODY MASS INDEX: 32.3 KG/M2 | HEART RATE: 84 BPM | DIASTOLIC BLOOD PRESSURE: 84 MMHG | WEIGHT: 201 LBS | RESPIRATION RATE: 18 BRPM | TEMPERATURE: 98 F | OXYGEN SATURATION: 99 % | SYSTOLIC BLOOD PRESSURE: 132 MMHG

## 2025-01-02 DIAGNOSIS — J96.11 CHRONIC RESPIRATORY FAILURE WITH HYPOXIA, ON HOME O2 THERAPY: ICD-10-CM

## 2025-01-02 DIAGNOSIS — K73.9 CHRONIC HEPATITIS, UNSPECIFIED: ICD-10-CM

## 2025-01-02 DIAGNOSIS — F17.200 TOBACCO DEPENDENCE SYNDROME: ICD-10-CM

## 2025-01-02 DIAGNOSIS — K21.9 GASTROESOPHAGEAL REFLUX DISEASE, UNSPECIFIED WHETHER ESOPHAGITIS PRESENT: ICD-10-CM

## 2025-01-02 DIAGNOSIS — Z79.891 LONG TERM (CURRENT) USE OF OPIATE ANALGESIC: ICD-10-CM

## 2025-01-02 DIAGNOSIS — J44.9 CHRONIC OBSTRUCTIVE PULMONARY DISEASE, UNSPECIFIED COPD TYPE: ICD-10-CM

## 2025-01-02 DIAGNOSIS — M51.9 LUMBAR DISC DISEASE: Primary | ICD-10-CM

## 2025-01-02 DIAGNOSIS — E78.5 DYSLIPIDEMIA: ICD-10-CM

## 2025-01-02 DIAGNOSIS — J30.9 ALLERGIC RHINITIS, UNSPECIFIED SEASONALITY, UNSPECIFIED TRIGGER: ICD-10-CM

## 2025-01-02 DIAGNOSIS — Z99.81 CHRONIC RESPIRATORY FAILURE WITH HYPOXIA, ON HOME O2 THERAPY: ICD-10-CM

## 2025-01-02 PROCEDURE — 99213 OFFICE O/P EST LOW 20 MIN: CPT | Mod: ,,, | Performed by: FAMILY MEDICINE

## 2025-01-02 PROCEDURE — 3075F SYST BP GE 130 - 139MM HG: CPT | Mod: ,,, | Performed by: FAMILY MEDICINE

## 2025-01-02 PROCEDURE — 80305 DRUG TEST PRSMV DIR OPT OBS: CPT | Mod: QW,,, | Performed by: FAMILY MEDICINE

## 2025-01-02 PROCEDURE — 3008F BODY MASS INDEX DOCD: CPT | Mod: ,,, | Performed by: FAMILY MEDICINE

## 2025-01-02 PROCEDURE — 1159F MED LIST DOCD IN RCRD: CPT | Mod: ,,, | Performed by: FAMILY MEDICINE

## 2025-01-02 PROCEDURE — 3079F DIAST BP 80-89 MM HG: CPT | Mod: ,,, | Performed by: FAMILY MEDICINE

## 2025-01-02 PROCEDURE — 1160F RVW MEDS BY RX/DR IN RCRD: CPT | Mod: ,,, | Performed by: FAMILY MEDICINE

## 2025-01-02 RX ORDER — PANTOPRAZOLE SODIUM 40 MG/1
40 TABLET, DELAYED RELEASE ORAL DAILY
Qty: 90 TABLET | Refills: 1 | Status: SHIPPED | OUTPATIENT
Start: 2025-01-02 | End: 2025-07-01

## 2025-01-02 RX ORDER — GABAPENTIN 100 MG/1
100 CAPSULE ORAL 3 TIMES DAILY
Qty: 180 CAPSULE | Refills: 1 | Status: SHIPPED | OUTPATIENT
Start: 2025-01-02

## 2025-01-02 RX ORDER — ALBUTEROL SULFATE 90 UG/1
2 INHALANT RESPIRATORY (INHALATION) EVERY 4 HOURS PRN
Qty: 18 G | Refills: 5 | Status: SHIPPED | OUTPATIENT
Start: 2025-01-02

## 2025-01-02 RX ORDER — BUPROPION HYDROCHLORIDE 300 MG/1
300 TABLET ORAL DAILY
Qty: 90 TABLET | Refills: 1 | Status: SHIPPED | OUTPATIENT
Start: 2025-01-02 | End: 2025-07-01

## 2025-01-02 RX ORDER — FLUTICASONE FUROATE, UMECLIDINIUM BROMIDE AND VILANTEROL TRIFENATATE 200; 62.5; 25 UG/1; UG/1; UG/1
1 POWDER RESPIRATORY (INHALATION) DAILY
Qty: 60 EACH | Refills: 5 | Status: SHIPPED | OUTPATIENT
Start: 2025-01-02

## 2025-01-02 RX ORDER — ROSUVASTATIN CALCIUM 20 MG/1
20 TABLET, COATED ORAL DAILY
Qty: 90 TABLET | Refills: 1 | Status: SHIPPED | OUTPATIENT
Start: 2025-01-02

## 2025-01-02 RX ORDER — HYDROCODONE BITARTRATE AND ACETAMINOPHEN 10; 325 MG/1; MG/1
1 TABLET ORAL EVERY 8 HOURS PRN
Qty: 90 TABLET | Refills: 0 | Status: SHIPPED | OUTPATIENT
Start: 2025-01-02

## 2025-01-02 RX ORDER — HYDROCODONE BITARTRATE AND ACETAMINOPHEN 10; 325 MG/1; MG/1
1 TABLET ORAL EVERY 8 HOURS PRN
Qty: 90 TABLET | Refills: 0 | Status: SHIPPED | OUTPATIENT
Start: 2025-01-02 | End: 2025-01-02

## 2025-01-02 RX ORDER — LEVOCETIRIZINE DIHYDROCHLORIDE 5 MG/1
5 TABLET, FILM COATED ORAL NIGHTLY
Qty: 90 TABLET | Refills: 1 | Status: SHIPPED | OUTPATIENT
Start: 2025-01-02

## 2025-01-02 NOTE — PROGRESS NOTES
Nneka Gomes is a 64 y.o. female seen today for follow-up on her chronic pain syndrome secondary to lumbar disc disease.  She reports good symptom control with no side effects and she is meeting her ADLs.  She has had no signs or symptoms of tolerance or addiction and her  today was appropriate.  Today she urine drug screen only showed her prescribed opiate.  Today she has no other complaints.    Past Medical History:   Diagnosis Date    Abdominal bloating 01/09/2020    Abdominal pain, right upper quadrant 01/09/2020    Abnormal liver enzymes 03/04/2020    Abnormal results of liver function studies 11/18/2020    Acute conjunctivitis 05/30/2014    Acute exacerbation of chronic obstructive airways disease 02/13/2017    Acute pharyngitis 05/19/2020    Acute sinusitis 10/09/2017    Acute upper respiratory infection 05/19/2020    Allergic rhinitis 09/23/2020    Anemia 03/07/2014    Anesthesia of skin 09/06/2017    bilateral fingers    Bilateral primary osteoarthritis of knee 12/02/2019    Bradycardia 01/16/2020    Carpal tunnel syndrome 08/10/2017    Chest pain 03/06/2020    Chronic idiopathic constipation 11/18/2020    Chronic low back pain 11/05/2018    Chronic pain 04/23/2019    Chronic pain syndrome 01/30/2020    COPD (chronic obstructive pulmonary disease) 02/19/2021    Coronavirus infection 05/21/2020    Cough 05/19/2020    Degeneration of lumbar intervertebral disc 09/26/2014    L3-L4 L4-L5 Greater than L5-S1    Depressive disorder 07/03/2019    Disorder of gallbladder 03/30/2015    Dysphagia 01/09/2020    Dyspnea 05/19/2020    Dysuria 05/19/2020    Fatigue 05/19/2020    Frequency of urination 02/16/2015    GERD (gastroesophageal reflux disease) 11/18/2020    Hematuria 06/30/2020    Hemoptysis 02/27/2020    Herpes zoster 09/23/2020    Hyperlipidemia 09/23/2020    Hypertension 03/06/2020    Joint pain 11/04/2019    Knee pain 01/04/2019    Left inguinal hernia 06/10/2019    Long term (current) use of opiate  analgesic 02/19/2021    Lumbar spondylosis 11/23/2020    Lumbar sprain 07/16/2012    Malaise 11/20/2014    Melena 09/25/2017    Microscopic hematuria 04/02/2020    Migraine without aura 02/06/2012    Nausea 02/27/2020    Nicotine dependence, cigarettes, uncomplicated 11/11/2020    Nonulcer dyspepsia 01/23/2017    On home O2     Other long term (current) drug therapy 09/23/2020    Other specified urinary incontinence 06/17/2020    Pain in left shoulder 05/02/2019    Pain in left wrist 09/06/2017    and hand    Pain in right shoulder 08/30/2019    Pain in right wrist 09/04/2018    Right hand    Palpitations 03/06/2020    Shoulder joint pain 04/23/2019    Smoker 07/03/2019    Snoring 08/02/2019    Synovial cyst of popliteal space 04/08/2013    Tobacco dependence syndrome 02/19/2021    Unstable angina 01/16/2020    UTI (urinary tract infection) 03/27/2020     Family History   Problem Relation Name Age of Onset    Hypertension Mother      Cancer Maternal Aunt      Rectal cancer Other      Diabetes Other      Heart disease Other      Hypertension Other      Breast cancer Paternal Cousin       Current Outpatient Medications on File Prior to Visit   Medication Sig Dispense Refill    albuterol (PROAIR HFA) 90 mcg/actuation inhaler Inhale 2 puffs into the lungs every 4 (four) hours as needed for Wheezing. 18 g 5    albuterol (PROVENTIL) 2.5 mg /3 mL (0.083 %) nebulizer solution Take 3 mLs (2.5 mg total) by nebulization 4 (four) times daily as needed for Wheezing. 100 each 5    azelastine (ASTELIN) 137 mcg (0.1 %) nasal spray USE 2 SPRAYS NASALLY TWICE A DAY 90 mL 3    buPROPion (WELLBUTRIN XL) 300 MG 24 hr tablet Take 1 tablet (300 mg total) by mouth once daily. 90 tablet 1    fluticasone propionate (FLONASE) 50 mcg/actuation nasal spray 2 sprays (100 mcg total) by Each Nostril route once daily. 16 g 5    fluticasone-umeclidin-vilanter (TRELEGY ELLIPTA) 200-62.5-25 mcg inhaler Inhale 1 puff into the lungs once daily. 60 each  5    gabapentin (NEURONTIN) 100 MG capsule Take 1 capsule (100 mg total) by mouth 3 (three) times daily. 180 capsule 1    HYDROcodone-acetaminophen (NORCO)  mg per tablet Take 1 tablet by mouth every 8 (eight) hours as needed (Chronic lumbar disc disease). 90 tablet 0    levocetirizine (XYZAL) 5 MG tablet Take 1 tablet (5 mg total) by mouth every evening. 90 tablet 3    linaCLOtide (LINZESS) 145 mcg Cap capsule Take 1 capsule (145 mcg total) by mouth before breakfast. 90 capsule 3    pantoprazole (PROTONIX) 40 MG tablet Take 1 tablet (40 mg total) by mouth once daily. 90 tablet 1    RESTASIS 0.05 % ophthalmic emulsion       rosuvastatin (CRESTOR) 20 MG tablet Take 1 tablet (20 mg total) by mouth once daily. 90 tablet 1    theophylline (THEODUR) 300 mg 24 hr capsule Take 300 mg by mouth once daily. One tab Daily with food: (replaced previous script for 100mg tab 3 daily) 90 capsule 3    valsartan (DIOVAN) 40 MG tablet Take 1 tablet (40 mg total) by mouth once daily. 90 tablet 1     No current facility-administered medications on file prior to visit.     Immunization History   Administered Date(s) Administered    COVID-19, MRNA, LN-S, PF (Pfizer) (Purple Cap) 04/29/2021, 10/28/2021, 04/29/2022    Influenza - Quadrivalent - PF *Preferred* (6 months and older) 09/16/2021, 10/11/2023    Influenza - Trivalent - Flucelvax - PF 11/06/2024    Influenza Split 10/03/2019    Pneumococcal Conjugate - 13 Valent 11/05/2018    Pneumococcal Conjugate - 20 Valent 01/09/2023    Pneumococcal Polysaccharide - 23 Valent 11/05/2019       Review of Systems   Constitutional:  Negative for fever, malaise/fatigue and weight loss.   Respiratory:  Positive for shortness of breath.    Cardiovascular:  Negative for chest pain and palpitations.   Gastrointestinal:  Negative for nausea and vomiting.   Musculoskeletal:  Positive for back pain and myalgias.   Psychiatric/Behavioral:  Negative for depression.         There were no vitals filed  for this visit.    Physical Exam  Vitals reviewed.   Constitutional:       Appearance: Normal appearance.   HENT:      Head: Normocephalic.   Eyes:      Extraocular Movements: Extraocular movements intact.      Conjunctiva/sclera: Conjunctivae normal.      Pupils: Pupils are equal, round, and reactive to light.   Neck:      Thyroid: No thyroid mass or thyromegaly.   Cardiovascular:      Rate and Rhythm: Normal rate and regular rhythm.      Heart sounds: Normal heart sounds. No murmur heard.     No gallop.   Pulmonary:      Effort: Pulmonary effort is normal. No respiratory distress.      Breath sounds: Normal breath sounds. Decreased air movement present. No wheezing or rales.   Musculoskeletal:      Lumbar back: Spasms and tenderness present. Decreased range of motion.      Comments: She is slow to rise from a seated position with an antalgic gait   Skin:     General: Skin is warm and dry.      Coloration: Skin is not jaundiced or pale.   Neurological:      Mental Status: She is alert.   Psychiatric:         Mood and Affect: Mood normal.         Behavior: Behavior normal.         Thought Content: Thought content normal.         Judgment: Judgment normal.          Assessment and Plan  1. Lumbar disc disease    2. Chronic hepatitis, unspecified  Assessment & Plan:  We will continue current plan and she wiil follow up with GI as directed.      3. Chronic respiratory failure with hypoxia, on home O2 therapy  Assessment & Plan:  We will continue current treatment plan and monitor for exacerbations      4. Chronic obstructive pulmonary disease, unspecified COPD type  Assessment & Plan:  We will continue current plan and monitor for exacerbations      5. Long term (current) use of opiate analgesic  -     POCT Urine Drug Screen Presump                        Return to clinic in 2 months or as needed    Health Maintenance Topics with due status: Not Due       Topic Last Completion Date    Colorectal Cancer Screening  11/08/2022    Hemoglobin A1c (Prediabetes) 02/08/2024    Cervical Cancer Screening 04/03/2024    Lipid Panel 04/08/2024    LDCT Lung Screen 05/01/2024    Mammogram 10/11/2024

## 2025-01-08 ENCOUNTER — HOSPITAL ENCOUNTER (EMERGENCY)
Facility: HOSPITAL | Age: 65
Discharge: HOME | End: 2025-01-08
Payer: MEDICARE

## 2025-01-08 VITALS
OXYGEN SATURATION: 97 % | SYSTOLIC BLOOD PRESSURE: 128 MMHG | RESPIRATION RATE: 17 BRPM | BODY MASS INDEX: 32.27 KG/M2 | WEIGHT: 199.94 LBS | TEMPERATURE: 98 F | HEART RATE: 98 BPM | DIASTOLIC BLOOD PRESSURE: 85 MMHG

## 2025-01-08 DIAGNOSIS — M79.641 PAIN OF RIGHT HAND: Primary | ICD-10-CM

## 2025-01-08 PROCEDURE — 99284 EMERGENCY DEPT VISIT MOD MDM: CPT | Mod: 25

## 2025-01-08 PROCEDURE — 63600175 PHARM REV CODE 636 W HCPCS: Performed by: NURSE PRACTITIONER

## 2025-01-08 PROCEDURE — 96372 THER/PROPH/DIAG INJ SC/IM: CPT | Performed by: NURSE PRACTITIONER

## 2025-01-08 RX ORDER — KETOROLAC TROMETHAMINE 30 MG/ML
60 INJECTION, SOLUTION INTRAMUSCULAR; INTRAVENOUS
Status: COMPLETED | OUTPATIENT
Start: 2025-01-08 | End: 2025-01-08

## 2025-01-08 RX ORDER — NAPROXEN 375 MG/1
375 TABLET ORAL 2 TIMES DAILY WITH MEALS
Qty: 10 TABLET | Refills: 0 | Status: SHIPPED | OUTPATIENT
Start: 2025-01-08 | End: 2025-01-13

## 2025-01-08 RX ADMIN — KETOROLAC TROMETHAMINE 60 MG: 30 INJECTION, SOLUTION INTRAMUSCULAR at 10:01

## 2025-01-08 NOTE — ED PROVIDER NOTES
Encounter Date: 1/8/2025       History     Chief Complaint   Patient presents with    Hand Pain     Patient presents to ED with c/o pain to right hand that started Monday.  Denies any injury to hand.       64-year-old female presents to the emergency department to be evaluated for right hand pain.  Denies any injury.  She reports she began several days ago and got worse yesterday while she was sweeping.    The history is provided by the patient.     Review of patient's allergies indicates:  No Known Allergies  Past Medical History:   Diagnosis Date    Abdominal bloating 01/09/2020    Abdominal pain, right upper quadrant 01/09/2020    Abnormal liver enzymes 03/04/2020    Abnormal results of liver function studies 11/18/2020    Acute conjunctivitis 05/30/2014    Acute exacerbation of chronic obstructive airways disease 02/13/2017    Acute pharyngitis 05/19/2020    Acute sinusitis 10/09/2017    Acute upper respiratory infection 05/19/2020    Allergic rhinitis 09/23/2020    Anemia 03/07/2014    Anesthesia of skin 09/06/2017    bilateral fingers    Bilateral primary osteoarthritis of knee 12/02/2019    Bradycardia 01/16/2020    Carpal tunnel syndrome 08/10/2017    Chest pain 03/06/2020    Chronic idiopathic constipation 11/18/2020    Chronic low back pain 11/05/2018    Chronic pain 04/23/2019    Chronic pain syndrome 01/30/2020    COPD (chronic obstructive pulmonary disease) 02/19/2021    Coronavirus infection 05/21/2020    Cough 05/19/2020    Degeneration of lumbar intervertebral disc 09/26/2014    L3-L4 L4-L5 Greater than L5-S1    Depressive disorder 07/03/2019    Disorder of gallbladder 03/30/2015    Dysphagia 01/09/2020    Dyspnea 05/19/2020    Dysuria 05/19/2020    Fatigue 05/19/2020    Frequency of urination 02/16/2015    GERD (gastroesophageal reflux disease) 11/18/2020    Hematuria 06/30/2020    Hemoptysis 02/27/2020    Herpes zoster 09/23/2020    Hyperlipidemia 09/23/2020    Hypertension 03/06/2020    Joint pain  11/04/2019    Knee pain 01/04/2019    Left inguinal hernia 06/10/2019    Long term (current) use of opiate analgesic 02/19/2021    Lumbar spondylosis 11/23/2020    Lumbar sprain 07/16/2012    Malaise 11/20/2014    Melena 09/25/2017    Microscopic hematuria 04/02/2020    Migraine without aura 02/06/2012    Nausea 02/27/2020    Nicotine dependence, cigarettes, uncomplicated 11/11/2020    Nonulcer dyspepsia 01/23/2017    On home O2     Other long term (current) drug therapy 09/23/2020    Other specified urinary incontinence 06/17/2020    Pain in left shoulder 05/02/2019    Pain in left wrist 09/06/2017    and hand    Pain in right shoulder 08/30/2019    Pain in right wrist 09/04/2018    Right hand    Palpitations 03/06/2020    Shoulder joint pain 04/23/2019    Smoker 07/03/2019    Snoring 08/02/2019    Synovial cyst of popliteal space 04/08/2013    Tobacco dependence syndrome 02/19/2021    Unstable angina 01/16/2020    UTI (urinary tract infection) 03/27/2020     Past Surgical History:   Procedure Laterality Date    CARDIAC CATHETERIZATION      CHOLECYSTECTOMY      HERNIA REPAIR      ROTATOR CUFF REPAIR       Family History   Problem Relation Name Age of Onset    Hypertension Mother      Cancer Maternal Aunt      Rectal cancer Other      Diabetes Other      Heart disease Other      Hypertension Other      Breast cancer Paternal Cousin       Social History     Tobacco Use    Smoking status: Former     Current packs/day: 0.00     Average packs/day: 1 pack/day for 40.0 years (40.0 ttl pk-yrs)     Types: Cigarettes     Start date: 07/1981     Quit date: 07/2021     Years since quitting: 3.5     Passive exposure: Current    Smokeless tobacco: Former   Substance Use Topics    Alcohol use: Yes     Comment: occasionally    Drug use: Not Currently     Types: Marijuana     Review of Systems   Constitutional:  Negative for chills, fatigue and fever.   Respiratory:  Negative for chest tightness and shortness of breath.     Cardiovascular:  Negative for chest pain.   All other systems reviewed and are negative.      Physical Exam     Initial Vitals [01/08/25 0926]   BP Pulse Resp Temp SpO2   128/85 98 17 98.1 °F (36.7 °C) 97 %      MAP       --         Physical Exam    Vitals reviewed.  Constitutional: She appears well-developed and well-nourished.   Neck: Neck supple.   Musculoskeletal:      Right hand: Swelling (Minimal swelling to the dorsum of the right hand) and tenderness (Moderate tenderness to the dorsum of the right) present. No deformity or lacerations. Normal range of motion. Normal strength. Normal sensation. There is no disruption of two-point discrimination. Normal capillary refill. Normal pulse.      Cervical back: Neck supple.     Neurological: She is alert. GCS score is 15. GCS eye subscore is 4. GCS verbal subscore is 5. GCS motor subscore is 6.   Skin: Skin is warm and dry. Capillary refill takes less than 2 seconds.   Psychiatric: She has a normal mood and affect.         Medical Screening Exam   See Full Note    ED Course   Procedures  Labs Reviewed - No data to display       Imaging Results              X-Ray Hand 3 view Right (In process)                      Medications   ketorolac injection 60 mg (has no administration in time range)     Medical Decision Making  64-year-old female presents to the emergency department to be evaluated for right hand pain.  Denies any injury.  She reports she began several days ago and got worse yesterday while she was sweeping.  X-rays ordered, films reviewed significant for no acute process  Diagnosis: Hand pain  Treated with Toradol IM  Prescribed naproxen    Amount and/or Complexity of Data Reviewed  Radiology: ordered.    Risk  Prescription drug management.                                      Clinical Impression:   Final diagnoses:  [M79.641] Pain of right hand (Primary)        ED Disposition Condition    Discharge Stable          ED Prescriptions       Medication Sig  Dispense Start Date End Date Auth. Provider    naproxen (NAPROSYN) 375 MG tablet Take 1 tablet (375 mg total) by mouth 2 (two) times daily with meals. for 5 days 10 tablet 1/8/2025 1/13/2025 Grace Rosario, ZENA          Follow-up Information    None          Grace Rosario, ZENA  01/08/25 1002

## 2025-01-21 ENCOUNTER — HOSPITAL ENCOUNTER (EMERGENCY)
Facility: HOSPITAL | Age: 65
Discharge: HOME | End: 2025-01-21
Payer: MEDICARE

## 2025-01-21 VITALS
HEIGHT: 66 IN | OXYGEN SATURATION: 98 % | BODY MASS INDEX: 32.14 KG/M2 | TEMPERATURE: 98 F | SYSTOLIC BLOOD PRESSURE: 136 MMHG | HEART RATE: 78 BPM | DIASTOLIC BLOOD PRESSURE: 82 MMHG | RESPIRATION RATE: 18 BRPM | WEIGHT: 200 LBS

## 2025-01-21 DIAGNOSIS — H81.10 BENIGN PAROXYSMAL POSITIONAL VERTIGO, UNSPECIFIED LATERALITY: Primary | ICD-10-CM

## 2025-01-21 LAB
ANION GAP SERPL CALCULATED.3IONS-SCNC: 14 MMOL/L (ref 7–16)
BASOPHILS # BLD AUTO: 0.04 K/UL (ref 0–0.2)
BASOPHILS NFR BLD AUTO: 0.4 % (ref 0–1)
BUN SERPL-MCNC: 12 MG/DL (ref 10–20)
BUN/CREAT SERPL: 13 (ref 6–20)
CALCIUM SERPL-MCNC: 9.4 MG/DL (ref 8.4–10.2)
CHLORIDE SERPL-SCNC: 110 MMOL/L (ref 98–107)
CO2 SERPL-SCNC: 24 MMOL/L (ref 23–31)
CREAT SERPL-MCNC: 0.93 MG/DL (ref 0.55–1.02)
DIFFERENTIAL METHOD BLD: ABNORMAL
EGFR (NO RACE VARIABLE) (RUSH/TITUS): 69 ML/MIN/1.73M2
EOSINOPHIL # BLD AUTO: 0.07 K/UL (ref 0–0.5)
EOSINOPHIL NFR BLD AUTO: 0.8 % (ref 1–4)
ERYTHROCYTE [DISTWIDTH] IN BLOOD BY AUTOMATED COUNT: 14.1 % (ref 11.5–14.5)
GLUCOSE SERPL-MCNC: 114 MG/DL (ref 82–115)
HCT VFR BLD AUTO: 40.6 % (ref 38–47)
HGB BLD-MCNC: 12.8 G/DL (ref 12–16)
IMM GRANULOCYTES # BLD AUTO: 0.02 K/UL (ref 0–0.04)
IMM GRANULOCYTES NFR BLD: 0.2 % (ref 0–0.4)
LYMPHOCYTES # BLD AUTO: 2.24 K/UL (ref 1–4.8)
LYMPHOCYTES NFR BLD AUTO: 24.8 % (ref 27–41)
MAGNESIUM SERPL-MCNC: 1.9 MG/DL (ref 1.6–2.6)
MCH RBC QN AUTO: 28.5 PG (ref 27–31)
MCHC RBC AUTO-ENTMCNC: 31.5 G/DL (ref 32–36)
MCV RBC AUTO: 90.4 FL (ref 80–96)
MONOCYTES # BLD AUTO: 0.44 K/UL (ref 0–0.8)
MONOCYTES NFR BLD AUTO: 4.9 % (ref 2–6)
MPC BLD CALC-MCNC: 9.3 FL (ref 9.4–12.4)
NEUTROPHILS # BLD AUTO: 6.22 K/UL (ref 1.8–7.7)
NEUTROPHILS NFR BLD AUTO: 68.9 % (ref 53–65)
NRBC # BLD AUTO: 0 X10E3/UL
NRBC, AUTO (.00): 0 %
PLATELET # BLD AUTO: 266 K/UL (ref 150–400)
POTASSIUM SERPL-SCNC: 3.5 MMOL/L (ref 3.5–5.1)
RBC # BLD AUTO: 4.49 M/UL (ref 4.2–5.4)
SODIUM SERPL-SCNC: 144 MMOL/L (ref 136–145)
WBC # BLD AUTO: 9.03 K/UL (ref 4.5–11)

## 2025-01-21 PROCEDURE — 96374 THER/PROPH/DIAG INJ IV PUSH: CPT

## 2025-01-21 PROCEDURE — 96361 HYDRATE IV INFUSION ADD-ON: CPT

## 2025-01-21 PROCEDURE — 85025 COMPLETE CBC W/AUTO DIFF WBC: CPT | Performed by: NURSE PRACTITIONER

## 2025-01-21 PROCEDURE — 25000003 PHARM REV CODE 250: Performed by: NURSE PRACTITIONER

## 2025-01-21 PROCEDURE — 63600175 PHARM REV CODE 636 W HCPCS: Performed by: NURSE PRACTITIONER

## 2025-01-21 PROCEDURE — 80048 BASIC METABOLIC PNL TOTAL CA: CPT | Performed by: NURSE PRACTITIONER

## 2025-01-21 PROCEDURE — 83735 ASSAY OF MAGNESIUM: CPT | Performed by: NURSE PRACTITIONER

## 2025-01-21 PROCEDURE — 99285 EMERGENCY DEPT VISIT HI MDM: CPT | Mod: 25

## 2025-01-21 RX ORDER — MECLIZINE HYDROCHLORIDE 25 MG/1
25 TABLET ORAL
Status: COMPLETED | OUTPATIENT
Start: 2025-01-21 | End: 2025-01-21

## 2025-01-21 RX ORDER — MECLIZINE HYDROCHLORIDE 25 MG/1
25 TABLET ORAL 3 TIMES DAILY PRN
Qty: 20 TABLET | Refills: 0 | Status: SHIPPED | OUTPATIENT
Start: 2025-01-21 | End: 2025-01-21

## 2025-01-21 RX ORDER — ONDANSETRON HYDROCHLORIDE 2 MG/ML
4 INJECTION, SOLUTION INTRAVENOUS
Status: COMPLETED | OUTPATIENT
Start: 2025-01-21 | End: 2025-01-21

## 2025-01-21 RX ORDER — MECLIZINE HYDROCHLORIDE 25 MG/1
25 TABLET ORAL 3 TIMES DAILY PRN
Qty: 20 TABLET | Refills: 0 | Status: SHIPPED | OUTPATIENT
Start: 2025-01-21 | End: 2025-01-28 | Stop reason: SDUPTHER

## 2025-01-21 RX ADMIN — ONDANSETRON 4 MG: 2 INJECTION INTRAMUSCULAR; INTRAVENOUS at 10:01

## 2025-01-21 RX ADMIN — SODIUM CHLORIDE 1000 ML: 9 INJECTION, SOLUTION INTRAVENOUS at 10:01

## 2025-01-21 RX ADMIN — MECLIZINE HYDROCHLORIDE 25 MG: 25 TABLET ORAL at 01:01

## 2025-01-21 NOTE — DISCHARGE INSTRUCTIONS
CC:  Had flu last week and had surgery for hernia on 07/08/2024 and states that his abdomen is bothering him.    HPI: Pleasant 46 years of age male diabetic patient came to Urgent Care for evaluation of abdominal pain.  He states that he had the flu recently last week.  He also states that he had surgery for hernia on 07/08/2024 and is concerned that something is not right at his surgical side especially since he was vomiting.  Now he is having abdominal pain.  Pain is worse with activity and urination.  Patient states that he has been taking Tylenol last dose at 8:00 a.m..  Urination causes spasms in entire abdomen.  Pain is present all the time.  Pressure on abdomen is significantly increases the pain.  Today patient cold his surgeon stating that he is having severe abdominal pain and he does not know what is going on.  Patient is tobacco smoker.    ROS: No diarrhea.  Abdominal pain.  I have reviewed the allergy list and the vital signs.    PE:  Stable, alert and oriented to time and place patient in no acute distress. Lungs have good air entry and are clear. Heart is regular. There is no heart murmur.  Oral mucosa is moist.  Conjunctivae are clear.  Voice is not hoarse. Skin is dry and warm. Gait is stable.  Kidneys are nontender.  Abdomen is diffusely tender especially left side.  Has decreased bowel sounds on both sides especially left side.  Patient is clearly in discomfort.    A/P:  Abdominal pain generalized.  This could be acute abdomen.  Based on clinical presentation and history I have advised to proceed to emergency room at this time by ambulance.  Patient declined ambulance transfer AMA accepting all risks including fatal and wants to be driven to [facility] in [place] emergency room by his wife and they know where the emergency room is.  I have communicated care with emergency room service.  Patient left in stable condition.  Just before leaving patient mentioned that he also  Rest.  Drink plenty of fluids. Follow up with your primary care provider in 2 days. Return to the emergency department for any increase in symptoms or for any other new or worrisome symptoms.      had some chest pains intermittently over last 1 week.  Does not have chest pain at this time.  I advised him to report it to emergency room provider and have full evaluation as soon as he arrives to emergency room.    Please see discharge instructions AVS for details.  I advised to follow-up with primary care provider as needed.  All questions were answered. The patient indicated understanding of the diagnosis and agreed with the plan of care.

## 2025-01-21 NOTE — ED PROVIDER NOTES
Encounter Date: 1/21/2025       History     Chief Complaint   Patient presents with    Vomiting     64-year-old female presents to the emergency department to be evaluated for nausea and dizziness.  She said that she feels like the room spins when she stands for the last couple of days.  She has also had some nausea and vomiting.  Denies any abdominal pain constipation, diarrhea, dysuria, fever, chills.    The history is provided by the patient.   Emesis   This is a new problem. The current episode started two days ago. Pertinent negatives include no abdominal pain, no arthralgias, no chills, no cough, no diarrhea, no fever, no headaches, no myalgias, no sweats and no URI.     Review of patient's allergies indicates:  No Known Allergies  Past Medical History:   Diagnosis Date    Abdominal bloating 01/09/2020    Abdominal pain, right upper quadrant 01/09/2020    Abnormal liver enzymes 03/04/2020    Abnormal results of liver function studies 11/18/2020    Acute conjunctivitis 05/30/2014    Acute exacerbation of chronic obstructive airways disease 02/13/2017    Acute pharyngitis 05/19/2020    Acute sinusitis 10/09/2017    Acute upper respiratory infection 05/19/2020    Allergic rhinitis 09/23/2020    Anemia 03/07/2014    Anesthesia of skin 09/06/2017    bilateral fingers    Bilateral primary osteoarthritis of knee 12/02/2019    Bradycardia 01/16/2020    Carpal tunnel syndrome 08/10/2017    Chest pain 03/06/2020    Chronic idiopathic constipation 11/18/2020    Chronic low back pain 11/05/2018    Chronic pain 04/23/2019    Chronic pain syndrome 01/30/2020    COPD (chronic obstructive pulmonary disease) 02/19/2021    Coronavirus infection 05/21/2020    Cough 05/19/2020    Degeneration of lumbar intervertebral disc 09/26/2014    L3-L4 L4-L5 Greater than L5-S1    Depressive disorder 07/03/2019    Disorder of gallbladder 03/30/2015    Dysphagia 01/09/2020    Dyspnea 05/19/2020    Dysuria 05/19/2020    Fatigue 05/19/2020     Frequency of urination 02/16/2015    GERD (gastroesophageal reflux disease) 11/18/2020    Hematuria 06/30/2020    Hemoptysis 02/27/2020    Herpes zoster 09/23/2020    Hyperlipidemia 09/23/2020    Hypertension 03/06/2020    Joint pain 11/04/2019    Knee pain 01/04/2019    Left inguinal hernia 06/10/2019    Long term (current) use of opiate analgesic 02/19/2021    Lumbar spondylosis 11/23/2020    Lumbar sprain 07/16/2012    Malaise 11/20/2014    Melena 09/25/2017    Microscopic hematuria 04/02/2020    Migraine without aura 02/06/2012    Nausea 02/27/2020    Nicotine dependence, cigarettes, uncomplicated 11/11/2020    Nonulcer dyspepsia 01/23/2017    On home O2     Other long term (current) drug therapy 09/23/2020    Other specified urinary incontinence 06/17/2020    Pain in left shoulder 05/02/2019    Pain in left wrist 09/06/2017    and hand    Pain in right shoulder 08/30/2019    Pain in right wrist 09/04/2018    Right hand    Palpitations 03/06/2020    Shoulder joint pain 04/23/2019    Smoker 07/03/2019    Snoring 08/02/2019    Synovial cyst of popliteal space 04/08/2013    Tobacco dependence syndrome 02/19/2021    Unstable angina 01/16/2020    UTI (urinary tract infection) 03/27/2020     Past Surgical History:   Procedure Laterality Date    CARDIAC CATHETERIZATION      CHOLECYSTECTOMY      HERNIA REPAIR      ROTATOR CUFF REPAIR       Family History   Problem Relation Name Age of Onset    Hypertension Mother      Cancer Maternal Aunt      Rectal cancer Other      Diabetes Other      Heart disease Other      Hypertension Other      Breast cancer Paternal Cousin       Social History     Tobacco Use    Smoking status: Former     Current packs/day: 0.00     Average packs/day: 1 pack/day for 40.0 years (40.0 ttl pk-yrs)     Types: Cigarettes     Start date: 07/1981     Quit date: 07/2021     Years since quitting: 3.5     Passive exposure: Current    Smokeless tobacco: Former   Substance Use Topics    Alcohol use: Yes      Comment: occasionally    Drug use: Not Currently     Types: Marijuana     Review of Systems   Constitutional:  Negative for chills and fever.   Respiratory:  Negative for cough.    Gastrointestinal:  Positive for vomiting. Negative for abdominal pain and diarrhea.   Musculoskeletal:  Negative for arthralgias and myalgias.   Neurological:  Negative for headaches.   All other systems reviewed and are negative.      Physical Exam     Initial Vitals [01/21/25 0943]   BP Pulse Resp Temp SpO2   (!) 131/94 98 18 98.3 °F (36.8 °C) 100 %      MAP       --         Physical Exam    Vitals reviewed.  Constitutional: She appears well-developed and well-nourished.   HENT:   Head: Normocephalic and atraumatic.   Right Ear: Tympanic membrane normal.   Left Ear: Tympanic membrane normal.   Eyes: EOM are normal. Pupils are equal, round, and reactive to light.   Neck: Neck supple.   Cardiovascular:  Normal rate and regular rhythm.           Pulmonary/Chest: Breath sounds normal.   Abdominal: Abdomen is soft. Bowel sounds are normal. She exhibits no distension and no mass. There is no abdominal tenderness. There is no rebound and no guarding.   Musculoskeletal:         General: Normal range of motion.      Cervical back: Neck supple.     Neurological: She is alert and oriented to person, place, and time. She has normal strength. GCS score is 15. GCS eye subscore is 4. GCS verbal subscore is 5. GCS motor subscore is 6.   Skin: Skin is warm and dry. Capillary refill takes less than 2 seconds.   Psychiatric: She has a normal mood and affect. Thought content normal.         Medical Screening Exam   See Full Note    ED Course   Procedures  Labs Reviewed   BASIC METABOLIC PANEL - Abnormal       Result Value    Sodium 144      Potassium 3.5      Chloride 110 (*)     CO2 24      Anion Gap 14      Glucose 114      BUN 12      Creatinine 0.93      BUN/Creatinine Ratio 13      Calcium 9.4      eGFR 69     CBC WITH DIFFERENTIAL - Abnormal    WBC  9.03      RBC 4.49      Hemoglobin 12.8      Hematocrit 40.6      MCV 90.4      MCH 28.5      MCHC 31.5 (*)     RDW 14.1      Platelet Count 266      MPV 9.3 (*)     Neutrophils % 68.9 (*)     Lymphocytes % 24.8 (*)     Monocytes % 4.9      Eosinophils % 0.8 (*)     Basophils % 0.4      Immature Granulocytes % 0.2      nRBC, Auto 0.0      Neutrophils, Abs 6.22      Lymphocytes, Absolute 2.24      Monocytes, Absolute 0.44      Eosinophils, Absolute 0.07      Basophils, Absolute 0.04      Immature Granulocytes, Absolute 0.02      nRBC, Absolute 0.00      Diff Type Auto     MAGNESIUM - Normal    Magnesium 1.9     CBC W/ AUTO DIFFERENTIAL    Narrative:     The following orders were created for panel order CBC auto differential.  Procedure                               Abnormality         Status                     ---------                               -----------         ------                     CBC with Differential[[id number]]       Abnormal            Final result                 Please view results for these tests on the individual orders.   EXTRA TUBES    Narrative:     The following orders were created for panel order EXTRA TUBES.  Procedure                               Abnormality         Status                     ---------                               -----------         ------                     Light Blue Top Hold[[id number]]                             In process                   Please view results for these tests on the individual orders.   LIGHT BLUE TOP HOLD          Imaging Results              CT Head Without Contrast (Final result)  Result time 01/21/25 12:10:11      Final result by Felix Bell MD (01/21/25 12:10:11)                   Impression:      No definite acute intracranial findings by noncontrast CT.      Electronically signed by: Felix Bell  Date:    01/21/2025  Time:    12:10               Narrative:    EXAMINATION:  CT HEAD WITHOUT CONTRAST    CLINICAL  HISTORY:  Dizziness, persistent/recurrent, cardiac or vascular cause suspected;    TECHNIQUE:  Low dose axial images were obtained through the head.  Coronal and sagittal reformations were also performed. Contrast was not administered.    COMPARISON:  None.    FINDINGS:  Blood: No acute intracranial hemorrhage.    Parenchyma: No definite loss of gray-white differentiation to suggest acute or subacute transcortical infarct.    Ventricles/Extra-axial spaces: No abnormal extra-axial fluid collection. Basal cisterns are patent.    Vessels: Mild atherosclerotic calcification.    Orbits: Unremarkable.    Scalp: Unremarkable.    Skull: There are no depressed skull fractures or destructive bone lesions.    Sinuses and mastoids: Essentially clear.    Other findings: None                                       Medications   meclizine tablet 25 mg (has no administration in time range)   ondansetron injection 4 mg (4 mg Intravenous Given 1/21/25 1030)   sodium chloride 0.9% bolus 1,000 mL 1,000 mL (0 mLs Intravenous Stopped 1/21/25 1138)     Medical Decision Making  64-year-old female presents to the emergency department to be evaluated for nausea and dizziness.  She said that she feels like the room spins when she stands for the last couple of days.  She has also had some nausea and vomiting. Denies any abdominal pain constipation, diarrhea, dysuria, fever, chills.  CTs ordered, reviewed as well as the radiologist's interpretation significant for no acute process  Labs ordered and reviewed  Treated with IV fluids and meclizine  Diagnosis: Positional vertigo  Prescribed meclizine    Amount and/or Complexity of Data Reviewed  Labs: ordered.  Radiology: ordered.    Risk  Prescription drug management.                                      Clinical Impression:   Final diagnoses:  [H81.10] Benign paroxysmal positional vertigo, unspecified laterality (Primary)        ED Disposition Condition    Discharge Stable          ED  Prescriptions       Medication Sig Dispense Start Date End Date Auth. Provider    meclizine (ANTIVERT) 25 mg tablet Take 1 tablet (25 mg total) by mouth 3 (three) times daily as needed. 20 tablet 1/21/2025 -- Grace Rosario FNP          Follow-up Information    None          Grace Rosario FNP  01/21/25 1257

## 2025-01-21 NOTE — Clinical Note
"Nneka Rodrigueznda"Mireya was seen and treated in our emergency department on 1/21/2025.  She may return to work on 01/23/2025.       If you have any questions or concerns, please don't hesitate to call.      Grace Rosario, MAIDAP"

## 2025-01-27 ENCOUNTER — PATIENT OUTREACH (OUTPATIENT)
Facility: OTHER | Age: 65
End: 2025-01-27
Payer: COMMERCIAL

## 2025-01-28 ENCOUNTER — PATIENT OUTREACH (OUTPATIENT)
Facility: OTHER | Age: 65
End: 2025-01-28
Payer: COMMERCIAL

## 2025-01-28 DIAGNOSIS — R11.0 NAUSEA: Primary | ICD-10-CM

## 2025-01-28 DIAGNOSIS — R42 DIZZINESS: ICD-10-CM

## 2025-01-28 RX ORDER — ONDANSETRON 8 MG/1
8 TABLET, ORALLY DISINTEGRATING ORAL 2 TIMES DAILY
COMMUNITY

## 2025-01-28 RX ORDER — MECLIZINE HYDROCHLORIDE 25 MG/1
25 TABLET ORAL 3 TIMES DAILY PRN
Qty: 20 TABLET | Refills: 0 | Status: SHIPPED | OUTPATIENT
Start: 2025-01-28

## 2025-01-28 RX ORDER — ONDANSETRON 8 MG/1
8 TABLET, ORALLY DISINTEGRATING ORAL EVERY 8 HOURS PRN
Qty: 30 TABLET | Refills: 1 | OUTPATIENT
Start: 2025-01-28

## 2025-01-28 NOTE — PROGRESS NOTES
Hilaria Agarwal LPN  ED Navigator  Emergency Department    Project: [facility] ED Navigator  Role: Community Health Worker    Date: 01/28/2025  Patient Name: Nneka Gomes  MRN: [id number]  PCP: Magno Yan MD    Assessment:     Nneka Gomes is a 64 y.o. female who has presented to ED for vomiting. Patient has visited the ED 2 times in the past 3 months. Patient did not contact PCP.     ED Navigator Initial Assessment    ED Navigator Enrollment Documentation  Consent to Services  Does patient consent to completing the assessment?: Yes  Contact  Method of Initial Contact: Phone  Transportation  Insurance Coverage  Do you have coverage/adequate coverage?: Yes  Reason for no/inadequate coverage: Self Pay (Comment: [facility])  Specialist Appointment  Did the patient come to the ED to see a specialist?: No  Does the patient have a pending specialist referral?: No  PCP Follow Up Appointment  Medications  Is patient able to afford medication?: Yes  Psychological  Food  Communication/Education  Other Financial Concerns  Other Social Barriers/Concerns  Primary Barrier  Plan: Provided information for [facility] 24/7 Nurse triage line, [phone] or [phone]-[facility] ([phone])         Social History     Socioeconomic History    Marital status: Single    Number of children: 1   Tobacco Use    Smoking status: Former     Current packs/day: 0.00     Average packs/day: 1 pack/day for 40.0 years (40.0 ttl pk-yrs)     Types: Cigarettes     Start date: 07/1981     Quit date: 07/2021     Years since quitting: 3.5     Passive exposure: Current    Smokeless tobacco: Former   Substance and Sexual Activity    Alcohol use: Yes     Comment: occasionally    Drug use: Not Currently     Types: Marijuana    Sexual activity: Not Currently     Social Drivers of Health     Financial Resource Strain: Low Risk  (1/28/2025)    Overall Financial Resource Strain (CARDIA)     Difficulty of Paying Living Expenses: Not hard at all    Food Insecurity: No Food Insecurity (1/28/2025)    Hunger Vital Sign     Worried About Running Out of Food in the Last Year: Never true     Ran Out of Food in the Last Year: Never true   Transportation Needs: No Transportation Needs (1/28/2025)    TRANSPORTATION NEEDS     Transportation : No   Physical Activity: Inactive (1/28/2025)    Exercise Vital Sign     Days of Exercise per Week: 0 days     Minutes of Exercise per Session: 0 min   Stress: Stress Concern Present (1/28/2025)    [employer] [place] [employer] - Occupational Stress Questionnaire     Feeling of Stress : To some extent   Housing Stability: Low Risk  (1/28/2025)    Housing Stability Vital Sign     Unable to Pay for Housing in the Last Year: No     Homeless in the Last Year: No       Plan:   ED navigator spoke with patient about ED visit. Patient states that she isn't vomiting anymore but she is still getting dizzy and nauseated at times. Patient doesn't have but two pills of Meclizine left with no refills. Patient is wanting to see if PCP can call in a refill to the pharmacy. Patient doesn't see  until 3-3-25 and doesn't have anything open until then. ED navigator reached out to nurse about a refill on Meclizine and something for nausea per patient request. ED navigator sent a link to get signed up for [facility]. Patient denies any new needs or needing any additional assistance at this time. ED Navigator gave [facility] 24[facility]7 Nurse triage line ([phone] or 1-866-[facility] ([phone]) contact information, in addition to office contact information if further assistance is needed in the future. ED navigator will follow-up with patient on/around 2-21-25.      3:02 pm. Called patient back to inform that ED navigator spoke with Mac Vazquez LPN at  office and states that they are going to call in Meclizine and nausea medicine to the pharmacy. Verbalized understanding.    Hilaria Agarwal ED Navigator    [phone]

## 2025-02-03 NOTE — PROGRESS NOTES
Patient contacted ED navigator and states that Magno Nelson didn't call anything in for nausea. ED navigator contacted Mac Vazquez LPN about refill and states that MD isn't going to call anything in for nausea due to Meclizine being for dizziness and nausea. ED navigator contacted patient to let her know. Verbalized understanding.    Hilaria Agarwal ED Navigator   [phone]

## 2025-02-21 ENCOUNTER — PATIENT OUTREACH (OUTPATIENT)
Facility: OTHER | Age: 65
End: 2025-02-21
Payer: COMMERCIAL

## 2025-02-21 NOTE — PROGRESS NOTES
ED navigator attempted to contact patient for follow up. Unable to reach patient or leave a message at this time. ED navigator will follow-up with patient on/around 2-24-25.    Hilaria Agarwal ED Navigator   [phone]

## 2025-02-24 ENCOUNTER — PATIENT OUTREACH (OUTPATIENT)
Facility: OTHER | Age: 65
End: 2025-02-24
Payer: COMMERCIAL

## 2025-02-24 DIAGNOSIS — R42 DIZZINESS: ICD-10-CM

## 2025-02-24 RX ORDER — MECLIZINE HYDROCHLORIDE 25 MG/1
25 TABLET ORAL 3 TIMES DAILY PRN
Qty: 20 TABLET | Refills: 0 | Status: SHIPPED | OUTPATIENT
Start: 2025-02-24

## 2025-02-24 NOTE — PROGRESS NOTES
Mac Vazquez LPN states that she will request refills for patient. ED navigator contacted patient to let her know about refills and if she doesn't hear anything about the refills to call back. Verbalized understanding.     Hilaria Agarwal ED Navigator   [phone]

## 2025-02-24 NOTE — PROGRESS NOTES
ED navigator contacted patient for a follow up. Patient states that she is doing pretty good. Patient has taken all of the Meclizine that was refilled last month. Patient states that she takes the medicine at least twice a day to prevent getting dizzy. Patient has been out of the medicine for about a week. Denies having any nausea or dizziness. Patient states that she would like a refill on the medication. Patient has an appointment to see Magno Saha on 3-3-25 but wants to see about getting refills before then. ED navigator contacted Mac Vazquez LPN about refills. ED navigator ensured patient had no other needs at this time. ED navigator will follow-up with patient on/around 3-20-35.    Hilaria Agarwal ED Navigator   [phone]

## 2025-03-03 ENCOUNTER — OFFICE VISIT (OUTPATIENT)
Dept: FAMILY MEDICINE | Facility: CLINIC | Age: 65
End: 2025-03-03
Payer: MEDICARE

## 2025-03-03 VITALS
BODY MASS INDEX: 32.3 KG/M2 | SYSTOLIC BLOOD PRESSURE: 130 MMHG | OXYGEN SATURATION: 98 % | HEIGHT: 66 IN | DIASTOLIC BLOOD PRESSURE: 84 MMHG | WEIGHT: 201 LBS | HEART RATE: 84 BPM | TEMPERATURE: 98 F | RESPIRATION RATE: 8 BRPM

## 2025-03-03 DIAGNOSIS — K59.04 CHRONIC IDIOPATHIC CONSTIPATION: ICD-10-CM

## 2025-03-03 DIAGNOSIS — R73.03 PREDIABETES: ICD-10-CM

## 2025-03-03 DIAGNOSIS — Z23 NEED FOR TDAP VACCINATION: ICD-10-CM

## 2025-03-03 DIAGNOSIS — Z79.891 LONG TERM (CURRENT) USE OF OPIATE ANALGESIC: ICD-10-CM

## 2025-03-03 DIAGNOSIS — I10 HYPERTENSION, UNSPECIFIED TYPE: ICD-10-CM

## 2025-03-03 DIAGNOSIS — M51.9 LUMBAR DISC DISEASE: Primary | ICD-10-CM

## 2025-03-03 DIAGNOSIS — R42 DIZZINESS: ICD-10-CM

## 2025-03-03 DIAGNOSIS — Z87.891 HISTORY OF SMOKING 10-25 PACK YEARS: ICD-10-CM

## 2025-03-03 DIAGNOSIS — J30.9 ALLERGIC RHINITIS, UNSPECIFIED SEASONALITY, UNSPECIFIED TRIGGER: ICD-10-CM

## 2025-03-03 LAB
EST. AVERAGE GLUCOSE BLD GHB EST-MCNC: 120 MG/DL
HBA1C MFR BLD HPLC: 5.8 %

## 2025-03-03 PROCEDURE — 4010F ACE/ARB THERAPY RXD/TAKEN: CPT | Mod: ,,, | Performed by: FAMILY MEDICINE

## 2025-03-03 PROCEDURE — 3075F SYST BP GE 130 - 139MM HG: CPT | Mod: ,,, | Performed by: FAMILY MEDICINE

## 2025-03-03 PROCEDURE — 3008F BODY MASS INDEX DOCD: CPT | Mod: ,,, | Performed by: FAMILY MEDICINE

## 2025-03-03 PROCEDURE — 83036 HEMOGLOBIN GLYCOSYLATED A1C: CPT | Mod: ,,, | Performed by: CLINICAL MEDICAL LABORATORY

## 2025-03-03 PROCEDURE — 1160F RVW MEDS BY RX/DR IN RCRD: CPT | Mod: ,,, | Performed by: FAMILY MEDICINE

## 2025-03-03 PROCEDURE — 1159F MED LIST DOCD IN RCRD: CPT | Mod: ,,, | Performed by: FAMILY MEDICINE

## 2025-03-03 PROCEDURE — 99214 OFFICE O/P EST MOD 30 MIN: CPT | Mod: ,,, | Performed by: FAMILY MEDICINE

## 2025-03-03 PROCEDURE — 3079F DIAST BP 80-89 MM HG: CPT | Mod: ,,, | Performed by: FAMILY MEDICINE

## 2025-03-03 RX ORDER — VALSARTAN 40 MG/1
40 TABLET ORAL DAILY
Qty: 90 TABLET | Refills: 1 | Status: SHIPPED | OUTPATIENT
Start: 2025-03-03 | End: 2025-08-30

## 2025-03-03 RX ORDER — FLUTICASONE PROPIONATE 50 MCG
2 SPRAY, SUSPENSION (ML) NASAL DAILY
Qty: 16 G | Refills: 5 | Status: SHIPPED | OUTPATIENT
Start: 2025-03-03

## 2025-03-03 RX ORDER — HYDROCODONE BITARTRATE AND ACETAMINOPHEN 10; 325 MG/1; MG/1
1 TABLET ORAL EVERY 8 HOURS PRN
Qty: 90 TABLET | Refills: 0 | Status: SHIPPED | OUTPATIENT
Start: 2025-03-03

## 2025-03-03 RX ORDER — HYDROCODONE BITARTRATE AND ACETAMINOPHEN 10; 325 MG/1; MG/1
1 TABLET ORAL EVERY 8 HOURS PRN
Qty: 90 TABLET | Refills: 0 | Status: SHIPPED | OUTPATIENT
Start: 2025-03-03 | End: 2025-03-03

## 2025-03-03 RX ORDER — MECLIZINE HYDROCHLORIDE 25 MG/1
25 TABLET ORAL 3 TIMES DAILY PRN
Qty: 20 TABLET | Refills: 0 | Status: SHIPPED | OUTPATIENT
Start: 2025-03-03

## 2025-03-03 NOTE — Clinical Note
[facility], unsure if she wants shingles and rsv vaccine, Dr Yan to determine ASA tx

## 2025-03-03 NOTE — PROGRESS NOTES
Nneka Gomes is a 64 y.o. female seen today for her chronic pain syndrome due to lumbar disc disease.  She reports good symptom control with no medication side effects and she is active in meeting her ADLs.  She has had no signs or symptoms of tolerance or addiction and her  today was appropriate.  Today's drug screen only showed her prescribed opiate.  Today she has no other complaints.  The patient is due for her low-dose CT scan in his now been 2 years since she quit smoking.    Past Medical History:   Diagnosis Date    Abdominal bloating 01/09/2020    Abdominal pain, right upper quadrant 01/09/2020    Abnormal liver enzymes 03/04/2020    Abnormal results of liver function studies 11/18/2020    Acute conjunctivitis 05/30/2014    Acute exacerbation of chronic obstructive airways disease 02/13/2017    Acute pharyngitis 05/19/2020    Acute sinusitis 10/09/2017    Acute upper respiratory infection 05/19/2020    Allergic rhinitis 09/23/2020    Anemia 03/07/2014    Anesthesia of skin 09/06/2017    bilateral fingers    Bilateral primary osteoarthritis of knee 12/02/2019    Bradycardia 01/16/2020    Carpal tunnel syndrome 08/10/2017    Chest pain 03/06/2020    Chronic idiopathic constipation 11/18/2020    Chronic low back pain 11/05/2018    Chronic pain 04/23/2019    Chronic pain syndrome 01/30/2020    COPD (chronic obstructive pulmonary disease) 02/19/2021    Coronavirus infection 05/21/2020    Cough 05/19/2020    Degeneration of lumbar intervertebral disc 09/26/2014    L3-L4 L4-L5 Greater than L5-S1    Depressive disorder 07/03/2019    Disorder of gallbladder 03/30/2015    Dysphagia 01/09/2020    Dyspnea 05/19/2020    Dysuria 05/19/2020    Fatigue 05/19/2020    Frequency of urination 02/16/2015    GERD (gastroesophageal reflux disease) 11/18/2020    Hematuria 06/30/2020    Hemoptysis 02/27/2020    Herpes zoster 09/23/2020    Hyperlipidemia 09/23/2020    Hypertension 03/06/2020    Joint pain 11/04/2019    Knee  pain 01/04/2019    Left inguinal hernia 06/10/2019    Long term (current) use of opiate analgesic 02/19/2021    Lumbar spondylosis 11/23/2020    Lumbar sprain 07/16/2012    Malaise 11/20/2014    Melena 09/25/2017    Microscopic hematuria 04/02/2020    Migraine without aura 02/06/2012    Nausea 02/27/2020    Nicotine dependence, cigarettes, uncomplicated 11/11/2020    Nonulcer dyspepsia 01/23/2017    On home O2     Other long term (current) drug therapy 09/23/2020    Other specified urinary incontinence 06/17/2020    Pain in left shoulder 05/02/2019    Pain in left wrist 09/06/2017    and hand    Pain in right shoulder 08/30/2019    Pain in right wrist 09/04/2018    Right hand    Palpitations 03/06/2020    Shoulder joint pain 04/23/2019    Smoker 07/03/2019    Snoring 08/02/2019    Synovial cyst of popliteal space 04/08/2013    Tobacco dependence syndrome 02/19/2021    Unstable angina 01/16/2020    UTI (urinary tract infection) 03/27/2020     Family History   Problem Relation Name Age of Onset    Hypertension Mother      Cancer Maternal Aunt      Rectal cancer Other      Diabetes Other      Heart disease Other      Hypertension Other      Breast cancer Paternal Cousin       Medications Ordered Prior to Encounter[1]  Immunization History   Administered Date(s) Administered    COVID-19, MRNA, LN-S, PF (Pfizer) (Purple Cap) 04/29/2021, 10/28/2021, 04/29/2022    Influenza - Quadrivalent - PF *Preferred* (6 months and older) 09/16/2021, 10/11/2023    Influenza - Trivalent - Flucelvax - PF 11/06/2024    Influenza Split 10/03/2019    Pneumococcal Conjugate - 13 Valent 11/05/2018    Pneumococcal Conjugate - 20 Valent 01/09/2023    Pneumococcal Polysaccharide - 23 Valent 11/05/2019       Review of Systems   Constitutional:  Negative for fever, malaise/fatigue and weight loss.   Respiratory:  Negative for shortness of breath.    Cardiovascular:  Negative for chest pain and palpitations.   Gastrointestinal:  Negative for nausea  and vomiting.   Musculoskeletal:  Positive for back pain and myalgias.   Psychiatric/Behavioral:  Negative for depression.         Vitals:    03/03/25 1053   BP: 130/84   Pulse: 84   Resp: (!) 8   Temp: 97.9 °F (36.6 °C)       Physical Exam  Vitals reviewed.   Constitutional:       Appearance: Normal appearance.   HENT:      Head: Normocephalic.   Eyes:      Extraocular Movements: Extraocular movements intact.      Conjunctiva/sclera: Conjunctivae normal.      Pupils: Pupils are equal, round, and reactive to light.   Neck:      Thyroid: No thyroid mass or thyromegaly.   Cardiovascular:      Rate and Rhythm: Normal rate and regular rhythm.      Heart sounds: Normal heart sounds. No murmur heard.     No gallop.   Pulmonary:      Effort: Pulmonary effort is normal. No respiratory distress.      Breath sounds: Normal breath sounds. No wheezing or rales.   Musculoskeletal:      Lumbar back: Tenderness present. Decreased range of motion.      Comments: Patient has a mildly antalgic gait.   Skin:     General: Skin is warm and dry.      Coloration: Skin is not jaundiced or pale.   Neurological:      Mental Status: She is alert.   Psychiatric:         Mood and Affect: Mood normal.         Behavior: Behavior normal.         Thought Content: Thought content normal.         Judgment: Judgment normal.          Assessment and Plan  1. Lumbar disc disease  -     Discontinue: HYDROcodone-acetaminophen (NORCO)  mg per tablet; Take 1 tablet by mouth every 8 (eight) hours as needed (Chronic lumbar disc disease).  Dispense: 90 tablet; Refill: 0  -     HYDROcodone-acetaminophen (NORCO)  mg per tablet; Take 1 tablet by mouth every 8 (eight) hours as needed (Chronic lumbar disc disease).  Dispense: 90 tablet; Refill: 0    2. Long term (current) use of opiate analgesic  -     POCT Urine Drug Screen Presump    3. Hypertension, unspecified type  -     valsartan (DIOVAN) 40 MG tablet; Take 1 tablet (40 mg total) by mouth once  daily.  Dispense: 90 tablet; Refill: 1    4. Chronic idiopathic constipation    5. Allergic rhinitis, unspecified seasonality, unspecified trigger  -     fluticasone propionate (FLONASE) 50 mcg/actuation nasal spray; 2 sprays (100 mcg total) by Each Nostril route once daily.  Dispense: 16 g; Refill: 5    6. Need for Tdap vaccination    7. Prediabetes  -     Hemoglobin A1C; Future; Expected date: 03/03/2025    8. Dizziness  -     meclizine (ANTIVERT) 25 mg tablet; Take 1 tablet (25 mg total) by mouth 3 (three) times daily as needed.  Dispense: 20 tablet; Refill: 0    9. History of smoking 10-25 pack years  -     CT Chest Lung Screening Low Dose; Future; Expected date: 03/03/2025             Return to clinic in 2 months or as needed.    Health Maintenance Topics with due status: Not Due       Topic Last Completion Date    Colorectal Cancer Screening 11/08/2022    Cervical Cancer Screening 04/03/2024    Lipid Panel 04/08/2024    LDCT Lung Screen 05/01/2024    Mammogram 10/11/2024              [1]   Current Outpatient Medications on File Prior to Visit   Medication Sig Dispense Refill    albuterol (PROAIR HFA) 90 mcg/actuation inhaler Inhale 2 puffs into the lungs every 4 (four) hours as needed for Wheezing. 18 g 5    albuterol (PROVENTIL) 2.5 mg /3 mL (0.083 %) nebulizer solution Take 3 mLs (2.5 mg total) by nebulization 4 (four) times daily as needed for Wheezing. 100 each 5    azelastine (ASTELIN) 137 mcg (0.1 %) nasal spray USE 2 SPRAYS NASALLY TWICE A DAY 90 mL 3    buPROPion (WELLBUTRIN XL) 300 MG 24 hr tablet Take 1 tablet (300 mg total) by mouth once daily. 90 tablet 1    fluticasone-umeclidin-vilanter (TRELEGY ELLIPTA) 200-62.5-25 mcg inhaler Inhale 1 puff into the lungs once daily. 60 each 5    gabapentin (NEURONTIN) 100 MG capsule Take 1 capsule (100 mg total) by mouth 3 (three) times daily. 180 capsule 1    levocetirizine (XYZAL) 5 MG tablet Take 1 tablet (5 mg total) by mouth every evening. 90 tablet 1     linaCLOtide (LINZESS) 145 mcg Cap capsule Take 1 capsule (145 mcg total) by mouth before breakfast. 90 capsule 3    pantoprazole (PROTONIX) 40 MG tablet Take 1 tablet (40 mg total) by mouth once daily. 90 tablet 1    RESTASIS 0.05 % ophthalmic emulsion       rosuvastatin (CRESTOR) 20 MG tablet Take 1 tablet (20 mg total) by mouth once daily. 90 tablet 1    theophylline (THEODUR) 300 mg 24 hr capsule Take 300 mg by mouth once daily. One tab Daily with food: (replaced previous script for 100mg tab 3 daily) 90 capsule 1    [DISCONTINUED] fluticasone propionate (FLONASE) 50 mcg/actuation nasal spray 2 sprays (100 mcg total) by Each Nostril route once daily. 16 g 5    [DISCONTINUED] HYDROcodone-acetaminophen (NORCO)  mg per tablet Take 1 tablet by mouth every 8 (eight) hours as needed (Chronic lumbar disc disease). 90 tablet 0    [DISCONTINUED] meclizine (ANTIVERT) 25 mg tablet Take 1 tablet (25 mg total) by mouth 3 (three) times daily as needed. 20 tablet 0    [DISCONTINUED] valsartan (DIOVAN) 40 MG tablet Take 1 tablet (40 mg total) by mouth once daily. 90 tablet 1    ondansetron (ZOFRAN-ODT) 8 MG TbDL Take 8 mg by mouth 2 (two) times daily. (Patient not taking: Reported on 3/3/2025)       No current facility-administered medications on file prior to visit.

## 2025-03-05 ENCOUNTER — RESULTS FOLLOW-UP (OUTPATIENT)
Dept: FAMILY MEDICINE | Facility: CLINIC | Age: 65
End: 2025-03-05
Payer: MEDICARE

## 2025-03-05 NOTE — TELEPHONE ENCOUNTER
Patient notified that Dr Yan stated  Patient is prediabetic although this is improved and He recommends a follow-up A1c in 3-6 months. Patient verbalized understanding.

## 2025-03-05 NOTE — TELEPHONE ENCOUNTER
----- Message from Magno Yan MD sent at 3/5/2025 11:59 AM CST -----  Patient is prediabetic although this is improved and I recommend a follow-up A1c in 3-6 months.  ----- Message -----  From: Lab, Background User  Sent: 3/3/2025   9:07 PM CST  To: Magno Yan MD

## 2025-03-20 ENCOUNTER — PATIENT OUTREACH (OUTPATIENT)
Facility: OTHER | Age: 65
End: 2025-03-20
Payer: COMMERCIAL

## 2025-03-20 DIAGNOSIS — H04.123 CHRONICALLY DRY EYES, BILATERAL: Primary | ICD-10-CM

## 2025-03-20 RX ORDER — CYCLOSPORINE 0.5 MG/ML
1 EMULSION OPHTHALMIC 2 TIMES DAILY
Qty: 60 EACH | Refills: 1 | Status: SHIPPED | OUTPATIENT
Start: 2025-03-20

## 2025-03-20 NOTE — TELEPHONE ENCOUNTER
----- Message from Rosita sent at 3/20/2025 10:03 AM CDT -----  Regarding: Med Refill Request  Pt called requesting refill of RESTASIS 0.05 % ophthalmic [facility]Ph[facility] #: [phone]

## 2025-03-20 NOTE — PROGRESS NOTES
ED navigator attempted to contact patient for a follow up. Unable to reach patient at this time or leave a message. ED navigator will follow-up with patient on/around 3-21-25.    Hilaria Agarwal ED Navigator   [phone]

## 2025-03-21 NOTE — PROGRESS NOTES
ED navigator second attempt to contact patient for a follow up. Unable to leave a message. Mailbox is full. ED navigator will close encounter at this time.    Hilaria Agarwal ED Navigator   [phone]

## 2025-03-31 ENCOUNTER — OFFICE VISIT (OUTPATIENT)
Dept: UROLOGY | Facility: CLINIC | Age: 65
End: 2025-03-31
Payer: MEDICARE

## 2025-03-31 VITALS
DIASTOLIC BLOOD PRESSURE: 81 MMHG | HEART RATE: 90 BPM | SYSTOLIC BLOOD PRESSURE: 159 MMHG | BODY MASS INDEX: 32.3 KG/M2 | HEIGHT: 66 IN | WEIGHT: 201 LBS | OXYGEN SATURATION: 99 %

## 2025-03-31 DIAGNOSIS — N36.8 PROLAPSE URETHRAL MUCOSA: Primary | ICD-10-CM

## 2025-03-31 DIAGNOSIS — R39.15 URGENCY OF URINATION: ICD-10-CM

## 2025-03-31 DIAGNOSIS — N39.41 URGE INCONTINENCE: ICD-10-CM

## 2025-03-31 PROCEDURE — 1159F MED LIST DOCD IN RCRD: CPT | Mod: CPTII,,, | Performed by: UROLOGY

## 2025-03-31 PROCEDURE — 3077F SYST BP >= 140 MM HG: CPT | Mod: CPTII,,, | Performed by: UROLOGY

## 2025-03-31 PROCEDURE — 3079F DIAST BP 80-89 MM HG: CPT | Mod: CPTII,,, | Performed by: UROLOGY

## 2025-03-31 PROCEDURE — 99214 OFFICE O/P EST MOD 30 MIN: CPT | Mod: S$PBB,,, | Performed by: UROLOGY

## 2025-03-31 PROCEDURE — 3008F BODY MASS INDEX DOCD: CPT | Mod: CPTII,,, | Performed by: UROLOGY

## 2025-03-31 PROCEDURE — 1160F RVW MEDS BY RX/DR IN RCRD: CPT | Mod: CPTII,,, | Performed by: UROLOGY

## 2025-03-31 PROCEDURE — 4010F ACE/ARB THERAPY RXD/TAKEN: CPT | Mod: CPTII,,, | Performed by: UROLOGY

## 2025-03-31 PROCEDURE — 3044F HG A1C LEVEL LT 7.0%: CPT | Mod: CPTII,,, | Performed by: UROLOGY

## 2025-03-31 PROCEDURE — 99999 PR PBB SHADOW E&M-EST. PATIENT-LVL IV: CPT | Mod: PBBFAC,,, | Performed by: UROLOGY

## 2025-03-31 PROCEDURE — 99214 OFFICE O/P EST MOD 30 MIN: CPT | Mod: PBBFAC | Performed by: UROLOGY

## 2025-03-31 RX ORDER — OXYBUTYNIN CHLORIDE 15 MG/1
15 TABLET, EXTENDED RELEASE ORAL DAILY
Qty: 90 TABLET | Refills: 1 | Status: SHIPPED | OUTPATIENT
Start: 2025-03-31 | End: 2025-06-29

## 2025-03-31 NOTE — PROGRESS NOTES
Assessment:   1. Prolapse urethral mucosa    2. Urgency of urination  -     oxybutynin (DITROPAN XL) 15 MG TR24; Take 1 tablet (15 mg total) by mouth once daily.  Dispense: 90 tablet; Refill: 1    3. Urge incontinence  -     oxybutynin (DITROPAN XL) 15 MG TR24; Take 1 tablet (15 mg total) by mouth once daily.  Dispense: 90 tablet; Refill: 1         Plan:     We discussed her most bothersome symptoms of urgency frequency and urge incontinence and discussed a trial of anticholinergic therapy with Ditropan XL 15 mg.  The patient has a history of chronic idiopathic constipation and chronic respiratory disease and we recommended this over a course of Premarin at this time.  There is a possibility she might require local estrogen replacement but we will see her back in 3 months to see if this anticholinergic therapy is effective.           Dashawn Etienne MD  Urology  03/31/2025          UROLOGY HISTORY AND PHYSICAL EXAM    Subjective:      Patient ID: Nneka Gomes is a 64 y.o. female.    Chief Complaint::   Follow-up  Associated symptoms include arthralgias. Pertinent negatives include no abdominal pain, congestion, coughing or headaches.     The patient is a 64-year-old female with a history of partial urethral prolapse and vaginal atrophy that presents per back for follow-up and reports that she has decided to try therapy for the urgency and frequency she reports that she is most bothered by the urgency and frequency and occasional urge incontinence.  She has nocturia more than 2 episode and denies any dysuria or hematuria.  She denies any difficulty emptying her bladder reports small amounts of leakage from stress incontinence and urinary leakage relating to urgency..  She denies any pain in the lower abdomen but occasionally has discomfort in the genital area.       Past Medical History:   Diagnosis Date    Abdominal bloating 01/09/2020    Abdominal pain, right upper quadrant 01/09/2020    Abnormal liver  enzymes 03/04/2020    Abnormal results of liver function studies 11/18/2020    Acute conjunctivitis 05/30/2014    Acute exacerbation of chronic obstructive airways disease 02/13/2017    Acute pharyngitis 05/19/2020    Acute sinusitis 10/09/2017    Acute upper respiratory infection 05/19/2020    Allergic rhinitis 09/23/2020    Anemia 03/07/2014    Anesthesia of skin 09/06/2017    bilateral fingers    Bilateral primary osteoarthritis of knee 12/02/2019    Bradycardia 01/16/2020    Carpal tunnel syndrome 08/10/2017    Chest pain 03/06/2020    Chronic idiopathic constipation 11/18/2020    Chronic low back pain 11/05/2018    Chronic pain 04/23/2019    Chronic pain syndrome 01/30/2020    COPD (chronic obstructive pulmonary disease) 02/19/2021    Coronavirus infection 05/21/2020    Cough 05/19/2020    Degeneration of lumbar intervertebral disc 09/26/2014    L3-L4 L4-L5 Greater than L5-S1    Depressive disorder 07/03/2019    Disorder of gallbladder 03/30/2015    Dysphagia 01/09/2020    Dyspnea 05/19/2020    Dysuria 05/19/2020    Fatigue 05/19/2020    Frequency of urination 02/16/2015    GERD (gastroesophageal reflux disease) 11/18/2020    Hematuria 06/30/2020    Hemoptysis 02/27/2020    Herpes zoster 09/23/2020    Hyperlipidemia 09/23/2020    Hypertension 03/06/2020    Joint pain 11/04/2019    Knee pain 01/04/2019    Left inguinal hernia 06/10/2019    Long term (current) use of opiate analgesic 02/19/2021    Lumbar spondylosis 11/23/2020    Lumbar sprain 07/16/2012    Malaise 11/20/2014    Melena 09/25/2017    Microscopic hematuria 04/02/2020    Migraine without aura 02/06/2012    Nausea 02/27/2020    Nicotine dependence, cigarettes, uncomplicated 11/11/2020    Nonulcer dyspepsia 01/23/2017    On home O2     Other long term (current) drug therapy 09/23/2020    Other specified urinary incontinence 06/17/2020    Pain in left shoulder 05/02/2019    Pain in left wrist  09/06/2017    and hand    Pain in right shoulder 08/30/2019    Pain in right wrist 09/04/2018    Right hand    Palpitations 03/06/2020    Shoulder joint pain 04/23/2019    Smoker 07/03/2019    Snoring 08/02/2019    Synovial cyst of popliteal space 04/08/2013    Tobacco dependence syndrome 02/19/2021    Unstable angina 01/16/2020    UTI (urinary tract infection) 03/27/2020     Past Surgical History:   Procedure Laterality Date    CARDIAC CATHETERIZATION      CHOLECYSTECTOMY      HERNIA REPAIR      ROTATOR CUFF REPAIR          Medications Ordered Prior to Encounter[1]     Review of patient's allergies indicates:  No Known Allergies  Vitals:    03/31/25 1035   BP: (!) 159/81   Pulse: 90          Review of Systems   Constitutional:  Negative for activity change.   HENT:  Negative for congestion.    Respiratory:  Negative for cough and choking.    Cardiovascular:  Negative for leg swelling.   Gastrointestinal:  Negative for abdominal pain and constipation.   Genitourinary:  Positive for frequency and urgency. Negative for difficulty urinating, dysuria and hematuria.   Musculoskeletal:  Positive for arthralgias.   Neurological:  Negative for headaches.   Psychiatric/Behavioral:  Positive for sleep disturbance.       Objective:     Physical Exam  Vitals and nursing note reviewed.   Constitutional:       General: She is not in acute distress.     Appearance: She is well-developed. She is not diaphoretic.   HENT:      Head: Normocephalic.   Eyes:      Conjunctiva/sclera: Conjunctivae normal.   Cardiovascular:      Rate and Rhythm: Normal rate.   Pulmonary:      Effort: Pulmonary effort is normal. No respiratory distress.      Breath sounds: No wheezing.   Abdominal:      General: There is no distension.      Tenderness: There is no abdominal tenderness.   Musculoskeletal:         General: No tenderness or deformity.   Skin:     General: Skin is warm.      Findings: No erythema or rash.   Neurological:       Mental Status: She is alert and oriented to person, place, and time.      Motor: No abnormal muscle tone.   Psychiatric:         Attention and Perception: Attention normal.         Mood and Affect: Affect normal.         Speech: Speech normal.         Behavior: Behavior normal.         Thought Content: Thought content normal.         Cognition and Memory: Cognition and memory normal.         Judgment: Judgment normal.          CMP  Sodium   Date Value Ref Range Status   01/21/2025 144 136 - 145 mmol/L Final     Potassium   Date Value Ref Range Status   01/21/2025 3.5 3.5 - 5.1 mmol/L Final     Chloride   Date Value Ref Range Status   01/21/2025 110 (H) 98 - 107 mmol/L Final     CO2   Date Value Ref Range Status   01/21/2025 24 23 - 31 mmol/L Final     Glucose   Date Value Ref Range Status   01/21/2025 114 82 - 115 mg/dL Final     BUN   Date Value Ref Range Status   01/21/2025 12 10 - 20 mg/dL Final     Creatinine   Date Value Ref Range Status   01/21/2025 0.93 0.55 - 1.02 mg/dL Final     Calcium   Date Value Ref Range Status   01/21/2025 9.4 8.4 - 10.2 mg/dL Final     Total Protein   Date Value Ref Range Status   10/08/2024 7.0 6.4 - 8.2 g/dL Final     Albumin   Date Value Ref Range Status   10/08/2024 3.6 3.5 - 5.0 g/dL Final     Bilirubin, Total   Date Value Ref Range Status   10/08/2024 0.6 >0.0 - 1.2 mg/dL Final     Alk Phos   Date Value Ref Range Status   10/08/2024 193 (H) 50 - 130 U/L Final     AST   Date Value Ref Range Status   10/08/2024 32 15 - 37 U/L Final     ALT   Date Value Ref Range Status   10/08/2024 29 13 - 56 U/L Final     Anion Gap   Date Value Ref Range Status   01/21/2025 14 7 - 16 mmol/L Final     eGFR   Date Value Ref Range Status   01/21/2025 69 >=60 mL/min/1.73m2 Final      BMP  Lab Results   Component Value Date     01/21/2025    K 3.5 01/21/2025     (H) 01/21/2025    CO2 24 01/21/2025    BUN 12 01/21/2025    CREATININE 0.93 01/21/2025    CALCIUM 9.4 01/21/2025    ANIONGAP 14  01/21/2025    EGFRNORACEVR 69 01/21/2025                [1]  Current Outpatient Medications on File Prior to Visit   Medication Sig Dispense Refill    albuterol (PROAIR HFA) 90 mcg/actuation inhaler Inhale 2 puffs into the lungs every 4 (four) hours as needed for Wheezing. 18 g 5    albuterol (PROVENTIL) 2.5 mg /3 mL (0.083 %) nebulizer solution Take 3 mLs (2.5 mg total) by nebulization 4 (four) times daily as needed for Wheezing. 100 each 5    azelastine (ASTELIN) 137 mcg (0.1 %) nasal spray USE 2 SPRAYS NASALLY TWICE A DAY 90 mL 3    buPROPion (WELLBUTRIN XL) 300 MG 24 hr tablet Take 1 tablet (300 mg total) by mouth once daily. 90 tablet 1    fluticasone propionate (FLONASE) 50 mcg/actuation nasal spray 2 sprays (100 mcg total) by Each Nostril route once daily. 16 g 5    fluticasone-umeclidin-vilanter (TRELEGY ELLIPTA) 200-62.5-25 mcg inhaler Inhale 1 puff into the lungs once daily. 60 each 5    gabapentin (NEURONTIN) 100 MG capsule Take 1 capsule (100 mg total) by mouth 3 (three) times daily. 180 capsule 1    HYDROcodone-acetaminophen (NORCO)  mg per tablet Take 1 tablet by mouth every 8 (eight) hours as needed (Chronic lumbar disc disease). 90 tablet 0    levocetirizine (XYZAL) 5 MG tablet Take 1 tablet (5 mg total) by mouth every evening. 90 tablet 1    linaCLOtide (LINZESS) 145 mcg Cap capsule Take 1 capsule (145 mcg total) by mouth before breakfast. 90 capsule 3    meclizine (ANTIVERT) 25 mg tablet Take 1 tablet (25 mg total) by mouth 3 (three) times daily as needed. 20 tablet 0    pantoprazole (PROTONIX) 40 MG tablet Take 1 tablet (40 mg total) by mouth once daily. 90 tablet 1    RESTASIS 0.05 % ophthalmic emulsion Place 1 drop into both eyes 2 (two) times daily. 60 each 1    rosuvastatin (CRESTOR) 20 MG tablet Take 1 tablet (20 mg total) by mouth once daily. 90 tablet 1    theophylline (THEODUR) 300 mg 24 hr capsule Take 300 mg by mouth once daily. One tab Daily with food: (replaced  previous script for 100mg tab 3 daily) 90 capsule 1    valsartan (DIOVAN) 40 MG tablet Take 1 tablet (40 mg total) by mouth once daily. 90 tablet 1     No current facility-administered medications on file prior to visit.

## 2025-04-01 ENCOUNTER — TELEPHONE (OUTPATIENT)
Dept: OBSTETRICS AND GYNECOLOGY | Facility: CLINIC | Age: 65
End: 2025-04-01
Payer: MEDICARE

## 2025-04-01 NOTE — PROGRESS NOTES
Annual Exam    Assessment/Plan:  64 y.o.  presenting for her annual exam:    Problem List Items Addressed This Visit    None  Visit Diagnoses         Screening for malignant neoplasm of the cervix    -  Primary    Relevant Orders    ThinPrep Pap Test      LLQ pain        Relevant Orders    US Pelvis Complete Non OB      History of abnormal cervical Pap smear          Encounter for gynecological examination (general) (routine) with abnormal findings              Annual exam with PAP performed due to abnormal PAP last year.   F/u for annual mammograms as scheduled.  F/u for colon cancer screens as scheduled.  F/u with primary care as scheduled.     RTC in 1 year for annual exam and/or prn.     Due to LLQ pain, pelvic US ordered.   RTC in 3 weeks for pelvic US and return GYN visit.    CC:   Chief Complaint   Patient presents with    Well Woman     Pt is here for an annual exam with pap.        HPI:  64 y.o.  presents for her gynecologic annual exam. She is doing well today. She does c/o pain in her left lower quadrant that is intermittent. Denies any vaginal bleeding or vaginal discharge.  She did have a negative PAP last year with positive HPV.  Colposcopy done in 2024 was negative.  She is seeing Urology and he prescribed her some Ditropan. However, she has not received this from her pharmacy yet. She is planning to stop by there today to check on this.    Patient seen and examined.      Health Maintenance:    Birth control: Postmenopausal  Pregnancy plans: none   Safe relationship: ?  Healthy diet: ?   Exercise: ?  PCP: Magno Yan MD     Screening:  Last pap smear: 2024- negative w/HPV+; colpo 12/3/24- negative  History of abnormal pap smears: yes  STI screening: Declines  Mammogram: 10/11/2024- negative; scheduled 10/17/2025  Colonoscopy or colon cancer screenin2022; repeat in 10 years    Review of Systems: The following ROS was otherwise negative, except as noted in the HPI:   constitutional, HEENT, respiratory, cardiovascular, gastrointestinal, genitourinary, skin, musculoskeletal, neurological, psych    Primary Care Physician: Magno Yan MD    Obstetric History  OB History    Para Term  AB Living   2 1 1  1 1   SAB IAB Ectopic Multiple Live Births    1   1      # Outcome Date GA Lbr [place]/2nd Weight Sex Type Anes PTL Lv   2 IAB            1 Term  40w0d  2.438 kg (5 lb 6 oz) U Vag-Spont   BRITTNY       Gynecologic History  Menstrual History:   LMP: Unknown    Age of Menarche: 13   Age of Menopause: 53      Sexual History:    Contraception: see above   Currently ? sexually active   Denies history of STIs   Denies sexual problems    Pap History:   History of abnormal pap smears: see above   Last pap: see above    Medical History:  Past Medical History:   Diagnosis Date    Abdominal bloating 2020    Abdominal pain, right upper quadrant 2020    Abnormal liver enzymes 2020    Abnormal results of liver function studies 2020    Acute conjunctivitis 2014    Acute exacerbation of chronic obstructive airways disease 2017    Acute pharyngitis 2020    Acute sinusitis 10/09/2017    Acute upper respiratory infection 2020    Allergic rhinitis 2020    Anemia 2014    Anesthesia of skin 2017    bilateral fingers    Bilateral primary osteoarthritis of knee 2019    Bradycardia 2020    Carpal tunnel syndrome 08/10/2017    Chest pain 2020    Chronic idiopathic constipation 2020    Chronic low back pain 2018    Chronic pain 2019    Chronic pain syndrome 2020    COPD (chronic obstructive pulmonary disease) 2021    Coronavirus infection 2020    Cough 2020    Degeneration of lumbar intervertebral disc 2014    L3-L4 L4-L5 Greater than L5-S1    Depressive disorder 2019    Disorder of gallbladder 2015    Dysphagia 2020    Dyspnea 2020    Dysuria  05/19/2020    Fatigue 05/19/2020    Frequency of urination 02/16/2015    GERD (gastroesophageal reflux disease) 11/18/2020    Hematuria 06/30/2020    Hemoptysis 02/27/2020    Herpes zoster 09/23/2020    Hyperlipidemia 09/23/2020    Hypertension 03/06/2020    Joint pain 11/04/2019    Knee pain 01/04/2019    Left inguinal hernia 06/10/2019    Long term (current) use of opiate analgesic 02/19/2021    Lumbar spondylosis 11/23/2020    Lumbar sprain 07/16/2012    Malaise 11/20/2014    Melena 09/25/2017    Microscopic hematuria 04/02/2020    Migraine without aura 02/06/2012    Nausea 02/27/2020    Nicotine dependence, cigarettes, uncomplicated 11/11/2020    Nonulcer dyspepsia 01/23/2017    On home O2     Other long term (current) drug therapy 09/23/2020    Other specified urinary incontinence 06/17/2020    Pain in left shoulder 05/02/2019    Pain in left wrist 09/06/2017    and hand    Pain in right shoulder 08/30/2019    Pain in right wrist 09/04/2018    Right hand    Palpitations 03/06/2020    Shoulder joint pain 04/23/2019    Smoker 07/03/2019    Snoring 08/02/2019    Synovial cyst of popliteal space 04/08/2013    Tobacco dependence syndrome 02/19/2021    Unstable angina 01/16/2020    UTI (urinary tract infection) 03/27/2020       Medications:  Medication List with Changes/Refills   Current Medications    ALBUTEROL (PROAIR HFA) 90 MCG/ACTUATION INHALER    Inhale 2 puffs into the lungs every 4 (four) hours as needed for Wheezing.    ALBUTEROL (PROVENTIL) 2.5 MG /3 ML (0.083 %) NEBULIZER SOLUTION    Take 3 mLs (2.5 mg total) by nebulization 4 (four) times daily as needed for Wheezing.    AZELASTINE (ASTELIN) 137 MCG (0.1 %) NASAL SPRAY    USE 2 SPRAYS NASALLY TWICE A DAY    BUPROPION (WELLBUTRIN XL) 300 MG 24 HR TABLET    Take 1 tablet (300 mg total) by mouth once daily.    FLUTICASONE PROPIONATE (FLONASE) 50 MCG/ACTUATION NASAL SPRAY    2 sprays (100 mcg total) by Each Nostril route once daily.     FLUTICASONE-UMECLIDIN-VILANTER (TRELEGY ELLIPTA) 200-62.5-25 MCG INHALER    Inhale 1 puff into the lungs once daily.    GABAPENTIN (NEURONTIN) 100 MG CAPSULE    Take 1 capsule (100 mg total) by mouth 3 (three) times daily.    HYDROCODONE-ACETAMINOPHEN (NORCO)  MG PER TABLET    Take 1 tablet by mouth every 8 (eight) hours as needed (Chronic lumbar disc disease).    LEVOCETIRIZINE (XYZAL) 5 MG TABLET    Take 1 tablet (5 mg total) by mouth every evening.    LINACLOTIDE (LINZESS) 145 MCG CAP CAPSULE    Take 1 capsule (145 mcg total) by mouth before breakfast.    MECLIZINE (ANTIVERT) 25 MG TABLET    Take 1 tablet (25 mg total) by mouth 3 (three) times daily as needed.    OXYBUTYNIN (DITROPAN XL) 15 MG TR24    Take 1 tablet (15 mg total) by mouth once daily.    PANTOPRAZOLE (PROTONIX) 40 MG TABLET    Take 1 tablet (40 mg total) by mouth once daily.    RESTASIS 0.05 % OPHTHALMIC EMULSION    Place 1 drop into both eyes 2 (two) times daily.    ROSUVASTATIN (CRESTOR) 20 MG TABLET    Take 1 tablet (20 mg total) by mouth once daily.    THEOPHYLLINE (THEODUR) 300 MG 24 HR CAPSULE    Take 300 mg by mouth once daily. One tab Daily with food: (replaced previous script for 100mg tab 3 daily)    VALSARTAN (DIOVAN) 40 MG TABLET    Take 1 tablet (40 mg total) by mouth once daily.         Surgical History:  Past Surgical History:   Procedure Laterality Date    CARDIAC CATHETERIZATION      CHOLECYSTECTOMY      HERNIA REPAIR      ROTATOR CUFF REPAIR         Allergies:  Review of patient's allergies indicates:  No Known Allergies    Family History:  Family History   Problem Relation Name Age of Onset    Hypertension Mother      Cancer Maternal Aunt      Rectal cancer Other      Diabetes Other      Heart disease Other      Hypertension Other      Breast cancer Paternal Cousin         Social History:  Social History     Socioeconomic History    Marital status: Single    Number of children: 1   Tobacco Use    Smoking status: Former      Current packs/day: 0.00     Average packs/day: 1 pack/day for 40.0 years (40.0 ttl pk-yrs)     Types: Cigarettes     Start date: 07/1981     Quit date: 07/2021     Years since quitting: 3.7     Passive exposure: Current    Smokeless tobacco: Never   Substance and Sexual Activity    Alcohol use: Yes     Comment: occasionally    Drug use: Not Currently     Types: Marijuana    Sexual activity: Not Currently     Social Drivers of Health     Financial Resource Strain: Low Risk  (1/28/2025)    Overall Financial Resource Strain (CARDIA)     Difficulty of Paying Living Expenses: Not hard at all   Food Insecurity: No Food Insecurity (1/28/2025)    Hunger Vital Sign     Worried About Running Out of Food in the Last Year: Never true     Ran Out of Food in the Last Year: Never true   Transportation Needs: No Transportation Needs (1/28/2025)    TRANSPORTATION NEEDS     Transportation : No   Physical Activity: Inactive (1/28/2025)    Exercise Vital Sign     Days of Exercise per Week: 0 days     Minutes of Exercise per Session: 0 min   Stress: Stress Concern Present (1/28/2025)    [employer] [place] [employer] - Occupational Stress Questionnaire     Feeling of Stress : To some extent   Housing Stability: Unknown (1/28/2025)    Housing Stability Vital Sign     Unable to Pay for Housing in the Last Year: No     Homeless in the Last Year: No       Objective:  Body mass index is 33.38 kg/m².    BP (!) 150/92   Pulse 79   Wt 93.8 kg (206 lb 12.8 oz)   SpO2 99%   BMI 33.38 kg/m²     General: Alert, well appearing, no acute distress  Head: Normocephalic, atraumatic  Breasts: Symmetric, non-tender to palpation, no skin changes, palpable axillary lymph nodes or masses noted  Lungs: Unlabored respirations. Clear to auscultation bilaterally.  Cardiovascular: Regular rate and rhythm.   Abdomen: Soft, nontender, nondistended   Pelvic: Exam chaperoned by female assistant.   External: normal female genitalia, no masses or lesions    Vagina: pale, pink mucosa with decreased rugae, minimal clear discharge.  Cervix: no masses or lesions, nontender. atrophic. PAP performed.  Uterus: approx 6 weeks, mobile, midline, nontender  Adnexa: no masses or fullness, nontender  Rectovaginal: deferred  Extremities: No redness or tenderness  Skin: Well perfused, normal coloration and turgor, no lesions or rashes visualized  Neuro: Alert, oriented, normal speech, no focal deficits, moves extremities appropriately  Psych: Normal mood and behavior.     James Muniz MD

## 2025-04-07 ENCOUNTER — OFFICE VISIT (OUTPATIENT)
Dept: OBSTETRICS AND GYNECOLOGY | Facility: CLINIC | Age: 65
End: 2025-04-07
Payer: MEDICARE

## 2025-04-07 VITALS
OXYGEN SATURATION: 99 % | SYSTOLIC BLOOD PRESSURE: 150 MMHG | BODY MASS INDEX: 33.38 KG/M2 | WEIGHT: 206.81 LBS | HEART RATE: 79 BPM | DIASTOLIC BLOOD PRESSURE: 92 MMHG

## 2025-04-07 DIAGNOSIS — Z12.4 SCREENING FOR MALIGNANT NEOPLASM OF THE CERVIX: Primary | ICD-10-CM

## 2025-04-07 DIAGNOSIS — Z01.411 ENCOUNTER FOR GYNECOLOGICAL EXAMINATION (GENERAL) (ROUTINE) WITH ABNORMAL FINDINGS: ICD-10-CM

## 2025-04-07 DIAGNOSIS — M51.9 LUMBAR DISC DISEASE: ICD-10-CM

## 2025-04-07 DIAGNOSIS — R10.32 LLQ PAIN: ICD-10-CM

## 2025-04-07 DIAGNOSIS — Z87.42 HISTORY OF ABNORMAL CERVICAL PAP SMEAR: ICD-10-CM

## 2025-04-07 PROCEDURE — 3080F DIAST BP >= 90 MM HG: CPT | Mod: CPTII,,, | Performed by: OBSTETRICS & GYNECOLOGY

## 2025-04-07 PROCEDURE — 3077F SYST BP >= 140 MM HG: CPT | Mod: CPTII,,, | Performed by: OBSTETRICS & GYNECOLOGY

## 2025-04-07 PROCEDURE — 3044F HG A1C LEVEL LT 7.0%: CPT | Mod: CPTII,,, | Performed by: OBSTETRICS & GYNECOLOGY

## 2025-04-07 PROCEDURE — 1160F RVW MEDS BY RX/DR IN RCRD: CPT | Mod: CPTII,,, | Performed by: OBSTETRICS & GYNECOLOGY

## 2025-04-07 PROCEDURE — 88141 CYTOPATH C/V INTERPRET: CPT | Mod: ,,, | Performed by: PATHOLOGY

## 2025-04-07 PROCEDURE — 1159F MED LIST DOCD IN RCRD: CPT | Mod: CPTII,,, | Performed by: OBSTETRICS & GYNECOLOGY

## 2025-04-07 PROCEDURE — 99215 OFFICE O/P EST HI 40 MIN: CPT | Mod: PBBFAC | Performed by: OBSTETRICS & GYNECOLOGY

## 2025-04-07 PROCEDURE — 3008F BODY MASS INDEX DOCD: CPT | Mod: CPTII,,, | Performed by: OBSTETRICS & GYNECOLOGY

## 2025-04-07 PROCEDURE — 88142 CYTOPATH C/V THIN LAYER: CPT | Mod: TC,GCY | Performed by: OBSTETRICS & GYNECOLOGY

## 2025-04-07 PROCEDURE — 99999 PR PBB SHADOW E&M-EST. PATIENT-LVL V: CPT | Mod: PBBFAC,,, | Performed by: OBSTETRICS & GYNECOLOGY

## 2025-04-07 PROCEDURE — G0101 CA SCREEN;PELVIC/BREAST EXAM: HCPCS | Mod: S$PBB,,, | Performed by: OBSTETRICS & GYNECOLOGY

## 2025-04-07 PROCEDURE — 4010F ACE/ARB THERAPY RXD/TAKEN: CPT | Mod: CPTII,,, | Performed by: OBSTETRICS & GYNECOLOGY

## 2025-04-07 RX ORDER — GABAPENTIN 100 MG/1
100 CAPSULE ORAL 3 TIMES DAILY
Qty: 270 CAPSULE | Refills: 0 | Status: SHIPPED | OUTPATIENT
Start: 2025-04-07

## 2025-04-08 ENCOUNTER — OFFICE VISIT (OUTPATIENT)
Dept: GASTROENTEROLOGY | Facility: CLINIC | Age: 65
End: 2025-04-08
Payer: MEDICARE

## 2025-04-08 VITALS
SYSTOLIC BLOOD PRESSURE: 131 MMHG | DIASTOLIC BLOOD PRESSURE: 83 MMHG | BODY MASS INDEX: 32.89 KG/M2 | HEIGHT: 66 IN | OXYGEN SATURATION: 99 % | WEIGHT: 204.63 LBS | HEART RATE: 71 BPM

## 2025-04-08 DIAGNOSIS — R74.8 ELEVATED LIVER ENZYMES: Primary | ICD-10-CM

## 2025-04-08 PROCEDURE — 3044F HG A1C LEVEL LT 7.0%: CPT | Mod: CPTII,,,

## 2025-04-08 PROCEDURE — 3008F BODY MASS INDEX DOCD: CPT | Mod: CPTII,,,

## 2025-04-08 PROCEDURE — 3079F DIAST BP 80-89 MM HG: CPT | Mod: CPTII,,,

## 2025-04-08 PROCEDURE — 85610 PROTHROMBIN TIME: CPT

## 2025-04-08 PROCEDURE — 4010F ACE/ARB THERAPY RXD/TAKEN: CPT | Mod: CPTII,,,

## 2025-04-08 PROCEDURE — 99999 PR PBB SHADOW E&M-EST. PATIENT-LVL IV: CPT | Mod: PBBFAC,,,

## 2025-04-08 PROCEDURE — 99214 OFFICE O/P EST MOD 30 MIN: CPT | Mod: S$PBB,,,

## 2025-04-08 PROCEDURE — 99214 OFFICE O/P EST MOD 30 MIN: CPT | Mod: PBBFAC

## 2025-04-08 PROCEDURE — 3075F SYST BP GE 130 - 139MM HG: CPT | Mod: CPTII,,,

## 2025-04-08 NOTE — PROGRESS NOTES
Gastroenterology Clinic Note    Patient ID: [id number]   Referring MD: Angela Sanabria FNP   Chief Complaint:   Chief Complaint   Patient presents with    Follow-up       History of Present Illness   Nneka Gomes is an 64 y.o. AAF who is referred for elevated liver enzymes.  CMP from 04/2024 revealed alk-phos 226.  ALT and AST within normal range.  Fractionated alk-phos revealed had elevation of liver 1.  Upon chart review, patient has had a chronically elevated alk-phos with a positive ASMA and negative AMA in 2021.  She also underwent liver biopsy in 2021.    Previous workup: EGD w/ dilation; US liver and elastography; Liver biopsy    A. LIVER, BIOPSY ([facility], [place], [place], [id number] A1, 08/11/2021):      - Portal-based inflammation, lymphocyte predominant with rare plasma cells including rare lymphoid aggregates.  - Moderate perisinusoidal fibrosis, supported by trichrome stain.  - Mild bile duct lymphocytosis.  - No chronic cholestasis, supported by copper stain.  - Minimal macrovesicular steatosis.  - No lobular inflammation or apoptotic bodies identified.  - No iron deposition, supported by iron stain.  - No alpha-1 antitrypsin globules, supported by PAS and PAS-D stain (with bacterial and fungal contaminant).  - No hepatocellular carcinoma identified.     NOTE: Anti smooth muscle antibodies, ALP, and GGT are increased due to which clinical suspicion of autoimmune hepatitis is noted. On the basis of clinical picture and histological findings in the submitted specimen, I favor drug induced liver toxicity (possibly bupropion and sertraline). The other differential for this pattern may include primary biliary cholangitis (PBC), and less likely autoimmune hepatitis (AIH). Clinical correlation and follow up of trending of LFT  is recommended after cessation of possible hepatotoxic drugs is recommended.     Previous workup:  EGD    Last colonoscopy was 11/08/2022 with 10 year recall for screening  purposes.    Interval  - Linzess helps with constipation; no hematochezia or melena reported  - EGD with dilation improved dysphagia  - she was unable to get the CT triphasic that was previously ordered and is asking for this to be re-scheduled today  - previous fib-4 1.50 recommending further investigation    Review of Systems   Constitutional:  Negative for weight loss.   Gastrointestinal:  Positive for abdominal pain and constipation. Negative for blood in stool, diarrhea, melena, nausea and vomiting.       Past Medical History      Past Medical History:   Diagnosis Date    Abdominal bloating 01/09/2020    Abdominal pain, right upper quadrant 01/09/2020    Abnormal liver enzymes 03/04/2020    Abnormal results of liver function studies 11/18/2020    Acute conjunctivitis 05/30/2014    Acute exacerbation of chronic obstructive airways disease 02/13/2017    Acute pharyngitis 05/19/2020    Acute sinusitis 10/09/2017    Acute upper respiratory infection 05/19/2020    Allergic rhinitis 09/23/2020    Anemia 03/07/2014    Anesthesia of skin 09/06/2017    bilateral fingers    Bilateral primary osteoarthritis of knee 12/02/2019    Bradycardia 01/16/2020    Carpal tunnel syndrome 08/10/2017    Chest pain 03/06/2020    Chronic idiopathic constipation 11/18/2020    Chronic low back pain 11/05/2018    Chronic pain 04/23/2019    Chronic pain syndrome 01/30/2020    COPD (chronic obstructive pulmonary disease) 02/19/2021    Coronavirus infection 05/21/2020    Cough 05/19/2020    Degeneration of lumbar intervertebral disc 09/26/2014    L3-L4 L4-L5 Greater than L5-S1    Depressive disorder 07/03/2019    Disorder of gallbladder 03/30/2015    Dysphagia 01/09/2020    Dyspnea 05/19/2020    Dysuria 05/19/2020    Fatigue 05/19/2020    Frequency of urination 02/16/2015    GERD (gastroesophageal reflux disease) 11/18/2020    Hematuria 06/30/2020    Hemoptysis 02/27/2020    Herpes zoster 09/23/2020    Hyperlipidemia 09/23/2020     Hypertension 03/06/2020    Joint pain 11/04/2019    Knee pain 01/04/2019    Left inguinal hernia 06/10/2019    Long term (current) use of opiate analgesic 02/19/2021    Lumbar spondylosis 11/23/2020    Lumbar sprain 07/16/2012    Malaise 11/20/2014    Melena 09/25/2017    Microscopic hematuria 04/02/2020    Migraine without aura 02/06/2012    Nausea 02/27/2020    Nicotine dependence, cigarettes, uncomplicated 11/11/2020    Nonulcer dyspepsia 01/23/2017    On home O2     Other long term (current) drug therapy 09/23/2020    Other specified urinary incontinence 06/17/2020    Pain in left shoulder 05/02/2019    Pain in left wrist 09/06/2017    and hand    Pain in right shoulder 08/30/2019    Pain in right wrist 09/04/2018    Right hand    Palpitations 03/06/2020    Shoulder joint pain 04/23/2019    Smoker 07/03/2019    Snoring 08/02/2019    Synovial cyst of popliteal space 04/08/2013    Tobacco dependence syndrome 02/19/2021    Unstable angina 01/16/2020    UTI (urinary tract infection) 03/27/2020       Past Surgical History     Past Surgical History:   Procedure Laterality Date    CARDIAC CATHETERIZATION      CHOLECYSTECTOMY      HERNIA REPAIR      ROTATOR CUFF REPAIR         Allergies   Review of patient's allergies indicates:  No Known Allergies    Immunization History     Immunization History   Administered Date(s) Administered    COVID-19, MRNA, LN-S, PF (Pfizer) (Purple Cap) 04/29/2021, 10/28/2021, 04/29/2022    Influenza - Quadrivalent - PF *Preferred* (6 months and older) 09/16/2021, 10/11/2023    Influenza - Trivalent - Flucelvax - PF 11/06/2024    Influenza Split 10/03/2019    Pneumococcal Conjugate - 13 Valent 11/05/2018    Pneumococcal Conjugate - 20 Valent 01/09/2023    Pneumococcal Polysaccharide - 23 Valent 11/05/2019       Past Family History      Family History   Problem Relation Name Age of Onset    Hypertension Mother      Cancer Maternal Aunt      Rectal cancer Other      Diabetes Other      Heart  disease Other      Hypertension Other      Breast cancer Paternal Cousin         Past Social History      Social History     Socioeconomic History    Marital status: Single    Number of children: 1   Tobacco Use    Smoking status: Former     Current packs/day: 0.00     Average packs/day: 1 pack/day for 40.0 years (40.0 ttl pk-yrs)     Types: Cigarettes     Start date: 07/1981     Quit date: 07/2021     Years since quitting: 3.8     Passive exposure: Current    Smokeless tobacco: Never   Substance and Sexual Activity    Alcohol use: Yes     Comment: occasionally    Drug use: Not Currently     Types: Marijuana    Sexual activity: Not Currently     Social Drivers of Health     Financial Resource Strain: Low Risk  (1/28/2025)    Overall Financial Resource Strain (CARDIA)     Difficulty of Paying Living Expenses: Not hard at all   Food Insecurity: No Food Insecurity (1/28/2025)    Hunger Vital Sign     Worried About Running Out of Food in the Last Year: Never true     Ran Out of Food in the Last Year: Never true   Transportation Needs: No Transportation Needs (1/28/2025)    TRANSPORTATION NEEDS     Transportation : No   Physical Activity: Inactive (1/28/2025)    Exercise Vital Sign     Days of Exercise per Week: 0 days     Minutes of Exercise per Session: 0 min   Stress: Stress Concern Present (1/28/2025)    [employer] [place] [employer] - Occupational Stress Questionnaire     Feeling of Stress : To some extent   Housing Stability: Unknown (1/28/2025)    Housing Stability Vital Sign     Unable to Pay for Housing in the Last Year: No     Homeless in the Last Year: No       Current Medications     Outpatient Medications Marked as Taking for the 4/8/25 encounter (Office Visit) with Angela Sanabria FNP   Medication Sig Dispense Refill    albuterol (PROAIR HFA) 90 mcg/actuation inhaler Inhale 2 puffs into the lungs every 4 (four) hours as needed for Wheezing. 18 g 5    azelastine (ASTELIN) 137 mcg (0.1 %) nasal  spray USE 2 SPRAYS NASALLY TWICE A DAY 90 mL 3    buPROPion (WELLBUTRIN XL) 300 MG 24 hr tablet Take 1 tablet (300 mg total) by mouth once daily. 90 tablet 1    fluticasone propionate (FLONASE) 50 mcg/actuation nasal spray 2 sprays (100 mcg total) by Each Nostril route once daily. 16 g 5    fluticasone-umeclidin-vilanter (TRELEGY ELLIPTA) 200-62.5-25 mcg inhaler Inhale 1 puff into the lungs once daily. 60 each 5    gabapentin (NEURONTIN) 100 MG capsule Take 1 capsule (100 mg total) by mouth 3 (three) times daily. 270 capsule 0    levocetirizine (XYZAL) 5 MG tablet Take 1 tablet (5 mg total) by mouth every evening. 90 tablet 1    linaCLOtide (LINZESS) 145 mcg Cap capsule Take 1 capsule (145 mcg total) by mouth before breakfast. 90 capsule 3    oxybutynin (DITROPAN XL) 15 MG TR24 Take 1 tablet (15 mg total) by mouth once daily. 90 tablet 1    pantoprazole (PROTONIX) 40 MG tablet Take 1 tablet (40 mg total) by mouth once daily. 90 tablet 1    rosuvastatin (CRESTOR) 20 MG tablet Take 1 tablet (20 mg total) by mouth once daily. 90 tablet 1    theophylline (THEODUR) 300 mg 24 hr capsule Take 300 mg by mouth once daily. One tab Daily with food: (replaced previous script for 100mg tab 3 daily) 90 capsule 1    valsartan (DIOVAN) 40 MG tablet Take 1 tablet (40 mg total) by mouth once daily. 90 tablet 1    [DISCONTINUED] albuterol (PROVENTIL) 2.5 mg /3 mL (0.083 %) nebulizer solution Take 3 mLs (2.5 mg total) by nebulization 4 (four) times daily as needed for Wheezing. 100 each 5    [DISCONTINUED] HYDROcodone-acetaminophen (NORCO)  mg per tablet Take 1 tablet by mouth every 8 (eight) hours as needed (Chronic lumbar disc disease). 90 tablet 0    [DISCONTINUED] meclizine (ANTIVERT) 25 mg tablet Take 1 tablet (25 mg total) by mouth 3 (three) times daily as needed. 20 tablet 0    [DISCONTINUED] RESTASIS 0.05 % ophthalmic emulsion Place 1 drop into both eyes 2 (two) times daily. 60 each 1        I have reviewed the current  "medications, allergies, vital signs, past medical and surgical history, family medical history, and social history for this encounter and agree with all findings.    OBJECTIVE    Physical Exam    /83   Pulse 71   Ht 5' 6" (1.676 m)   Wt 92.8 kg (204 lb 9.6 oz)   SpO2 99%   BMI 33.02 kg/m²   GEN: Well appearing, cooperative, NAD  NECK: Supple, no LAD  CV: Normal rate  RESP: Unlabored  ABD: ND, no guarding  EXT: No clubbing, cyanosis. No extremity edema.  SKIN: Warm and dry  NEURO: AAO x4.     LABS    CBC (with or without Differential):   Lab Results   Component Value Date    WBC 8.40 04/08/2025    HGB 12.6 04/08/2025    HCT 39.8 04/08/2025    MCV 91.5 04/08/2025    MCH 29.0 04/08/2025    MCHC 31.7 (L) 04/08/2025    RDW 14.4 04/08/2025     04/08/2025    MPV 9.6 04/08/2025    NEUTOPHILPCT 49.2 (L) 04/08/2025    DIFFTYPE Auto 04/08/2025     BMP/CMP:   Lab Results   Component Value Date     04/08/2025    K 3.3 (L) 04/08/2025     (H) 04/08/2025    CO2 27 04/08/2025    BUN 10 04/08/2025    CREATININE 0.80 04/08/2025    GLU 93 04/08/2025    CALCIUM 9.2 04/08/2025    ALBUMIN 3.9 04/08/2025    AST 46 (H) 04/08/2025    ALT 25 04/08/2025    ALKPHOS 148 04/08/2025    MG 1.9 01/21/2025        IMAGING  Ultrasound abdomen limited liver 06/2024  - No evidence of abnormality demonstrated.     Ultrasound elastography liver 6/324  - Metavir score F0-1    Ultrasound abdomen limited with elastography 07/2021  - No significant sonographic abnormality.  - Normal to mild fibrosis (F0-F1 METAVIR score), with minimal risk of clinically significant fibrosis.  No followup is required.       ASSESSMENT  Nneka Gomes is a 64 y.o. AAF with history of COPD, hypertension, hyperlipidemia, GERD, and elevated liver enzymes who is referred for follow-up of elevated liver enzymes.    1. Elevated liver enzymes           PLAN    - labs today as detailed below  - reschedule CT abdomen plus triphasic  - I reviewed patient's " liver biopsy with Dr. Delarosa; her liver enzymes have essentially been normal aside from elevated ALP; can follow-up in one year with repeat cmp and if ALP is elevated, consider ggt and vit d for further investigation     There are no Patient Instructions on file for this visit.      Orders Placed This Encounter   Procedures    CBC Auto Differential     Standing Status:   Future     Number of Occurrences:   1     Expected Date:   4/8/2025     Expiration Date:   6/7/2026    Comprehensive Metabolic Panel     Standing Status:   Future     Number of Occurrences:   1     Expected Date:   4/8/2025     Expiration Date:   6/7/2026    Protime-INR     Standing Status:   Future     Number of Occurrences:   1     Expected Date:   4/8/2025     Expiration Date:   6/8/2026         The risks and benefits of my recommendations, as well as other treatment options were discussed with the patient today. All questions were answered.    30 minutes of total time spent on the encounter, which includes face to face time and non-face to face time preparing to see the patient (eg, review of tests), obtaining and/or reviewing separately obtained history, documenting clinical information in the electronic or other health record, Independently interpreting results (not separately reported) and communicating results to the patient/family/caregiver, or care coordination (not separately reported).        ZENA Dempsey/MARILYNP  [facility]

## 2025-04-16 ENCOUNTER — RESULTS FOLLOW-UP (OUTPATIENT)
Dept: OBSTETRICS AND GYNECOLOGY | Facility: CLINIC | Age: 65
End: 2025-04-16

## 2025-04-16 ENCOUNTER — TELEPHONE (OUTPATIENT)
Dept: OBSTETRICS AND GYNECOLOGY | Facility: CLINIC | Age: 65
End: 2025-04-16
Payer: MEDICARE

## 2025-04-22 ENCOUNTER — HOSPITAL ENCOUNTER (OUTPATIENT)
Dept: RADIOLOGY | Facility: HOSPITAL | Age: 65
Discharge: HOME | End: 2025-04-22
Attending: FAMILY MEDICINE
Payer: MEDICARE

## 2025-04-22 VITALS — HEIGHT: 66 IN | WEIGHT: 204 LBS | BODY MASS INDEX: 32.78 KG/M2

## 2025-04-22 DIAGNOSIS — Z87.891 HISTORY OF SMOKING 10-25 PACK YEARS: ICD-10-CM

## 2025-04-22 PROCEDURE — 71271 CT THORAX LUNG CANCER SCR C-: CPT | Mod: TC

## 2025-04-23 ENCOUNTER — HOSPITAL ENCOUNTER (OUTPATIENT)
Dept: RADIOLOGY | Facility: HOSPITAL | Age: 65
Discharge: HOME | End: 2025-04-23
Payer: MEDICARE

## 2025-04-23 PROCEDURE — 74170 CT ABD WO CNTRST FLWD CNTRST: CPT | Mod: TC

## 2025-04-23 PROCEDURE — 25500020 PHARM REV CODE 255

## 2025-04-23 RX ORDER — IOPAMIDOL 755 MG/ML
100 INJECTION, SOLUTION INTRAVASCULAR
Status: COMPLETED | OUTPATIENT
Start: 2025-04-23 | End: 2025-04-23

## 2025-04-23 RX ADMIN — IOPAMIDOL 100 ML: 755 INJECTION, SOLUTION INTRAVENOUS at 09:04

## 2025-04-25 NOTE — PROGRESS NOTES
"Return Gyn Office Visit    Assessment/Plan  Problem List Items Addressed This Visit    None  Visit Diagnoses         LLQ pain    -  Primary      Vasomotor symptoms due to menopause          Constipation, unspecified constipation type              Discussed her US was wnl from a GYN standpoint.  Her LLQ pain is likely due to constipation.   Recommended continue treatment of this and f/u with primary care.  RTC for annual exam and/or prn.    CC:   Chief Complaint   Patient presents with    Follow-up     Pt is following up on her ultrasound and states she has no concerns.      HPI:  64 y.o. who presents to office for US discussion. US wnl.    FINDINGS:  The uterus measures 5.6 x 2.2 x 3.6 cm.  No focal uterine lesions are seen.  The endometrium measures 2.4 mm.     There was a large amount of peristalsing bowel in the adnexal regions.  The ovaries could not be visualized.  There is only a small, normal amount of fluid in the cul-de-sac.     Impression:     The uterus is normal in appearance.  The ovaries were not visualized.    Constipation takes Melinzest  Some left sided pain with some improvement    C/o hot flashes and night sweats since menopause w/o improvement  Menopause in 40-50s without HRT  Asked for something to relieve symptoms; wishes to continue with current dose of gabapentin     No other questions or concerns  Annual w/ pap in 1 yr          Review of Systems - The following ROS was otherwise negative, except as noted in the HPI:  constitutional, respiratory, cardiovascular, gastrointestinal, genitourinary    Objective:  BP (!) 150/91 (BP Location: Right arm, Patient Position: Sitting)   Pulse 81   Ht 5' 6" (1.676 m)   Wt 95.3 kg (210 lb)   BMI 33.89 kg/m²   General: Alert, well appearing, no acute distress  Psych: Normal mood and behavior.      James Muniz MD     "

## 2025-04-28 ENCOUNTER — OFFICE VISIT (OUTPATIENT)
Dept: OBSTETRICS AND GYNECOLOGY | Facility: CLINIC | Age: 65
End: 2025-04-28
Attending: OBSTETRICS & GYNECOLOGY
Payer: MEDICARE

## 2025-04-28 ENCOUNTER — HOSPITAL ENCOUNTER (OUTPATIENT)
Dept: RADIOLOGY | Facility: HOSPITAL | Age: 65
Discharge: HOME | End: 2025-04-28
Attending: OBSTETRICS & GYNECOLOGY
Payer: MEDICARE

## 2025-04-28 VITALS
DIASTOLIC BLOOD PRESSURE: 91 MMHG | HEART RATE: 81 BPM | WEIGHT: 210 LBS | BODY MASS INDEX: 33.75 KG/M2 | SYSTOLIC BLOOD PRESSURE: 150 MMHG | HEIGHT: 66 IN

## 2025-04-28 DIAGNOSIS — R10.32 LLQ PAIN: Primary | ICD-10-CM

## 2025-04-28 DIAGNOSIS — K59.00 CONSTIPATION, UNSPECIFIED CONSTIPATION TYPE: ICD-10-CM

## 2025-04-28 DIAGNOSIS — N95.1 VASOMOTOR SYMPTOMS DUE TO MENOPAUSE: ICD-10-CM

## 2025-04-28 DIAGNOSIS — R10.32 LLQ PAIN: ICD-10-CM

## 2025-04-28 PROCEDURE — 99214 OFFICE O/P EST MOD 30 MIN: CPT | Mod: PBBFAC,25 | Performed by: OBSTETRICS & GYNECOLOGY

## 2025-04-28 PROCEDURE — 3008F BODY MASS INDEX DOCD: CPT | Mod: CPTII,,, | Performed by: OBSTETRICS & GYNECOLOGY

## 2025-04-28 PROCEDURE — 3080F DIAST BP >= 90 MM HG: CPT | Mod: CPTII,,, | Performed by: OBSTETRICS & GYNECOLOGY

## 2025-04-28 PROCEDURE — 76830 TRANSVAGINAL US NON-OB: CPT | Mod: TC

## 2025-04-28 PROCEDURE — 76856 US EXAM PELVIC COMPLETE: CPT | Mod: 26,,, | Performed by: RADIOLOGY

## 2025-04-28 PROCEDURE — 99999 PR PBB SHADOW E&M-EST. PATIENT-LVL IV: CPT | Mod: PBBFAC,,, | Performed by: OBSTETRICS & GYNECOLOGY

## 2025-04-28 PROCEDURE — 1160F RVW MEDS BY RX/DR IN RCRD: CPT | Mod: CPTII,,, | Performed by: OBSTETRICS & GYNECOLOGY

## 2025-04-28 PROCEDURE — 99213 OFFICE O/P EST LOW 20 MIN: CPT | Mod: S$PBB,,, | Performed by: OBSTETRICS & GYNECOLOGY

## 2025-04-28 PROCEDURE — 3044F HG A1C LEVEL LT 7.0%: CPT | Mod: CPTII,,, | Performed by: OBSTETRICS & GYNECOLOGY

## 2025-04-28 PROCEDURE — 3077F SYST BP >= 140 MM HG: CPT | Mod: CPTII,,, | Performed by: OBSTETRICS & GYNECOLOGY

## 2025-04-28 PROCEDURE — 76830 TRANSVAGINAL US NON-OB: CPT | Mod: 26,,, | Performed by: RADIOLOGY

## 2025-04-28 PROCEDURE — 1159F MED LIST DOCD IN RCRD: CPT | Mod: CPTII,,, | Performed by: OBSTETRICS & GYNECOLOGY

## 2025-04-28 PROCEDURE — 4010F ACE/ARB THERAPY RXD/TAKEN: CPT | Mod: CPTII,,, | Performed by: OBSTETRICS & GYNECOLOGY

## 2025-05-01 DIAGNOSIS — F17.200 TOBACCO DEPENDENCE SYNDROME: ICD-10-CM

## 2025-05-02 ENCOUNTER — RESULTS FOLLOW-UP (OUTPATIENT)
Dept: GASTROENTEROLOGY | Facility: CLINIC | Age: 65
End: 2025-05-02

## 2025-05-02 ENCOUNTER — OFFICE VISIT (OUTPATIENT)
Dept: FAMILY MEDICINE | Facility: CLINIC | Age: 65
End: 2025-05-02
Payer: MEDICARE

## 2025-05-02 VITALS
RESPIRATION RATE: 18 BRPM | OXYGEN SATURATION: 98 % | HEIGHT: 66 IN | WEIGHT: 203 LBS | TEMPERATURE: 98 F | DIASTOLIC BLOOD PRESSURE: 82 MMHG | HEART RATE: 78 BPM | SYSTOLIC BLOOD PRESSURE: 136 MMHG | BODY MASS INDEX: 32.62 KG/M2

## 2025-05-02 DIAGNOSIS — Z79.891 LONG TERM (CURRENT) USE OF OPIATE ANALGESIC: ICD-10-CM

## 2025-05-02 DIAGNOSIS — R73.03 PREDIABETES: ICD-10-CM

## 2025-05-02 DIAGNOSIS — H04.123 CHRONICALLY DRY EYES, BILATERAL: ICD-10-CM

## 2025-05-02 DIAGNOSIS — R42 DIZZINESS: ICD-10-CM

## 2025-05-02 DIAGNOSIS — E78.5 HYPERLIPIDEMIA, UNSPECIFIED HYPERLIPIDEMIA TYPE: ICD-10-CM

## 2025-05-02 DIAGNOSIS — M51.9 LUMBAR DISC DISEASE: Primary | ICD-10-CM

## 2025-05-02 DIAGNOSIS — J44.9 CHRONIC OBSTRUCTIVE PULMONARY DISEASE, UNSPECIFIED COPD TYPE: ICD-10-CM

## 2025-05-02 RX ORDER — BUPROPION HYDROCHLORIDE 300 MG/1
300 TABLET ORAL
Qty: 90 TABLET | Refills: 3 | OUTPATIENT
Start: 2025-05-02

## 2025-05-02 RX ORDER — MECLIZINE HYDROCHLORIDE 25 MG/1
25 TABLET ORAL 3 TIMES DAILY PRN
Qty: 20 TABLET | Refills: 0 | Status: SHIPPED | OUTPATIENT
Start: 2025-05-02

## 2025-05-02 RX ORDER — ALBUTEROL SULFATE 0.83 MG/ML
2.5 SOLUTION RESPIRATORY (INHALATION) 4 TIMES DAILY PRN
Qty: 100 EACH | Refills: 5 | Status: SHIPPED | OUTPATIENT
Start: 2025-05-02

## 2025-05-02 RX ORDER — HYDROCODONE BITARTRATE AND ACETAMINOPHEN 10; 325 MG/1; MG/1
1 TABLET ORAL EVERY 8 HOURS PRN
Qty: 90 TABLET | Refills: 0 | Status: SHIPPED | OUTPATIENT
Start: 2025-05-02

## 2025-05-02 RX ORDER — HYDROCODONE BITARTRATE AND ACETAMINOPHEN 10; 325 MG/1; MG/1
1 TABLET ORAL EVERY 8 HOURS PRN
Qty: 90 TABLET | Refills: 0 | Status: SHIPPED | OUTPATIENT
Start: 2025-06-02

## 2025-05-02 RX ORDER — CYCLOSPORINE 0.5 MG/ML
1 EMULSION OPHTHALMIC 2 TIMES DAILY
Qty: 60 EACH | Refills: 2 | Status: SHIPPED | OUTPATIENT
Start: 2025-05-02

## 2025-05-02 NOTE — PROGRESS NOTES
Nneka Gomes is a 64 y.o. female seen today for follow-up on her chronic pain syndrome secondary to lumbar disc disease.  She reports the Norco is working well with no side effects and she is active in meeting her ADLs.  She has had no signs or symptoms of tolerance or addiction and her  today was appropriate.  Today's drug screen only showed her prescribed opiate.  Today she has no new complaints but she will need a follow-up on her lipids and prediabetes at the next visit.  We discussed having her labs done just before that visit.    Past Medical History:   Diagnosis Date    Abdominal bloating 01/09/2020    Abdominal pain, right upper quadrant 01/09/2020    Abnormal liver enzymes 03/04/2020    Abnormal results of liver function studies 11/18/2020    Acute conjunctivitis 05/30/2014    Acute exacerbation of chronic obstructive airways disease 02/13/2017    Acute pharyngitis 05/19/2020    Acute sinusitis 10/09/2017    Acute upper respiratory infection 05/19/2020    Allergic rhinitis 09/23/2020    Anemia 03/07/2014    Anesthesia of skin 09/06/2017    bilateral fingers    Bilateral primary osteoarthritis of knee 12/02/2019    Bradycardia 01/16/2020    Carpal tunnel syndrome 08/10/2017    Chest pain 03/06/2020    Chronic idiopathic constipation 11/18/2020    Chronic low back pain 11/05/2018    Chronic pain 04/23/2019    Chronic pain syndrome 01/30/2020    COPD (chronic obstructive pulmonary disease) 02/19/2021    Coronavirus infection 05/21/2020    Cough 05/19/2020    Degeneration of lumbar intervertebral disc 09/26/2014    L3-L4 L4-L5 Greater than L5-S1    Depressive disorder 07/03/2019    Disorder of gallbladder 03/30/2015    Dysphagia 01/09/2020    Dyspnea 05/19/2020    Dysuria 05/19/2020    Fatigue 05/19/2020    Frequency of urination 02/16/2015    GERD (gastroesophageal reflux disease) 11/18/2020    Hematuria 06/30/2020    Hemoptysis 02/27/2020    Herpes zoster 09/23/2020    Hyperlipidemia 09/23/2020     Hypertension 03/06/2020    Joint pain 11/04/2019    Knee pain 01/04/2019    Left inguinal hernia 06/10/2019    Long term (current) use of opiate analgesic 02/19/2021    Lumbar spondylosis 11/23/2020    Lumbar sprain 07/16/2012    Malaise 11/20/2014    Melena 09/25/2017    Microscopic hematuria 04/02/2020    Migraine without aura 02/06/2012    Nausea 02/27/2020    Nicotine dependence, cigarettes, uncomplicated 11/11/2020    Nonulcer dyspepsia 01/23/2017    On home O2     Other long term (current) drug therapy 09/23/2020    Other specified urinary incontinence 06/17/2020    Pain in left shoulder 05/02/2019    Pain in left wrist 09/06/2017    and hand    Pain in right shoulder 08/30/2019    Pain in right wrist 09/04/2018    Right hand    Palpitations 03/06/2020    Shoulder joint pain 04/23/2019    Smoker 07/03/2019    Snoring 08/02/2019    Synovial cyst of popliteal space 04/08/2013    Tobacco dependence syndrome 02/19/2021    Unstable angina 01/16/2020    UTI (urinary tract infection) 03/27/2020     Family History   Problem Relation Name Age of Onset    Hypertension Mother      Cancer Maternal Aunt      Rectal cancer Other      Diabetes Other      Heart disease Other      Hypertension Other      Breast cancer Paternal Cousin       Medications Ordered Prior to Encounter[1]  Immunization History   Administered Date(s) Administered    COVID-19, MRNA, LN-S, PF (Pfizer) (Purple Cap) 04/29/2021, 10/28/2021, 04/29/2022    Influenza - Quadrivalent - PF *Preferred* (6 months and older) 09/16/2021, 10/11/2023    Influenza - Trivalent - Flucelvax - PF 11/06/2024    Influenza Split 10/03/2019    Pneumococcal Conjugate - 13 Valent 11/05/2018    Pneumococcal Conjugate - 20 Valent 01/09/2023    Pneumococcal Polysaccharide - 23 Valent 11/05/2019       Review of Systems   Constitutional:  Negative for fever, malaise/fatigue and weight loss.   Respiratory:  Negative for shortness of breath.    Cardiovascular:  Negative for chest pain  and palpitations.   Gastrointestinal:  Negative for nausea and vomiting.   Musculoskeletal:  Positive for back pain and myalgias.   Psychiatric/Behavioral:  Negative for depression.         Vitals:    05/02/25 0927   BP: 136/82   Pulse: 78   Resp: 18   Temp: 97.9 °F (36.6 °C)       Physical Exam  Vitals reviewed.   Constitutional:       Appearance: Normal appearance.   HENT:      Head: Normocephalic.   Eyes:      Extraocular Movements: Extraocular movements intact.      Conjunctiva/sclera: Conjunctivae normal.      Pupils: Pupils are equal, round, and reactive to light.   Neck:      Thyroid: No thyroid mass or thyromegaly.   Cardiovascular:      Rate and Rhythm: Normal rate and regular rhythm.      Heart sounds: Normal heart sounds. No murmur heard.     No gallop.   Pulmonary:      Effort: Pulmonary effort is normal. No respiratory distress.      Breath sounds: Normal breath sounds. No wheezing or rales.   Skin:     General: Skin is warm and dry.      Coloration: Skin is not jaundiced or pale.   Neurological:      Mental Status: She is alert.   Psychiatric:         Mood and Affect: Mood normal.         Behavior: Behavior normal.         Thought Content: Thought content normal.         Judgment: Judgment normal.          Assessment and Plan  1. Lumbar disc disease  -     HYDROcodone-acetaminophen (NORCO)  mg per tablet; Take 1 tablet by mouth every 8 (eight) hours as needed (Chronic lumbar disc disease).  Dispense: 90 tablet; Refill: 0  -     HYDROcodone-acetaminophen (NORCO)  mg per tablet; Take 1 tablet by mouth every 8 (eight) hours as needed (Chronic lumbar disc disease).  Dispense: 90 tablet; Refill: 0    2. Long term (current) use of opiate analgesic  -     POCT Urine Drug Screen Presump    3. Dizziness  -     meclizine (ANTIVERT) 25 mg tablet; Take 1 tablet (25 mg total) by mouth 3 (three) times daily as needed for Dizziness.  Dispense: 20 tablet; Refill: 0    4. Chronically dry eyes, bilateral  -      RESTASIS 0.05 % ophthalmic emulsion; Place 1 drop into both eyes 2 (two) times daily.  Dispense: 60 each; Refill: 2    5. Chronic obstructive pulmonary disease, unspecified COPD type  -     albuterol (PROVENTIL) 2.5 mg /3 mL (0.083 %) nebulizer solution; Take 3 mLs (2.5 mg total) by nebulization 4 (four) times daily as needed for Wheezing.  Dispense: 100 each; Refill: 5    6. Hyperlipidemia, unspecified hyperlipidemia type  -     CBC Auto Differential; Future; Expected date: 06/02/2025  -     Comprehensive Metabolic Panel; Future; Expected date: 06/02/2025  -     Lipid Panel; Future; Expected date: 06/02/2025    7. Prediabetes  -     Hemoglobin A1C; Future; Expected date: 06/02/2025             Return to clinic in 2 months but have her lab work done just before that visit.    Health Maintenance Topics with due status: Not Due       Topic Last Completion Date    Colorectal Cancer Screening 11/08/2022    Mammogram 10/11/2024    Hemoglobin A1c (Prediabetes) 03/03/2025    Cervical Cancer Screening 04/07/2025    LDCT Lung Screen 04/22/2025              [1]   Current Outpatient Medications on File Prior to Visit   Medication Sig Dispense Refill    albuterol (PROAIR HFA) 90 mcg/actuation inhaler Inhale 2 puffs into the lungs every 4 (four) hours as needed for Wheezing. 18 g 5    azelastine (ASTELIN) 137 mcg (0.1 %) nasal spray USE 2 SPRAYS NASALLY TWICE A DAY 90 mL 3    buPROPion (WELLBUTRIN XL) 300 MG 24 hr tablet Take 1 tablet (300 mg total) by mouth once daily. 90 tablet 1    fluticasone propionate (FLONASE) 50 mcg/actuation nasal spray 2 sprays (100 mcg total) by Each Nostril route once daily. 16 g 5    fluticasone-umeclidin-vilanter (TRELEGY ELLIPTA) 200-62.5-25 mcg inhaler Inhale 1 puff into the lungs once daily. 60 each 5    gabapentin (NEURONTIN) 100 MG capsule Take 1 capsule (100 mg total) by mouth 3 (three) times daily. 270 capsule 0    levocetirizine (XYZAL) 5 MG tablet Take 1 tablet (5 mg total) by mouth  every evening. 90 tablet 1    linaCLOtide (LINZESS) 145 mcg Cap capsule Take 1 capsule (145 mcg total) by mouth before breakfast. 90 capsule 3    oxybutynin (DITROPAN XL) 15 MG TR24 Take 1 tablet (15 mg total) by mouth once daily. 90 tablet 1    pantoprazole (PROTONIX) 40 MG tablet Take 1 tablet (40 mg total) by mouth once daily. 90 tablet 1    rosuvastatin (CRESTOR) 20 MG tablet Take 1 tablet (20 mg total) by mouth once daily. 90 tablet 1    theophylline (THEODUR) 300 mg 24 hr capsule Take 300 mg by mouth once daily. One tab Daily with food: (replaced previous script for 100mg tab 3 daily) 90 capsule 1    valsartan (DIOVAN) 40 MG tablet Take 1 tablet (40 mg total) by mouth once daily. 90 tablet 1    [DISCONTINUED] albuterol (PROVENTIL) 2.5 mg /3 mL (0.083 %) nebulizer solution Take 3 mLs (2.5 mg total) by nebulization 4 (four) times daily as needed for Wheezing. 100 each 5    [DISCONTINUED] HYDROcodone-acetaminophen (NORCO)  mg per tablet Take 1 tablet by mouth every 8 (eight) hours as needed (Chronic lumbar disc disease). 90 tablet 0    [DISCONTINUED] meclizine (ANTIVERT) 25 mg tablet Take 1 tablet (25 mg total) by mouth 3 (three) times daily as needed. 20 tablet 0    [DISCONTINUED] RESTASIS 0.05 % ophthalmic emulsion Place 1 drop into both eyes 2 (two) times daily. 60 each 1     No current facility-administered medications on file prior to visit.

## 2025-05-04 DIAGNOSIS — J30.9 ALLERGIC RHINITIS, UNSPECIFIED SEASONALITY, UNSPECIFIED TRIGGER: ICD-10-CM

## 2025-05-05 ENCOUNTER — TELEPHONE (OUTPATIENT)
Dept: GASTROENTEROLOGY | Facility: CLINIC | Age: 65
End: 2025-05-05
Payer: MEDICARE

## 2025-05-05 RX ORDER — LEVOCETIRIZINE DIHYDROCHLORIDE 5 MG/1
5 TABLET, FILM COATED ORAL NIGHTLY
Qty: 90 TABLET | Refills: 3 | OUTPATIENT
Start: 2025-05-05

## 2025-05-05 NOTE — TELEPHONE ENCOUNTER
----- Message from ZENA Dempsey sent at 5/2/2025  1:31 PM CDT -----  Will you let her know that her labs and imaging are ok. No acute findings on imaging. Dr. Delarosa reviewed her labs and previous liver biopsy. He recommends a follow-up in one year. We can reschedule   this for her. If she needs us for other issues, tell her to reach out. Her dysphagia had improved though and her linzess was doing well, so unless something changes, we will plan for annual   follow-up.   ----- Message -----  From: Lab, Background User  Sent: 4/8/2025   3:23 PM CDT  To: ZENA Dempsey

## 2025-05-05 NOTE — TELEPHONE ENCOUNTER
Patient aware of results and 1 year follow up appointment with Angela Sanabria NP 4/8/26 @ 9:00am, verbalized good understanding.

## 2025-05-15 ENCOUNTER — TELEPHONE (OUTPATIENT)
Dept: FAMILY MEDICINE | Facility: CLINIC | Age: 65
End: 2025-05-15
Payer: MEDICARE

## 2025-05-15 NOTE — TELEPHONE ENCOUNTER
----- Message from Rosita sent at 5/14/2025  1:22 PM CDT -----  Regarding: Pt Call Back  Pt states she received a letter in the mail to call us about lab results Pt phone :# [phone]

## 2025-06-17 DIAGNOSIS — R42 DIZZINESS: ICD-10-CM

## 2025-06-17 RX ORDER — MECLIZINE HYDROCHLORIDE 25 MG/1
25 TABLET ORAL 3 TIMES DAILY PRN
Qty: 20 TABLET | Refills: 0 | Status: SHIPPED | OUTPATIENT
Start: 2025-06-17

## 2025-06-17 NOTE — TELEPHONE ENCOUNTER
----- Message from Phuong sent at 6/17/2025 11:17 AM CDT -----  Regarding: Med  Contact: Patient  Pt needs: meclizine (ANTIVERT) 25 mg tablet, still having dizziness.    Pharmacy: [facility]    Phone #: [phone] (H)

## 2025-07-02 ENCOUNTER — OFFICE VISIT (OUTPATIENT)
Dept: FAMILY MEDICINE | Facility: CLINIC | Age: 65
End: 2025-07-02
Payer: MEDICARE

## 2025-07-02 VITALS
TEMPERATURE: 98 F | BODY MASS INDEX: 33.75 KG/M2 | RESPIRATION RATE: 18 BRPM | WEIGHT: 210 LBS | OXYGEN SATURATION: 96 % | SYSTOLIC BLOOD PRESSURE: 136 MMHG | HEART RATE: 79 BPM | DIASTOLIC BLOOD PRESSURE: 85 MMHG | HEIGHT: 66 IN

## 2025-07-02 DIAGNOSIS — R73.03 PREDIABETES: ICD-10-CM

## 2025-07-02 DIAGNOSIS — R42 DIZZINESS: ICD-10-CM

## 2025-07-02 DIAGNOSIS — Z79.899 LONG TERM USE OF DRUG: Primary | ICD-10-CM

## 2025-07-02 DIAGNOSIS — E78.5 HYPERLIPIDEMIA, UNSPECIFIED HYPERLIPIDEMIA TYPE: ICD-10-CM

## 2025-07-02 DIAGNOSIS — M51.9 LUMBAR DISC DISEASE: ICD-10-CM

## 2025-07-02 DIAGNOSIS — F17.200 TOBACCO DEPENDENCE SYNDROME: ICD-10-CM

## 2025-07-02 LAB
ALBUMIN SERPL BCP-MCNC: 4 G/DL (ref 3.4–4.8)
ALBUMIN/GLOB SERPL: 1.5 {RATIO}
ALP SERPL-CCNC: 164 U/L (ref 40–150)
ALT SERPL W P-5'-P-CCNC: 39 U/L
ANION GAP SERPL CALCULATED.3IONS-SCNC: 17 MMOL/L (ref 7–16)
AST SERPL W P-5'-P-CCNC: 50 U/L (ref 11–45)
BASOPHILS # BLD AUTO: 0.04 K/UL (ref 0–0.2)
BASOPHILS NFR BLD AUTO: 0.6 % (ref 0–1)
BILIRUB SERPL-MCNC: 1.1 MG/DL
BUN SERPL-MCNC: 9 MG/DL (ref 10–20)
BUN/CREAT SERPL: 9 (ref 6–20)
CALCIUM SERPL-MCNC: 9.1 MG/DL (ref 8.4–10.2)
CHLORIDE SERPL-SCNC: 105 MMOL/L (ref 98–107)
CHOLEST SERPL-MCNC: 142 MG/DL
CHOLEST/HDLC SERPL: 2.3 {RATIO}
CO2 SERPL-SCNC: 27 MMOL/L (ref 23–31)
CREAT SERPL-MCNC: 0.95 MG/DL (ref 0.55–1.02)
CTP QC/QA: YES
DIFFERENTIAL METHOD BLD: ABNORMAL
EGFR (NO RACE VARIABLE) (RUSH/TITUS): 67 ML/MIN/1.73M2
EOSINOPHIL # BLD AUTO: 0.12 K/UL (ref 0–0.5)
EOSINOPHIL NFR BLD AUTO: 1.9 % (ref 1–4)
ERYTHROCYTE [DISTWIDTH] IN BLOOD BY AUTOMATED COUNT: 14.8 % (ref 11.5–14.5)
EST. AVERAGE GLUCOSE BLD GHB EST-MCNC: 131 MG/DL
GLOBULIN SER-MCNC: 2.6 G/DL (ref 2–4)
GLUCOSE SERPL-MCNC: 100 MG/DL (ref 82–115)
HBA1C MFR BLD HPLC: 6.2 %
HCT VFR BLD AUTO: 41.2 % (ref 38–47)
HDLC SERPL-MCNC: 61 MG/DL (ref 35–60)
HGB BLD-MCNC: 12.6 G/DL (ref 12–16)
IMM GRANULOCYTES # BLD AUTO: 0.02 K/UL (ref 0–0.04)
IMM GRANULOCYTES NFR BLD: 0.3 % (ref 0–0.4)
LDLC SERPL CALC-MCNC: 74 MG/DL
LDLC/HDLC SERPL: 1.2 {RATIO}
LYMPHOCYTES # BLD AUTO: 2.21 K/UL (ref 1–4.8)
LYMPHOCYTES NFR BLD AUTO: 34.5 % (ref 27–41)
MCH RBC QN AUTO: 28.4 PG (ref 27–31)
MCHC RBC AUTO-ENTMCNC: 30.6 G/DL (ref 32–36)
MCV RBC AUTO: 92.8 FL (ref 80–96)
MONOCYTES # BLD AUTO: 0.57 K/UL (ref 0–0.8)
MONOCYTES NFR BLD AUTO: 8.9 % (ref 2–6)
MPC BLD CALC-MCNC: 9.9 FL (ref 9.4–12.4)
NEUTROPHILS # BLD AUTO: 3.44 K/UL (ref 1.8–7.7)
NEUTROPHILS NFR BLD AUTO: 53.8 % (ref 53–65)
NONHDLC SERPL-MCNC: 81 MG/DL
NRBC # BLD AUTO: 0 X10E3/UL
NRBC, AUTO (.00): 0 %
PLATELET # BLD AUTO: 223 K/UL (ref 150–400)
POC (AMP) AMPHETAMINE: NEGATIVE
POC (BAR) BARBITURATES: NEGATIVE
POC (BUP) BUPRENORPHINE: NEGATIVE
POC (BZO) BENZODIAZEPINES: NEGATIVE
POC (COC) COCAINE: NEGATIVE
POC (MDMA) METHYLENEDIOXYMETHAMPHETAMINE 3,4: NEGATIVE
POC (MET) METHAMPHETAMINE: NEGATIVE
POC (MOP) OPIATES: ABNORMAL
POC (MTD) METHADONE: NEGATIVE
POC (OXY) OXYCODONE: NEGATIVE
POC (PCP) PHENCYCLIDINE: NEGATIVE
POC (TCA) TRICYCLIC ANTIDEPRESSANTS: NEGATIVE
POC TEMPERATURE (URINE): 92
POC THC: NEGATIVE
POTASSIUM SERPL-SCNC: 4 MMOL/L (ref 3.5–5.1)
PROT SERPL-MCNC: 6.6 G/DL (ref 5.8–7.6)
RBC # BLD AUTO: 4.44 M/UL (ref 4.2–5.4)
SODIUM SERPL-SCNC: 145 MMOL/L (ref 136–145)
TRIGL SERPL-MCNC: 33 MG/DL (ref 37–140)
VLDLC SERPL-MCNC: 7 MG/DL
WBC # BLD AUTO: 6.4 K/UL (ref 4.5–11)

## 2025-07-02 PROCEDURE — 3044F HG A1C LEVEL LT 7.0%: CPT | Mod: ,,, | Performed by: FAMILY MEDICINE

## 2025-07-02 PROCEDURE — 4010F ACE/ARB THERAPY RXD/TAKEN: CPT | Mod: ,,, | Performed by: FAMILY MEDICINE

## 2025-07-02 PROCEDURE — 3075F SYST BP GE 130 - 139MM HG: CPT | Mod: ,,, | Performed by: FAMILY MEDICINE

## 2025-07-02 PROCEDURE — 80305 DRUG TEST PRSMV DIR OPT OBS: CPT | Mod: QW,,, | Performed by: FAMILY MEDICINE

## 2025-07-02 PROCEDURE — 3079F DIAST BP 80-89 MM HG: CPT | Mod: ,,, | Performed by: FAMILY MEDICINE

## 2025-07-02 PROCEDURE — 85025 COMPLETE CBC W/AUTO DIFF WBC: CPT | Mod: ,,, | Performed by: CLINICAL MEDICAL LABORATORY

## 2025-07-02 PROCEDURE — 3008F BODY MASS INDEX DOCD: CPT | Mod: ,,, | Performed by: FAMILY MEDICINE

## 2025-07-02 PROCEDURE — 80053 COMPREHEN METABOLIC PANEL: CPT | Mod: ,,, | Performed by: CLINICAL MEDICAL LABORATORY

## 2025-07-02 PROCEDURE — 1159F MED LIST DOCD IN RCRD: CPT | Mod: ,,, | Performed by: FAMILY MEDICINE

## 2025-07-02 PROCEDURE — 80061 LIPID PANEL: CPT | Mod: ,,, | Performed by: CLINICAL MEDICAL LABORATORY

## 2025-07-02 PROCEDURE — 83036 HEMOGLOBIN GLYCOSYLATED A1C: CPT | Mod: ,,, | Performed by: CLINICAL MEDICAL LABORATORY

## 2025-07-02 PROCEDURE — 99213 OFFICE O/P EST LOW 20 MIN: CPT | Mod: ,,, | Performed by: FAMILY MEDICINE

## 2025-07-02 PROCEDURE — 1160F RVW MEDS BY RX/DR IN RCRD: CPT | Mod: ,,, | Performed by: FAMILY MEDICINE

## 2025-07-02 RX ORDER — MECLIZINE HYDROCHLORIDE 25 MG/1
25 TABLET ORAL 3 TIMES DAILY PRN
Qty: 20 TABLET | Refills: 0 | Status: SHIPPED | OUTPATIENT
Start: 2025-07-02

## 2025-07-02 RX ORDER — HYDROCODONE BITARTRATE AND ACETAMINOPHEN 10; 325 MG/1; MG/1
1 TABLET ORAL EVERY 8 HOURS PRN
Qty: 90 TABLET | Refills: 0 | Status: SHIPPED | OUTPATIENT
Start: 2025-07-02 | End: 2025-07-02

## 2025-07-02 RX ORDER — BUPROPION HYDROCHLORIDE 300 MG/1
300 TABLET ORAL DAILY
Qty: 90 TABLET | Refills: 1 | Status: SHIPPED | OUTPATIENT
Start: 2025-07-02 | End: 2025-12-29

## 2025-07-02 RX ORDER — HYDROCODONE BITARTRATE AND ACETAMINOPHEN 10; 325 MG/1; MG/1
1 TABLET ORAL EVERY 8 HOURS PRN
Qty: 90 TABLET | Refills: 0 | Status: SHIPPED | OUTPATIENT
Start: 2025-08-01

## 2025-07-02 NOTE — PROGRESS NOTES
Nneka Gomes is a 64 y.o. female seen today for follow-up on her chronic pain syndrome due to lumbar disc disease.  She reports the Norco is working well with no side effects and she is meeting her ADLs.  She has had no signs or symptoms of tolerance or addiction and her  today was appropriate.  Today's drug screen only showed her prescribed opiate.  Today she is fasting for follow-up blood work but has no other complaints.      Past Medical History:   Diagnosis Date    Abdominal bloating 01/09/2020    Abdominal pain, right upper quadrant 01/09/2020    Abnormal liver enzymes 03/04/2020    Abnormal results of liver function studies 11/18/2020    Acute conjunctivitis 05/30/2014    Acute exacerbation of chronic obstructive airways disease 02/13/2017    Acute pharyngitis 05/19/2020    Acute sinusitis 10/09/2017    Acute upper respiratory infection 05/19/2020    Allergic rhinitis 09/23/2020    Anemia 03/07/2014    Anesthesia of skin 09/06/2017    bilateral fingers    Bilateral primary osteoarthritis of knee 12/02/2019    Bradycardia 01/16/2020    Carpal tunnel syndrome 08/10/2017    Chest pain 03/06/2020    Chronic idiopathic constipation 11/18/2020    Chronic low back pain 11/05/2018    Chronic pain 04/23/2019    Chronic pain syndrome 01/30/2020    COPD (chronic obstructive pulmonary disease) 02/19/2021    Coronavirus infection 05/21/2020    Cough 05/19/2020    Degeneration of lumbar intervertebral disc 09/26/2014    L3-L4 L4-L5 Greater than L5-S1    Depressive disorder 07/03/2019    Disorder of gallbladder 03/30/2015    Dysphagia 01/09/2020    Dyspnea 05/19/2020    Dysuria 05/19/2020    Fatigue 05/19/2020    Frequency of urination 02/16/2015    GERD (gastroesophageal reflux disease) 11/18/2020    Hematuria 06/30/2020    Hemoptysis 02/27/2020    Herpes zoster 09/23/2020    Hyperlipidemia 09/23/2020    Hypertension 03/06/2020    Joint pain 11/04/2019    Knee pain 01/04/2019    Left inguinal hernia 06/10/2019     Long term (current) use of opiate analgesic 02/19/2021    Lumbar spondylosis 11/23/2020    Lumbar sprain 07/16/2012    Malaise 11/20/2014    Melena 09/25/2017    Microscopic hematuria 04/02/2020    Migraine without aura 02/06/2012    Nausea 02/27/2020    Nicotine dependence, cigarettes, uncomplicated 11/11/2020    Nonulcer dyspepsia 01/23/2017    On home O2     Other long term (current) drug therapy 09/23/2020    Other specified urinary incontinence 06/17/2020    Pain in left shoulder 05/02/2019    Pain in left wrist 09/06/2017    and hand    Pain in right shoulder 08/30/2019    Pain in right wrist 09/04/2018    Right hand    Palpitations 03/06/2020    Shoulder joint pain 04/23/2019    Smoker 07/03/2019    Snoring 08/02/2019    Synovial cyst of popliteal space 04/08/2013    Tobacco dependence syndrome 02/19/2021    Unstable angina 01/16/2020    UTI (urinary tract infection) 03/27/2020     Family History   Problem Relation Name Age of Onset    Hypertension Mother      Cancer Maternal Aunt      Rectal cancer Other      Diabetes Other      Heart disease Other      Hypertension Other      Breast cancer Paternal Cousin       Medications Ordered Prior to Encounter[1]  Immunization History   Administered Date(s) Administered    COVID-19, MRNA, LN-S, PF (Pfizer) (Purple Cap) 04/29/2021, 10/28/2021, 04/29/2022    Influenza - Quadrivalent - PF *Preferred* (6 months and older) 09/16/2021, 10/11/2023    Influenza - Trivalent - Flucelvax - PF 11/06/2024    Influenza Split 10/03/2019    Pneumococcal Conjugate - 13 Valent 11/05/2018    Pneumococcal Conjugate - 20 Valent 01/09/2023    Pneumococcal Polysaccharide - 23 Valent 11/05/2019       Review of Systems   Constitutional:  Negative for fever, malaise/fatigue and weight loss.   Respiratory:  Negative for shortness of breath.    Cardiovascular:  Negative for chest pain and palpitations.   Gastrointestinal:  Negative for nausea and vomiting.   Musculoskeletal:  Positive for back  pain and myalgias.   Psychiatric/Behavioral:  Negative for depression.         Vitals:    07/02/25 1013   BP: 136/85   Pulse: 79   Resp: 18   Temp: 98.2 °F (36.8 °C)       Physical Exam  Vitals reviewed.   Constitutional:       Appearance: Normal appearance.   HENT:      Head: Normocephalic.   Eyes:      Extraocular Movements: Extraocular movements intact.      Conjunctiva/sclera: Conjunctivae normal.      Pupils: Pupils are equal, round, and reactive to light.   Neck:      Thyroid: No thyroid mass or thyromegaly.   Cardiovascular:      Rate and Rhythm: Normal rate and regular rhythm.      Heart sounds: Normal heart sounds. No murmur heard.     No gallop.   Pulmonary:      Effort: Pulmonary effort is normal. No respiratory distress.      Breath sounds: Normal breath sounds. No wheezing or rales.   Musculoskeletal:      Lumbar back: Spasms and tenderness present. Decreased range of motion.      Comments: She is slow to rise from a seated position with a mildly antalgic gait.   Skin:     General: Skin is warm and dry.      Coloration: Skin is not jaundiced or pale.   Neurological:      Mental Status: She is alert.   Psychiatric:         Mood and Affect: Mood normal.         Behavior: Behavior normal.         Thought Content: Thought content normal.         Judgment: Judgment normal.          Assessment and Plan  1. Long term use of drug  -     POCT Urine Drug Screen Presump    2. Lumbar disc disease  -     Discontinue: HYDROcodone-acetaminophen (NORCO)  mg per tablet; Take 1 tablet by mouth every 8 (eight) hours as needed (Chronic lumbar disc disease).  Dispense: 90 tablet; Refill: 0  -     HYDROcodone-acetaminophen (NORCO)  mg per tablet; Take 1 tablet by mouth every 8 (eight) hours as needed (Chronic lumbar disc disease).  Dispense: 90 tablet; Refill: 0    3. Dizziness  -     meclizine (ANTIVERT) 25 mg tablet; Take 1 tablet (25 mg total) by mouth 3 (three) times daily as needed for Dizziness.  Dispense:  20 tablet; Refill: 0    4. Tobacco dependence syndrome  -     buPROPion (WELLBUTRIN XL) 300 MG 24 hr tablet; Take 1 tablet (300 mg total) by mouth once daily.  Dispense: 90 tablet; Refill: 1             Return to clinic in 2 months or as needed once her labs are in.    Health Maintenance Topics with due status: Not Due       Topic Last Completion Date    Colorectal Cancer Screening 11/08/2022    Mammogram 10/11/2024    Influenza Vaccine 11/06/2024    Hemoglobin A1c (Prediabetes) 03/03/2025    Cervical Cancer Screening 04/07/2025    LDCT Lung Screen 04/22/2025              [1]   Current Outpatient Medications on File Prior to Visit   Medication Sig Dispense Refill    albuterol (PROAIR HFA) 90 mcg/actuation inhaler Inhale 2 puffs into the lungs every 4 (four) hours as needed for Wheezing. 18 g 5    albuterol (PROVENTIL) 2.5 mg /3 mL (0.083 %) nebulizer solution Take 3 mLs (2.5 mg total) by nebulization 4 (four) times daily as needed for Wheezing. 100 each 5    azelastine (ASTELIN) 137 mcg (0.1 %) nasal spray USE 2 SPRAYS NASALLY TWICE A DAY 90 mL 3    fluticasone propionate (FLONASE) 50 mcg/actuation nasal spray 2 sprays (100 mcg total) by Each Nostril route once daily. 16 g 5    fluticasone-umeclidin-vilanter (TRELEGY ELLIPTA) 200-62.5-25 mcg inhaler Inhale 1 puff into the lungs once daily. 60 each 5    gabapentin (NEURONTIN) 100 MG capsule Take 1 capsule (100 mg total) by mouth 3 (three) times daily. 270 capsule 0    HYDROcodone-acetaminophen (NORCO)  mg per tablet Take 1 tablet by mouth every 8 (eight) hours as needed (Chronic lumbar disc disease). 90 tablet 0    levocetirizine (XYZAL) 5 MG tablet Take 1 tablet (5 mg total) by mouth every evening. 90 tablet 1    linaCLOtide (LINZESS) 145 mcg Cap capsule Take 1 capsule (145 mcg total) by mouth before breakfast. 90 capsule 3    oxybutynin (DITROPAN XL) 15 MG TR24 Take 1 tablet (15 mg total) by mouth once daily. 90 tablet 1    pantoprazole (PROTONIX) 40 MG  tablet Take 1 tablet (40 mg total) by mouth once daily. 90 tablet 1    RESTASIS 0.05 % ophthalmic emulsion Place 1 drop into both eyes 2 (two) times daily. 60 each 2    rosuvastatin (CRESTOR) 20 MG tablet Take 1 tablet (20 mg total) by mouth once daily. 90 tablet 1    theophylline (THEODUR) 300 mg 24 hr capsule Take 300 mg by mouth once daily. One tab Daily with food: (replaced previous script for 100mg tab 3 daily) 90 capsule 1    valsartan (DIOVAN) 40 MG tablet Take 1 tablet (40 mg total) by mouth once daily. 90 tablet 1    [DISCONTINUED] HYDROcodone-acetaminophen (NORCO)  mg per tablet Take 1 tablet by mouth every 8 (eight) hours as needed (Chronic lumbar disc disease). 90 tablet 0    [DISCONTINUED] meclizine (ANTIVERT) 25 mg tablet Take 1 tablet (25 mg total) by mouth 3 (three) times daily as needed for Dizziness. 20 tablet 0    [DISCONTINUED] buPROPion (WELLBUTRIN XL) 300 MG 24 hr tablet Take 1 tablet (300 mg total) by mouth once daily. 90 tablet 1     No current facility-administered medications on file prior to visit.

## 2025-07-03 ENCOUNTER — TELEPHONE (OUTPATIENT)
Dept: FAMILY MEDICINE | Facility: CLINIC | Age: 65
End: 2025-07-03
Payer: MEDICARE

## 2025-07-03 ENCOUNTER — RESULTS FOLLOW-UP (OUTPATIENT)
Dept: FAMILY MEDICINE | Facility: CLINIC | Age: 65
End: 2025-07-03

## 2025-07-03 NOTE — TELEPHONE ENCOUNTER
----- Message from Magno Yan MD sent at 7/3/2025  8:10 AM CDT -----  Good A1c and lipids.  ----- Message -----  From: Soha Lee  Sent: 7/2/2025  10:49 AM CDT  To: Magno Yan MD

## 2025-07-04 DIAGNOSIS — K21.9 GASTROESOPHAGEAL REFLUX DISEASE, UNSPECIFIED WHETHER ESOPHAGITIS PRESENT: ICD-10-CM

## 2025-07-07 RX ORDER — PANTOPRAZOLE SODIUM 40 MG/1
40 TABLET, DELAYED RELEASE ORAL
Qty: 90 TABLET | Refills: 1 | Status: SHIPPED | OUTPATIENT
Start: 2025-07-07

## 2025-07-28 DIAGNOSIS — J44.9 CHRONIC OBSTRUCTIVE PULMONARY DISEASE, UNSPECIFIED COPD TYPE: ICD-10-CM

## 2025-07-28 RX ORDER — THEOPHYLLINE ANHYDROUS 300 MG/1
CAPSULE, EXTENDED RELEASE ORAL
Qty: 90 CAPSULE | Refills: 1 | Status: SHIPPED | OUTPATIENT
Start: 2025-07-28

## 2025-07-30 ENCOUNTER — OFFICE VISIT (OUTPATIENT)
Dept: PULMONOLOGY | Facility: CLINIC | Age: 65
End: 2025-07-30
Payer: COMMERCIAL

## 2025-07-30 VITALS
HEIGHT: 66 IN | OXYGEN SATURATION: 98 % | WEIGHT: 214.38 LBS | SYSTOLIC BLOOD PRESSURE: 124 MMHG | BODY MASS INDEX: 34.45 KG/M2 | HEART RATE: 82 BPM | RESPIRATION RATE: 18 BRPM | DIASTOLIC BLOOD PRESSURE: 82 MMHG

## 2025-07-30 DIAGNOSIS — J44.9 CHRONIC OBSTRUCTIVE PULMONARY DISEASE, UNSPECIFIED COPD TYPE: Primary | ICD-10-CM

## 2025-07-30 DIAGNOSIS — J44.9 CHRONIC OBSTRUCTIVE PULMONARY DISEASE, UNSPECIFIED COPD TYPE: ICD-10-CM

## 2025-07-30 PROCEDURE — 3044F HG A1C LEVEL LT 7.0%: CPT | Mod: ,,, | Performed by: INTERNAL MEDICINE

## 2025-07-30 PROCEDURE — 99214 OFFICE O/P EST MOD 30 MIN: CPT | Mod: S$PBB,,, | Performed by: INTERNAL MEDICINE

## 2025-07-30 PROCEDURE — 99215 OFFICE O/P EST HI 40 MIN: CPT | Mod: PBBFAC | Performed by: INTERNAL MEDICINE

## 2025-07-30 PROCEDURE — 3074F SYST BP LT 130 MM HG: CPT | Mod: ,,, | Performed by: INTERNAL MEDICINE

## 2025-07-30 PROCEDURE — 3079F DIAST BP 80-89 MM HG: CPT | Mod: ,,, | Performed by: INTERNAL MEDICINE

## 2025-07-30 PROCEDURE — 3008F BODY MASS INDEX DOCD: CPT | Mod: ,,, | Performed by: INTERNAL MEDICINE

## 2025-07-30 PROCEDURE — 1159F MED LIST DOCD IN RCRD: CPT | Mod: ,,, | Performed by: INTERNAL MEDICINE

## 2025-07-30 PROCEDURE — 99999 PR PBB SHADOW E&M-EST. PATIENT-LVL V: CPT | Mod: PBBFAC,,, | Performed by: INTERNAL MEDICINE

## 2025-07-30 PROCEDURE — 4010F ACE/ARB THERAPY RXD/TAKEN: CPT | Mod: ,,, | Performed by: INTERNAL MEDICINE

## 2025-07-30 RX ORDER — FLUTICASONE FUROATE, UMECLIDINIUM BROMIDE AND VILANTEROL TRIFENATATE 200; 62.5; 25 UG/1; UG/1; UG/1
1 POWDER RESPIRATORY (INHALATION) DAILY
Qty: 60 EACH | Refills: 5 | Status: CANCELLED | OUTPATIENT
Start: 2025-07-30

## 2025-07-30 RX ORDER — FLUTICASONE FUROATE, UMECLIDINIUM BROMIDE AND VILANTEROL TRIFENATATE 200; 62.5; 25 UG/1; UG/1; UG/1
1 POWDER RESPIRATORY (INHALATION) DAILY
Qty: 180 EACH | Refills: 5 | Status: SHIPPED | OUTPATIENT
Start: 2025-07-30

## 2025-07-30 RX ORDER — ALBUTEROL SULFATE 90 UG/1
2 INHALANT RESPIRATORY (INHALATION) EVERY 6 HOURS PRN
Qty: 18 G | Refills: 5 | Status: SHIPPED | OUTPATIENT
Start: 2025-07-30

## 2025-07-30 RX ORDER — ALBUTEROL SULFATE 0.83 MG/ML
2.5 SOLUTION RESPIRATORY (INHALATION) 4 TIMES DAILY PRN
Qty: 100 EACH | Refills: 5 | Status: CANCELLED | OUTPATIENT
Start: 2025-07-30

## 2025-07-30 NOTE — PROGRESS NOTES
"Subjective:       Patient ID: Nneka Gomes is a 64 y.o. female.    Chief Complaint: COPD and Medication Refill (Albuterol vials)    History of Present Illness    CHIEF COMPLAINT:  Ms. Gomes presents today for follow up    RESPIRATORY:  She continues Trelegy and Ventolin inhalers along with Theophylline, reporting that her current medications are working well without adverse effects. She quit smoking approximately 3 years ago and currently denies active tobacco use with no ongoing smoking-related symptoms.    PREVENTIVE CARE:  Annual lung cancer screening was performed in April by Dr. Martinez with good results. She receives annual influenza vaccination and is up to date with pneumonia vaccination.      ROS:  General: -fever, -chills, -fatigue, -weight gain, -weight loss  Eyes: -vision changes, -redness, -discharge  ENT: -ear pain, -nasal congestion, -sore throat  Cardiovascular: -chest pain, -palpitations, -lower extremity edema  Respiratory: -cough, -shortness of breath  Gastrointestinal: -abdominal pain, -nausea, -vomiting, -diarrhea, -constipation, -blood in stool  Genitourinary: -dysuria, -hematuria, -frequency  Musculoskeletal: -joint pain, -muscle pain  Skin: -rash, -lesion  Neurological: -headache, -dizziness, -numbness, -tingling  Psychiatric: -anxiety, -depression, -sleep difficulty          Objective:      Physical Exam  Personal Diagnostic Review  Low-dose CT reviewed normal        7/30/2025     1:41 PM 7/2/2025    10:13 AM 5/2/2025     9:27 AM 4/28/2025     9:39 AM 4/22/2025     2:35 PM 4/22/2025     2:27 PM 4/8/2025     1:09 PM   Pulmonary Function Tests   SpO2 98 % 96 % 98 %    99 %   Height 5' 6" (1.676 m) 5' 6" (1.676 m) 5' 6" (1.676 m) 5' 6" (1.676 m) 5' 6" (1.676 m) 5' 6" (1.676 m) 5' 6" (1.676 m)   Weight 97.3 kg (214 lb 6.4 oz) 95.3 kg (210 lb) 92.1 kg (203 lb) 95.3 kg (210 lb) 92.5 kg (204 lb) 92.5 kg (204 lb) 92.8 kg (204 lb 9.6 oz)   BMI (Calculated) 34.6 33.9 32.8 33.9 32.9 32.9 33       "     Current Medications[1]   Assessment:       No diagnosis found.    Encounter Medications[2]  No orders of the defined types were placed in this encounter.      Plan:       Problem List Items Addressed This Visit    None        Assessment & Plan    IMPRESSION:  - Reviewed current medications and determined they are effective: Trelegy, Ventolin, Theophylline.  - Noted recent lung cancer screening test by Dr. Martinez in April with good results.  - Confirmed up to date on flu vaccines and pneumonia vaccination.  - Assessed smoking cessation status, noting it has been about 3 years since quitting.  - Determined annual lung cancer screening should continue until 76 years old (12 more years of screening).    CHRONIC OBSTRUCTIVE PULMONARY DISEASE (COPD):  - Continue Trelegy, Ventolin, and Theophylline as currently prescribed.    FOLLOW-UP:  - Follow up in 1 year for annual check-up.            This note was generated with the assistance of ambient listening technology. Verbal consent was obtained by the patient and accompanying visitor(s) for the recording of patient appointment to facilitate this note. I attest to having reviewed and edited the generated note for accuracy, though some syntax or spelling errors may persist. Please contact the author of this note for any clarification.                  [1]   Current Outpatient Medications:     albuterol (PROAIR HFA) 90 mcg/actuation inhaler, Inhale 2 puffs into the lungs every 4 (four) hours as needed for Wheezing., Disp: 18 g, Rfl: 5    albuterol (PROVENTIL) 2.5 mg /3 mL (0.083 %) nebulizer solution, Take 3 mLs (2.5 mg total) by nebulization 4 (four) times daily as needed for Wheezing., Disp: 100 each, Rfl: 5    azelastine (ASTELIN) 137 mcg (0.1 %) nasal spray, USE 2 SPRAYS NASALLY TWICE A DAY, Disp: 90 mL, Rfl: 3    buPROPion (WELLBUTRIN XL) 300 MG 24 hr tablet, Take 1 tablet (300 mg total) by mouth once daily., Disp: 90 tablet, Rfl: 1    fluticasone propionate  (FLONASE) 50 mcg/actuation nasal spray, 2 sprays (100 mcg total) by Each Nostril route once daily., Disp: 16 g, Rfl: 5    fluticasone-umeclidin-vilanter (TRELEGY ELLIPTA) 200-62.5-25 mcg inhaler, Inhale 1 puff into the lungs once daily., Disp: 60 each, Rfl: 5    gabapentin (NEURONTIN) 100 MG capsule, Take 1 capsule (100 mg total) by mouth 3 (three) times daily., Disp: 270 capsule, Rfl: 0    HYDROcodone-acetaminophen (NORCO)  mg per tablet, Take 1 tablet by mouth every 8 (eight) hours as needed (Chronic lumbar disc disease)., Disp: 90 tablet, Rfl: 0    levocetirizine (XYZAL) 5 MG tablet, Take 1 tablet (5 mg total) by mouth every evening., Disp: 90 tablet, Rfl: 1    linaCLOtide (LINZESS) 145 mcg Cap capsule, Take 1 capsule (145 mcg total) by mouth before breakfast., Disp: 90 capsule, Rfl: 3    meclizine (ANTIVERT) 25 mg tablet, Take 1 tablet (25 mg total) by mouth 3 (three) times daily as needed for Dizziness., Disp: 20 tablet, Rfl: 0    oxybutynin (DITROPAN XL) 15 MG TR24, Take 1 tablet (15 mg total) by mouth once daily., Disp: 90 tablet, Rfl: 1    pantoprazole (PROTONIX) 40 MG tablet, TAKE 1 TABLET DAILY, Disp: 90 tablet, Rfl: 1    rosuvastatin (CRESTOR) 20 MG tablet, Take 1 tablet (20 mg total) by mouth once daily., Disp: 90 tablet, Rfl: 1    ZEFERINO-24 300 mg 24 hr capsule, TAKE 1 CAPSULE ONCE DAILY WITH FOOD, Disp: 90 capsule, Rfl: 1    valsartan (DIOVAN) 40 MG tablet, Take 1 tablet (40 mg total) by mouth once daily., Disp: 90 tablet, Rfl: 1    albuterol (PROVENTIL/VENTOLIN HFA) 90 mcg/actuation inhaler, Inhale 2 puffs into the lungs every 6 (six) hours as needed. Rescue, Disp: 18 g, Rfl: 5    fluticasone-umeclidin-vilanter (TRELEGY ELLIPTA) 200-62.5-25 mcg inhaler, Inhale 1 puff into the lungs once daily., Disp: 180 each, Rfl: 5    [START ON 8/1/2025] HYDROcodone-acetaminophen (NORCO)  mg per tablet, Take 1 tablet by mouth every 8 (eight) hours as needed (Chronic lumbar disc disease). (Patient not  taking: Reported on 7/30/2025), Disp: 90 tablet, Rfl: 0    RESTASIS 0.05 % ophthalmic emulsion, Place 1 drop into both eyes 2 (two) times daily. (Patient not taking: Reported on 7/30/2025), Disp: 60 each, Rfl: 2  [2]   Outpatient Encounter Medications as of 7/30/2025   Medication Sig Dispense Refill    albuterol (PROAIR HFA) 90 mcg/actuation inhaler Inhale 2 puffs into the lungs every 4 (four) hours as needed for Wheezing. 18 g 5    albuterol (PROVENTIL) 2.5 mg /3 mL (0.083 %) nebulizer solution Take 3 mLs (2.5 mg total) by nebulization 4 (four) times daily as needed for Wheezing. 100 each 5    azelastine (ASTELIN) 137 mcg (0.1 %) nasal spray USE 2 SPRAYS NASALLY TWICE A DAY 90 mL 3    buPROPion (WELLBUTRIN XL) 300 MG 24 hr tablet Take 1 tablet (300 mg total) by mouth once daily. 90 tablet 1    fluticasone propionate (FLONASE) 50 mcg/actuation nasal spray 2 sprays (100 mcg total) by Each Nostril route once daily. 16 g 5    fluticasone-umeclidin-vilanter (TRELEGY ELLIPTA) 200-62.5-25 mcg inhaler Inhale 1 puff into the lungs once daily. 60 each 5    gabapentin (NEURONTIN) 100 MG capsule Take 1 capsule (100 mg total) by mouth 3 (three) times daily. 270 capsule 0    HYDROcodone-acetaminophen (NORCO)  mg per tablet Take 1 tablet by mouth every 8 (eight) hours as needed (Chronic lumbar disc disease). 90 tablet 0    levocetirizine (XYZAL) 5 MG tablet Take 1 tablet (5 mg total) by mouth every evening. 90 tablet 1    linaCLOtide (LINZESS) 145 mcg Cap capsule Take 1 capsule (145 mcg total) by mouth before breakfast. 90 capsule 3    meclizine (ANTIVERT) 25 mg tablet Take 1 tablet (25 mg total) by mouth 3 (three) times daily as needed for Dizziness. 20 tablet 0    oxybutynin (DITROPAN XL) 15 MG TR24 Take 1 tablet (15 mg total) by mouth once daily. 90 tablet 1    pantoprazole (PROTONIX) 40 MG tablet TAKE 1 TABLET DAILY 90 tablet 1    rosuvastatin (CRESTOR) 20 MG tablet Take 1 tablet (20 mg total) by mouth once daily. 90  tablet 1    ZEFERINO-24 300 mg 24 hr capsule TAKE 1 CAPSULE ONCE DAILY WITH FOOD 90 capsule 1    valsartan (DIOVAN) 40 MG tablet Take 1 tablet (40 mg total) by mouth once daily. 90 tablet 1    albuterol (PROVENTIL/VENTOLIN HFA) 90 mcg/actuation inhaler Inhale 2 puffs into the lungs every 6 (six) hours as needed. Rescue 18 g 5    fluticasone-umeclidin-vilanter (TRELEGY ELLIPTA) 200-62.5-25 mcg inhaler Inhale 1 puff into the lungs once daily. 180 each 5    [START ON 8/1/2025] HYDROcodone-acetaminophen (NORCO)  mg per tablet Take 1 tablet by mouth every 8 (eight) hours as needed (Chronic lumbar disc disease). (Patient not taking: Reported on 7/30/2025) 90 tablet 0    RESTASIS 0.05 % ophthalmic emulsion Place 1 drop into both eyes 2 (two) times daily. (Patient not taking: Reported on 7/30/2025) 60 each 2    [DISCONTINUED] pantoprazole (PROTONIX) 40 MG tablet Take 1 tablet (40 mg total) by mouth once daily. 90 tablet 1    [DISCONTINUED] theophylline (THEODUR) 300 mg 24 hr capsule Take 300 mg by mouth once daily. One tab Daily with food: (replaced previous script for 100mg tab 3 daily) 90 capsule 1     No facility-administered encounter medications on file as of 7/30/2025.

## 2025-08-18 DIAGNOSIS — J30.9 ALLERGIC RHINITIS, UNSPECIFIED SEASONALITY, UNSPECIFIED TRIGGER: ICD-10-CM

## 2025-08-19 RX ORDER — AZELASTINE 1 MG/ML
2 SPRAY, METERED NASAL 2 TIMES DAILY
Qty: 90 ML | Refills: 3 | Status: SHIPPED | OUTPATIENT
Start: 2025-08-19

## 2025-08-21 DIAGNOSIS — E78.5 DYSLIPIDEMIA: ICD-10-CM

## 2025-08-21 RX ORDER — ROSUVASTATIN CALCIUM 20 MG/1
20 TABLET, COATED ORAL
Qty: 90 TABLET | Refills: 3 | Status: SHIPPED | OUTPATIENT
Start: 2025-08-21

## 2025-09-02 ENCOUNTER — OFFICE VISIT (OUTPATIENT)
Dept: FAMILY MEDICINE | Facility: CLINIC | Age: 65
End: 2025-09-02
Payer: COMMERCIAL

## 2025-09-02 VITALS
OXYGEN SATURATION: 98 % | HEIGHT: 66 IN | BODY MASS INDEX: 34.07 KG/M2 | DIASTOLIC BLOOD PRESSURE: 86 MMHG | TEMPERATURE: 98 F | WEIGHT: 212 LBS | RESPIRATION RATE: 18 BRPM | SYSTOLIC BLOOD PRESSURE: 128 MMHG | HEART RATE: 96 BPM

## 2025-09-02 DIAGNOSIS — M51.9 LUMBAR DISC DISEASE: ICD-10-CM

## 2025-09-02 DIAGNOSIS — M25.522 LEFT ELBOW PAIN: Primary | ICD-10-CM

## 2025-09-02 DIAGNOSIS — J44.9 CHRONIC OBSTRUCTIVE PULMONARY DISEASE, UNSPECIFIED COPD TYPE: ICD-10-CM

## 2025-09-02 PROCEDURE — 3008F BODY MASS INDEX DOCD: CPT | Mod: ,,, | Performed by: FAMILY MEDICINE

## 2025-09-02 PROCEDURE — 1160F RVW MEDS BY RX/DR IN RCRD: CPT | Mod: ,,, | Performed by: FAMILY MEDICINE

## 2025-09-02 PROCEDURE — 4010F ACE/ARB THERAPY RXD/TAKEN: CPT | Mod: ,,, | Performed by: FAMILY MEDICINE

## 2025-09-02 PROCEDURE — 99213 OFFICE O/P EST LOW 20 MIN: CPT | Mod: ,,, | Performed by: FAMILY MEDICINE

## 2025-09-02 PROCEDURE — 3079F DIAST BP 80-89 MM HG: CPT | Mod: ,,, | Performed by: FAMILY MEDICINE

## 2025-09-02 PROCEDURE — 3074F SYST BP LT 130 MM HG: CPT | Mod: ,,, | Performed by: FAMILY MEDICINE

## 2025-09-02 PROCEDURE — 1159F MED LIST DOCD IN RCRD: CPT | Mod: ,,, | Performed by: FAMILY MEDICINE

## 2025-09-02 PROCEDURE — 3044F HG A1C LEVEL LT 7.0%: CPT | Mod: ,,, | Performed by: FAMILY MEDICINE

## 2025-09-02 RX ORDER — ALBUTEROL SULFATE 0.83 MG/ML
2.5 SOLUTION RESPIRATORY (INHALATION) 4 TIMES DAILY PRN
Qty: 100 EACH | Refills: 5 | Status: SHIPPED | OUTPATIENT
Start: 2025-09-02

## 2025-09-02 RX ORDER — DICLOFENAC SODIUM 10 MG/G
2 GEL TOPICAL 3 TIMES DAILY
Qty: 100 G | Refills: 1 | Status: SHIPPED | OUTPATIENT
Start: 2025-09-02

## 2025-09-02 RX ORDER — HYDROCODONE BITARTRATE AND ACETAMINOPHEN 10; 325 MG/1; MG/1
1 TABLET ORAL EVERY 8 HOURS PRN
Qty: 90 TABLET | Refills: 0 | Status: SHIPPED | OUTPATIENT
Start: 2025-09-02

## 2025-09-02 RX ORDER — HYDROCODONE BITARTRATE AND ACETAMINOPHEN 10; 325 MG/1; MG/1
1 TABLET ORAL EVERY 8 HOURS PRN
Qty: 90 TABLET | Refills: 0 | Status: SHIPPED | OUTPATIENT
Start: 2025-10-02